# Patient Record
Sex: MALE | Race: WHITE | Employment: FULL TIME | URBAN - NONMETROPOLITAN AREA
[De-identification: names, ages, dates, MRNs, and addresses within clinical notes are randomized per-mention and may not be internally consistent; named-entity substitution may affect disease eponyms.]

---

## 2017-06-11 ENCOUNTER — HOSPITAL ENCOUNTER (EMERGENCY)
Age: 42
Discharge: HOME | End: 2017-06-12
Payer: COMMERCIAL

## 2017-06-11 ENCOUNTER — HOSPITAL ENCOUNTER (EMERGENCY)
Facility: HOSPITAL | Age: 42
Discharge: LEFT BEFORE DISCHARGE | End: 2017-06-11

## 2017-06-11 VITALS
SYSTOLIC BLOOD PRESSURE: 130 MMHG | BODY MASS INDEX: 43.39 KG/M2 | HEIGHT: 66 IN | WEIGHT: 270 LBS | OXYGEN SATURATION: 100 % | TEMPERATURE: 97.7 F | RESPIRATION RATE: 22 BRPM | HEART RATE: 103 BPM | DIASTOLIC BLOOD PRESSURE: 72 MMHG

## 2017-06-11 DIAGNOSIS — S39.012A BACK STRAIN, INITIAL ENCOUNTER: Primary | ICD-10-CM

## 2017-06-11 PROCEDURE — 99283 EMERGENCY DEPT VISIT LOW MDM: CPT

## 2017-06-11 PROCEDURE — 99211 OFF/OP EST MAY X REQ PHY/QHP: CPT

## 2017-06-11 PROCEDURE — 96372 THER/PROPH/DIAG INJ SC/IM: CPT

## 2017-06-11 RX ORDER — SERTRALINE HYDROCHLORIDE 25 MG/1
25 TABLET, FILM COATED ORAL
COMMUNITY

## 2017-06-11 RX ORDER — CYCLOBENZAPRINE HCL 5 MG
5 TABLET ORAL
COMMUNITY
End: 2017-09-15 | Stop reason: ALTCHOICE

## 2017-06-11 RX ORDER — SERTRALINE HYDROCHLORIDE 25 MG/1
25 TABLET, FILM COATED ORAL DAILY
COMMUNITY
End: 2020-11-17

## 2017-06-11 RX ORDER — CYCLOBENZAPRINE HCL 5 MG
5 TABLET ORAL 3 TIMES DAILY PRN
COMMUNITY
End: 2020-11-17

## 2017-06-11 ASSESSMENT — PAIN SCALES - GENERAL: PAINLEVEL_OUTOF10: 10

## 2017-06-11 ASSESSMENT — PAIN DESCRIPTION - PAIN TYPE: TYPE: ACUTE PAIN

## 2017-06-11 ASSESSMENT — PAIN DESCRIPTION - LOCATION: LOCATION: BACK

## 2017-06-12 ENCOUNTER — APPOINTMENT (OUTPATIENT)
Dept: GENERAL RADIOLOGY | Age: 42
End: 2017-06-12
Payer: COMMERCIAL

## 2017-06-12 VITALS
TEMPERATURE: 98 F | RESPIRATION RATE: 17 BRPM | DIASTOLIC BLOOD PRESSURE: 70 MMHG | OXYGEN SATURATION: 98 % | BODY MASS INDEX: 42.38 KG/M2 | HEIGHT: 67 IN | HEART RATE: 72 BPM | SYSTOLIC BLOOD PRESSURE: 128 MMHG | WEIGHT: 270 LBS

## 2017-06-12 PROCEDURE — 99282 EMERGENCY DEPT VISIT SF MDM: CPT | Performed by: NURSE PRACTITIONER

## 2017-06-12 PROCEDURE — 6360000002 HC RX W HCPCS: Performed by: NURSE PRACTITIONER

## 2017-06-12 PROCEDURE — 72100 X-RAY EXAM L-S SPINE 2/3 VWS: CPT

## 2017-06-12 RX ORDER — KETOROLAC TROMETHAMINE 30 MG/ML
60 INJECTION, SOLUTION INTRAMUSCULAR; INTRAVENOUS ONCE
Status: COMPLETED | OUTPATIENT
Start: 2017-06-12 | End: 2017-06-12

## 2017-06-12 RX ORDER — DEXAMETHASONE SODIUM PHOSPHATE 10 MG/ML
10 INJECTION INTRAMUSCULAR; INTRAVENOUS ONCE
Status: COMPLETED | OUTPATIENT
Start: 2017-06-12 | End: 2017-06-12

## 2017-06-12 RX ORDER — HYDROCODONE BITARTRATE AND ACETAMINOPHEN 5; 325 MG/1; MG/1
1 TABLET ORAL EVERY 6 HOURS PRN
Qty: 15 TABLET | Refills: 0 | Status: SHIPPED | OUTPATIENT
Start: 2017-06-12 | End: 2017-06-19

## 2017-06-12 RX ORDER — METHYLPREDNISOLONE 4 MG/1
TABLET ORAL
Qty: 1 KIT | Refills: 0 | Status: SHIPPED | OUTPATIENT
Start: 2017-06-12 | End: 2017-06-18

## 2017-06-12 RX ADMIN — DEXAMETHASONE SODIUM PHOSPHATE 10 MG: 10 INJECTION INTRAMUSCULAR; INTRAVENOUS at 00:41

## 2017-06-12 RX ADMIN — KETOROLAC TROMETHAMINE 60 MG: 30 INJECTION, SOLUTION INTRAMUSCULAR at 00:40

## 2017-06-12 RX ADMIN — HYDROMORPHONE HYDROCHLORIDE 1 MG: 1 INJECTION, SOLUTION INTRAMUSCULAR; INTRAVENOUS; SUBCUTANEOUS at 00:41

## 2017-06-12 ASSESSMENT — PAIN SCALES - GENERAL
PAINLEVEL_OUTOF10: 0
PAINLEVEL_OUTOF10: 7
PAINLEVEL_OUTOF10: 0

## 2017-06-12 ASSESSMENT — ENCOUNTER SYMPTOMS
ABDOMINAL PAIN: 0
BACK PAIN: 1

## 2017-09-15 ENCOUNTER — APPOINTMENT (OUTPATIENT)
Dept: GENERAL RADIOLOGY | Age: 42
End: 2017-09-15
Payer: COMMERCIAL

## 2017-09-15 ENCOUNTER — HOSPITAL ENCOUNTER (EMERGENCY)
Age: 42
Discharge: HOME | End: 2017-09-15
Attending: EMERGENCY MEDICINE
Payer: COMMERCIAL

## 2017-09-15 VITALS
WEIGHT: 245 LBS | RESPIRATION RATE: 18 BRPM | BODY MASS INDEX: 38.45 KG/M2 | DIASTOLIC BLOOD PRESSURE: 79 MMHG | HEIGHT: 67 IN | HEART RATE: 88 BPM | SYSTOLIC BLOOD PRESSURE: 138 MMHG | OXYGEN SATURATION: 95 % | TEMPERATURE: 98.3 F

## 2017-09-15 DIAGNOSIS — R07.89 MUSCULOSKELETAL CHEST PAIN: Primary | ICD-10-CM

## 2017-09-15 LAB
ALBUMIN SERPL-MCNC: 4.2 G/DL (ref 3.5–5.2)
ALP BLD-CCNC: 84 U/L (ref 40–130)
ALT SERPL-CCNC: 29 U/L (ref 5–41)
ANION GAP SERPL CALCULATED.3IONS-SCNC: 14 MMOL/L (ref 7–19)
AST SERPL-CCNC: 21 U/L (ref 5–40)
BASOPHILS ABSOLUTE: 0.1 K/UL (ref 0–0.2)
BASOPHILS RELATIVE PERCENT: 0.5 % (ref 0–1)
BILIRUB SERPL-MCNC: 0.5 MG/DL (ref 0.2–1.2)
BUN BLDV-MCNC: 7 MG/DL (ref 6–20)
CALCIUM SERPL-MCNC: 9.6 MG/DL (ref 8.6–10)
CHLORIDE BLD-SCNC: 101 MMOL/L (ref 98–111)
CO2: 23 MMOL/L (ref 22–29)
CREAT SERPL-MCNC: 0.8 MG/DL (ref 0.5–1.2)
EOSINOPHILS ABSOLUTE: 0 K/UL (ref 0–0.6)
EOSINOPHILS RELATIVE PERCENT: 0 % (ref 0–5)
ETHANOL: 23 MG/DL (ref 0–0.08)
GFR NON-AFRICAN AMERICAN: >60
GLUCOSE BLD-MCNC: 97 MG/DL (ref 74–109)
HCT VFR BLD CALC: 43.8 % (ref 42–52)
HEMOGLOBIN: 15 G/DL (ref 14–18)
LYMPHOCYTES ABSOLUTE: 1.5 K/UL (ref 1.1–4.5)
LYMPHOCYTES RELATIVE PERCENT: 13.5 % (ref 20–40)
MCH RBC QN AUTO: 29 PG (ref 27–31)
MCHC RBC AUTO-ENTMCNC: 34.2 G/DL (ref 33–37)
MCV RBC AUTO: 84.7 FL (ref 80–94)
MONOCYTES ABSOLUTE: 0.7 K/UL (ref 0–0.9)
MONOCYTES RELATIVE PERCENT: 6.5 % (ref 0–10)
NEUTROPHILS ABSOLUTE: 8.8 K/UL (ref 1.5–7.5)
NEUTROPHILS RELATIVE PERCENT: 78.6 % (ref 50–65)
PDW BLD-RTO: 12.7 % (ref 11.5–14.5)
PERFORMED ON: NORMAL
PLATELET # BLD: 392 K/UL (ref 130–400)
PMV BLD AUTO: 8.8 FL (ref 9.4–12.4)
POC TROPONIN I: 0.01 NG/ML (ref 0–0.08)
POTASSIUM SERPL-SCNC: 4.1 MMOL/L (ref 3.5–5)
RBC # BLD: 5.17 M/UL (ref 4.7–6.1)
SODIUM BLD-SCNC: 138 MMOL/L (ref 136–145)
TOTAL PROTEIN: 7.3 G/DL (ref 6.6–8.7)
WBC # BLD: 11.3 K/UL (ref 4.8–10.8)

## 2017-09-15 PROCEDURE — 96375 TX/PRO/DX INJ NEW DRUG ADDON: CPT

## 2017-09-15 PROCEDURE — 6360000002 HC RX W HCPCS: Performed by: EMERGENCY MEDICINE

## 2017-09-15 PROCEDURE — 80053 COMPREHEN METABOLIC PANEL: CPT

## 2017-09-15 PROCEDURE — G0480 DRUG TEST DEF 1-7 CLASSES: HCPCS

## 2017-09-15 PROCEDURE — 84484 ASSAY OF TROPONIN QUANT: CPT

## 2017-09-15 PROCEDURE — 99283 EMERGENCY DEPT VISIT LOW MDM: CPT | Performed by: EMERGENCY MEDICINE

## 2017-09-15 PROCEDURE — 71010 XR CHEST PORTABLE: CPT

## 2017-09-15 PROCEDURE — 36415 COLL VENOUS BLD VENIPUNCTURE: CPT

## 2017-09-15 PROCEDURE — 99285 EMERGENCY DEPT VISIT HI MDM: CPT

## 2017-09-15 PROCEDURE — 96374 THER/PROPH/DIAG INJ IV PUSH: CPT

## 2017-09-15 PROCEDURE — 85025 COMPLETE CBC W/AUTO DIFF WBC: CPT

## 2017-09-15 RX ORDER — HYDROCODONE BITARTRATE AND IBUPROFEN 7.5; 2 MG/1; MG/1
1 TABLET, FILM COATED ORAL EVERY 6 HOURS PRN
Qty: 10 TABLET | Refills: 0 | Status: SHIPPED | OUTPATIENT
Start: 2017-09-15 | End: 2018-05-17

## 2017-09-15 RX ORDER — ORPHENADRINE CITRATE 30 MG/ML
60 INJECTION INTRAMUSCULAR; INTRAVENOUS ONCE
Status: COMPLETED | OUTPATIENT
Start: 2017-09-15 | End: 2017-09-15

## 2017-09-15 RX ORDER — KETOROLAC TROMETHAMINE 30 MG/ML
30 INJECTION, SOLUTION INTRAMUSCULAR; INTRAVENOUS ONCE
Status: COMPLETED | OUTPATIENT
Start: 2017-09-15 | End: 2017-09-15

## 2017-09-15 RX ADMIN — KETOROLAC TROMETHAMINE 30 MG: 30 INJECTION, SOLUTION INTRAMUSCULAR at 18:31

## 2017-09-15 RX ADMIN — ORPHENADRINE CITRATE 60 MG: 30 INJECTION INTRAMUSCULAR; INTRAVENOUS at 18:31

## 2017-09-15 ASSESSMENT — ENCOUNTER SYMPTOMS
SHORTNESS OF BREATH: 0
VOMITING: 0
NAUSEA: 0
COUGH: 0
BACK PAIN: 0

## 2017-09-15 ASSESSMENT — PAIN SCALES - GENERAL
PAINLEVEL_OUTOF10: 6
PAINLEVEL_OUTOF10: 6

## 2018-05-16 ENCOUNTER — APPOINTMENT (OUTPATIENT)
Dept: GENERAL RADIOLOGY | Facility: HOSPITAL | Age: 43
End: 2018-05-16

## 2018-05-16 ENCOUNTER — HOSPITAL ENCOUNTER (EMERGENCY)
Facility: HOSPITAL | Age: 43
Discharge: HOME | End: 2018-05-17
Attending: EMERGENCY MEDICINE | Admitting: EMERGENCY MEDICINE

## 2018-05-16 DIAGNOSIS — R07.9 CHEST PAIN, UNSPECIFIED TYPE: Primary | ICD-10-CM

## 2018-05-16 LAB
ALBUMIN SERPL-MCNC: 3.9 G/DL (ref 3.5–5)
ALBUMIN/GLOB SERPL: 1.6 G/DL (ref 1.1–2.5)
ALP SERPL-CCNC: 71 U/L (ref 24–120)
ALT SERPL W P-5'-P-CCNC: 26 U/L (ref 0–54)
ANION GAP SERPL CALCULATED.3IONS-SCNC: 13 MMOL/L (ref 4–13)
AST SERPL-CCNC: 21 U/L (ref 7–45)
BASOPHILS # BLD AUTO: 0.04 10*3/MM3 (ref 0–0.2)
BASOPHILS NFR BLD AUTO: 0.5 % (ref 0–2)
BILIRUB SERPL-MCNC: 0.4 MG/DL (ref 0.1–1)
BUN BLD-MCNC: 15 MG/DL (ref 5–21)
BUN/CREAT SERPL: 17.4 (ref 7–25)
CALCIUM SPEC-SCNC: 8.5 MG/DL (ref 8.4–10.4)
CHLORIDE SERPL-SCNC: 105 MMOL/L (ref 98–110)
CO2 SERPL-SCNC: 24 MMOL/L (ref 24–31)
CREAT BLD-MCNC: 0.86 MG/DL (ref 0.5–1.4)
DEPRECATED RDW RBC AUTO: 34.9 FL (ref 40–54)
EOSINOPHIL # BLD AUTO: 0.06 10*3/MM3 (ref 0–0.7)
EOSINOPHIL NFR BLD AUTO: 0.7 % (ref 0–4)
ERYTHROCYTE [DISTWIDTH] IN BLOOD BY AUTOMATED COUNT: 12.1 % (ref 12–15)
GFR SERPL CREATININE-BSD FRML MDRD: 98 ML/MIN/1.73
GLOBULIN UR ELPH-MCNC: 2.5 GM/DL
GLUCOSE BLD-MCNC: 93 MG/DL (ref 70–100)
HCT VFR BLD AUTO: 41.4 % (ref 40–52)
HGB BLD-MCNC: 14.7 G/DL (ref 14–18)
HOLD SPECIMEN: NORMAL
HOLD SPECIMEN: NORMAL
IMM GRANULOCYTES # BLD: 0.04 10*3/MM3 (ref 0–0.03)
IMM GRANULOCYTES NFR BLD: 0.5 % (ref 0–5)
INR PPP: 0.96 (ref 0.91–1.09)
LYMPHOCYTES # BLD AUTO: 2 10*3/MM3 (ref 0.72–4.86)
LYMPHOCYTES NFR BLD AUTO: 22.8 % (ref 15–45)
MCH RBC QN AUTO: 28.3 PG (ref 28–32)
MCHC RBC AUTO-ENTMCNC: 35.5 G/DL (ref 33–36)
MCV RBC AUTO: 79.8 FL (ref 82–95)
MONOCYTES # BLD AUTO: 0.68 10*3/MM3 (ref 0.19–1.3)
MONOCYTES NFR BLD AUTO: 7.7 % (ref 4–12)
NEUTROPHILS # BLD AUTO: 5.97 10*3/MM3 (ref 1.87–8.4)
NEUTROPHILS NFR BLD AUTO: 67.8 % (ref 39–78)
NRBC BLD MANUAL-RTO: 0 /100 WBC (ref 0–0)
NT-PROBNP SERPL-MCNC: 35 PG/ML (ref 0–450)
PLATELET # BLD AUTO: 302 10*3/MM3 (ref 130–400)
PMV BLD AUTO: 8.9 FL (ref 6–12)
POTASSIUM BLD-SCNC: 3.6 MMOL/L (ref 3.5–5.3)
PROT SERPL-MCNC: 6.4 G/DL (ref 6.3–8.7)
PROTHROMBIN TIME: 13.1 SECONDS (ref 11.9–14.6)
RBC # BLD AUTO: 5.19 10*6/MM3 (ref 4.8–5.9)
SODIUM BLD-SCNC: 142 MMOL/L (ref 135–145)
TROPONIN I SERPL-MCNC: <0.012 NG/ML (ref 0–0.03)
WBC NRBC COR # BLD: 8.79 10*3/MM3 (ref 4.8–10.8)
WHOLE BLOOD HOLD SPECIMEN: NORMAL
WHOLE BLOOD HOLD SPECIMEN: NORMAL

## 2018-05-16 PROCEDURE — 71045 X-RAY EXAM CHEST 1 VIEW: CPT

## 2018-05-16 PROCEDURE — 85610 PROTHROMBIN TIME: CPT | Performed by: EMERGENCY MEDICINE

## 2018-05-16 PROCEDURE — 93010 ELECTROCARDIOGRAM REPORT: CPT | Performed by: INTERNAL MEDICINE

## 2018-05-16 PROCEDURE — 99284 EMERGENCY DEPT VISIT MOD MDM: CPT

## 2018-05-16 PROCEDURE — 80053 COMPREHEN METABOLIC PANEL: CPT | Performed by: EMERGENCY MEDICINE

## 2018-05-16 PROCEDURE — 84484 ASSAY OF TROPONIN QUANT: CPT | Performed by: EMERGENCY MEDICINE

## 2018-05-16 PROCEDURE — 93005 ELECTROCARDIOGRAM TRACING: CPT | Performed by: EMERGENCY MEDICINE

## 2018-05-16 PROCEDURE — 96374 THER/PROPH/DIAG INJ IV PUSH: CPT

## 2018-05-16 PROCEDURE — 85025 COMPLETE CBC W/AUTO DIFF WBC: CPT | Performed by: EMERGENCY MEDICINE

## 2018-05-16 PROCEDURE — 25010000002 KETOROLAC TROMETHAMINE PER 15 MG: Performed by: EMERGENCY MEDICINE

## 2018-05-16 PROCEDURE — 83880 ASSAY OF NATRIURETIC PEPTIDE: CPT | Performed by: EMERGENCY MEDICINE

## 2018-05-16 RX ORDER — KETOROLAC TROMETHAMINE 15 MG/ML
15 INJECTION, SOLUTION INTRAMUSCULAR; INTRAVENOUS ONCE
Status: COMPLETED | OUTPATIENT
Start: 2018-05-16 | End: 2018-05-16

## 2018-05-16 RX ORDER — SODIUM CHLORIDE 0.9 % (FLUSH) 0.9 %
10 SYRINGE (ML) INJECTION AS NEEDED
Status: DISCONTINUED | OUTPATIENT
Start: 2018-05-16 | End: 2018-05-17 | Stop reason: HOSPADM

## 2018-05-16 RX ORDER — ASPIRIN 81 MG/1
324 TABLET, CHEWABLE ORAL ONCE
Status: COMPLETED | OUTPATIENT
Start: 2018-05-16 | End: 2018-05-16

## 2018-05-16 RX ADMIN — ASPIRIN 81 MG 324 MG: 81 TABLET ORAL at 20:49

## 2018-05-16 RX ADMIN — KETOROLAC TROMETHAMINE 15 MG: 15 INJECTION, SOLUTION INTRAMUSCULAR; INTRAVENOUS at 22:19

## 2018-05-16 RX ADMIN — Medication 10 ML: at 22:20

## 2018-05-17 ENCOUNTER — APPOINTMENT (OUTPATIENT)
Dept: GENERAL RADIOLOGY | Age: 43
End: 2018-05-17
Payer: COMMERCIAL

## 2018-05-17 ENCOUNTER — HOSPITAL ENCOUNTER (EMERGENCY)
Age: 43
Discharge: ANOTHER HOSPITAL | End: 2018-05-18
Attending: EMERGENCY MEDICINE
Payer: COMMERCIAL

## 2018-05-17 VITALS
HEART RATE: 65 BPM | TEMPERATURE: 98 F | RESPIRATION RATE: 16 BRPM | DIASTOLIC BLOOD PRESSURE: 70 MMHG | BODY MASS INDEX: 40.18 KG/M2 | OXYGEN SATURATION: 99 % | HEIGHT: 66 IN | SYSTOLIC BLOOD PRESSURE: 125 MMHG | WEIGHT: 250 LBS

## 2018-05-17 DIAGNOSIS — R07.9 CHEST PAIN, UNSPECIFIED TYPE: Primary | ICD-10-CM

## 2018-05-17 DIAGNOSIS — Z95.810 ICD (IMPLANTABLE CARDIOVERTER-DEFIBRILLATOR) IN PLACE: ICD-10-CM

## 2018-05-17 DIAGNOSIS — Z86.79 HISTORY OF HYPERTROPHIC CARDIOMYOPATHY: ICD-10-CM

## 2018-05-17 LAB
ALBUMIN SERPL-MCNC: 4.5 G/DL (ref 3.5–5.2)
ALP BLD-CCNC: 92 U/L (ref 40–130)
ALT SERPL-CCNC: 18 U/L (ref 5–41)
ANION GAP SERPL CALCULATED.3IONS-SCNC: 13 MMOL/L (ref 7–19)
AST SERPL-CCNC: 16 U/L (ref 5–40)
BASOPHILS ABSOLUTE: 0.1 K/UL (ref 0–0.2)
BASOPHILS RELATIVE PERCENT: 0.5 % (ref 0–1)
BILIRUB SERPL-MCNC: 0.5 MG/DL (ref 0.2–1.2)
BUN BLDV-MCNC: 13 MG/DL (ref 6–20)
CALCIUM SERPL-MCNC: 9.4 MG/DL (ref 8.6–10)
CHLORIDE BLD-SCNC: 102 MMOL/L (ref 98–111)
CO2: 25 MMOL/L (ref 22–29)
CREAT SERPL-MCNC: 0.7 MG/DL (ref 0.5–1.2)
EOSINOPHILS ABSOLUTE: 0 K/UL (ref 0–0.6)
EOSINOPHILS RELATIVE PERCENT: 0.3 % (ref 0–5)
GFR NON-AFRICAN AMERICAN: >60
GLUCOSE BLD-MCNC: 89 MG/DL (ref 74–109)
HCT VFR BLD CALC: 43.2 % (ref 42–52)
HEMOGLOBIN: 15 G/DL (ref 14–18)
LYMPHOCYTES ABSOLUTE: 2.1 K/UL (ref 1.1–4.5)
LYMPHOCYTES RELATIVE PERCENT: 21 % (ref 20–40)
MCH RBC QN AUTO: 28.5 PG (ref 27–31)
MCHC RBC AUTO-ENTMCNC: 34.7 G/DL (ref 33–37)
MCV RBC AUTO: 82 FL (ref 80–94)
MONOCYTES ABSOLUTE: 0.6 K/UL (ref 0–0.9)
MONOCYTES RELATIVE PERCENT: 6.3 % (ref 0–10)
NEUTROPHILS ABSOLUTE: 7.1 K/UL (ref 1.5–7.5)
NEUTROPHILS RELATIVE PERCENT: 71.4 % (ref 50–65)
PDW BLD-RTO: 12.2 % (ref 11.5–14.5)
PERFORMED ON: NORMAL
PERFORMED ON: NORMAL
PLATELET # BLD: 302 K/UL (ref 130–400)
PMV BLD AUTO: 8.9 FL (ref 9.4–12.4)
POC TROPONIN I: 0 NG/ML (ref 0–0.08)
POC TROPONIN I: 0.01 NG/ML (ref 0–0.08)
POTASSIUM SERPL-SCNC: 4.1 MMOL/L (ref 3.5–5)
PRO-BNP: 62 PG/ML (ref 0–450)
RBC # BLD: 5.27 M/UL (ref 4.7–6.1)
SODIUM BLD-SCNC: 140 MMOL/L (ref 136–145)
TOTAL PROTEIN: 7.3 G/DL (ref 6.6–8.7)
TROPONIN I SERPL-MCNC: <0.012 NG/ML (ref 0–0.03)
TROPONIN: <0.01 NG/ML (ref 0–0.03)
WBC # BLD: 10 K/UL (ref 4.8–10.8)

## 2018-05-17 PROCEDURE — 80053 COMPREHEN METABOLIC PANEL: CPT

## 2018-05-17 PROCEDURE — 84484 ASSAY OF TROPONIN QUANT: CPT

## 2018-05-17 PROCEDURE — 83880 ASSAY OF NATRIURETIC PEPTIDE: CPT

## 2018-05-17 PROCEDURE — 71045 X-RAY EXAM CHEST 1 VIEW: CPT

## 2018-05-17 PROCEDURE — 99285 EMERGENCY DEPT VISIT HI MDM: CPT

## 2018-05-17 PROCEDURE — 36415 COLL VENOUS BLD VENIPUNCTURE: CPT

## 2018-05-17 PROCEDURE — 93005 ELECTROCARDIOGRAM TRACING: CPT

## 2018-05-17 PROCEDURE — 85025 COMPLETE CBC W/AUTO DIFF WBC: CPT

## 2018-05-17 ASSESSMENT — ENCOUNTER SYMPTOMS
SHORTNESS OF BREATH: 0
EYE DISCHARGE: 0
ABDOMINAL PAIN: 0
WHEEZING: 0
DIARRHEA: 0
SORE THROAT: 0
BACK PAIN: 0
VOMITING: 0
COUGH: 0
NAUSEA: 1

## 2018-05-17 ASSESSMENT — PAIN SCALES - GENERAL: PAINLEVEL_OUTOF10: 5

## 2018-05-17 NOTE — DISCHARGE INSTRUCTIONS
Read all instructions in this handout.     Call your primary care physician for a follow up appointment in 1-2 days.     Return to the emergency department as soon as possible for worsening of your symptoms or for any other concerns that you may have.

## 2018-05-17 NOTE — ED PROVIDER NOTES
[facility]  emergency department encounter      Pt Name: Feliberto Knowles  MRN: [id number]  Birthdate 1975  Date of evaluation: 5/17/2018      CHIEF COMPLAINT       Chief Complaint   Patient presents with   • Chest Pain       Nurses Notes reviewed and I agree except as noted in the HPI.      HISTORY OF PRESENT ILLNESS    Feliberto Knowles is a 42 y.o. male who presents To the emergency department complaining of left-sided chest pain which he describes as a pressure-type sensation over the past 4 days.  Patient notes that he has been working in the garage quite a bit over that time.  She is not normal for him.  He denies any shortness breath, nausea, vomiting, diaphoresis, light headedness, fever, chills.  Patient does have a history of HOCM as a pacemaker and a third liter.     REVIEW OF SYSTEMS     Review of Systems  CONSTITUTION: No Fever, No chills, No activity change, No diaphoresis, No unexpected wt change.    HENT: No congestion, no dental problems, no ear pain or discharge,   EYES: No drainage, no itching, no photophobia, no visual disturbance  RESPIRATORY: No apnea, no chest tightness, no cough, no shortness of breath, no stridor, no wheezing  CARDIOVASCULAR: Chest pain, no palpitations, no leg swelling  GI: No abdominal distention, no abdominal pain, no rectal bleeding, no melena, no hematochezia, no diarrhea, no nausea, no vomiting  ENDOCRINE: No polydipsia, no polyphagia, no polyuria, no cold or heat intolerance  : No difficulty urinating, no dyspareunia, no dysuria, no flank pain, no frequency, no genital sore, no hematuria, no menstrual problem if female, no decreased urination, no hesitation of urination, no vaginal discharge if female, no vaginal pain if female no penile pain if male  ALLERGY/IMMUNO:  No env or food allergies, not immunocompromised  NEUROLOGICAL: No dizziness, no facial asymmetry, no Headaches, no Light-headedness, no numbness nor paresthesias, no seizures, no speech  "difficulty, No syncope, no tremors, no weakness  HEMATOLOGIC: No adenopathy, no unusual bleeding or bruising  MUSC: No arthralgia, no back pain, no joint swelling, no myalgias, no neck pain, no neck stiffness  SKIN: No rash, no color change, no pallor, no wound  PSYCH: No agitation, no behavior problem, no confusion, no decrease concentration, no hallucinations, no suicidal ideation, no homicidal ideation, no self injury, no sleep disturbance      PAST MEDICAL HISTORY     Past Medical History:   Diagnosis Date   • Anger    • Anxiety    • Coronary artery disease    • Hypertension    • Hypertrophic obstructive cardiomyopathy (HOCM)        SURGICAL HISTORY      has a past surgical history that includes Cardiac pacemaker placement; Carpal tunnel release; Adenoidectomy; Ankle surgery; Coronary artery bypass graft; and Cardiac catheterization.    CURRENT MEDICATIONS        Medication List      CONTINUE taking these medications    FLEXERIL 5 MG tablet  Generic drug:  cyclobenzaprine     FLUoxetine 20 MG capsule  Commonly known as:  PROzac     metoprolol tartrate 25 MG tablet  Commonly known as:  LOPRESSOR     sertraline 25 MG tablet  Commonly known as:  ZOLOFT          ALLERGIES     has No Known Allergies.    FAMILY HISTORY     has no family status information on file.    family history is not on file.    SOCIAL HISTORY      reports that he has never smoked. He does not have any smokeless tobacco history on file. He reports that he does not drink alcohol or use drugs.    PHYSICAL EXAM     INITIAL VITALS:  height is 167.6 cm (66\") and weight is 113 kg (250 lb). His temperature is 98 °F (36.7 °C). His blood pressure is 125/70 and his pulse is 65. His respiration is 16 and oxygen saturation is 99%.    Physical Exam    CONSTITUTIONAL: Well developed, well nourished, not diaphoretic nor distressed  HENT: Normocephalic, atraumatic, oropharynx clear and moist  EYES: PERRL, EOM normal, no discharge, no scleral icterus  NECK: ROM " normal, supple, no tracheal deviation nor JVD, no stridor  CARDIOVASCULAR: Normal rate and rhythm, heart sounds normal, no rub no gallop, intact distal pulses, normal cap refill  PULMONARY: Normal effort and breath sounds, no distress, no wheezes, rhonchi or rales, no chest tenderness  ABDOMINAL: Soft, nontender, no guarding, no mass, no rebound, no hernia  GENITOURINARY/ANORECTAL: deferred  MUSCULOSKELETAL: Left anterior chest wall tenderness, ROM normal, no deformity, no edema  NEUROLOGICAL: Alert, oriented x 3, DTRS normal, CN x 10 normal, normal tone  SKIN: Warm, dry, no erythema, no rash, normal color  PSYCH: Mood and affect normal, behavior normal, thought content and judgement normal.    DIAGNOSTIC RESULTS     EKG: All EKG's are interpreted by the Emergency Department Physician who either signs or Co-signs this chart in the absence of a cardiologist.  EKG #1 performed at 2030 shows normal sinus rhythm with rate of 89 bpm, normal axis, prolonged QT interval of 503 ms, nonspecific ST and T-wave changes, left on a red block.  No change in EKG as compared to EKG performed November 14, 2016    EKG #2 performed at 2328 shows no acute changes from EKG #1    RADIOLOGY: non-plain film images(s) such as CT, Ultrasound and MRI are read by the radiologist.  Plain radiographic images are visualized and preliminarily interpreted by the emergency physician unless otherwise stated below.  Xr Chest 1 View    Result Date: 5/16/2018  1. No acute cardiopulmonary process.  This report was finalized on 05/16/2018 21:04 by Dr. Oliver Escudero MD.             LABS:   Lab Results (last 24 hours)     Procedure Component Value Units Date/Time    CBC & Differential [[id number]] Collected:  05/16/18 2038    Specimen:  Blood Updated:  05/16/18 2045    Narrative:       The following orders were created for panel order CBC & Differential.  Procedure                               Abnormality         Status                     ---------                                -----------         ------                     CBC Auto Differential[[id number]]        Abnormal            Final result                 Please view results for these tests on the individual orders.    Comprehensive Metabolic Panel [[id number]] Collected:  05/16/18 2038    Specimen:  Blood Updated:  05/16/18 2057     Glucose 93 mg/dL      BUN 15 mg/dL      Creatinine 0.86 mg/dL      Sodium 142 mmol/L      Potassium 3.6 mmol/L      Chloride 105 mmol/L      CO2 24.0 mmol/L      Calcium 8.5 mg/dL      Total Protein 6.4 g/dL      Albumin 3.90 g/dL      ALT (SGPT) 26 U/L      AST (SGOT) 21 U/L      Alkaline Phosphatase 71 U/L      Total Bilirubin 0.4 mg/dL      eGFR Non African Amer 98 mL/min/1.73      Globulin 2.5 gm/dL      A/G Ratio 1.6 g/dL      BUN/Creatinine Ratio 17.4     Anion Gap 13.0 mmol/L     Troponin [[id number]]  (Normal) Collected:  05/16/18 2038    Specimen:  Blood Updated:  05/16/18 2108     Troponin I <0.012 ng/mL     proBNP [[id number]]  (Normal) Collected:  05/16/18 2038    Specimen:  Blood Updated:  05/16/18 2108     proBNP 35.0 pg/mL     CBC Auto Differential [[id number]]  (Abnormal) Collected:  05/16/18 2038    Specimen:  Blood Updated:  05/16/18 2045     WBC 8.79 10*3/mm3      RBC 5.19 10*6/mm3      Hemoglobin 14.7 g/dL      Hematocrit 41.4 %      MCV 79.8 (L) fL      MCH 28.3 pg      MCHC 35.5 g/dL      RDW 12.1 %      RDW-SD 34.9 (L) fl      MPV 8.9 fL      Platelets 302 10*3/mm3      Neutrophil % 67.8 %      Lymphocyte % 22.8 %      Monocyte % 7.7 %      Eosinophil % 0.7 %      Basophil % 0.5 %      Immature Grans % 0.5 %      Neutrophils, Absolute 5.97 10*3/mm3      Lymphocytes, Absolute 2.00 10*3/mm3      Monocytes, Absolute 0.68 10*3/mm3      Eosinophils, Absolute 0.06 10*3/mm3      Basophils, Absolute 0.04 10*3/mm3      Immature Grans, Absolute 0.04 (H) 10*3/mm3      nRBC 0.0 /100 WBC     Protime-INR [[id number]]  (Normal) Collected:  05/16/18 2039    Specimen:  Blood Updated:   05/16/18 2053     Protime 13.1 Seconds      INR 0.96    Troponin [[id number]]  (Normal) Collected:  05/16/18 2337    Specimen:  Blood Updated:  05/17/18 0008     Troponin I <0.012 ng/mL           EMERGENCY DEPARTMENT COURSE:   Vitals:    Vitals:    05/16/18 2246 05/16/18 2301 05/16/18 2331 05/17/18 0256   BP: 125/84 121/75 128/83 125/70   Pulse: 77 73 73 65   Resp:    16   Temp:    98 °F (36.7 °C)   TempSrc:       SpO2: 96% 97% 95% 99%   Weight:       Height:           The patient was given the following medications:  Medications   aspirin chewable tablet 324 mg (324 mg Oral Given 5/16/18 2049)   ketorolac (TORADOL) injection 15 mg (15 mg Intravenous Given 5/16/18 2219)        patient presents with left-sided chest pain for 4 days.  He has tenderness to palpation to his left chest.  Exam is otherwise unremarkable.  EKGs show no acute changes.  Labs and chest x-ray are unremarkable.  Patient is a Sensormatic after aspirin and Toradol.  Patient wants to go home.  He will follow-up with his PCP or his cardiologist.  Patient is comfortable at time of discharge and agreeable with plan of care.    CRITICAL CARE:  none    CONSULTS:  none    PROCEDURES:  None    FINAL IMPRESSION      1. Chest pain, unspecified type          DISPOSITION/PLAN   DC      PATIENT REFERRED TO:  Trey Arciniega MD  [address]  [place] [address] [phone]    Schedule an appointment as soon as possible for a visit in 2 days        DISCHARGE MEDICATIONS:     Medication List      CONTINUE taking these medications    FLEXERIL 5 MG tablet  Generic drug:  cyclobenzaprine     FLUoxetine 20 MG capsule  Commonly known as:  PROzac     metoprolol tartrate 25 MG tablet  Commonly known as:  LOPRESSOR     sertraline 25 MG tablet  Commonly known as:  ZOLOFT          (Please note that portions of this note were completed with a voice recognition program.)    Spenser Mcnulty, DO Spenser Mcnulty DO  05/17/18 0742

## 2018-05-18 VITALS
RESPIRATION RATE: 18 BRPM | TEMPERATURE: 98.1 F | DIASTOLIC BLOOD PRESSURE: 83 MMHG | HEART RATE: 74 BPM | BODY MASS INDEX: 39.37 KG/M2 | OXYGEN SATURATION: 98 % | HEIGHT: 66 IN | WEIGHT: 245 LBS | SYSTOLIC BLOOD PRESSURE: 141 MMHG

## 2018-05-18 PROCEDURE — 6370000000 HC RX 637 (ALT 250 FOR IP): Performed by: NURSE PRACTITIONER

## 2018-05-18 PROCEDURE — 84484 ASSAY OF TROPONIN QUANT: CPT

## 2018-05-18 PROCEDURE — 99285 EMERGENCY DEPT VISIT HI MDM: CPT | Performed by: EMERGENCY MEDICINE

## 2018-05-18 RX ORDER — ASPIRIN 325 MG
325 TABLET ORAL ONCE
Status: COMPLETED | OUTPATIENT
Start: 2018-05-18 | End: 2018-05-18

## 2018-05-18 RX ADMIN — ASPIRIN 325 MG ORAL TABLET 325 MG: 325 PILL ORAL at 01:42

## 2018-05-18 NOTE — ED NOTES
Updated family and pt on plan of care and that I am waiting to hear back from the [place].      Lily Mae RN  05/18/18 3515

## 2018-05-18 NOTE — ED PROVIDER NOTES
for abdominal pain, diarrhea and vomiting. Genitourinary: Negative for dysuria, frequency, hematuria and urgency. Musculoskeletal: Negative for back pain and neck pain. Skin: Negative for rash. Neurological: Negative for dizziness and headaches. Except as noted above the remainder of the review of systems was reviewed and negative. PAST MEDICAL HISTORY     Past Medical History:   Diagnosis Date    Cardiomyopathy [facility])     Pacemaker          SURGICAL HISTORY       Past Surgical History:   Procedure Laterality Date    ANKLE SURGERY Right     APPENDECTOMY      CARDIAC SURGERY  05/2012    cardiomypopathy with obstruction         CURRENT MEDICATIONS       Previous Medications    METOPROLOL TARTRATE (LOPRESSOR) 25 MG TABLET    Take 25 mg by mouth daily    SERTRALINE (ZOLOFT) 25 MG TABLET    Take 25 mg by mouth       ALLERGIES     Patient has no known allergies. FAMILY HISTORY     History reviewed. No pertinent family history. SOCIAL HISTORY       Social History     Social History    Marital status:      Spouse name: N/A    Number of children: N/A    Years of education: N/A     Social History Main Topics    Smoking status: Never Smoker    Smokeless tobacco: Current User     Types: Snuff    Alcohol use Yes      Comment: Occaisionally    Drug use: No    Sexual activity: Not Asked     Other Topics Concern    None     Social History Narrative    None       SCREENINGS    [place] Coma Scale  Eye Opening: Spontaneous  Best Verbal Response: Oriented  Best Motor Response: Obeys commands  [place] Coma Scale Score: 15      PHYSICAL EXAM    (up to 7 for level 4, 8 or more for level 5)     ED Triage Vitals [05/17/18 2039]   BP Temp Temp src Pulse Resp SpO2 Height Weight   125/75 98 °F (36.7 °C) -- 61 18 95 % 5' 6\" (1.676 m) 245 lb (111.1 kg)       Physical Exam   Constitutional: He is oriented to person, place, and time. He appears well-developed and well-nourished. No distress.

## 2018-05-19 LAB
EKG P AXIS: 10 DEGREES
EKG P AXIS: 8 DEGREES
EKG P-R INTERVAL: 196 MS
EKG P-R INTERVAL: 200 MS
EKG Q-T INTERVAL: 492 MS
EKG Q-T INTERVAL: 516 MS
EKG QRS DURATION: 138 MS
EKG QRS DURATION: 142 MS
EKG QTC CALCULATION (BAZETT): 491 MS
EKG QTC CALCULATION (BAZETT): 504 MS
EKG T AXIS: 118 DEGREES
EKG T AXIS: 119 DEGREES

## 2018-09-01 ENCOUNTER — APPOINTMENT (OUTPATIENT)
Dept: GENERAL RADIOLOGY | Age: 43
End: 2018-09-01
Payer: OTHER GOVERNMENT

## 2018-09-01 ENCOUNTER — HOSPITAL ENCOUNTER (EMERGENCY)
Age: 43
Discharge: HOME | End: 2018-09-01
Attending: FAMILY MEDICINE
Payer: OTHER GOVERNMENT

## 2018-09-01 VITALS
HEART RATE: 80 BPM | TEMPERATURE: 97.8 F | OXYGEN SATURATION: 96 % | RESPIRATION RATE: 18 BRPM | WEIGHT: 240 LBS | BODY MASS INDEX: 38.74 KG/M2 | SYSTOLIC BLOOD PRESSURE: 130 MMHG | DIASTOLIC BLOOD PRESSURE: 76 MMHG

## 2018-09-01 DIAGNOSIS — S16.1XXA STRAIN OF NECK MUSCLE, INITIAL ENCOUNTER: Primary | ICD-10-CM
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
## 2018-09-01 PROCEDURE — 6360000002 HC RX W HCPCS: Performed by: FAMILY MEDICINE

## 2018-09-01 PROCEDURE — 99284 EMERGENCY DEPT VISIT MOD MDM: CPT | Performed by: FAMILY MEDICINE

## 2018-09-01 PROCEDURE — 72040 X-RAY EXAM NECK SPINE 2-3 VW: CPT

## 2018-09-01 PROCEDURE — 6370000000 HC RX 637 (ALT 250 FOR IP): Performed by: FAMILY MEDICINE

## 2018-09-01 PROCEDURE — 99284 EMERGENCY DEPT VISIT MOD MDM: CPT

## 2018-09-01 RX ORDER — ONDANSETRON 4 MG/1
4 TABLET, ORALLY DISINTEGRATING ORAL ONCE
Status: COMPLETED | OUTPATIENT
Start: 2018-09-01 | End: 2018-09-01

## 2018-09-01 RX ORDER — NAPROXEN SODIUM 550 MG/1
550 TABLET ORAL 2 TIMES DAILY WITH MEALS
Qty: 10 TABLET | Refills: 0 | Status: SHIPPED | OUTPATIENT
Start: 2018-09-01 | End: 2020-06-25

## 2018-09-01 RX ORDER — ONDANSETRON 4 MG/1
4 TABLET, ORALLY DISINTEGRATING ORAL EVERY 8 HOURS PRN
Qty: 15 TABLET | Refills: 0 | Status: SHIPPED | OUTPATIENT
Start: 2018-09-01 | End: 2020-06-25

## 2018-09-01 RX ORDER — CYCLOBENZAPRINE HCL 10 MG
10 TABLET ORAL 3 TIMES DAILY PRN
Qty: 15 TABLET | Refills: 0 | Status: SHIPPED | OUTPATIENT
Start: 2018-09-01 | End: 2018-09-06

## 2018-09-01 RX ORDER — HYDROCODONE BITARTRATE AND ACETAMINOPHEN 7.5; 325 MG/1; MG/1
1 TABLET ORAL ONCE
Status: COMPLETED | OUTPATIENT
Start: 2018-09-01 | End: 2018-09-01

## 2018-09-01 RX ADMIN — ONDANSETRON 4 MG: 4 TABLET, ORALLY DISINTEGRATING ORAL at 19:19

## 2018-09-01 RX ADMIN — HYDROCODONE BITARTRATE AND ACETAMINOPHEN 1 TABLET: 7.5; 325 TABLET ORAL at 19:19

## 2018-09-01 ASSESSMENT — ENCOUNTER SYMPTOMS
CONSTIPATION: 0
NAUSEA: 0
SORE THROAT: 0
ABDOMINAL PAIN: 0
WHEEZING: 0
BACK PAIN: 0
ABDOMINAL DISTENTION: 0
COUGH: 0
SHORTNESS OF BREATH: 0
PHOTOPHOBIA: 0
DIARRHEA: 0

## 2018-09-01 ASSESSMENT — PAIN SCALES - GENERAL
PAINLEVEL_OUTOF10: 4
PAINLEVEL_OUTOF10: 4
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
## 2018-09-01 NOTE — ED NOTES
Bed: 05  Expected date:   Expected time:   Means of arrival: [facility]  Comments:  [address], RN  09/01/18 4882

## 2018-09-05 ENCOUNTER — HOSPITAL ENCOUNTER (EMERGENCY)
Age: 43
Discharge: HOME | End: 2018-09-06
Payer: OTHER MISCELLANEOUS

## 2018-09-05 ENCOUNTER — APPOINTMENT (OUTPATIENT)
Dept: CT IMAGING | Age: 43
End: 2018-09-05
Payer: OTHER MISCELLANEOUS

## 2018-09-05 DIAGNOSIS — G44.319 ACUTE POST-TRAUMATIC HEADACHE, NOT INTRACTABLE: Primary | ICD-10-CM
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
## 2018-09-05 PROCEDURE — 6370000000 HC RX 637 (ALT 250 FOR IP): Performed by: NURSE PRACTITIONER

## 2018-09-05 PROCEDURE — 99284 EMERGENCY DEPT VISIT MOD MDM: CPT

## 2018-09-05 PROCEDURE — 70450 CT HEAD/BRAIN W/O DYE: CPT

## 2018-09-05 PROCEDURE — 99284 EMERGENCY DEPT VISIT MOD MDM: CPT | Performed by: NURSE PRACTITIONER

## 2018-09-05 RX ORDER — HYDROCODONE BITARTRATE AND ACETAMINOPHEN 7.5; 325 MG/1; MG/1
1 TABLET ORAL ONCE
Status: DISCONTINUED | OUTPATIENT
Start: 2018-09-05 | End: 2018-09-05

## 2018-09-05 RX ORDER — HYDROCODONE BITARTRATE AND ACETAMINOPHEN 7.5; 325 MG/1; MG/1
1 TABLET ORAL ONCE
Status: COMPLETED | OUTPATIENT
Start: 2018-09-05 | End: 2018-09-05

## 2018-09-05 RX ADMIN — HYDROCODONE BITARTRATE AND ACETAMINOPHEN 1 TABLET: 7.5; 325 TABLET ORAL at 22:46

## 2018-09-05 ASSESSMENT — PAIN DESCRIPTION - LOCATION: LOCATION: HEAD

## 2018-09-05 ASSESSMENT — ENCOUNTER SYMPTOMS
TROUBLE SWALLOWING: 0
CONSTIPATION: 0
RHINORRHEA: 0
SHORTNESS OF BREATH: 0
VOMITING: 0
NAUSEA: 0
SORE THROAT: 0
ABDOMINAL PAIN: 0
DIARRHEA: 0
COUGH: 0

## 2018-09-05 ASSESSMENT — PAIN SCALES - GENERAL: PAINLEVEL_OUTOF10: 8

## 2018-09-06 VITALS
SYSTOLIC BLOOD PRESSURE: 117 MMHG | BODY MASS INDEX: 38.57 KG/M2 | TEMPERATURE: 98 F | HEIGHT: 66 IN | RESPIRATION RATE: 20 BRPM | WEIGHT: 240 LBS | DIASTOLIC BLOOD PRESSURE: 78 MMHG | HEART RATE: 78 BPM | OXYGEN SATURATION: 98 %
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
## 2019-04-09 ENCOUNTER — HOSPITAL ENCOUNTER (OUTPATIENT)
Age: 44
Setting detail: OBSERVATION
Discharge: HOME | End: 2019-04-11
Attending: EMERGENCY MEDICINE | Admitting: HOSPITALIST
Payer: COMMERCIAL

## 2019-04-09 ENCOUNTER — APPOINTMENT (OUTPATIENT)
Dept: GENERAL RADIOLOGY | Age: 44
End: 2019-04-09
Payer: COMMERCIAL

## 2019-04-09 DIAGNOSIS — R06.00 DYSPNEA, UNSPECIFIED TYPE: ICD-10-CM

## 2019-04-09 DIAGNOSIS — R55 NEAR SYNCOPE: ICD-10-CM

## 2019-04-09 DIAGNOSIS — R07.9 CHEST PAIN, UNSPECIFIED TYPE: Primary | ICD-10-CM

## 2019-04-09 LAB
ALBUMIN SERPL-MCNC: 4.2 G/DL (ref 3.5–5.2)
ALP BLD-CCNC: 97 U/L (ref 40–130)
ALT SERPL-CCNC: 20 U/L (ref 5–41)
ANION GAP SERPL CALCULATED.3IONS-SCNC: 10 MMOL/L (ref 7–19)
AST SERPL-CCNC: 20 U/L (ref 5–40)
BILIRUB SERPL-MCNC: 0.4 MG/DL (ref 0.2–1.2)
BUN BLDV-MCNC: 8 MG/DL (ref 6–20)
CALCIUM SERPL-MCNC: 9.4 MG/DL (ref 8.6–10)
CHLORIDE BLD-SCNC: 102 MMOL/L (ref 98–111)
CO2: 29 MMOL/L (ref 22–29)
CREAT SERPL-MCNC: 0.9 MG/DL (ref 0.5–1.2)
D DIMER: <0.27 UG/ML FEU (ref 0–0.48)
GFR NON-AFRICAN AMERICAN: >60
GLUCOSE BLD-MCNC: 100 MG/DL (ref 74–109)
HCT VFR BLD CALC: 44.5 % (ref 42–52)
HEMOGLOBIN: 15.3 G/DL (ref 14–18)
INR BLD: 1.02 (ref 0.88–1.18)
MCH RBC QN AUTO: 28.9 PG (ref 27–31)
MCHC RBC AUTO-ENTMCNC: 34.4 G/DL (ref 33–37)
MCV RBC AUTO: 84.1 FL (ref 80–94)
PDW BLD-RTO: 12.5 % (ref 11.5–14.5)
PLATELET # BLD: 228 K/UL (ref 130–400)
PMV BLD AUTO: 9.3 FL (ref 9.4–12.4)
POTASSIUM SERPL-SCNC: 4.1 MMOL/L (ref 3.5–5)
PROTHROMBIN TIME: 12.8 SEC (ref 12–14.6)
RBC # BLD: 5.29 M/UL (ref 4.7–6.1)
SODIUM BLD-SCNC: 141 MMOL/L (ref 136–145)
TOTAL PROTEIN: 7.1 G/DL (ref 6.6–8.7)
TROPONIN: <0.01 NG/ML (ref 0–0.03)
WBC # BLD: 6.1 K/UL (ref 4.8–10.8)

## 2019-04-09 PROCEDURE — G0378 HOSPITAL OBSERVATION PER HR: HCPCS

## 2019-04-09 PROCEDURE — 96374 THER/PROPH/DIAG INJ IV PUSH: CPT

## 2019-04-09 PROCEDURE — 2500000003 HC RX 250 WO HCPCS: Performed by: INTERNAL MEDICINE

## 2019-04-09 PROCEDURE — 2580000003 HC RX 258: Performed by: INTERNAL MEDICINE

## 2019-04-09 PROCEDURE — 99285 EMERGENCY DEPT VISIT HI MDM: CPT

## 2019-04-09 PROCEDURE — 84484 ASSAY OF TROPONIN QUANT: CPT

## 2019-04-09 PROCEDURE — 99285 EMERGENCY DEPT VISIT HI MDM: CPT | Performed by: EMERGENCY MEDICINE

## 2019-04-09 PROCEDURE — 85027 COMPLETE CBC AUTOMATED: CPT

## 2019-04-09 PROCEDURE — 71046 X-RAY EXAM CHEST 2 VIEWS: CPT

## 2019-04-09 PROCEDURE — 85610 PROTHROMBIN TIME: CPT

## 2019-04-09 PROCEDURE — 6370000000 HC RX 637 (ALT 250 FOR IP): Performed by: INTERNAL MEDICINE

## 2019-04-09 PROCEDURE — 80053 COMPREHEN METABOLIC PANEL: CPT

## 2019-04-09 PROCEDURE — 36415 COLL VENOUS BLD VENIPUNCTURE: CPT

## 2019-04-09 PROCEDURE — 99219 PR INITIAL OBSERVATION CARE/DAY 50 MINUTES: CPT | Performed by: INTERNAL MEDICINE

## 2019-04-09 PROCEDURE — 93005 ELECTROCARDIOGRAM TRACING: CPT

## 2019-04-09 PROCEDURE — 85379 FIBRIN DEGRADATION QUANT: CPT

## 2019-04-09 RX ORDER — ONDANSETRON 2 MG/ML
4 INJECTION INTRAMUSCULAR; INTRAVENOUS EVERY 6 HOURS PRN
Status: DISCONTINUED | OUTPATIENT
Start: 2019-04-09 | End: 2019-04-11 | Stop reason: SDUPTHER

## 2019-04-09 RX ORDER — NITROGLYCERIN 0.4 MG/1
0.4 TABLET SUBLINGUAL EVERY 5 MIN PRN
Status: DISCONTINUED | OUTPATIENT
Start: 2019-04-09 | End: 2019-04-11 | Stop reason: HOSPADM

## 2019-04-09 RX ORDER — SODIUM CHLORIDE 0.9 % (FLUSH) 0.9 %
10 SYRINGE (ML) INJECTION PRN
Status: DISCONTINUED | OUTPATIENT
Start: 2019-04-09 | End: 2019-04-11 | Stop reason: SDUPTHER

## 2019-04-09 RX ORDER — SODIUM CHLORIDE 0.9 % (FLUSH) 0.9 %
10 SYRINGE (ML) INJECTION EVERY 12 HOURS SCHEDULED
Status: DISCONTINUED | OUTPATIENT
Start: 2019-04-09 | End: 2019-04-11 | Stop reason: SDUPTHER

## 2019-04-09 RX ORDER — SERTRALINE HYDROCHLORIDE 25 MG/1
25 TABLET, FILM COATED ORAL DAILY
Status: DISCONTINUED | OUTPATIENT
Start: 2019-04-10 | End: 2019-04-11 | Stop reason: HOSPADM

## 2019-04-09 RX ORDER — ASPIRIN 81 MG/1
81 TABLET, CHEWABLE ORAL DAILY
Status: DISCONTINUED | OUTPATIENT
Start: 2019-04-10 | End: 2019-04-11 | Stop reason: HOSPADM

## 2019-04-09 RX ORDER — ATORVASTATIN CALCIUM 40 MG/1
40 TABLET, FILM COATED ORAL NIGHTLY
Status: DISCONTINUED | OUTPATIENT
Start: 2019-04-09 | End: 2019-04-11 | Stop reason: HOSPADM

## 2019-04-09 RX ADMIN — ATORVASTATIN CALCIUM 40 MG: 40 TABLET, FILM COATED ORAL at 22:21

## 2019-04-09 RX ADMIN — FAMOTIDINE 20 MG: 10 INJECTION INTRAVENOUS at 22:21

## 2019-04-09 RX ADMIN — Medication 10 ML: at 22:22

## 2019-04-09 ASSESSMENT — PAIN SCALES - GENERAL: PAINLEVEL_OUTOF10: 0

## 2019-04-09 ASSESSMENT — ENCOUNTER SYMPTOMS
DIARRHEA: 0
VOMITING: 0
ABDOMINAL PAIN: 0
EYE PAIN: 0
SHORTNESS OF BREATH: 1

## 2019-04-09 NOTE — ED NOTES
[id number]     Tayler Bryant RN  04/09/19 8631

## 2019-04-09 NOTE — ED PROVIDER NOTES
SURGICAL HISTORY       Past Surgical History:   Procedure Laterality Date    ANKLE SURGERY Right     APPENDECTOMY      CARDIAC SURGERY  05/2012    cardiomypopathy with obstruction         CURRENT MEDICATIONS       Previous Medications    METOPROLOL TARTRATE (LOPRESSOR) 25 MG TABLET    Take 25 mg by mouth daily    NAPROXEN SODIUM (ANAPROX DS) 550 MG TABLET    Take 1 tablet by mouth 2 times daily (with meals)    ONDANSETRON (ZOFRAN ODT) 4 MG DISINTEGRATING TABLET    Take 1 tablet by mouth every 8 hours as needed for Nausea or Vomiting    SERTRALINE (ZOLOFT) 25 MG TABLET    Take 25 mg by mouth       ALLERGIES     Patient has no known allergies. FAMILY HISTORY     History reviewed. No pertinent family history.        SOCIAL HISTORY       Social History     Socioeconomic History    Marital status:      Spouse name: None    Number of children: None    Years of education: None    Highest education level: None   Occupational History    None   Social Needs    Financial resource strain: None    Food insecurity:     Worry: None     Inability: None    Transportation needs:     Medical: None     Non-medical: None   Tobacco Use    Smoking status: Never Smoker    Smokeless tobacco: Current User     Types: Snuff   Substance and Sexual Activity    Alcohol use: Yes     Comment: Occaisionally    Drug use: No    Sexual activity: None   Lifestyle    Physical activity:     Days per week: None     Minutes per session: None    Stress: None   Relationships    Social connections:     Talks on phone: None     Gets together: None     Attends Latter day service: None     Active member of club or organization: None     Attends meetings of clubs or organizations: None     Relationship status: None    Intimate partner violence:     Fear of current or ex partner: None     Emotionally abused: None     Physically abused: None     Forced sexual activity: None   Other Topics Concern    None   Social History Narrative    None       SCREENINGS    [place] Coma Scale  Eye Opening: Spontaneous  Best Verbal Response: Oriented  Best Motor Response: Obeys commands  [place] Coma Scale Score: 15        PHYSICAL EXAM    (up to 7 for level 4, 8 or more for level 5)     ED Triage Vitals   BP Temp Temp Source Pulse Resp SpO2 Height Weight   04/09/19 1449 04/09/19 1449 04/09/19 1449 04/09/19 1449 04/09/19 1449 04/09/19 1449 04/09/19 1451 04/09/19 1451   127/86 98.2 °F (36.8 °C) Oral 85 20 (!) 89 % 5' 7\" (1.702 m) 250 lb (113.4 kg)       Physical Exam   Constitutional: He is oriented to person, place, and time. He appears well-developed. No distress. HENT:   Head: Normocephalic and atraumatic. Eyes: Pupils are equal, round, and reactive to light. No scleral icterus. Neck: Normal range of motion. Neck supple. No JVD present. Cardiovascular: Normal rate, regular rhythm, normal heart sounds and intact distal pulses. Pulmonary/Chest: Effort normal and breath sounds normal. No respiratory distress. Abdominal: Soft. He exhibits no distension. There is no tenderness. Musculoskeletal: He exhibits no edema or tenderness. Neurological: He is alert and oriented to person, place, and time. He has normal strength. No sensory deficit. GCS eye subscore is 4. GCS verbal subscore is 5. GCS motor subscore is 6. Skin: Skin is warm and dry. Psychiatric: He has a normal mood and affect. His behavior is normal.   Vitals reviewed. DIAGNOSTIC RESULTS     EKG: All EKG's are interpreted by the Emergency Department Physician who either signs or Co-signs this chart in the absence of a cardiologist.    Normal sinus rhythm. Normal QT. Left bundle branch block. No evidence of acute ischemia compared to prior EKG. Repeat EKG shows Sinus rhythm. Left bundle branch block.     RADIOLOGY:   Non-plain film images such as CT, Ultrasound and MRI are read by the radiologist. Plainradiographic images are visualized and preliminarily interpreted by Oscar Bennett MD  04/09/19 8573

## 2019-04-10 ENCOUNTER — APPOINTMENT (OUTPATIENT)
Dept: NUCLEAR MEDICINE | Age: 44
End: 2019-04-10
Payer: COMMERCIAL

## 2019-04-10 PROBLEM — E78.1 HYPERTRIGLYCERIDEMIA: Status: ACTIVE | Noted: 2019-04-10

## 2019-04-10 LAB
ANION GAP SERPL CALCULATED.3IONS-SCNC: 11 MMOL/L (ref 7–19)
BUN BLDV-MCNC: 10 MG/DL (ref 6–20)
CALCIUM SERPL-MCNC: 9.2 MG/DL (ref 8.6–10)
CHLORIDE BLD-SCNC: 103 MMOL/L (ref 98–111)
CHOLESTEROL, TOTAL: 175 MG/DL (ref 160–199)
CO2: 25 MMOL/L (ref 22–29)
CREAT SERPL-MCNC: 0.8 MG/DL (ref 0.5–1.2)
GFR NON-AFRICAN AMERICAN: >60
GLUCOSE BLD-MCNC: 103 MG/DL (ref 74–109)
HCT VFR BLD CALC: 42.9 % (ref 42–52)
HCT VFR BLD CALC: 43.9 % (ref 42–52)
HDLC SERPL-MCNC: 30 MG/DL (ref 55–121)
HEMOGLOBIN: 15 G/DL (ref 14–18)
HEMOGLOBIN: 15.3 G/DL (ref 14–18)
LDL CHOLESTEROL CALCULATED: ABNORMAL MG/DL
LDL CHOLESTEROL DIRECT: 98 MG/DL
LV EF: 45 %
LVEF MODALITY: NORMAL
MCH RBC QN AUTO: 29 PG (ref 27–31)
MCH RBC QN AUTO: 29.3 PG (ref 27–31)
MCHC RBC AUTO-ENTMCNC: 34.9 G/DL (ref 33–37)
MCHC RBC AUTO-ENTMCNC: 35 G/DL (ref 33–37)
MCV RBC AUTO: 83.3 FL (ref 80–94)
MCV RBC AUTO: 83.8 FL (ref 80–94)
PDW BLD-RTO: 12.3 % (ref 11.5–14.5)
PDW BLD-RTO: 12.5 % (ref 11.5–14.5)
PLATELET # BLD: 217 K/UL (ref 130–400)
PLATELET # BLD: 224 K/UL (ref 130–400)
PMV BLD AUTO: 9.3 FL (ref 9.4–12.4)
PMV BLD AUTO: 9.3 FL (ref 9.4–12.4)
POTASSIUM REFLEX MAGNESIUM: 3.9 MMOL/L (ref 3.5–5)
RBC # BLD: 5.12 M/UL (ref 4.7–6.1)
RBC # BLD: 5.27 M/UL (ref 4.7–6.1)
SODIUM BLD-SCNC: 139 MMOL/L (ref 136–145)
TRIGL SERPL-MCNC: 582 MG/DL (ref 0–149)
TROPONIN: <0.01 NG/ML (ref 0–0.03)
WBC # BLD: 5.6 K/UL (ref 4.8–10.8)
WBC # BLD: 6.7 K/UL (ref 4.8–10.8)

## 2019-04-10 PROCEDURE — G0378 HOSPITAL OBSERVATION PER HR: HCPCS

## 2019-04-10 PROCEDURE — 3430000000 HC RX DIAGNOSTIC RADIOPHARMACEUTICAL: Performed by: INTERNAL MEDICINE

## 2019-04-10 PROCEDURE — 2580000003 HC RX 258: Performed by: INTERNAL MEDICINE

## 2019-04-10 PROCEDURE — 80061 LIPID PANEL: CPT

## 2019-04-10 PROCEDURE — 96376 TX/PRO/DX INJ SAME DRUG ADON: CPT

## 2019-04-10 PROCEDURE — 93306 TTE W/DOPPLER COMPLETE: CPT

## 2019-04-10 PROCEDURE — 2500000003 HC RX 250 WO HCPCS: Performed by: INTERNAL MEDICINE

## 2019-04-10 PROCEDURE — 36415 COLL VENOUS BLD VENIPUNCTURE: CPT

## 2019-04-10 PROCEDURE — 99225 PR SBSQ OBSERVATION CARE/DAY 25 MINUTES: CPT | Performed by: INTERNAL MEDICINE

## 2019-04-10 PROCEDURE — 93017 CV STRESS TEST TRACING ONLY: CPT

## 2019-04-10 PROCEDURE — 6370000000 HC RX 637 (ALT 250 FOR IP): Performed by: INTERNAL MEDICINE

## 2019-04-10 PROCEDURE — 96372 THER/PROPH/DIAG INJ SC/IM: CPT

## 2019-04-10 PROCEDURE — 99223 1ST HOSP IP/OBS HIGH 75: CPT | Performed by: INTERNAL MEDICINE

## 2019-04-10 PROCEDURE — A9500 TC99M SESTAMIBI: HCPCS | Performed by: INTERNAL MEDICINE

## 2019-04-10 PROCEDURE — 78452 HT MUSCLE IMAGE SPECT MULT: CPT

## 2019-04-10 PROCEDURE — 6360000002 HC RX W HCPCS: Performed by: INTERNAL MEDICINE

## 2019-04-10 PROCEDURE — 83721 ASSAY OF BLOOD LIPOPROTEIN: CPT

## 2019-04-10 PROCEDURE — 85027 COMPLETE CBC AUTOMATED: CPT

## 2019-04-10 PROCEDURE — 93005 ELECTROCARDIOGRAM TRACING: CPT

## 2019-04-10 PROCEDURE — 80048 BASIC METABOLIC PNL TOTAL CA: CPT

## 2019-04-10 PROCEDURE — 84484 ASSAY OF TROPONIN QUANT: CPT

## 2019-04-10 RX ORDER — SODIUM CHLORIDE 0.9 % (FLUSH) 0.9 %
10 SYRINGE (ML) INJECTION PRN
Status: DISCONTINUED | OUTPATIENT
Start: 2019-04-10 | End: 2019-04-11 | Stop reason: SDUPTHER

## 2019-04-10 RX ORDER — ALPRAZOLAM 0.5 MG/1
0.5 TABLET ORAL
Status: ACTIVE | OUTPATIENT
Start: 2019-04-10 | End: 2019-04-10

## 2019-04-10 RX ORDER — LANOLIN ALCOHOL/MO/W.PET/CERES
500 CREAM (GRAM) TOPICAL NIGHTLY
Qty: 30 CAPSULE | Refills: 3 | Status: SHIPPED | OUTPATIENT
Start: 2019-04-10 | End: 2020-06-25

## 2019-04-10 RX ORDER — SODIUM CHLORIDE 9 MG/ML
INJECTION, SOLUTION INTRAVENOUS CONTINUOUS
Status: DISCONTINUED | OUTPATIENT
Start: 2019-04-10 | End: 2019-04-11 | Stop reason: HOSPADM

## 2019-04-10 RX ORDER — LANOLIN ALCOHOL/MO/W.PET/CERES
500 CREAM (GRAM) TOPICAL NIGHTLY
Status: DISCONTINUED | OUTPATIENT
Start: 2019-04-10 | End: 2019-04-11 | Stop reason: HOSPADM

## 2019-04-10 RX ORDER — GEMFIBROZIL 600 MG/1
600 TABLET, FILM COATED ORAL
Qty: 60 TABLET | Refills: 3 | Status: SHIPPED | OUTPATIENT
Start: 2019-04-10 | End: 2020-06-25

## 2019-04-10 RX ORDER — ASPIRIN 81 MG/1
81 TABLET, CHEWABLE ORAL DAILY
Qty: 30 TABLET | Refills: 3 | Status: SHIPPED | OUTPATIENT
Start: 2019-04-11 | End: 2020-06-25

## 2019-04-10 RX ORDER — ONDANSETRON 2 MG/ML
4 INJECTION INTRAMUSCULAR; INTRAVENOUS EVERY 6 HOURS PRN
Status: DISCONTINUED | OUTPATIENT
Start: 2019-04-10 | End: 2019-04-11 | Stop reason: SDUPTHER

## 2019-04-10 RX ORDER — SODIUM CHLORIDE 9 MG/ML
INJECTION, SOLUTION INTRAVENOUS CONTINUOUS
Status: DISCONTINUED | OUTPATIENT
Start: 2019-04-11 | End: 2019-04-11 | Stop reason: HOSPADM

## 2019-04-10 RX ORDER — GEMFIBROZIL 600 MG/1
600 TABLET, FILM COATED ORAL
Status: DISCONTINUED | OUTPATIENT
Start: 2019-04-10 | End: 2019-04-11 | Stop reason: HOSPADM

## 2019-04-10 RX ORDER — ASPIRIN 81 MG/1
81 TABLET ORAL ONCE
Status: COMPLETED | OUTPATIENT
Start: 2019-04-10 | End: 2019-04-10

## 2019-04-10 RX ORDER — ATORVASTATIN CALCIUM 40 MG/1
40 TABLET, FILM COATED ORAL NIGHTLY
Qty: 30 TABLET | Refills: 3 | Status: SHIPPED | OUTPATIENT
Start: 2019-04-10 | End: 2020-06-25

## 2019-04-10 RX ORDER — SODIUM CHLORIDE 0.9 % (FLUSH) 0.9 %
10 SYRINGE (ML) INJECTION EVERY 12 HOURS SCHEDULED
Status: DISCONTINUED | OUTPATIENT
Start: 2019-04-10 | End: 2019-04-11 | Stop reason: SDUPTHER

## 2019-04-10 RX ADMIN — SERTRALINE HYDROCHLORIDE 25 MG: 25 TABLET ORAL at 08:24

## 2019-04-10 RX ADMIN — GEMFIBROZIL 600 MG: 600 TABLET ORAL at 17:18

## 2019-04-10 RX ADMIN — ENOXAPARIN SODIUM 40 MG: 40 INJECTION SUBCUTANEOUS at 08:24

## 2019-04-10 RX ADMIN — REGADENOSON 0.4 MG: 0.08 INJECTION, SOLUTION INTRAVENOUS at 12:10

## 2019-04-10 RX ADMIN — METOPROLOL TARTRATE 25 MG: 25 TABLET ORAL at 08:24

## 2019-04-10 RX ADMIN — TETRAKIS(2-METHOXYISOBUTYLISOCYANIDE)COPPER(I) TETRAFLUOROBORATE 10 MILLICURIE: 1 INJECTION, POWDER, LYOPHILIZED, FOR SOLUTION INTRAVENOUS at 13:31

## 2019-04-10 RX ADMIN — FAMOTIDINE 20 MG: 10 INJECTION INTRAVENOUS at 08:24

## 2019-04-10 RX ADMIN — SODIUM CHLORIDE: 9 INJECTION, SOLUTION INTRAVENOUS at 22:56

## 2019-04-10 RX ADMIN — Medication 10 ML: at 08:24

## 2019-04-10 RX ADMIN — ATORVASTATIN CALCIUM 40 MG: 40 TABLET, FILM COATED ORAL at 20:58

## 2019-04-10 RX ADMIN — TETRAKIS(2-METHOXYISOBUTYLISOCYANIDE)COPPER(I) TETRAFLUOROBORATE 30 MILLICURIE: 1 INJECTION, POWDER, LYOPHILIZED, FOR SOLUTION INTRAVENOUS at 13:31

## 2019-04-10 RX ADMIN — ASPIRIN 81 MG: 81 TABLET, COATED ORAL at 21:05

## 2019-04-10 RX ADMIN — Medication 500 MG: at 20:58

## 2019-04-10 RX ADMIN — ASPIRIN 81 MG 81 MG: 81 TABLET ORAL at 08:24

## 2019-04-10 RX ADMIN — FAMOTIDINE 20 MG: 10 INJECTION INTRAVENOUS at 20:58

## 2019-04-10 RX ADMIN — Medication 10 ML: at 20:59

## 2019-04-10 ASSESSMENT — ENCOUNTER SYMPTOMS
RESPIRATORY NEGATIVE: 1
DIARRHEA: 0
GASTROINTESTINAL NEGATIVE: 1
VOMITING: 0
EYES NEGATIVE: 1
SHORTNESS OF BREATH: 0
NAUSEA: 0

## 2019-04-10 ASSESSMENT — PAIN SCALES - GENERAL
PAINLEVEL_OUTOF10: 0

## 2019-04-10 NOTE — CONSULTS
[facility]  Cardiology Consult      Requesting MD:  Edmundo Taylor MD   Admit Status:  Observation [104]       History obtained from:   [] Patient  [] Other (specify):     Patient:  Belen Bacon  [id number]     Chief Complaint:   Chief Complaint   Patient presents with    Chest Pain     arrived via EMS with co of chest pains, cardiac hx       HPI: Mr. Diana Romano is a 44 y.o. male with a history of hypertrophic cardiomyopathy previous septal myomectomy at [facility] May 2012 now presents with mid substernal chest discomfort described as crazy pressure in the center of his chest while at work yesterday. Some dyspnea and dizziness. D-dimer negative. Heart rate had been increased. ECG shows sinus rhythm with left bundle branch block multiple cardiac enzymes are negative however. He denies exertional chest discomfort or limiting dyspnea. Previous catheterization did not show any evidence of coronary disease. Review of Systems:  Review of Systems   Constitutional: Negative. Negative for chills, fever and unexpected weight change. HENT: Negative. Eyes: Negative. Respiratory: Negative. Negative for shortness of breath. Cardiovascular: Negative. Negative for chest pain. Gastrointestinal: Negative. Negative for diarrhea, nausea and vomiting. Endocrine: Negative. Genitourinary: Negative. Musculoskeletal: Negative. Skin: Negative. Neurological: Negative. All other systems reviewed and are negative. Cardiac Specific Data:  Specialty Problems        Cardiology Problems    Cardiomyopathy [facility])              Past Medical History:  Past Medical History:   Diagnosis Date    Cardiomyopathy [facility])     Pacemaker         Past Surgical History:  Past Surgical History:   Procedure Laterality Date    ANKLE SURGERY Right     APPENDECTOMY      CARDIAC SURGERY  05/2012    cardiomypopathy with obstruction       Past Family History:  History reviewed. No pertinent family history. Past Social History:  Social History     Socioeconomic History    Marital status:      Spouse name: Not on file    Number of children: Not on file    Years of education: Not on file    Highest education level: Not on file   Occupational History    Not on file   Social Needs    Financial resource strain: Not on file    Food insecurity:     Worry: Not on file     Inability: Not on file    Transportation needs:     Medical: Not on file     Non-medical: Not on file   Tobacco Use    Smoking status: Never Smoker    Smokeless tobacco: Current User     Types: Snuff   Substance and Sexual Activity    Alcohol use: Yes     Comment: Occaisionally    Drug use: No    Sexual activity: Not on file   Lifestyle    Physical activity:     Days per week: Not on file     Minutes per session: Not on file    Stress: Not on file   Relationships    Social connections:     Talks on phone: Not on file     Gets together: Not on file     Attends Sikhism service: Not on file     Active member of club or organization: Not on file     Attends meetings of clubs or organizations: Not on file     Relationship status: Not on file    Intimate partner violence:     Fear of current or ex partner: Not on file     Emotionally abused: Not on file     Physically abused: Not on file     Forced sexual activity: Not on file   Other Topics Concern    Not on file   Social History Narrative    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  Authorizing Provider   ondansetron (ZOFRAN ODT) 4 MG 224       Recent Labs     04/09/19  1609 04/10/19  0137    139   K 4.1 3.9    103   CO2 29 25   BUN 8 10   CREATININE 0.9 0.8   LABGLOM >60 >60   CALCIUM 9.4 9.2       CK, CKMB, Troponin: @LABRCNT (CKTOTAL:3, CKMB:3, TROPONINI:3)@    Last 3 BNP:  No results for input(s): BNP in the last 72 hours. IMAGING:  Xr Chest Standard (2 Vw)    Result Date: 4/9/2019  XR CHEST (2 VW) 4/9/2019 3:00 PM HISTORY: Chest pain COMPARISON: May 17, 2018. FINDINGS: The lungs are clear. The cardiac silhouette is normal. Wires are present from previous median sternotomy. Pacing device present soft tissues the left chest.. The osseous structures and surrounding soft tissues demonstrate no acute abnormality. 1. No radiographic evidence of acute cardiopulmonary process. Signed by Dr Cuong Martinez on 4/9/2019 4:46 PM      Assessment:  1. Episode of chest pressure undetermined etiology unstable angina versus cardiac arrhythmias etc.  2. History of hypertrophic cardiomyopathy status post septal myomectomy [facility] May 2012  3. History of ICD placement  4. Apical exertional angina  5. Prior tobacco abuse discontinued 2011  6. Left bundle branch block        Recommendations:  1. Echocardiogram  2.  Lexiscan stress test further comments to follow

## 2019-04-10 NOTE — PROGRESS NOTES
4 Eyes Skin Assessment    Mara Romero is being assessed upon: Admission    I agree that I, Twyla Causey, along with Maldonado Velasquez RN (either 2 RN's or 1 LPN and 1 RN) have performed a thorough Head to Toe Skin Assessment on the patient. ALL assessment sites listed below have been assessed. Areas assessed by both nurses:     [x]   Head, Face, and Ears   [x]   Shoulders, Back, and Chest  [x]   Arms, Elbows, and Hands   [x]   Coccyx, Sacrum, and Ischium  [x]   Legs, Feet, and Heels    Does the Patient have Skin Breakdown?  No    [place] Prevention initiated: NA  Wound Care Orders initiated: NA    [facility] nurse consulted for Pressure Injury (Stage 3,4, Unstageable, DTI, NWPT, and Complex wounds) and New or Established Ostomies: NA        Primary Nurse eSignature: Jaden Randall RN on 4/10/2019 at 3:35 AM      Co-Signer eSignature: Electronically signed by Stanford Atkinson RN on 4/10/19 at 3:37 AM

## 2019-04-10 NOTE — PLAN OF CARE
Problem: Pain:  Goal: Pain level will decrease  Description  Pain level will decrease  Outcome: Ongoing  Goal: Control of acute pain  Description  Control of acute pain  Outcome: Ongoing  Goal: Control of chronic pain  Description  Control of chronic pain  Outcome: Ongoing     Problem: Cardiac:  Goal: Ability to maintain an adequate cardiac output will improve  Description  Ability to maintain an adequate cardiac output will improve  Outcome: Ongoing  Goal: Complications related to the disease process, condition or treatment will be avoided or minimized  Description  Complications related to the disease process, condition or treatment will be avoided or minimized  Outcome: Ongoing  Goal: Hemodynamic stability will improve  Description  Hemodynamic stability will improve  Outcome: Ongoing  Goal: Risk factors for ineffective tissue perfusion will decrease  Description  Risk factors for ineffective tissue perfusion will decrease  Outcome: Ongoing

## 2019-04-10 NOTE — H&P
[facility]      History & Physical    [id number]  PCP: Charity Cobos    Date of Admission:4/9/2019    Patient:  Lan Jackson  MRN: [id number]    Date of Service: Pt seen/examined on4/9/2019 and Placed in Observation. CHIEF COMPLAINT:     Chief Complaint   Patient presents with    Chest Pain     arrived via EMS with co of chest pains, cardiac hx       History Obtained From:  patient, electronic medical record  Primary Care Physician: Charity Cobos    HISTORY OF PRESENT ILLNESS: :    The patient is a 44 y.o. male with a history of hypertrophic cardiomyopathy (s/p repair and defibrillator in place), presenting to the ED on account of acute onset of chest pain, with associated pre-syncope, as well as shortness of breath that started while she was at work. Symptoms reportedly only lasted a few minutes. Patient describes heavy chest pressure    Denies any other associated symptoms, no nausea, no vomiting, no diaphoresis. PAST MEDICAL & SURGICAL HISTORY    Past Medical History:      Diagnosis Date    Cardiomyopathy [facility])     Pacemaker          Past Surgical History:      Procedure Laterality Date    ANKLE SURGERY Right     APPENDECTOMY      CARDIAC SURGERY  05/2012    cardiomypopathy with obstruction          SOCIAL & FAMILY HISTORY:    Social History:   TOBACCO:   reports that he has never smoked. His smokeless tobacco use includes snuff. ETOH:   reports that he drinks alcohol. Family History:   History reviewed. No pertinent family history. MEDICATIONS:    Medications Prior to Admission:    Prior to Admission medications    Medication Sig Start Date End Date Taking?  Authorizing Provider   ondansetron (ZOFRAN ODT) 4 MG disintegrating tablet Take 1 tablet by mouth every 8 hours as needed for Nausea or Vomiting 9/1/18  Yes Argelia Carr MD   naproxen sodium (ANAPROX DS) 550 MG tablet Take 1 tablet by mouth 2 times daily (with meals) 9/1/18  Yes Argelia Carr MD   sertraline (ZOLOFT) 25 MG      Recent Labs     04/09/19  1609      K 4.1      CO2 29   BUN 8   CREATININE 0.9   GLUCOSE 100   AST 20   ALT 20   BILITOT 0.4   ALKPHOS 97     Troponin T:   Recent Labs     04/09/19  1609 04/09/19  1842   TROPONINI <0.01 <0.01     Pro-BNP: No results found for: BNP  Lactate: No results found for: LACTA      ABGs: No results found for: PHART, PO2ART, WDV0GBN  INR:   Recent Labs     04/09/19  1609   INR 1.02     TSH: No results found for: TSH, TSHREFLEX  URINALYSIS:No results for input(s): NITRITE, COLORU, PHUR, LABCAST, WBCUA, RBCUA, MUCUS, TRICHOMONAS, YEAST, BACTERIA, CLARITYU, SPECGRAV, LEUKOCYTESUR, UROBILINOGEN, BILIRUBINUR, BLOODU, GLUCOSEU, AMORPHOUS in the last 72 hours. Invalid input(s): Chares Friday / IMAGING REPORTS:     Xr Chest Standard (2 Vw)    Result Date: 4/9/2019  XR CHEST (2 VW) 4/9/2019 3:00 PM HISTORY: Chest pain COMPARISON: May 17, 2018. FINDINGS: The lungs are clear. The cardiac silhouette is normal. Wires are present from previous median sternotomy. Pacing device present soft tissues the left chest.. The osseous structures and surrounding soft tissues demonstrate no acute abnormality. 1. No radiographic evidence of acute cardiopulmonary process. Signed by Dr Aniceto Gallardo on 4/9/2019 4:46 PM          ECHOCARDIOGRAM     Pending         PATIENT SUMMARY      ASSESSMENT / IMPRESSION & PLAN:      Patient Active Problem List    Diagnosis Date Noted    Chest pain 04/09/2019       Patient Active Problem List   Diagnosis Code    Chest pain R07.9       Active Problems:    Chest pain  Resolved Problems:    * No resolved hospital problems. *      Patient Active Problem List    Diagnosis Date Noted    Chest pain 04/09/2019           # Chest pain / tightness   - Admit to PCU to r/o ACS  - Initial troponin negative   - Will trend troponin  - Serial EKGs for chest pain  - Optimized medical management  -->  Aspirin, Statin and Beta Blocker.    --> Nitro glycerin sublingual when necessary for chest pain  - 2D Echocardiogram  - Continue to monitor on telemetry  - Fasting Lipid panel in a.m  - Famotidine  - Cardiology consult  - NPO after midnight  - Plan for  Stress test for better risk stratification      Continue management of other chronic medical conditions - Please see orders above         CONSULTS:    IP CONSULT TO CARDIOLOGY        INPATIENT CHECKLIST:      Nutrition: No diet orders on file    Prophylaxis Orders:   VTE - enoxaparin     CODE STATUS: Not on file  ISOLATION:       DISCHARGE PLAN: tbd          Electronically signed by   Nan Xie MD, MPH  Internal Medicine Hospitalist   4/9/2019 8:46 PM      EMR Dragon/Transcription disclaimer:   Much of this encounter note is an electronic transcription/translation of spoken language to printed text.  The electronic translation of spoken language may permit erroneous, or at times, nonsensical words or phrases to be inadvertently transcribed; although attempts have made to review the note for such errors, some may still exist.

## 2019-04-10 NOTE — PROGRESS NOTES
[facility] Hospitalists Progress Note    Patient:  Jessica Spear  YOB: 1975  Date of Service: 4/10/2019  MRN: [id number]   Acct: [de-identified]   Primary Care Physician: Que Browne  Advance Directive: Full Code  Admit Date: 4/9/2019       Hospital Day: 0      CHIEF COMPLAINT:     Chief Complaint   Patient presents with    Chest Pain     arrived via EMS with co of chest pains, cardiac hx       4/10/2019 2:26 PM  Subjective / Interval History:   No acute overnight event reported. Patient laying comfortably in bed. Spouse at bedside. Chest pain improved. Denies any active chest pain at this time. Cumulative Hospital History:    As per Initial admission HPI 4/9/2019,   44 y.o. male with a history of hypertrophic cardiomyopathy (s/p repair and defibrillator in place), presenting to the ED on account of acute onset of chest pain, with associated pre-syncope, as well as shortness of breath that started while she was at work.     Symptoms reportedly only lasted a few minutes. Patient describes heavy chest pressure     Denies any other associated symptoms, no nausea, no vomiting, no diaphoresis. Review of Systems:   Review of Systems  ROS: 14 point review of systems is negative except as specifically addressed above.     DIET CARDIAC; No Caffeine    Intake/Output Summary (Last 24 hours) at 4/10/2019 1426  Last data filed at 4/10/2019 0836  Gross per 24 hour   Intake 250 ml   Output 150 ml   Net 100 ml       Medications:  Current Facility-Administered Medications   Medication Dose Route Frequency Provider Last Rate Last Dose    metoprolol tartrate (LOPRESSOR) tablet 25 mg  25 mg Oral Daily Bekah Story MD   25 mg at 04/10/19 8677    sertraline (ZOLOFT) tablet 25 mg  25 mg Oral Daily Bekah Story MD   25 mg at 04/10/19 3337    sodium chloride flush 0.9 % injection 10 mL  10 mL Intravenous 2 times per day Bekah Story MD   10 mL at 04/10/19 0824    sodium chloride flush 0.9 % injection 10 mL  10 mL Intravenous PRN Betsy Moya MD        magnesium hydroxide (MILK OF MAGNESIA) 400 MG/5ML suspension 30 mL  30 mL Oral Daily PRN Betsy Moya MD        ondansetron [facility]) injection 4 mg  4 mg Intravenous Q6H PRN Betsy Moya MD        famotidine (PEPCID) injection 20 mg  20 mg Intravenous BID Betsy Moya MD   20 mg at 04/10/19 6341    atorvastatin (LIPITOR) tablet 40 mg  40 mg Oral Nightly Betsy Moya MD   40 mg at 04/09/19 2221    aspirin chewable tablet 81 mg  81 mg Oral Daily Betsy Moya MD   81 mg at 04/10/19 0824    nitroGLYCERIN (NITROSTAT) SL tablet 0.4 mg  0.4 mg Sublingual Q5 Min PRN Betsy Moya MD        enoxaparin (LOVENOX) injection 40 mg  40 mg Subcutaneous Daily Betsy Moya MD   40 mg at 04/10/19 6110           metoprolol tartrate  25 mg Oral Daily    sertraline  25 mg Oral Daily    sodium chloride flush  10 mL Intravenous 2 times per day    famotidine (PEPCID) injection  20 mg Intravenous BID    atorvastatin  40 mg Oral Nightly    aspirin  81 mg Oral Daily    enoxaparin  40 mg Subcutaneous Daily     sodium chloride flush, magnesium hydroxide, ondansetron, nitroGLYCERIN  DIET CARDIAC; No Caffeine       Labs:     Recent Labs     04/09/19  1609 04/10/19  0137   WBC 6.1 5.6   RBC 5.29 5.12   HGB 15.3 15.0   HCT 44.5 42.9   MCV 84.1 83.8   MCH 28.9 29.3   MCHC 34.4 35.0    224     Recent Labs     04/09/19  1609 04/10/19  0137    139   K 4.1 3.9   ANIONGAP 10 11    103   CO2 29 25   BUN 8 10   CREATININE 0.9 0.8   GLUCOSE 100 103   CALCIUM 9.4 9.2     No results for input(s): MG, PHOS in the last 72 hours.   Recent Labs     04/09/19  1609   AST 20   ALT 20   BILITOT 0.4   ALKPHOS 97     HgBA1c:  No components found for: HGBA1C  FLP:    Lab Results   Component Value Date    TRIG 582 04/10/2019    HDL 30 04/10/2019    LDLCALC see below 04/10/2019    LDLDIRECT 98 04/10/2019     TSH:  No results found for: TSH  Troponin T:   Recent Labs     04/09/19  1842 04/09/19  2123 04/10/19  0137   TROPONINI <0.01 <0.01 <0.01     Pro-BNP: No results for input(s): BNP in the last 72 hours. INR:   Recent Labs     04/09/19  1609   INR 1.02     ABGs: No results found for: PHART, PO2ART, ULS1NNV  UA:No results for input(s): NITRITE, COLORU, PHUR, LABCAST, WBCUA, RBCUA, MUCUS, TRICHOMONAS, YEAST, BACTERIA, CLARITYU, SPECGRAV, LEUKOCYTESUR, UROBILINOGEN, BILIRUBINUR, BLOODU, GLUCOSEU, AMORPHOUS in the last 72 hours. Invalid input(s): Lauren Farris      RAD:   Xr Chest Standard (2 Vw)    Result Date: 4/9/2019  XR CHEST (2 VW) 4/9/2019 3:00 PM HISTORY: Chest pain COMPARISON: May 17, 2018. FINDINGS: The lungs are clear. The cardiac silhouette is normal. Wires are present from previous median sternotomy. Pacing device present soft tissues the left chest.. The osseous structures and surrounding soft tissues demonstrate no acute abnormality. 1. No radiographic evidence of acute cardiopulmonary process.  Signed by Dr Janine Orona on 4/9/2019 4:46 PM    Echo: Official read ending      Objective:   Vitals: BP (!) 139/95   Pulse 69   Temp 97.4 °F (36.3 °C) (Temporal)   Resp 16   Ht 5' 7\" (1.702 m)   Wt 250 lb 11.2 oz (113.7 kg)   SpO2 98%   BMI 39.27 kg/m²   24HR INTAKE/OUTPUT:      Intake/Output Summary (Last 24 hours) at 4/10/2019 1426  Last data filed at 4/10/2019 0836  Gross per 24 hour   Intake 250 ml   Output 150 ml   Net 100 ml       Physical Exam  General appearance: alert and cooperative with exam  HEENT: atraumatic, eyes with clear conjunctiva and normal lids, pupils and irises normal, external ears and nose are normal, lips normal.  Neck without masses, lympadenopathy, bruit, thyroid normal  Lungs: Patient in no acute respiratory distress, no increased work of breathing, \"clear to auscultation bilaterally\" without rales, rhonchi or wheezes  Heart: regular rate and rhythm, S1, S2 normal, no murmur, click, rub or

## 2019-04-10 NOTE — PROGRESS NOTES
Adamaris Baird arrived to room # 416-2. Presented with: CP  Mental Status: Patient is oriented, alert, coherent, logical, thought processes intact and able to concentrate and follow conversation. Vitals:    04/10/19 0247   BP: 124/76   Pulse: 82   Resp: 18   Temp: 98.1 °F (36.7 °C)   SpO2: 97%     Patient safety contract and falls prevention contract reviewed with patient Yes. Oriented Patient and Family to room. Call light within reach. Yes.   Needs, issues or concerns expressed at this time: no.      Electronically signed by Mary Tatum RN on 4/10/2019 at 3:34 AM

## 2019-04-11 ENCOUNTER — APPOINTMENT (OUTPATIENT)
Dept: CARDIAC CATH/INVASIVE PROCEDURES | Age: 44
End: 2019-04-11
Payer: COMMERCIAL

## 2019-04-11 VITALS
RESPIRATION RATE: 18 BRPM | OXYGEN SATURATION: 98 % | WEIGHT: 249.38 LBS | TEMPERATURE: 97.8 F | BODY MASS INDEX: 39.14 KG/M2 | HEART RATE: 62 BPM | DIASTOLIC BLOOD PRESSURE: 77 MMHG | SYSTOLIC BLOOD PRESSURE: 127 MMHG | HEIGHT: 67 IN

## 2019-04-11 LAB
ALBUMIN SERPL-MCNC: 4.2 G/DL (ref 3.5–5.2)
ALP BLD-CCNC: 91 U/L (ref 40–130)
ALT SERPL-CCNC: 25 U/L (ref 5–41)
ANION GAP SERPL CALCULATED.3IONS-SCNC: 11 MMOL/L (ref 7–19)
AST SERPL-CCNC: 23 U/L (ref 5–40)
BILIRUB SERPL-MCNC: 0.6 MG/DL (ref 0.2–1.2)
BUN BLDV-MCNC: 13 MG/DL (ref 6–20)
CALCIUM SERPL-MCNC: 9.1 MG/DL (ref 8.6–10)
CHLORIDE BLD-SCNC: 106 MMOL/L (ref 98–111)
CHOLESTEROL, TOTAL: 156 MG/DL (ref 160–199)
CO2: 23 MMOL/L (ref 22–29)
CREAT SERPL-MCNC: 0.8 MG/DL (ref 0.5–1.2)
EKG P AXIS: 18 DEGREES
EKG P AXIS: 18 DEGREES
EKG P AXIS: 26 DEGREES
EKG P AXIS: 28 DEGREES
EKG P AXIS: 8 DEGREES
EKG P-R INTERVAL: 172 MS
EKG P-R INTERVAL: 178 MS
EKG P-R INTERVAL: 190 MS
EKG P-R INTERVAL: 190 MS
EKG P-R INTERVAL: 194 MS
EKG Q-T INTERVAL: 423 MS
EKG Q-T INTERVAL: 426 MS
EKG Q-T INTERVAL: 456 MS
EKG Q-T INTERVAL: 462 MS
EKG Q-T INTERVAL: 464 MS
EKG QRS DURATION: 132 MS
EKG QRS DURATION: 140 MS
EKG QRS DURATION: 144 MS
EKG QRS DURATION: 148 MS
EKG QRS DURATION: 148 MS
EKG QTC CALCULATION (BAZETT): 462 MS
EKG QTC CALCULATION (BAZETT): 477 MS
EKG QTC CALCULATION (BAZETT): 477 MS
EKG QTC CALCULATION (BAZETT): 481 MS
EKG QTC CALCULATION (BAZETT): 506 MS
EKG T AXIS: 120 DEGREES
EKG T AXIS: 124 DEGREES
EKG T AXIS: 126 DEGREES
EKG T AXIS: 132 DEGREES
EKG T AXIS: 133 DEGREES
GFR NON-AFRICAN AMERICAN: >60
GLUCOSE BLD-MCNC: 104 MG/DL (ref 74–109)
HDLC SERPL-MCNC: 31 MG/DL (ref 55–121)
LDL CHOLESTEROL CALCULATED: 60 MG/DL
LV EF: 46 %
LVEF MODALITY: NORMAL
POTASSIUM REFLEX MAGNESIUM: 3.9 MMOL/L (ref 3.5–5)
SODIUM BLD-SCNC: 140 MMOL/L (ref 136–145)
TOTAL PROTEIN: 6.8 G/DL (ref 6.6–8.7)
TRIGL SERPL-MCNC: 326 MG/DL (ref 0–149)

## 2019-04-11 PROCEDURE — 99232 SBSQ HOSP IP/OBS MODERATE 35: CPT | Performed by: INTERNAL MEDICINE

## 2019-04-11 PROCEDURE — G0378 HOSPITAL OBSERVATION PER HR: HCPCS

## 2019-04-11 PROCEDURE — 6360000004 HC RX CONTRAST MEDICATION: Performed by: INTERNAL MEDICINE

## 2019-04-11 PROCEDURE — 80053 COMPREHEN METABOLIC PANEL: CPT

## 2019-04-11 PROCEDURE — C1894 INTRO/SHEATH, NON-LASER: HCPCS

## 2019-04-11 PROCEDURE — 2500000003 HC RX 250 WO HCPCS

## 2019-04-11 PROCEDURE — 2500000003 HC RX 250 WO HCPCS: Performed by: INTERNAL MEDICINE

## 2019-04-11 PROCEDURE — 96372 THER/PROPH/DIAG INJ SC/IM: CPT

## 2019-04-11 PROCEDURE — 2580000003 HC RX 258: Performed by: INTERNAL MEDICINE

## 2019-04-11 PROCEDURE — 36415 COLL VENOUS BLD VENIPUNCTURE: CPT

## 2019-04-11 PROCEDURE — 6360000002 HC RX W HCPCS: Performed by: INTERNAL MEDICINE

## 2019-04-11 PROCEDURE — 99217 PR OBSERVATION CARE DISCHARGE MANAGEMENT: CPT | Performed by: HOSPITALIST

## 2019-04-11 PROCEDURE — 93458 L HRT ARTERY/VENTRICLE ANGIO: CPT

## 2019-04-11 PROCEDURE — 80061 LIPID PANEL: CPT

## 2019-04-11 PROCEDURE — 6360000002 HC RX W HCPCS

## 2019-04-11 PROCEDURE — 99152 MOD SED SAME PHYS/QHP 5/>YRS: CPT

## 2019-04-11 PROCEDURE — 2709999900 HC NON-CHARGEABLE SUPPLY

## 2019-04-11 PROCEDURE — 93458 L HRT ARTERY/VENTRICLE ANGIO: CPT | Performed by: INTERNAL MEDICINE

## 2019-04-11 PROCEDURE — 93005 ELECTROCARDIOGRAM TRACING: CPT

## 2019-04-11 PROCEDURE — 6370000000 HC RX 637 (ALT 250 FOR IP): Performed by: INTERNAL MEDICINE

## 2019-04-11 PROCEDURE — 99152 MOD SED SAME PHYS/QHP 5/>YRS: CPT | Performed by: INTERNAL MEDICINE

## 2019-04-11 PROCEDURE — 96376 TX/PRO/DX INJ SAME DRUG ADON: CPT

## 2019-04-11 RX ORDER — ACETAMINOPHEN 325 MG/1
650 TABLET ORAL EVERY 4 HOURS PRN
Status: DISCONTINUED | OUTPATIENT
Start: 2019-04-11 | End: 2019-04-11 | Stop reason: HOSPADM

## 2019-04-11 RX ORDER — ONDANSETRON 2 MG/ML
4 INJECTION INTRAMUSCULAR; INTRAVENOUS EVERY 6 HOURS PRN
Status: DISCONTINUED | OUTPATIENT
Start: 2019-04-11 | End: 2019-04-11 | Stop reason: HOSPADM

## 2019-04-11 RX ORDER — SODIUM CHLORIDE 0.9 % (FLUSH) 0.9 %
10 SYRINGE (ML) INJECTION PRN
Status: DISCONTINUED | OUTPATIENT
Start: 2019-04-11 | End: 2019-04-11 | Stop reason: HOSPADM

## 2019-04-11 RX ORDER — SODIUM CHLORIDE 0.9 % (FLUSH) 0.9 %
10 SYRINGE (ML) INJECTION EVERY 12 HOURS SCHEDULED
Status: DISCONTINUED | OUTPATIENT
Start: 2019-04-11 | End: 2019-04-11 | Stop reason: HOSPADM

## 2019-04-11 RX ORDER — SODIUM CHLORIDE 9 MG/ML
1000 INJECTION, SOLUTION INTRAVENOUS CONTINUOUS
Status: DISCONTINUED | OUTPATIENT
Start: 2019-04-11 | End: 2019-04-11 | Stop reason: HOSPADM

## 2019-04-11 RX ORDER — HYDRALAZINE HYDROCHLORIDE 20 MG/ML
10 INJECTION INTRAMUSCULAR; INTRAVENOUS EVERY 10 MIN PRN
Status: DISCONTINUED | OUTPATIENT
Start: 2019-04-11 | End: 2019-04-11 | Stop reason: HOSPADM

## 2019-04-11 RX ADMIN — METOPROLOL TARTRATE 25 MG: 25 TABLET ORAL at 08:49

## 2019-04-11 RX ADMIN — GEMFIBROZIL 600 MG: 600 TABLET ORAL at 08:49

## 2019-04-11 RX ADMIN — FAMOTIDINE 20 MG: 10 INJECTION INTRAVENOUS at 08:48

## 2019-04-11 RX ADMIN — SODIUM CHLORIDE: 9 INJECTION, SOLUTION INTRAVENOUS at 08:49

## 2019-04-11 RX ADMIN — ENOXAPARIN SODIUM 40 MG: 40 INJECTION SUBCUTANEOUS at 08:48

## 2019-04-11 RX ADMIN — Medication 10 ML: at 08:48

## 2019-04-11 RX ADMIN — IOPAMIDOL 136 ML: 612 INJECTION, SOLUTION INTRAVENOUS at 08:09

## 2019-04-11 RX ADMIN — ASPIRIN 81 MG 81 MG: 81 TABLET ORAL at 08:49

## 2019-04-11 RX ADMIN — SERTRALINE HYDROCHLORIDE 25 MG: 25 TABLET ORAL at 08:49

## 2019-04-11 NOTE — PROGRESS NOTES
Yes Historical Provider, MD   metoprolol tartrate (LOPRESSOR) 25 MG tablet Take 25 mg by mouth daily   Yes Historical Provider, MD        gemfibrozil  600 mg Oral BID AC    niacin  500 mg Oral Nightly    sodium chloride flush  10 mL Intravenous 2 times per day    metoprolol tartrate  25 mg Oral Daily    sertraline  25 mg Oral Daily    sodium chloride flush  10 mL Intravenous 2 times per day    famotidine (PEPCID) injection  20 mg Intravenous BID    atorvastatin  40 mg Oral Nightly    aspirin  81 mg Oral Daily    enoxaparin  40 mg Subcutaneous Daily       TELEMETRY: Sinus     Physical Exam:      Physical Exam      General:  Awake, alert, NAD  Skin:  Warm and dry  Neck:  no jvd , no carotid bruits  Chest:  Clear to auscultation, no wheezing or rales  Cardiovascular:  RRR K3I9 no murmurs, clicks, gallups, or rubs  Abdomen:  Soft nontender, nondistended, bowel sounds present  Extremities:  Edema: none       Lab Data:  CBC:   Recent Labs     04/09/19  1609 04/10/19  0137 04/10/19  2241   WBC 6.1 5.6 6.7   HGB 15.3 15.0 15.3   HCT 44.5 42.9 43.9   MCV 84.1 83.8 83.3    224 217     BMP:   Recent Labs     04/09/19  1609 04/10/19  0137 04/11/19  0154    139 140   K 4.1 3.9 3.9    103 106   CO2 29 25 23   BUN 8 10 13   CREATININE 0.9 0.8 0.8     LIVER PROFILE:   Recent Labs     04/09/19  1609 04/11/19  0154   AST 20 23   ALT 20 25   BILITOT 0.4 0.6   ALKPHOS 97 91     PT/INR:   Recent Labs     04/09/19  1609   PROTIME 12.8   INR 1.02     APTT: No results for input(s): APTT in the last 72 hours. BNP:  No results for input(s): BNP in the last 72 hours. CK, CKMB, Troponin: @LABRCNT (CKTOTAL:3, CKMB:3, TROPONINI:3)@    IMAGING:  Xr Chest Standard (2 Vw)    Result Date: 4/9/2019  XR CHEST (2 VW) 4/9/2019 3:00 PM HISTORY: Chest pain COMPARISON: May 17, 2018. FINDINGS: The lungs are clear. The cardiac silhouette is normal. Wires are present from previous median sternotomy.  Pacing device present soft tissues the left chest.. The osseous structures and surrounding soft tissues demonstrate no acute abnormality. 1. No radiographic evidence of acute cardiopulmonary process. Signed by Dr Kae Becerril on 4/9/2019 4:46 PM    Nm Myocardial Spect Rest Exercise Or Rx    Result Date: 4/10/2019  Lexiscan Nuclear Stress Test Report Procedure date: 4/10/2019 Indications: Chest pain Procedure: Stress was performed with injection of 0.4 mg Lexiscan. Vital signs and EKG were monitored. Technetium-99 sestamibi was injected in divided doses, approximately 10 mCi and 30 mCi respectively for rest and stress imaging. The patient was discharged in stable condition. Results: Patient had symptoms of dyspnea and chest pressure during infusion that resolved in recovery. Baseline EKG showed sinus with a left bundle branch block. During stress there were no significant EKG changes or rhythm changes. Baseline and peak blood pressures were 130/81, and 129/78 respectively. Baseline and peak heart rates were 76 and  101 respectively. Radioactive pharmaceuticals used: Rest images 9.09 mCi technetium 99m sestamibi Stress images 30.25 mCi technetium 99m sestamibi Review of rest and stress images obtained in a SPECT acquisition protocol low with review of the polar plot revealed: 1. Ejection fraction mildly decreased at 46% 2. Wall motion suggest inferoseptal akinesis 3. Myocardial perfusion imaging revealed diminished uptake in the anterior wall with some reversibility present suggestive of ischemia. The sum stress score was 3. Summary impression: Mildly abnormal study with mildly decreased ejection fraction 46% and evidence of mild to moderate anterior ischemia correlate clinically Signed by Dr Juan Jose Sanchez on 4/10/2019 5:09 PM        Assessment:  1.  Episode of chest pressure undetermined etiology unstable angina versus cardiac arrhythmias etc.  2. History of hypertrophic cardiomyopathy status post septal myomectomy [facility] May 2012  3. History of ICD placement  4. Apical exertional angina  5. Prior tobacco abuse discontinued 2011  6. Left bundle branch block  7. Abnormal stress test 4/10/2019 with anterior ischemia          Plan:  1. Recommend diagnostic catheterization patient agreeable  I have discussed with the patient regarding indications for the proposed procedure LEFT HEART CATHETERIZATION AND POSSIBLE PERCUTANEOUS INTERVENTION  along with possible alternatives benefits and risks including but not limited to risks of death, myocardial infarction, stroke, contrast induced nephropathy which in some cases may lead to acute kidney failure requiring dialysis, allergic reactions, bleeding requiring blood transfusion,  cardiac arrhthymias, respiratory failure which may require placing the patient on respiratory support such as a ventilator or breathing machine,risk of complications which may require vascular surgery, and if coronary intervention is performed emergency CABG may be required in less than 1% of cases. The patient is awake and alert and understands the issues involved and indicates willingness to proceed as ordered. The patient does not have any contraindications to dual antiplatelet therapy. The patient does not have any known  pending surgical procedures in the next 12 months at this time. The patient is  a reasonable candidate for moderate conscious sedation.     ASA score:  ASA 3 - Patient with moderate systemic disease with functional limitations    Mallampati: II (soft palate, uvula, fauces visible)    Preferred vascular access site will be: right radial        Macrina Jaimes MD 4/11/2019 6:45 AM

## 2019-04-11 NOTE — DISCHARGE SUMMARY
Hospitalist   Discharge Summary    Al Dowd  :  1975  MRN:  [id number]    Admit date:  2019  Discharge date:  2019    Admitting Physician:  Faith Wilson MD    Advance Directive: Full Code    Consults: cardiology    Primary Care Physician:  Brandon Riley    Discharge Diagnoses:  Principal Problem:    Chest pain  Active Problems:    Cardiomyopathy [facility])    Pacemaker    Dyspnea    Hypertriglyceridemia  Resolved Problems:    * No resolved hospital problems. *      Significant Diagnostic Studies:   Xr Chest Standard (2 Vw)    Result Date: 2019  XR CHEST (2 VW) 2019 3:00 PM HISTORY: Chest pain COMPARISON: May 17, 2018. FINDINGS: The lungs are clear. The cardiac silhouette is normal. Wires are present from previous median sternotomy. Pacing device present soft tissues the left chest.. The osseous structures and surrounding soft tissues demonstrate no acute abnormality. 1. No radiographic evidence of acute cardiopulmonary process. Signed by Dr Tala Chapa on 2019 4:46 PM    Nm Myocardial Spect Rest Exercise Or Rx    Result Date: 2019  Mittie Habermann Nuclear Stress Test Report Procedure date: 4/10/2019 Indications: Chest pain Procedure: Stress was performed with injection of 0.4 mg Lexiscan. Vital signs and EKG were monitored. Technetium-99 sestamibi was injected in divided doses, approximately 10 mCi and 30 mCi respectively for rest and stress imaging. The patient was discharged in stable condition. Results: Patient had symptoms of dyspnea and chest pressure during infusion that resolved in recovery. Baseline EKG showed sinus with a left bundle branch block. During stress there were no significant EKG changes or rhythm changes. Baseline and peak blood pressures were 130/81, and 129/78 respectively. Baseline and peak heart rates were 76 and  101 respectively.  Radioactive pharmaceuticals used: Rest images 9.09 mCi technetium 99m sestamibi Stress images 30.25 mCi technetium 99m sestamibi Review of rest and stress images obtained in a SPECT acquisition protocol low with review of the polar plot revealed: 1. Ejection fraction mildly decreased at 46% 2. Wall motion suggest inferoseptal akinesis 3. Myocardial perfusion imaging revealed diminished uptake in the anterior wall with some reversibility present suggestive of ischemia. The sum stress score was 3. Summary impression: Mildly abnormal study with mildly decreased ejection fraction 46% and evidence of mild to moderate anterior ischemia correlate clinically Signed by Dr Bridget Ly on 4/10/2019 5:09 PM      Labs:    CBC:   Recent Labs     04/09/19  1609 04/10/19  0137 04/10/19  2241   WBC 6.1 5.6 6.7   HGB 15.3 15.0 15.3    224 217     BMP:    Recent Labs     04/09/19  1609 04/10/19  0137 04/11/19  0154    139 140   K 4.1 3.9 3.9    103 106   CO2 29 25 23   BUN 8 10 13   CREATININE 0.9 0.8 0.8   GLUCOSE 100 103 104     Hepatic:   Recent Labs     04/09/19  1609 04/11/19  0154   AST 20 23   ALT 20 25   BILITOT 0.4 0.6   ALKPHOS 97 91     Troponin:   Recent Labs     04/09/19  1842 04/09/19  2123 04/10/19  0137   TROPONINI <0.01 <0.01 <0.01     BNP: No results for input(s): BNP in the last 72 hours. Lipids:   Recent Labs     04/11/19  0154   CHOL 156*   HDL 31*     INR:   Recent Labs     04/09/19  1609   INR 1.02     ABG: No results for input(s): PHART, ICA5DRI, PO2ART, HEL7RCV, M5CLDMEH, BEART in the last 72 hours. 30 Day lookback of cultures:    Blood Culture Recent: No results for input(s): BC in the last 720 hours. Gram Stain Recent: No results for input(s): LABGRAM in the last 720 hours. Resp Culture Recent: No results for input(s): CULTRESP in the last 720 hours. Body Fluid Recent : No results for input(s): BFCX in the last 720 hours. MRSA Recent : No results for input(s): [address] in the last 720 hours. Urine Culture Recent : No results for input(s): LABURIN in the last 720 hours.   Organism Recent : No results for input(s): ORG in the last 720 hours. Hospital Course: 44 y.o. male with a history of hypertrophic cardiomyopathy (s/p repair and defibrillator in place), presenting to the ED on account of acute onset of chest pain, with associated pre-syncope, as well as shortness of breath that started while she was at work. Symptoms reportedly only lasted a few minutes. Patient describes heavy chest pressure. Cardiology was consulted and he had stress test done that showed some abnormalities and cath was then planned for. Patient had cardiac cath done that was negative and cardiology cleared patient for discharge. Physical Exam:    Vitals: /77   Pulse 62   Temp 97.8 °F (36.6 °C) (Temporal)   Resp 18   Ht 5' 7\" (1.702 m)   Wt 249 lb 6 oz (113.1 kg)   SpO2 98%   BMI 39.06 kg/m²   General appearance: alert, appears stated age and cooperative  Skin: Skin color, texture, turgor normal. No rashes or lesions   HEENT: Head: Normocephalic, no lesions, without obvious abnormality.   Neck: no adenopathy, no carotid bruit, no JVD, supple, symmetrical, trachea midline and thyroid not enlarged, symmetric, no tenderness/mass/nodules  Lungs: clear to auscultation bilaterally  Heart: regular rate and rhythm, S1, S2 normal, no murmur, click, rub or gallop  Abdomen: soft, non-tender; bowel sounds normal; no masses,  no organomegaly  Extremities: extremities normal, atraumatic, no cyanosis or edema  Neurologic: Mental status: Alert, oriented, thought content appropriate    Discharge Medications:       Debbi Norman   Home Medication Instructions CELIAZS:[id number]    Printed on:04/11/19 4743   Medication Information                      aspirin 81 MG chewable tablet  Take 1 tablet by mouth daily             atorvastatin (LIPITOR) 40 MG tablet  Take 1 tablet by mouth nightly             gemfibrozil (LOPID) 600 MG tablet  Take 1 tablet by mouth 2 times daily (before meals)             metoprolol tartrate (LOPRESSOR) 25 MG tablet  Take 25 mg by mouth daily             naproxen sodium (ANAPROX DS) 550 MG tablet  Take 1 tablet by mouth 2 times daily (with meals)             niacin 500 MG extended release capsule  Take 1 capsule by mouth nightly             ondansetron (ZOFRAN ODT) 4 MG disintegrating tablet  Take 1 tablet by mouth every 8 hours as needed for Nausea or Vomiting             sertraline (ZOLOFT) 25 MG tablet  Take 25 mg by mouth                 Discharge Instructions: Follow up with Ari Chan in 7 days. Take medications as directed. Resume activity as tolerated. Diet: DIET GENERAL; No Caffeine     Disposition: Patient is medically stable and will be discharged home. Time spent on discharge 25 minutes.     Signed:  Wendi Fernandez MD  Hospitalist service  4/11/2019  12:59 PM

## 2019-05-20 ENCOUNTER — TELEPHONE (OUTPATIENT)
Dept: CARDIOLOGY | Age: 44
End: 2019-05-20

## 2019-05-20 NOTE — TELEPHONE ENCOUNTER
left msg on pt phone in regards to prov being out of office.  Pt is a post op and will need to be sent to my ext [phone] when pt calls back

## 2019-05-21 ENCOUNTER — TELEPHONE (OUTPATIENT)
Dept: CARDIOLOGY | Age: 44
End: 2019-05-21

## 2019-05-21 NOTE — TELEPHONE ENCOUNTER
left msg on pt phone in regards to prov being out of office on 5-30. Prov is requesting that pt see him only.  If pt calls back please direct them to my extension @ [phone]

## 2019-06-19 ENCOUNTER — TELEPHONE (OUTPATIENT)
Dept: CARDIOLOGY | Age: 44
End: 2019-06-19

## 2019-06-20 ENCOUNTER — TELEPHONE (OUTPATIENT)
Dept: CARDIOLOGY | Age: 44
End: 2019-06-20

## 2019-06-20 NOTE — TELEPHONE ENCOUNTER
Called to reschedule no show appointment, left message to return call & reschedule appointment. Letter sent.

## 2019-06-24 ENCOUNTER — TELEPHONE (OUTPATIENT)
Dept: CARDIOLOGY | Age: 44
End: 2019-06-24

## 2019-06-25 ENCOUNTER — TELEPHONE (OUTPATIENT)
Dept: CARDIOLOGY | Age: 44
End: 2019-06-25

## 2020-05-20 ENCOUNTER — HOSPITAL ENCOUNTER (OUTPATIENT)
Dept: CT IMAGING | Age: 45
Discharge: HOME | End: 2020-05-20
Payer: COMMERCIAL

## 2020-05-20 PROCEDURE — 73202 CT UPPR EXTREMITY W/O&W/DYE: CPT

## 2020-05-20 PROCEDURE — 6360000004 HC RX CONTRAST MEDICATION: Performed by: ORTHOPAEDIC SURGERY

## 2020-05-20 RX ADMIN — IOPAMIDOL 90 ML: 755 INJECTION, SOLUTION INTRAVENOUS at 08:15

## 2020-06-25 ENCOUNTER — HOSPITAL ENCOUNTER (OUTPATIENT)
Dept: PREADMISSION TESTING | Age: 45
Discharge: HOME | End: 2020-06-29
Payer: COMMERCIAL

## 2020-06-25 ENCOUNTER — OFFICE VISIT (OUTPATIENT)
Age: 45
End: 2020-06-25

## 2020-06-25 VITALS — WEIGHT: 250 LBS | BODY MASS INDEX: 40.18 KG/M2 | HEIGHT: 66 IN

## 2020-06-25 VITALS — OXYGEN SATURATION: 98 % | TEMPERATURE: 98.2 F

## 2020-06-25 LAB
ANION GAP SERPL CALCULATED.3IONS-SCNC: 12 MMOL/L (ref 7–19)
BASOPHILS ABSOLUTE: 0.1 K/UL (ref 0–0.2)
BASOPHILS RELATIVE PERCENT: 0.9 % (ref 0–1)
BUN BLDV-MCNC: 12 MG/DL (ref 6–20)
CALCIUM SERPL-MCNC: 9 MG/DL (ref 8.6–10)
CHLORIDE BLD-SCNC: 103 MMOL/L (ref 98–111)
CO2: 24 MMOL/L (ref 22–29)
CREAT SERPL-MCNC: 0.8 MG/DL (ref 0.5–1.2)
EOSINOPHILS ABSOLUTE: 0 K/UL (ref 0–0.6)
EOSINOPHILS RELATIVE PERCENT: 0.2 % (ref 0–5)
GFR NON-AFRICAN AMERICAN: >60
GLUCOSE BLD-MCNC: 90 MG/DL (ref 74–109)
HCT VFR BLD CALC: 41.6 % (ref 42–52)
HEMOGLOBIN: 14.2 G/DL (ref 14–18)
IMMATURE GRANULOCYTES #: 0.1 K/UL
LYMPHOCYTES ABSOLUTE: 2.1 K/UL (ref 1.1–4.5)
LYMPHOCYTES RELATIVE PERCENT: 25.6 % (ref 20–40)
MCH RBC QN AUTO: 29.1 PG (ref 27–31)
MCHC RBC AUTO-ENTMCNC: 34.1 G/DL (ref 33–37)
MCV RBC AUTO: 85.2 FL (ref 80–94)
MONOCYTES ABSOLUTE: 0.9 K/UL (ref 0–0.9)
MONOCYTES RELATIVE PERCENT: 10.9 % (ref 0–10)
NEUTROPHILS ABSOLUTE: 5 K/UL (ref 1.5–7.5)
NEUTROPHILS RELATIVE PERCENT: 61.3 % (ref 50–65)
PDW BLD-RTO: 12.7 % (ref 11.5–14.5)
PLATELET # BLD: 309 K/UL (ref 130–400)
PMV BLD AUTO: 8.9 FL (ref 9.4–12.4)
POTASSIUM SERPL-SCNC: 4.1 MMOL/L (ref 3.5–5)
RBC # BLD: 4.88 M/UL (ref 4.7–6.1)
SODIUM BLD-SCNC: 139 MMOL/L (ref 136–145)
WBC # BLD: 8.2 K/UL (ref 4.8–10.8)

## 2020-06-25 PROCEDURE — 93005 ELECTROCARDIOGRAM TRACING: CPT | Performed by: ORTHOPAEDIC SURGERY

## 2020-06-25 PROCEDURE — 85025 COMPLETE CBC W/AUTO DIFF WBC: CPT

## 2020-06-25 PROCEDURE — 80048 BASIC METABOLIC PNL TOTAL CA: CPT

## 2020-06-25 RX ORDER — SODIUM CHLORIDE, SODIUM LACTATE, POTASSIUM CHLORIDE, CALCIUM CHLORIDE 600; 310; 30; 20 MG/100ML; MG/100ML; MG/100ML; MG/100ML
INJECTION, SOLUTION INTRAVENOUS CONTINUOUS
Status: CANCELLED | OUTPATIENT
Start: 2020-06-29

## 2020-06-25 NOTE — PATIENT INSTRUCTIONS
departments. [employer] allows you to send messages to your doctor, view your test results, renew your prescriptions, schedule appointments, view visit notes, and more. How Do I Sign Up? 1. In your Internet browser, go to [email or web address]  2. Click on the Sign Up Now link in the Sign In box. You will see the New Member Sign Up page. 3. Enter your [employer] Access Code exactly as it appears below. You will not need to use this code after youve completed the sign-up process. If you do not sign up before the expiration date, you must request a new code. [employer] Access Code: [email or web address]  Expires: 8/9/2020  9:09 AM    4. Enter your Social Security Number ([id number]) and Date of Birth (mm/dd/yyyy) as indicated and click Submit. You will be taken to the next sign-up page. 5. Create a [employer] ID. This will be your [employer] login ID and cannot be changed, so think of one that is secure and easy to remember. 6. Create a [employer] password. You can change your password at any time. 7. Enter your Password Reset Question and Answer. This can be used at a later time if you forget your password. 8. Enter your e-mail address. You will receive e-mail notification when new information is available in [address]. 9. Click Sign Up. You can now view your medical record. Additional Information  If you have questions, please contact the physician practice where you receive care. Remember, [employer] is NOT to be used for urgent needs. For medical emergencies, dial 911. For questions regarding your [employer] account call [phone]. If you have a clinical question, please call your doctor's office.

## 2020-06-26 LAB
EKG P AXIS: 12 DEGREES
EKG P-R INTERVAL: 172 MS
EKG Q-T INTERVAL: 428 MS
EKG QRS DURATION: 138 MS
EKG QTC CALCULATION (BAZETT): 456 MS
EKG T AXIS: 144 DEGREES
REPORT: NORMAL
SARS-COV-2: NOT DETECTED
THIS TEST SENT TO: NORMAL

## 2020-06-26 PROCEDURE — 93010 ELECTROCARDIOGRAM REPORT: CPT | Performed by: INTERNAL MEDICINE

## 2020-06-29 ENCOUNTER — ANESTHESIA (OUTPATIENT)
Dept: OPERATING ROOM | Age: 45
End: 2020-06-29
Payer: COMMERCIAL

## 2020-06-29 ENCOUNTER — ANESTHESIA EVENT (OUTPATIENT)
Dept: OPERATING ROOM | Age: 45
End: 2020-06-29
Payer: COMMERCIAL

## 2020-06-29 ENCOUNTER — HOSPITAL ENCOUNTER (OUTPATIENT)
Age: 45
Setting detail: OUTPATIENT SURGERY
Discharge: HOME | End: 2020-06-29
Attending: ORTHOPAEDIC SURGERY | Admitting: ORTHOPAEDIC SURGERY
Payer: COMMERCIAL

## 2020-06-29 VITALS
RESPIRATION RATE: 2 BRPM | OXYGEN SATURATION: 98 % | DIASTOLIC BLOOD PRESSURE: 52 MMHG | TEMPERATURE: 98 F | SYSTOLIC BLOOD PRESSURE: 106 MMHG

## 2020-06-29 VITALS
RESPIRATION RATE: 16 BRPM | HEART RATE: 89 BPM | BODY MASS INDEX: 40.18 KG/M2 | DIASTOLIC BLOOD PRESSURE: 87 MMHG | OXYGEN SATURATION: 93 % | WEIGHT: 250 LBS | TEMPERATURE: 97.2 F | SYSTOLIC BLOOD PRESSURE: 153 MMHG | HEIGHT: 66 IN

## 2020-06-29 PROCEDURE — 2580000003 HC RX 258: Performed by: ORTHOPAEDIC SURGERY

## 2020-06-29 PROCEDURE — 3700000000 HC ANESTHESIA ATTENDED CARE: Performed by: ORTHOPAEDIC SURGERY

## 2020-06-29 PROCEDURE — 6360000002 HC RX W HCPCS: Performed by: NURSE ANESTHETIST, CERTIFIED REGISTERED

## 2020-06-29 PROCEDURE — 7100000001 HC PACU RECOVERY - ADDTL 15 MIN: Performed by: ORTHOPAEDIC SURGERY

## 2020-06-29 PROCEDURE — 2709999900 HC NON-CHARGEABLE SUPPLY: Performed by: ORTHOPAEDIC SURGERY

## 2020-06-29 PROCEDURE — 6360000002 HC RX W HCPCS: Performed by: ORTHOPAEDIC SURGERY

## 2020-06-29 PROCEDURE — 7100000000 HC PACU RECOVERY - FIRST 15 MIN: Performed by: ORTHOPAEDIC SURGERY

## 2020-06-29 PROCEDURE — 3600000004 HC SURGERY LEVEL 4 BASE: Performed by: ORTHOPAEDIC SURGERY

## 2020-06-29 PROCEDURE — 2500000003 HC RX 250 WO HCPCS: Performed by: NURSE ANESTHETIST, CERTIFIED REGISTERED

## 2020-06-29 PROCEDURE — 2720000010 HC SURG SUPPLY STERILE: Performed by: ORTHOPAEDIC SURGERY

## 2020-06-29 PROCEDURE — 6370000000 HC RX 637 (ALT 250 FOR IP): Performed by: ORTHOPAEDIC SURGERY

## 2020-06-29 PROCEDURE — 6360000002 HC RX W HCPCS: Performed by: ANESTHESIOLOGY

## 2020-06-29 PROCEDURE — 3700000001 HC ADD 15 MINUTES (ANESTHESIA): Performed by: ORTHOPAEDIC SURGERY

## 2020-06-29 PROCEDURE — C1713 ANCHOR/SCREW BN/BN,TIS/BN: HCPCS | Performed by: ORTHOPAEDIC SURGERY

## 2020-06-29 PROCEDURE — 7100000010 HC PHASE II RECOVERY - FIRST 15 MIN: Performed by: ORTHOPAEDIC SURGERY

## 2020-06-29 PROCEDURE — 3600000014 HC SURGERY LEVEL 4 ADDTL 15MIN: Performed by: ORTHOPAEDIC SURGERY

## 2020-06-29 PROCEDURE — 64415 NJX AA&/STRD BRCH PLXS IMG: CPT | Performed by: NURSE ANESTHETIST, CERTIFIED REGISTERED

## 2020-06-29 PROCEDURE — 7100000011 HC PHASE II RECOVERY - ADDTL 15 MIN: Performed by: ORTHOPAEDIC SURGERY

## 2020-06-29 DEVICE — KIT INSTR 1.4MM CRV FLX DRL BIT OBT DISP FOR SFT ANCHR: Type: IMPLANTABLE DEVICE | Status: FUNCTIONAL

## 2020-06-29 DEVICE — ANCHOR SUT DIA14MM 1 SFT SGL LD MB JUGGERKNOT: Type: IMPLANTABLE DEVICE | Site: SHOULDER | Status: FUNCTIONAL

## 2020-06-29 RX ORDER — LABETALOL HYDROCHLORIDE 5 MG/ML
5 INJECTION, SOLUTION INTRAVENOUS EVERY 10 MIN PRN
Status: DISCONTINUED | OUTPATIENT
Start: 2020-06-29 | End: 2020-06-29 | Stop reason: HOSPADM

## 2020-06-29 RX ORDER — FENTANYL CITRATE 50 UG/ML
25 INJECTION, SOLUTION INTRAMUSCULAR; INTRAVENOUS
Status: DISCONTINUED | OUTPATIENT
Start: 2020-06-29 | End: 2020-06-29 | Stop reason: HOSPADM

## 2020-06-29 RX ORDER — HYDROMORPHONE HYDROCHLORIDE 1 MG/ML
0.25 INJECTION, SOLUTION INTRAMUSCULAR; INTRAVENOUS; SUBCUTANEOUS EVERY 5 MIN PRN
Status: DISCONTINUED | OUTPATIENT
Start: 2020-06-29 | End: 2020-06-29 | Stop reason: HOSPADM

## 2020-06-29 RX ORDER — PROPOFOL 10 MG/ML
INJECTION, EMULSION INTRAVENOUS PRN
Status: DISCONTINUED | OUTPATIENT
Start: 2020-06-29 | End: 2020-06-29 | Stop reason: SDUPTHER

## 2020-06-29 RX ORDER — ONDANSETRON 4 MG/1
4 TABLET, FILM COATED ORAL DAILY PRN
Qty: 20 TABLET | Refills: 0 | Status: SHIPPED | OUTPATIENT
Start: 2020-06-29

## 2020-06-29 RX ORDER — LIDOCAINE HYDROCHLORIDE 10 MG/ML
1 INJECTION, SOLUTION EPIDURAL; INFILTRATION; INTRACAUDAL; PERINEURAL
Status: DISCONTINUED | OUTPATIENT
Start: 2020-06-29 | End: 2020-06-29 | Stop reason: HOSPADM

## 2020-06-29 RX ORDER — SODIUM CHLORIDE 9 MG/ML
INJECTION, SOLUTION INTRAVENOUS CONTINUOUS
Status: DISCONTINUED | OUTPATIENT
Start: 2020-06-29 | End: 2020-06-29 | Stop reason: HOSPADM

## 2020-06-29 RX ORDER — DEXAMETHASONE SODIUM PHOSPHATE 10 MG/ML
INJECTION, SOLUTION INTRAMUSCULAR; INTRAVENOUS PRN
Status: DISCONTINUED | OUTPATIENT
Start: 2020-06-29 | End: 2020-06-29 | Stop reason: SDUPTHER

## 2020-06-29 RX ORDER — HYDRALAZINE HYDROCHLORIDE 20 MG/ML
5 INJECTION INTRAMUSCULAR; INTRAVENOUS EVERY 10 MIN PRN
Status: DISCONTINUED | OUTPATIENT
Start: 2020-06-29 | End: 2020-06-29 | Stop reason: HOSPADM

## 2020-06-29 RX ORDER — PROMETHAZINE HYDROCHLORIDE 25 MG/ML
6.25 INJECTION, SOLUTION INTRAMUSCULAR; INTRAVENOUS
Status: DISCONTINUED | OUTPATIENT
Start: 2020-06-29 | End: 2020-06-29 | Stop reason: HOSPADM

## 2020-06-29 RX ORDER — MORPHINE SULFATE 4 MG/ML
2 INJECTION, SOLUTION INTRAMUSCULAR; INTRAVENOUS EVERY 5 MIN PRN
Status: DISCONTINUED | OUTPATIENT
Start: 2020-06-29 | End: 2020-06-29 | Stop reason: HOSPADM

## 2020-06-29 RX ORDER — MEPERIDINE HYDROCHLORIDE 50 MG/ML
12.5 INJECTION INTRAMUSCULAR; INTRAVENOUS; SUBCUTANEOUS EVERY 5 MIN PRN
Status: DISCONTINUED | OUTPATIENT
Start: 2020-06-29 | End: 2020-06-29 | Stop reason: HOSPADM

## 2020-06-29 RX ORDER — ENALAPRILAT 2.5 MG/2ML
1.25 INJECTION INTRAVENOUS
Status: DISCONTINUED | OUTPATIENT
Start: 2020-06-29 | End: 2020-06-29 | Stop reason: HOSPADM

## 2020-06-29 RX ORDER — SODIUM CHLORIDE 0.9 % (FLUSH) 0.9 %
10 SYRINGE (ML) INJECTION EVERY 12 HOURS SCHEDULED
Status: DISCONTINUED | OUTPATIENT
Start: 2020-06-29 | End: 2020-06-29 | Stop reason: HOSPADM

## 2020-06-29 RX ORDER — METOCLOPRAMIDE HYDROCHLORIDE 5 MG/ML
10 INJECTION INTRAMUSCULAR; INTRAVENOUS
Status: COMPLETED | OUTPATIENT
Start: 2020-06-29 | End: 2020-06-29

## 2020-06-29 RX ORDER — SODIUM CHLORIDE, SODIUM LACTATE, POTASSIUM CHLORIDE, CALCIUM CHLORIDE 600; 310; 30; 20 MG/100ML; MG/100ML; MG/100ML; MG/100ML
INJECTION, SOLUTION INTRAVENOUS CONTINUOUS
Status: DISCONTINUED | OUTPATIENT
Start: 2020-06-29 | End: 2020-06-29 | Stop reason: HOSPADM

## 2020-06-29 RX ORDER — HYDROMORPHONE HYDROCHLORIDE 1 MG/ML
0.5 INJECTION, SOLUTION INTRAMUSCULAR; INTRAVENOUS; SUBCUTANEOUS EVERY 5 MIN PRN
Status: DISCONTINUED | OUTPATIENT
Start: 2020-06-29 | End: 2020-06-29 | Stop reason: HOSPADM

## 2020-06-29 RX ORDER — DIPHENHYDRAMINE HYDROCHLORIDE 50 MG/ML
12.5 INJECTION INTRAMUSCULAR; INTRAVENOUS
Status: DISCONTINUED | OUTPATIENT
Start: 2020-06-29 | End: 2020-06-29 | Stop reason: HOSPADM

## 2020-06-29 RX ORDER — SODIUM CHLORIDE 0.9 % (FLUSH) 0.9 %
10 SYRINGE (ML) INJECTION PRN
Status: DISCONTINUED | OUTPATIENT
Start: 2020-06-29 | End: 2020-06-29 | Stop reason: HOSPADM

## 2020-06-29 RX ORDER — FENTANYL CITRATE 50 UG/ML
50 INJECTION, SOLUTION INTRAMUSCULAR; INTRAVENOUS
Status: DISCONTINUED | OUTPATIENT
Start: 2020-06-29 | End: 2020-06-29 | Stop reason: HOSPADM

## 2020-06-29 RX ORDER — MORPHINE SULFATE 4 MG/ML
4 INJECTION, SOLUTION INTRAMUSCULAR; INTRAVENOUS EVERY 5 MIN PRN
Status: DISCONTINUED | OUTPATIENT
Start: 2020-06-29 | End: 2020-06-29 | Stop reason: HOSPADM

## 2020-06-29 RX ORDER — MIDAZOLAM HYDROCHLORIDE 1 MG/ML
2 INJECTION INTRAMUSCULAR; INTRAVENOUS
Status: COMPLETED | OUTPATIENT
Start: 2020-06-29 | End: 2020-06-29

## 2020-06-29 RX ORDER — LIDOCAINE HYDROCHLORIDE 10 MG/ML
INJECTION, SOLUTION INFILTRATION; PERINEURAL PRN
Status: DISCONTINUED | OUTPATIENT
Start: 2020-06-29 | End: 2020-06-29 | Stop reason: SDUPTHER

## 2020-06-29 RX ORDER — ROPIVACAINE HYDROCHLORIDE 5 MG/ML
INJECTION, SOLUTION EPIDURAL; INFILTRATION; PERINEURAL
Status: COMPLETED | OUTPATIENT
Start: 2020-06-29 | End: 2020-06-29

## 2020-06-29 RX ORDER — HYDROCODONE BITARTRATE AND ACETAMINOPHEN 5; 325 MG/1; MG/1
2 TABLET ORAL
Status: COMPLETED | OUTPATIENT
Start: 2020-06-29 | End: 2020-06-29

## 2020-06-29 RX ORDER — HYDROCODONE BITARTRATE AND ACETAMINOPHEN 10; 325 MG/1; MG/1
TABLET ORAL
Qty: 40 TABLET | Refills: 0 | Status: SHIPPED | OUTPATIENT
Start: 2020-06-29 | End: 2020-07-06

## 2020-06-29 RX ORDER — ROCURONIUM BROMIDE 10 MG/ML
INJECTION, SOLUTION INTRAVENOUS PRN
Status: DISCONTINUED | OUTPATIENT
Start: 2020-06-29 | End: 2020-06-29 | Stop reason: SDUPTHER

## 2020-06-29 RX ORDER — ONDANSETRON 2 MG/ML
INJECTION INTRAMUSCULAR; INTRAVENOUS PRN
Status: DISCONTINUED | OUTPATIENT
Start: 2020-06-29 | End: 2020-06-29 | Stop reason: SDUPTHER

## 2020-06-29 RX ADMIN — Medication 2 G: at 08:43

## 2020-06-29 RX ADMIN — ROCURONIUM BROMIDE 50 MG: 10 INJECTION, SOLUTION INTRAVENOUS at 08:36

## 2020-06-29 RX ADMIN — ROPIVACAINE HYDROCHLORIDE 20 ML: 5 INJECTION, SOLUTION EPIDURAL; INFILTRATION; PERINEURAL at 08:13

## 2020-06-29 RX ADMIN — SUGAMMADEX 250 MG: 100 INJECTION, SOLUTION INTRAVENOUS at 10:53

## 2020-06-29 RX ADMIN — LIDOCAINE HYDROCHLORIDE 50 MG: 10 INJECTION, SOLUTION INFILTRATION; PERINEURAL at 08:36

## 2020-06-29 RX ADMIN — SODIUM CHLORIDE, SODIUM LACTATE, POTASSIUM CHLORIDE, AND CALCIUM CHLORIDE: 600; 310; 30; 20 INJECTION, SOLUTION INTRAVENOUS at 08:21

## 2020-06-29 RX ADMIN — DEXAMETHASONE SODIUM PHOSPHATE 10 MG: 10 INJECTION, SOLUTION INTRAMUSCULAR; INTRAVENOUS at 08:49

## 2020-06-29 RX ADMIN — SODIUM CHLORIDE, SODIUM LACTATE, POTASSIUM CHLORIDE, AND CALCIUM CHLORIDE: 600; 310; 30; 20 INJECTION, SOLUTION INTRAVENOUS at 10:21

## 2020-06-29 RX ADMIN — MIDAZOLAM HYDROCHLORIDE 2 MG: 2 INJECTION, SOLUTION INTRAMUSCULAR; INTRAVENOUS at 08:05

## 2020-06-29 RX ADMIN — ONDANSETRON HYDROCHLORIDE 4 MG: 2 INJECTION, SOLUTION INTRAMUSCULAR; INTRAVENOUS at 10:21

## 2020-06-29 RX ADMIN — HYDROMORPHONE HYDROCHLORIDE 1 MG: 1 INJECTION, SOLUTION INTRAMUSCULAR; INTRAVENOUS; SUBCUTANEOUS at 08:36

## 2020-06-29 RX ADMIN — HYDROCODONE BITARTRATE AND ACETAMINOPHEN 2 TABLET: 5; 325 TABLET ORAL at 13:00

## 2020-06-29 RX ADMIN — PROPOFOL 150 MG: 10 INJECTION, EMULSION INTRAVENOUS at 08:36

## 2020-06-29 RX ADMIN — METOCLOPRAMIDE 10 MG: 5 INJECTION, SOLUTION INTRAMUSCULAR; INTRAVENOUS at 11:12

## 2020-06-29 ASSESSMENT — LIFESTYLE VARIABLES: SMOKING_STATUS: 1

## 2020-06-29 ASSESSMENT — PAIN SCALES - GENERAL
PAINLEVEL_OUTOF10: 6
PAINLEVEL_OUTOF10: 0

## 2020-06-29 ASSESSMENT — ENCOUNTER SYMPTOMS: SHORTNESS OF BREATH: 0

## 2020-06-29 NOTE — ANESTHESIA POSTPROCEDURE EVALUATION
Department of Anesthesiology  Postprocedure Note    Patient: Delta Pratt  MRN: [id number]  YOB: 1975  Date of evaluation: 6/29/2020  Time:  11:04 AM     Procedure Summary     Date:  06/29/20 Room / Location:  [address]    Anesthesia Start:  7457 Anesthesia Stop:  1104    Procedure:  RIGHT SHOULDER DIAGNOSTIC ARTHROSCOPY SUBPECTORALIS BICEP TENDONESISI, POSTERIOR LABRAL REPAIR, ANTERIOR LABRAL REPAIR, CONDOPLASTY GLENOID (Right ) Diagnosis:  (M75.41)    Surgeon:  Stephanie Biggs MD Responsible Provider:  ARLEN Moody CRNA    Anesthesia Type:  general, regional ASA Status:  3          Anesthesia Type: general, regional    Ruth Phase I: Ruth Score: 10    Ruth Phase II:      Last vitals: Reviewed and per EMR flowsheets.        Anesthesia Post Evaluation    Patient location during evaluation: PACU  Patient participation: complete - patient participated  Level of consciousness: awake and alert  Pain score: 0  Airway patency: patent  Nausea & Vomiting: no nausea and no vomiting  Complications: no  Cardiovascular status: hemodynamically stable  Respiratory status: acceptable  Hydration status: euvolemic

## 2020-06-29 NOTE — H&P
Pt Name: Reji Munoz  MRN: [id number]  YOB: 1975  Date: 6/29/2020      HPI: 40 yo male presents for a right shoulder diagnostic arthroscopy. Past Medical/Surgical History:   Past Medical History:   Diagnosis Date    Cardiomyopathy ([address]     Left bundle branch block     Pacemaker      Past Surgical History:   Procedure Laterality Date    ANKLE SURGERY Right     APPENDECTOMY      CARDIAC SURGERY  05/2012    cardiomypopathy with obstruction         Social History:    Smoking:  No Smoking History = 0   Alcohol:limit consumption of alcohol to nor more than 14 drinks/week and 4 drinks per occasion for men, or no more than 7 drinks/week and 3 drinks per occasion for women    Medications:   Prior to Admission medications    Medication Sig Start Date End Date Taking?  Authorizing Provider   HYDROcodone-acetaminophen [facility])  MG per tablet 1 tab po q 4 prn pain 6/29/20 7/6/20 Yes Rolando Gutierrez MD   ondansetron [facility]) 4 MG tablet Take 1 tablet by mouth daily as needed for Nausea or Vomiting 6/29/20  Yes Rolando Gutierrez MD   sertraline (ZOLOFT) 25 MG tablet Take 25 mg by mouth   Yes Historical Provider, MD   metoprolol tartrate (LOPRESSOR) 25 MG tablet Take 25 mg by mouth daily   Yes Historical Provider, MD       Allergies: No Known Allergies    Review of systems:     * Constitutional: negative     * HEENT: negative     * Skin: negative     * Cardiac: negative     * Respiratory: negative     * GI: negative     * : negative     * Neuro: negative     * CNS: negative     * Extremities: negative     * Endcrine: negative     Physical Exam:   /67   Pulse 78   Temp 97 °F (36.1 °C) (Tympanic)   Resp 18   Ht 5' 6\" (1.676 m)   Wt 250 lb (113.4 kg)   SpO2 97%   BMI 40.35 kg/m²     - General: normal development and appearance     - HEENT: normocephalic, CASSIE     - Skin: pink, warm, normal tone     - Neck: supple, no masses,     - Chest: no rales or wheezes, normal expansion     - Heart: RRR, no murmurs/gallops     - Abdomen: soft, nondistended, nontender, normal sounds     - Vascular: normal pulses, color, tone     - Pysch/Neuro: normal affect, mood, alert and oriented     - Musculoskeletal: right shoulder anterior TTP and weakness in abduction. Impression: right shoulder pain      Surgical Plan: Right shoulder diagnostic arthroscopy with possible rotator cuff repair.       Electronically signed by Marie Queen MD on 6/29/2020 at 8:30 AM

## 2020-06-29 NOTE — ANESTHESIA PRE PROCEDURE
Department of Anesthesiology  Preprocedure Note       Name:  Marquez Franz   Age:  41 y.o.  :  1975                                          MRN:  [id number]         Date:  2020      Surgeon: Dee Shields):  Xiomara Mendoza MD    Procedure: Procedure(s):  RIGHT SHOULDER DIAGNOSTIC ARTHROSCOPY    Medications prior to admission:   Prior to Admission medications    Medication Sig Start Date End Date Taking?  Authorizing Provider   HYDROcodone-acetaminophen [facility])  MG per tablet 1 tab po q 4 prn pain 20 Yes Xiomara Mendoza MD   ondansetron [facility]) 4 MG tablet Take 1 tablet by mouth daily as needed for Nausea or Vomiting 20  Yes Xiomara Mendoza MD   sertraline (ZOLOFT) 25 MG tablet Take 25 mg by mouth   Yes Historical Provider, MD   metoprolol tartrate (LOPRESSOR) 25 MG tablet Take 25 mg by mouth daily   Yes Historical Provider, MD       Current medications:    Current Facility-Administered Medications   Medication Dose Route Frequency Provider Last Rate Last Dose    ceFAZolin (ANCEF) 2 g in sterile water 20 mL IV syringe  2 g Intravenous Once Xiomara Mendoza MD        lactated ringers infusion   Intravenous Continuous Xiomara Mendoza MD           Allergies:  No Known Allergies    Problem List:    Patient Active Problem List   Diagnosis Code    Chest pain R07.9    Cardiomyopathy ([address] I42.9    Pacemaker Z95.0    Dyspnea R06.00    Hypertriglyceridemia E78.1       Past Medical History:        Diagnosis Date    Cardiomyopathy ([address]     Left bundle branch block     Pacemaker        Past Surgical History:        Procedure Laterality Date    ANKLE SURGERY Right     APPENDECTOMY      CARDIAC SURGERY  2012    cardiomypopathy with obstruction       Social History:    Social History     Tobacco Use    Smoking status: Never Smoker    Smokeless tobacco: Current User     Types: Snuff   Substance Use Topics    Alcohol use: Yes     Comment: Occaisionally                                Ready to quit: Not

## 2020-06-30 NOTE — OP NOTE
[facility]RIAN JEFFRE[facility] [place] JOS[address] MIGUEL [facility] PHYLLIS [address], [address] [facility]                                OPERATIVE REPORT    PATIENT NAME: Mayank Manzanares                     :        1975  MED REC NO:   [id number]                              ROOM:  ACCOUNT NO:   [de-identified]                           ADMIT DATE: 2020  PROVIDER:     Umberto Franklin MD    DATE OF PROCEDURE:  2020    PREOPERATIVE DIAGNOSIS:  Right shoulder pain. POSTOPERATIVE DIAGNOSES:  1. Right shoulder superior labral biceps complex tear. 2.  Right shoulder posterior labral tear. 3.  Right shoulder cartilage tear on the glenoid. PROCEDURE PERFORMED:  1. Right shoulder arthroscopic posterior labral repair. 2.  Right shoulder mini open subpectoralis biceps tenodesis. 3.  Right shoulder SLAP tear debridement with repair of the superior  labrum. 4.  Right shoulder chondroplasty of the glenoid for a glenoid cartilage  tear. SURGEON:  Umberto Franklin MD    ASSISTANT:  Denisse Tomlinson PA-C    ANESTHESIA:  General endotracheal anesthesia. ESTIMATED BLOOD LOSS:  Minimal.    COMPLICATIONS:  None. CONDITION:  Stable. BRIEF HISTORY:  This is a 61-year-old male who presented to the  outpatient clinic with complaints of severe shoulder pain. Unfortunately, the patient could not have an MRI due to a defibrillator  implanted for hypertrophic cardiomyopathy. The patient had signs and  symptoms of proximal biceps tendon pathology versus rotator cuff tear. Based on this, we elected to take him to the operating room for the  above-mentioned procedure. After risks and benefits had been discussed  with the patient, he agreed to proceed. OPERATIVE PROCEDURE:  The patient was interviewed in the preanesthesia  area where his right shoulder was marked with a marking pen.   He was  administered scalene block by the Anesthesia team.  The patient was then  taken to the operative suite where general endotracheal anesthesia was  performed by the Anesthesia team.  Megan Ramsey was then called confirming  the patient, the operative site as well as the planned procedure and  administration of antibiotics. The patient was positioned in the  lateral decubitus position over an axillary roll and Vac-Pac. Care was  taken to pad all bony prominences. He was then prepped and draped in a  standard sterile fashion using ChloraPrep. Once fully prepped and draped, the arm was placed in an Arthrex arm  minor in 10 pounds of traction, slight forward flexion and 70 degrees  of abduction. A standard posterior glenohumeral viewing portal was  established with an 11-blade scalpel and the scope trocar was introduced  into the glenohumeral joint. Using an inside-out technique, an anterior  outflow cannula was established. A diagnostic arthroscopy was then  carried out. The patient had extensive tearing of the labrum. The  patient had a full-thickness tear of the superior labral biceps complex  that extended anteriorly over the large flap tear. This was very  unstable and patulous. This tear then extended all the way around to  about the 5 o'clock position on the glenoid. There was also cracking of  the cartilage just inside from the labral tear with a full-thickness  chondral flap at the inferior aspect of the glenoid. The subscapularis  itself was intact and the articular surface of the humeral head was  intact. The articular side of the supraspinatus, infraspinatus and  teres minor were intact as well. At this point in time, we elected to perform a superior, posterior and  inferior labral repair as well as a mini open subpectoralis biceps  tenodesis in order to address the superior labral biceps complex tear as  well as the superior, posterior and inferior labral tissue. Initially, an anterior superior portal was created. We then started at  the anterior superior aspect of the labrum.   Initially, a liberator  knife was used to free up the labral tissue. A shaver and RF wand were  used to remove a small amount of articular cartilage to create  excoriated bleeding bone. The biceps was tenotomized with a basket  cutter and the superior labral biceps complex was debrided with a shaver  and basket cutter back to a more stable margin as it was very patulous. Initially, two Biomet 1.4 JuggerKnot anchors were placed, one just  anterior to where the biceps anchor would normally be and one slightly  posterior to this. Simple sutures were passed around the labral tissue  and these were tied with [facility] sliding knots back to about three  half-hitches. Scope was then moved to the anterior superior portal and a liberator  knife with shaver were used through the mid glenoid portal to debride  the posterior labrum and create bleeding bony edges. The port of  Corn[place] was located percutaneously and a 1.4 JuggerKnot anchor was  then placed approximately about the 5:30 position. Spectrum suture  passing device was used to pass the PDS suture, which was used to  shuttle the tissue side of the suture. This was tied with an [facility]  sliding knot back to about three half-hitches. I then placed two more  evenly spaced anchors posteroinferiorly and straight posteriorly to  complete the repair of the posterior labrum. Essentially, this was  slightly over a 180-degree repair. At this point in time, a basket cutter and shaver were then used to  perform a chondroplasty on the glenoid resecting the loose articular  flap. Scope was then moved into the subacromial space where a diagnostic  arthroscopy was then carried out in the subacromial space. The  patient's rotator cuff in the subacromial space appeared to be intact. The arm was then taken out of traction. An incision was then made  perpendicular to the lower board of the pec major tendon. Blunt  dissection was carried beneath the pec major.   A 90-degree Hohmann  retractors were placed medially and laterally around the proximal  humerus. A right angle clamp was then used to retrieve the biceps. The  biceps tendon was then whipstitched approximately 3 cm from  musculotendinous junction with #2 nonabsorbable MaxBraid suture. An 8  mm reamer was used to make a socket in the metaphyseal bone above the  bicipital groove. A 2.5 mm drill bit was then used a centimeter distal  to this to drill a distal hole. A PDS suture was then passed through  the 2.5 mm hole up to 8 mm hole. The bicipital sutures were then passed  and the biceps was pulled down in the bony socket and then tied. The  suture was tied over the top of the tendon completing the tenodesis. Once this was complete, final images were captured of the biceps  tenodesis. The subpectoralis incision was then closed with a Vicryl  suture as well as a Prineo wound closure system. The arthroscopic  portals were closed with 3-0 nylon suture. The patient was placed in a  sling. He awoke from anesthesia without difficulty and was transferred  to PACU in stable condition. All sponge, needle and instrument counts  were correct at the end of the procedure. PLAN:  We will plan to keep this patient in a sling for approximately 4  weeks. He can do gentle passive and active assist range of motion  exercises of his elbow. I do not want him to do any active lifting with  his biceps. At 1 month postop, he will be in formal physical therapy.         Poncho Guzman MD    D: 06/29/2020 16:34:39      T: 06/29/2020 16:39:09     [id number]  Job#: [id number]     Doc#: [id number]    CC:

## 2020-11-17 ENCOUNTER — APPOINTMENT (OUTPATIENT)
Dept: GENERAL RADIOLOGY | Facility: HOSPITAL | Age: 45
End: 2020-11-17

## 2020-11-17 ENCOUNTER — APPOINTMENT (OUTPATIENT)
Dept: CT IMAGING | Facility: HOSPITAL | Age: 45
End: 2020-11-17

## 2020-11-17 ENCOUNTER — HOSPITAL ENCOUNTER (OUTPATIENT)
Facility: HOSPITAL | Age: 45
Setting detail: OBSERVATION
Discharge: HOME | End: 2020-11-19
Attending: INTERNAL MEDICINE | Admitting: INTERNAL MEDICINE

## 2020-11-17 DIAGNOSIS — R29.818 SUSPECTED SLEEP APNEA: ICD-10-CM

## 2020-11-17 DIAGNOSIS — Z74.09 IMPAIRED MOBILITY: ICD-10-CM

## 2020-11-17 DIAGNOSIS — R42 DIZZINESS: Primary | ICD-10-CM

## 2020-11-17 PROBLEM — I42.1 HYPERTROPHIC OBSTRUCTIVE CARDIOMYOPATHY (HCC): Status: ACTIVE | Noted: 2020-11-17

## 2020-11-17 PROBLEM — Z86.73 HISTORY OF STROKE: Status: ACTIVE | Noted: 2020-11-17

## 2020-11-17 PROBLEM — G45.9 TRANSIENT ISCHEMIC ATTACK (TIA): Status: ACTIVE | Noted: 2020-11-17

## 2020-11-17 PROBLEM — I10 ESSENTIAL HYPERTENSION: Status: ACTIVE | Noted: 2020-11-17

## 2020-11-17 LAB
ALBUMIN SERPL-MCNC: 4.3 G/DL (ref 3.5–5.2)
ALBUMIN/GLOB SERPL: 1.7 G/DL
ALP SERPL-CCNC: 99 U/L (ref 39–117)
ALT SERPL W P-5'-P-CCNC: 22 U/L (ref 1–41)
ANION GAP SERPL CALCULATED.3IONS-SCNC: 11 MMOL/L (ref 5–15)
AST SERPL-CCNC: 19 U/L (ref 1–40)
BASOPHILS # BLD AUTO: 0.06 10*3/MM3 (ref 0–0.2)
BASOPHILS NFR BLD AUTO: 0.5 % (ref 0–1.5)
BILIRUB SERPL-MCNC: 0.5 MG/DL (ref 0–1.2)
BUN SERPL-MCNC: 7 MG/DL (ref 6–20)
BUN/CREAT SERPL: 8.8 (ref 7–25)
CALCIUM SPEC-SCNC: 9.5 MG/DL (ref 8.6–10.5)
CHLORIDE SERPL-SCNC: 103 MMOL/L (ref 98–107)
CO2 SERPL-SCNC: 24 MMOL/L (ref 22–29)
CREAT SERPL-MCNC: 0.8 MG/DL (ref 0.76–1.27)
DEPRECATED RDW RBC AUTO: 34.7 FL (ref 37–54)
EOSINOPHIL # BLD AUTO: 0.04 10*3/MM3 (ref 0–0.4)
EOSINOPHIL NFR BLD AUTO: 0.4 % (ref 0.3–6.2)
ERYTHROCYTE [DISTWIDTH] IN BLOOD BY AUTOMATED COUNT: 12.3 % (ref 12.3–15.4)
GFR SERPL CREATININE-BSD FRML MDRD: 105 ML/MIN/1.73
GLOBULIN UR ELPH-MCNC: 2.5 GM/DL
GLUCOSE SERPL-MCNC: 95 MG/DL (ref 65–99)
HCT VFR BLD AUTO: 43.3 % (ref 37.5–51)
HGB BLD-MCNC: 15.4 G/DL (ref 13–17.7)
IMM GRANULOCYTES # BLD AUTO: 0.1 10*3/MM3 (ref 0–0.05)
IMM GRANULOCYTES NFR BLD AUTO: 0.9 % (ref 0–0.5)
INR PPP: 1.05 (ref 0.91–1.09)
LYMPHOCYTES # BLD AUTO: 1.89 10*3/MM3 (ref 0.7–3.1)
LYMPHOCYTES NFR BLD AUTO: 17.2 % (ref 19.6–45.3)
MCH RBC QN AUTO: 28.1 PG (ref 26.6–33)
MCHC RBC AUTO-ENTMCNC: 35.6 G/DL (ref 31.5–35.7)
MCV RBC AUTO: 79 FL (ref 79–97)
MONOCYTES # BLD AUTO: 0.82 10*3/MM3 (ref 0.1–0.9)
MONOCYTES NFR BLD AUTO: 7.5 % (ref 5–12)
NEUTROPHILS NFR BLD AUTO: 73.5 % (ref 42.7–76)
NEUTROPHILS NFR BLD AUTO: 8.05 10*3/MM3 (ref 1.7–7)
NRBC BLD AUTO-RTO: 0 /100 WBC (ref 0–0.2)
PLATELET # BLD AUTO: 316 10*3/MM3 (ref 140–450)
PMV BLD AUTO: 9 FL (ref 6–12)
POTASSIUM SERPL-SCNC: 4.2 MMOL/L (ref 3.5–5.2)
PROT SERPL-MCNC: 6.8 G/DL (ref 6–8.5)
PROTHROMBIN TIME: 13.3 SECONDS (ref 11.9–14.6)
RBC # BLD AUTO: 5.48 10*6/MM3 (ref 4.14–5.8)
SARS-COV-2 RDRP RESP QL NAA+PROBE: NOT DETECTED
SODIUM SERPL-SCNC: 138 MMOL/L (ref 136–145)
TROPONIN T SERPL-MCNC: <0.01 NG/ML (ref 0–0.03)
WBC # BLD AUTO: 10.96 10*3/MM3 (ref 3.4–10.8)

## 2020-11-17 PROCEDURE — 87635 SARS-COV-2 COVID-19 AMP PRB: CPT | Performed by: NURSE PRACTITIONER

## 2020-11-17 PROCEDURE — 93010 ELECTROCARDIOGRAM REPORT: CPT | Performed by: INTERNAL MEDICINE

## 2020-11-17 PROCEDURE — G0378 HOSPITAL OBSERVATION PER HR: HCPCS

## 2020-11-17 PROCEDURE — 99285 EMERGENCY DEPT VISIT HI MDM: CPT

## 2020-11-17 PROCEDURE — 85610 PROTHROMBIN TIME: CPT | Performed by: NURSE PRACTITIONER

## 2020-11-17 PROCEDURE — 93005 ELECTROCARDIOGRAM TRACING: CPT | Performed by: INTERNAL MEDICINE

## 2020-11-17 PROCEDURE — 70450 CT HEAD/BRAIN W/O DYE: CPT

## 2020-11-17 PROCEDURE — 93005 ELECTROCARDIOGRAM TRACING: CPT | Performed by: NURSE PRACTITIONER

## 2020-11-17 PROCEDURE — 84484 ASSAY OF TROPONIN QUANT: CPT | Performed by: NURSE PRACTITIONER

## 2020-11-17 PROCEDURE — 80053 COMPREHEN METABOLIC PANEL: CPT | Performed by: NURSE PRACTITIONER

## 2020-11-17 PROCEDURE — 93005 ELECTROCARDIOGRAM TRACING: CPT

## 2020-11-17 PROCEDURE — C9803 HOPD COVID-19 SPEC COLLECT: HCPCS

## 2020-11-17 PROCEDURE — 71045 X-RAY EXAM CHEST 1 VIEW: CPT

## 2020-11-17 PROCEDURE — 85025 COMPLETE CBC W/AUTO DIFF WBC: CPT | Performed by: NURSE PRACTITIONER

## 2020-11-17 RX ORDER — SERTRALINE HYDROCHLORIDE 100 MG/1
100 TABLET, FILM COATED ORAL DAILY
COMMUNITY

## 2020-11-17 RX ORDER — SODIUM CHLORIDE 0.9 % (FLUSH) 0.9 %
10 SYRINGE (ML) INJECTION AS NEEDED
Status: DISCONTINUED | OUTPATIENT
Start: 2020-11-17 | End: 2020-11-19 | Stop reason: HOSPADM

## 2020-11-17 NOTE — ED PROVIDER NOTES
Subjective   Patient is a 45-year-old white male presents to the emergency department with complaints of intermittent dizziness, nausea, headache, elevated blood pressure for the past 3 days.  Patient states he has never had elevated blood pressure previously.  He states his wife is a nurse and checked his blood pressure 3 nights ago and and it was 169/110.  He states he takes metoprolol for tachycardia and he took an extra metoprolol at that time.  He states he was feeling better the next day and then had another episode of dizziness nausea and his blood pressure was elevated again.  He denies any numbness or focal weakness.  He states he has had a mild headache.  He does have a history of hyper trophic cardiomyopathy status post repair and [facility]ibr[facility].  He states he has not had any problems since then.  He states that he was just worried about his blood pressure and the symptoms that he was having.  He states he is not having any nausea at this time.      History provided by:  Patient   used: No        Review of Systems   Constitutional:        Patient is a 45-year-old white male presents to the emergency department with complaints of intermittent dizziness, nausea, headache, elevated blood pressure for the past 3 days.  Patient states he has never had elevated blood pressure previously.  He states his wife is a nurse and checked his blood pressure 3 nights ago and and it was 169/110.  He states he takes metoprolol for tachycardia and he took an extra metoprolol at that time.  He states he was feeling better the next day and then had another episode of dizziness nausea and his blood pressure was elevated again.  He denies any numbness or focal weakness.  He states he has had a mild headache.  He does have a history of hyper trophic cardiomyopathy status post repair and defibrillator placement [facility].  He states he has not had any problems since then.   He states that he was just worried about his blood pressure and the symptoms that he was having.  He states he is not having any nausea at this time.     HENT: Negative.    Eyes: Negative.    Respiratory: Negative.    Cardiovascular: Negative.    Gastrointestinal: Negative.    Endocrine: Negative.    Genitourinary: Negative.    Musculoskeletal: Negative.    Skin: Negative.    Allergic/Immunologic: Negative.    Neurological: Negative.    Hematological: Negative.    Psychiatric/Behavioral: Negative.    All other systems reviewed and are negative.      Past Medical History:   Diagnosis Date   • AICD (automatic cardioverter/defibrillator) present    • Anger    • Anxiety    • Hypertrophic obstructive cardiomyopathy (HOCM) (CMS/HCC)    • Tachycardia        No Known Allergies    Past Surgical History:   Procedure Laterality Date   • ANKLE SURGERY     • APPENDECTOMY     • CARDIAC CATHETERIZATION     • CARDIAC PACEMAKER PLACEMENT     • CARPAL TUNNEL RELEASE     • OTHER SURGICAL HISTORY  05/2012       Family History   Problem Relation Age of Onset   • Heart disease Mother    • No Known Problems Father        Social History     Socioeconomic History   • Marital status:      Spouse name: Not on file   • Number of children: Not on file   • Years of education: Not on file   • Highest education level: Not on file   Tobacco Use   • Smoking status: Never Smoker   Substance and Sexual Activity   • Alcohol use: No   • Drug use: No   • Sexual activity: Yes     Partners: Female           Objective   Physical Exam  Vitals signs and nursing note reviewed.   Constitutional:       Appearance: He is well-developed.   HENT:      Head: Normocephalic and atraumatic.   Eyes:      Conjunctiva/sclera: Conjunctivae normal.      Pupils: Pupils are equal, round, and reactive to light.   Neck:      Musculoskeletal: Normal range of motion and neck supple.   Cardiovascular:      Rate and Rhythm: Normal rate and regular rhythm.      Heart sounds:  Normal heart sounds.   Pulmonary:      Effort: Pulmonary effort is normal.      Breath sounds: Normal breath sounds.   Abdominal:      General: Bowel sounds are normal.      Palpations: Abdomen is soft.   Musculoskeletal: Normal range of motion.   Skin:     General: Skin is warm and dry.   Neurological:      Mental Status: He is alert and oriented to person, place, and time.      Deep Tendon Reflexes: Reflexes are normal and symmetric.   Psychiatric:         Behavior: Behavior normal.         Thought Content: Thought content normal.         Judgment: Judgment normal.         Procedures           ED Course  ED Course as of Nov 17 2310   Tue Nov 17, 2020 1855 Pending ct scan of head at this time. Reviewed pt and pt care plan with SUELLEN MONTES DE OCA. Care of pt transferred at this time     [CW]   1910 Case discussed with Radha Bland. APRN . Discussed results with Dr. Rangel and pt aware. Pt reports continued dizziness and fluttering in chest. Rocky Lawler, case mngr [facility] and [facility] okay to be admitted to this facility.     [LF]   2307 Discussed results with CAIT Rees with neurology. The pt may have an outpt workup for Ct scan and dizziness; states would interrogate pacemaker and would be more concerned of cardiac etiology at this point. Pacemaker interrogation by [employer] shows no abnormality. Discussed with Dr. Benson; pt will be admitted at this time in stable cond. I also discussed pt results with his wife Janet after pt gave permission for this.     [LF]      ED Course User Index  [CW] Radha Bland APRN  [LF] Rebecca Haskins APRN                                   CT Head Without Contrast   Final Result   1.. Atrophy with small vessel disease. Remote right basal ganglial   lacunar infarction is present with focal encephalomalacia.   2. Otherwise unremarkable nonenhanced CT of the brain.   This report was finalized on 11/17/2020 18:51 by Dr. Berto Judge MD.      XR  Chest 1 View   Final Result   . No active disease.   This report was finalized on 11/17/2020 18:09 by Dr. Berto Judge MD.        Labs Reviewed   CBC WITH AUTO DIFFERENTIAL - Abnormal; Notable for the following components:       Result Value    WBC 10.96 (*)     RDW-SD 34.7 (*)     Lymphocyte % 17.2 (*)     Immature Grans % 0.9 (*)     Neutrophils, Absolute 8.05 (*)     Immature Grans, Absolute 0.10 (*)     All other components within normal limits   COVID-19,ABBOTT IN-HOUSE,NP SWAB (NO TRANSPORT MEDIA) 2 HR TAT - Normal    Narrative:     Fact sheet for providers: https://www.fda.gov/media/256346/download     Fact sheet for patients: https://www.fda.gov/media/981304/download   PROTIME-INR - Normal   TROPONIN (IN-HOUSE) - Normal    Narrative:     Troponin T Reference Range:  <= 0.03 ng/mL-   Negative for AMI  >0.03 ng/mL-     Abnormal for myocardial necrosis.  Clinicians would have to utilize clinical acumen, EKG, Troponin and serial changes to determine if it is an Acute Myocardial Infarction or myocardial injury due to an underlying chronic condition.       Results may be falsely decreased if patient taking Biotin.     COVID PRE-OP / PRE-PROCEDURE SCREENING ORDER (NO ISOLATION)    Narrative:     The following orders were created for panel order COVID PRE-OP / PRE-PROCEDURE SCREENING ORDER (NO ISOLATION) - Swab, Nasal Cavity.  Procedure                               Abnormality         Status                     ---------                               -----------         ------                     COVID-19, ABBOTT IN-HOUS...[[id number]]  Normal              Final result                 Please view results for these tests on the individual orders.   COMPREHENSIVE METABOLIC PANEL    Narrative:     GFR Normal >60  Chronic Kidney Disease <60  Kidney Failure <15     CBC AND DIFFERENTIAL    Narrative:     The following orders were created for panel order CBC & Differential.  Procedure                                Abnormality         Status                     ---------                               -----------         ------                     CBC Auto Differential[[id number]]        Abnormal            Final result                 Please view results for these tests on the individual orders.             MDM  Number of Diagnoses or Management Options  Dizziness: new and requires workup     Amount and/or Complexity of Data Reviewed  Clinical lab tests: reviewed and ordered  Tests in the radiology section of CPT®: reviewed and ordered  Tests in the medicine section of CPT®: reviewed and ordered  Discuss the patient with other providers: yes    Patient Progress  Patient progress: stable      Final diagnoses:   Dizziness            Rebecca Haskins, APRN  11/17/20 [id number]

## 2020-11-18 ENCOUNTER — APPOINTMENT (OUTPATIENT)
Dept: CARDIOLOGY | Facility: HOSPITAL | Age: 45
End: 2020-11-18

## 2020-11-18 ENCOUNTER — APPOINTMENT (OUTPATIENT)
Dept: ULTRASOUND IMAGING | Facility: HOSPITAL | Age: 45
End: 2020-11-18

## 2020-11-18 LAB
BH CV ECHO MEAS - AO MAX PG (FULL): 2.3 MMHG
BH CV ECHO MEAS - AO MAX PG: 6.2 MMHG
BH CV ECHO MEAS - AO MEAN PG (FULL): 2 MMHG
BH CV ECHO MEAS - AO MEAN PG: 4 MMHG
BH CV ECHO MEAS - AO ROOT AREA (BSA CORRECTED): 1.5
BH CV ECHO MEAS - AO ROOT AREA: 9.6 CM^2
BH CV ECHO MEAS - AO ROOT DIAM: 3.5 CM
BH CV ECHO MEAS - AO V2 MAX: 124 CM/SEC
BH CV ECHO MEAS - AO V2 MEAN: 91.7 CM/SEC
BH CV ECHO MEAS - AO V2 VTI: 21.9 CM
BH CV ECHO MEAS - AVA(I,A): 3.8 CM^2
BH CV ECHO MEAS - AVA(I,D): 3.8 CM^2
BH CV ECHO MEAS - AVA(V,A): 3.9 CM^2
BH CV ECHO MEAS - AVA(V,D): 3.9 CM^2
BH CV ECHO MEAS - BSA(HAYCOCK): 2.5 M^2
BH CV ECHO MEAS - BSA: 2.3 M^2
BH CV ECHO MEAS - BZI_BMI: 43.9 KILOGRAMS/M^2
BH CV ECHO MEAS - BZI_METRIC_HEIGHT: 170.2 CM
BH CV ECHO MEAS - BZI_METRIC_WEIGHT: 127 KG
BH CV ECHO MEAS - EDV(CUBED): 117.6 ML
BH CV ECHO MEAS - EDV(MOD-SP4): 230 ML
BH CV ECHO MEAS - EDV(TEICH): 112.8 ML
BH CV ECHO MEAS - EF(CUBED): 76.1 %
BH CV ECHO MEAS - EF(MOD-SP4): 66.6 %
BH CV ECHO MEAS - EF(TEICH): 68 %
BH CV ECHO MEAS - ESV(CUBED): 28.1 ML
BH CV ECHO MEAS - ESV(MOD-SP4): 76.8 ML
BH CV ECHO MEAS - ESV(TEICH): 36.2 ML
BH CV ECHO MEAS - FS: 38 %
BH CV ECHO MEAS - IVS/LVPW: 1.7
BH CV ECHO MEAS - IVSD: 1.4 CM
BH CV ECHO MEAS - LA 3D VOL INDEX: 34.6
BH CV ECHO MEAS - LA DIMENSION: 4.4 CM
BH CV ECHO MEAS - LA/AO: 1.3
BH CV ECHO MEAS - LAT PEAK E' VEL: 9.4 CM/SEC
BH CV ECHO MEAS - LV DIASTOLIC VOL/BSA (35-75): 98.6 ML/M^2
BH CV ECHO MEAS - LV MASS(C)D: 205.2 GRAMS
BH CV ECHO MEAS - LV MASS(C)DI: 87.9 GRAMS/M^2
BH CV ECHO MEAS - LV MAX PG: 3.8 MMHG
BH CV ECHO MEAS - LV MEAN PG: 2 MMHG
BH CV ECHO MEAS - LV SYSTOLIC VOL/BSA (12-30): 32.9 ML/M^2
BH CV ECHO MEAS - LV V1 MAX: 97.5 CM/SEC
BH CV ECHO MEAS - LV V1 MEAN: 65.5 CM/SEC
BH CV ECHO MEAS - LV V1 VTI: 17.1 CM
BH CV ECHO MEAS - LVIDD: 4.9 CM
BH CV ECHO MEAS - LVIDS: 3 CM
BH CV ECHO MEAS - LVLD AP4: 10.7 CM
BH CV ECHO MEAS - LVLS AP4: 7.9 CM
BH CV ECHO MEAS - LVOT AREA (M): 4.9 CM^2
BH CV ECHO MEAS - LVOT AREA: 4.9 CM^2
BH CV ECHO MEAS - LVOT DIAM: 2.5 CM
BH CV ECHO MEAS - LVPWD: 0.84 CM
BH CV ECHO MEAS - MED PEAK E' VEL: 5.22 CM/SEC
BH CV ECHO MEAS - MV A MAX VEL: 99.4 CM/SEC
BH CV ECHO MEAS - MV DEC SLOPE: 426 CM/SEC^2
BH CV ECHO MEAS - MV DEC TIME: 0.19 SEC
BH CV ECHO MEAS - MV E MAX VEL: 81 CM/SEC
BH CV ECHO MEAS - MV E/A: 0.81
BH CV ECHO MEAS - RAP SYSTOLE: 5 MMHG
BH CV ECHO MEAS - RVSP: 22.6 MMHG
BH CV ECHO MEAS - SI(AO): 90.3 ML/M^2
BH CV ECHO MEAS - SI(CUBED): 38.4 ML/M^2
BH CV ECHO MEAS - SI(LVOT): 36 ML/M^2
BH CV ECHO MEAS - SI(MOD-SP4): 65.7 ML/M^2
BH CV ECHO MEAS - SI(TEICH): 32.9 ML/M^2
BH CV ECHO MEAS - SV(AO): 210.7 ML
BH CV ECHO MEAS - SV(CUBED): 89.6 ML
BH CV ECHO MEAS - SV(LVOT): 83.9 ML
BH CV ECHO MEAS - SV(MOD-SP4): 153.2 ML
BH CV ECHO MEAS - SV(TEICH): 76.7 ML
BH CV ECHO MEAS - TR MAX VEL: 210 CM/SEC
BH CV ECHO MEASUREMENTS AVERAGE E/E' RATIO: 11.08
CHOLEST SERPL-MCNC: 163 MG/DL (ref 0–200)
HBA1C MFR BLD: 5.4 % (ref 4.8–5.6)
HDLC SERPL-MCNC: 34 MG/DL (ref 40–60)
LDLC SERPL CALC-MCNC: 88 MG/DL (ref 0–100)
LDLC/HDLC SERPL: 2.36 {RATIO}
LEFT ATRIUM VOLUME: 80.7 CM3
LV EF 2D ECHO EST: 65 %
MAXIMAL PREDICTED HEART RATE: 176 BPM
QT INTERVAL: 434 MS
QTC INTERVAL: 494 MS
STRESS TARGET HR: 150 BPM
T4 FREE SERPL-MCNC: 1.07 NG/DL (ref 0.93–1.7)
TRIGL SERPL-MCNC: 243 MG/DL (ref 0–150)
TSH SERPL DL<=0.05 MIU/L-ACNC: 5.44 UIU/ML (ref 0.27–4.2)
VLDLC SERPL-MCNC: 41 MG/DL (ref 5–40)

## 2020-11-18 PROCEDURE — 25010000002 PERFLUTREN 6.52 MG/ML SUSPENSION: Performed by: INTERNAL MEDICINE

## 2020-11-18 PROCEDURE — 93306 TTE W/DOPPLER COMPLETE: CPT | Performed by: INTERNAL MEDICINE

## 2020-11-18 PROCEDURE — G0378 HOSPITAL OBSERVATION PER HR: HCPCS

## 2020-11-18 PROCEDURE — 80061 LIPID PANEL: CPT | Performed by: NURSE PRACTITIONER

## 2020-11-18 PROCEDURE — 93880 EXTRACRANIAL BILAT STUDY: CPT

## 2020-11-18 PROCEDURE — 93306 TTE W/DOPPLER COMPLETE: CPT

## 2020-11-18 PROCEDURE — 93880 EXTRACRANIAL BILAT STUDY: CPT | Performed by: SURGERY

## 2020-11-18 PROCEDURE — 82607 VITAMIN B-12: CPT | Performed by: PSYCHIATRY & NEUROLOGY

## 2020-11-18 PROCEDURE — 97161 PT EVAL LOW COMPLEX 20 MIN: CPT

## 2020-11-18 PROCEDURE — 83036 HEMOGLOBIN GLYCOSYLATED A1C: CPT | Performed by: NURSE PRACTITIONER

## 2020-11-18 PROCEDURE — 84443 ASSAY THYROID STIM HORMONE: CPT | Performed by: CLINICAL NURSE SPECIALIST

## 2020-11-18 PROCEDURE — 84439 ASSAY OF FREE THYROXINE: CPT | Performed by: NURSE PRACTITIONER

## 2020-11-18 PROCEDURE — 99204 OFFICE O/P NEW MOD 45 MIN: CPT | Performed by: PSYCHIATRY & NEUROLOGY

## 2020-11-18 PROCEDURE — 97165 OT EVAL LOW COMPLEX 30 MIN: CPT | Performed by: OCCUPATIONAL THERAPIST

## 2020-11-18 RX ORDER — ATORVASTATIN CALCIUM 40 MG/1
80 TABLET, FILM COATED ORAL NIGHTLY
Status: DISCONTINUED | OUTPATIENT
Start: 2020-11-18 | End: 2020-11-19 | Stop reason: HOSPADM

## 2020-11-18 RX ORDER — ASPIRIN 81 MG/1
81 TABLET, CHEWABLE ORAL DAILY
Status: DISCONTINUED | OUTPATIENT
Start: 2020-11-18 | End: 2020-11-19 | Stop reason: HOSPADM

## 2020-11-18 RX ORDER — ASPIRIN 300 MG/1
300 SUPPOSITORY RECTAL DAILY
Status: DISCONTINUED | OUTPATIENT
Start: 2020-11-18 | End: 2020-11-19 | Stop reason: HOSPADM

## 2020-11-18 RX ORDER — ONDANSETRON 2 MG/ML
4 INJECTION INTRAMUSCULAR; INTRAVENOUS EVERY 6 HOURS PRN
Status: DISCONTINUED | OUTPATIENT
Start: 2020-11-18 | End: 2020-11-19 | Stop reason: HOSPADM

## 2020-11-18 RX ORDER — ACETAMINOPHEN 325 MG/1
650 TABLET ORAL EVERY 4 HOURS PRN
Status: DISCONTINUED | OUTPATIENT
Start: 2020-11-18 | End: 2020-11-19 | Stop reason: HOSPADM

## 2020-11-18 RX ORDER — ACETAMINOPHEN 160 MG/5ML
650 SOLUTION ORAL EVERY 4 HOURS PRN
Status: DISCONTINUED | OUTPATIENT
Start: 2020-11-18 | End: 2020-11-19 | Stop reason: HOSPADM

## 2020-11-18 RX ORDER — ONDANSETRON 4 MG/1
4 TABLET, FILM COATED ORAL EVERY 6 HOURS PRN
Status: DISCONTINUED | OUTPATIENT
Start: 2020-11-18 | End: 2020-11-19 | Stop reason: HOSPADM

## 2020-11-18 RX ORDER — ACETAMINOPHEN 650 MG/1
650 SUPPOSITORY RECTAL EVERY 4 HOURS PRN
Status: DISCONTINUED | OUTPATIENT
Start: 2020-11-18 | End: 2020-11-19 | Stop reason: HOSPADM

## 2020-11-18 RX ORDER — SODIUM CHLORIDE 0.9 % (FLUSH) 0.9 %
10 SYRINGE (ML) INJECTION EVERY 12 HOURS SCHEDULED
Status: DISCONTINUED | OUTPATIENT
Start: 2020-11-18 | End: 2020-11-19 | Stop reason: HOSPADM

## 2020-11-18 RX ORDER — SODIUM CHLORIDE 0.9 % (FLUSH) 0.9 %
10 SYRINGE (ML) INJECTION AS NEEDED
Status: DISCONTINUED | OUTPATIENT
Start: 2020-11-18 | End: 2020-11-19 | Stop reason: HOSPADM

## 2020-11-18 RX ADMIN — SODIUM CHLORIDE, PRESERVATIVE FREE 10 ML: 5 INJECTION INTRAVENOUS at 09:58

## 2020-11-18 RX ADMIN — ATORVASTATIN CALCIUM 80 MG: 40 TABLET, FILM COATED ORAL at 20:47

## 2020-11-18 RX ADMIN — SODIUM CHLORIDE, PRESERVATIVE FREE 10 ML: 5 INJECTION INTRAVENOUS at 20:47

## 2020-11-18 RX ADMIN — METOPROLOL TARTRATE 25 MG: 25 TABLET, FILM COATED ORAL at 09:58

## 2020-11-18 RX ADMIN — METOPROLOL TARTRATE 25 MG: 25 TABLET, FILM COATED ORAL at 20:48

## 2020-11-18 RX ADMIN — ASPIRIN 81 MG: 81 TABLET, CHEWABLE ORAL at 09:57

## 2020-11-18 RX ADMIN — PERFLUTREN 8.48 MG: 6.52 INJECTION, SUSPENSION INTRAVENOUS at 15:03

## 2020-11-18 NOTE — PLAN OF CARE
Goal Outcome Evaluation:  Plan of Care Reviewed With: patient  Progress: improving  Outcome Summary: Pt hasnt had any complaints throughout the day today.  Pt just got back from his carotid us/echo with results pending. Questionable whether or not his aicd is MRI complatible as the company said it was not but he/his wife states that it is.  Vital signs have been stable.  Pt has been [id number] on tele.  Will continue to monitor.

## 2020-11-18 NOTE — PROGRESS NOTES
Notified by provider that pt needs to be admitted due to stroke. Spoke with Makedaerika Lundberg to update her. She said they do not have neuro so to admit here. She will callback with their on call MD's name. Faxed clinical to her.    Dr Beltran is the [facility] MD on call who approved this admission.

## 2020-11-18 NOTE — PLAN OF CARE
Problem: Adult Inpatient Plan of Care  Goal: Plan of Care Review  Recent Flowsheet Documentation  Taken 11/18/2020 0813 by Lili Reeves, PT Student  Progress: no change  Plan of Care Reviewed With:   patient   spouse   PT eval completed. Pt A&Ox4. Pt demonstrates independence with bed mobility, t/f, and ambulation. Pt was in bathroom at start of eval. demonstrates 5/5 strength, reports no dizziness, vision or speech problems, or any other symptoms. No Skilled PT recommended. PT to sign off. D/c home
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
## 2020-11-18 NOTE — PLAN OF CARE
Problem: Adult Inpatient Plan of Care  Goal: Plan of Care Review  Outcome: Ongoing, Progressing  Flowsheets (Taken 11/18/2020 1042)  Progress: improving  Plan of Care Reviewed With: patient  Outcome Summary: Patient passed swallow screening.  No concerns with speech, language, or cognition from patient or family.  SLP evaluation not warranted.  Reconsult if any concerns.

## 2020-11-18 NOTE — PLAN OF CARE
Goal Outcome Evaluation:  Plan of Care Reviewed With: patient  Progress: no change  Outcome Summary: patient arrived to 4b this shift. VSS. S 88-95. patient has had no complaints of pain or dizziness this shift. alert and oriented. q4 neuro checks completed. room air. safety maintained, will continue to monitor.

## 2020-11-18 NOTE — PLAN OF CARE
Problem: Adult Inpatient Plan of Care  Goal: Plan of Care Review  Recent Flowsheet Documentation  Taken 11/18/2020 0814 by Mio Tobar, OTR/L  Progress: no change  Plan of Care Reviewed With: patient  Outcome Summary: OT tony salmeron. Pt reports no pain and that all symptoms have resolved. Pt was independent for all bed mobility, transfers, and functional mobility. Pt was independent for toileting and donning socks. Pt has no coordination, strength, ROM or sensation deficits. Pt appears to be at baseline and expresses no concerns. No skilled OT warranted. OT to sign off. Recommended d/c home.

## 2020-11-18 NOTE — THERAPY DISCHARGE NOTE
Acute Care - Occupational Therapy Discharge  [facility]    Patient Name: Feliberto Knowles  : 1975    MRN: [id number]                              Today's Date: 2020       Admit Date: 2020    Visit Dx:     ICD-10-CM ICD-9-CM   1. Dizziness  R42 780.4     Patient Active Problem List   Diagnosis   • Dizziness   • Transient ischemic attack (TIA)   • Hypertrophic obstructive cardiomyopathy (CMS/HCC)   • Essential hypertension, new diagnosis   • History of stroke     Past Medical History:   Diagnosis Date   • AICD (automatic cardioverter/defibrillator) present    • Anger    • Anxiety    • Hypertrophic obstructive cardiomyopathy (HOCM) (CMS/HCC)    • Tachycardia      Past Surgical History:   Procedure Laterality Date   • ANKLE SURGERY     • APPENDECTOMY     • CARDIAC CATHETERIZATION     • CARDIAC PACEMAKER PLACEMENT     • CARPAL TUNNEL RELEASE     • OTHER SURGICAL HISTORY  2012     General Information     Row Name 2014          OT Time and Intention    Document Type  evaluation;other (see comments) see MAR  -MM     Mode of Treatment  occupational therapy  -MM     Row Name 2014          General Information    Patient Profile Reviewed  yes  -MM     Prior Level of Function  independent:;all household mobility;community mobility;ADL's  -MM     Existing Precautions/Restrictions  no known precautions/restrictions  -MM     Barriers to Rehab  none identified  -MM     Row Name 20          Occupational Profile    Occupational History/Life Experiences (Occupational Profile)  CC: dizziness; Dx: TIA; Hx: hypertrophic obstructive cardiomyopathy, pacemaker, labral repair with bicep tendonesis 2020  -MM     Environmental Supports and Barriers (Occupational Profile)  tub/shower combo  -MM     Row Name 2014          Living Environment    Lives With  spouse;child(camron), dependent  -MM     Row Name 20          Home Main Entrance    Number of Stairs, Main Entrance  five   -MM     Stair Railings, Main Entrance  none  -MM     Row Name 11/18/20 0814          Stairs Within Home, Primary    Number of Stairs, Within Home, Primary  none  -MM     Stair Railings, Within Home, Primary  none  -MM     Row Name 11/18/20 0814          Cognition    Orientation Status (Cognition)  oriented x 4  -MM       User Key  (r) = Recorded By, (t) = Taken By, (c) = Cosigned By    Initials Name Provider Type    Mio Porter, OTR/L Occupational Therapist        Mobility/ADL's     Row Name 11/18/20 0814          Bed Mobility    Bed Mobility  bed mobility (all) activities  -MM     All Activities, [place] (Bed Mobility)  independent  -MM     Assistive Device (Bed Mobility)  head of bed elevated  -MM     Row Name 11/18/20 0814          Functional Mobility    Functional Mobility- Ind. Level  independent  -MM     Functional Mobility- Comment  BR to bed to [place] to bed  -MM     Row Name 11/18/20 0814          Activities of Daily Living    BADL Assessment/Intervention  lower body dressing;toileting  -MM     Row Name 11/18/20 0814          Lower Body Dressing Assessment/Training    [place] Level (Lower Body Dressing)  don;socks;independent  -MM     Position (Lower Body Dressing)  edge of bed sitting  -MM     Row Name 11/18/20 0814          Toileting Assessment/Training    [place] Level (Toileting)  toileting skills;independent  -MM     Position (Toileting)  unsupported standing  -MM       User Key  (r) = Recorded By, (t) = Taken By, (c) = Cosigned By    Initials Name Provider Type    Mio Porter, OTR/L Occupational Therapist        Obj/Interventions     Row Name 11/18/20 0814          Sensory Assessment (Somatosensory)    Sensory Assessment (Somatosensory)  UE sensation intact  -MM     Row Name 11/18/20 0814          Vision Assessment/Intervention    Visual Impairment/Limitations  WFL  -MM     Row Name 11/18/20 0814          Range of Motion Comprehensive    General Range of Motion  no range  of motion deficits identified  -MM     Row Name 11/18/20 0814          Strength Comprehensive (MMT)    General Manual Muscle Testing (MMT) Assessment  no strength deficits identified  -MM     Comment, General Manual Muscle Testing (MMT) Assessment  BUE 5/5  -MM       User Key  (r) = Recorded By, (t) = Taken By, (c) = Cosigned By    Initials Name Provider Type    MM Mio Tobar, OTR/L Occupational Therapist        Goals/Plan    No documentation.       Clinical Impression     [facility] Name 11/18/20 0814          Pain Assessment    Additional Documentation  Pain Scale: Numbers Pre/Post-Treatment (Group)  -MM     Row Name 11/18/20 0814          Pain Scale: Numbers Pre/Post-Treatment    Pretreatment Pain Rating  0/10 - no pain  -MM     Posttreatment Pain Rating  0/10 - no pain  -MM     Row Name 11/18/20 0814          Plan of Care Review    Plan of Care Reviewed With  patient  -MM     Progress  no change  -MM     Outcome Summary  OT eval comopleted. Pt reports no pain and that all symptoms have resolved. Pt was independent for all bed mobility, transfers, and functional mobility. Pt was independent for toileting and donning socks. Pt has no coordination, strength, ROM or sensation deficits. Pt appears to be at baseline and expresses no concerns. No skilled OT warranted. OT to sign off. Recommended d/c home.  -MM     [facility] Name 11/18/20 0814          Therapy Assessment/Plan (OT)    Patient/Family Therapy Goal Statement (OT)  return home  -MM     Criteria for Skilled Therapeutic Interventions Met (OT)  no;no problems identified which require skilled intervention  -MM     Therapy Frequency (OT)  evaluation only  -MM     Row Name 11/18/20 0814          Therapy Plan Review/Discharge Plan (OT)    Anticipated Discharge Disposition (OT)  home  -MM     Row Name 11/18/20 0814          Positioning and Restraints    Pre-Treatment Position  bathroom  -MM     Post Treatment Position  bed  -MM     In Bed  fowlers;call light within  reach;encouraged to call for assist;with family/caregiver;side rails up x2  -MM       User Key  (r) = Recorded By, (t) = Taken By, (c) = Cosigned By    Initials Name Provider Type    Mio Porter, OTR/L Occupational Therapist        Outcome Measures     Row Name 11/18/20 0851          How much help from another is currently needed...    Putting on and taking off regular lower body clothing?  4  -MM     Bathing (including washing, rinsing, and drying)  4  -MM     Toileting (which includes using toilet bed pan or urinal)  4  -MM     Putting on and taking off regular upper body clothing  4  -MM     Taking care of personal grooming (such as brushing teeth)  4  -MM     Eating meals  4  -MM     AM-PAC 6 Clicks Score (OT)  24  -MM     Row Name 11/18/20 0851          Modified [place] Scale    Pre-Stroke Modified [place] Scale  0 - No Symptoms at all.  -MM     Row Name 11/18/20 0851          Functional Assessment    Outcome Measure Options  AM-PAC 6 Clicks Daily Activity (OT);Modified Venu  -MM       User Key  (r) = Recorded By, (t) = Taken By, (c) = Cosigned By    Initials Name Provider Type    Mio Porter, OTR/L Occupational Therapist        Occupational Therapy Education                 Title: PT OT SLP Therapies (In Progress)     Topic: Occupational Therapy (Resolved)     Point: ADL training (Resolved)     Description:   Instruct learner(s) on proper safety adaptation and remediation techniques during self care or transfers.   Instruct in proper use of assistive devices.              Learning Progress Summary           Patient Acceptance, E, VU by  at 11/18/2020 0852    Comment: OT role, benefits, POC   Family Acceptance, E, VU by  at 11/18/2020 0852    Comment: OT role, benefits, POC                               User Key     Initials Effective Dates Name Provider Type [facility] 07/05/20 -  Mio Tobar OTR/L Occupational Therapist OT              OT Recommendation and Plan  Retired  Outcome Summary/Treatment Plan (OT)  Anticipated Discharge Disposition (OT): home  Therapy Frequency (OT): evaluation only  Plan of Care Review  Plan of Care Reviewed With: patient  Progress: no change  Outcome Summary: OT eval comopleted. Pt reports no pain and that all symptoms have resolved. Pt was independent for all bed mobility, transfers, and functional mobility. Pt was independent for toileting and donning socks. Pt has no coordination, strength, ROM or sensation deficits. Pt appears to be at baseline and expresses no concerns. No skilled OT warranted. OT to sign off. Recommended d/c home.  Plan of Care Reviewed With: patient  Outcome Summary: OT eval comopleted. Pt reports no pain and that all symptoms have resolved. Pt was independent for all bed mobility, transfers, and functional mobility. Pt was independent for toileting and donning socks. Pt has no coordination, strength, ROM or sensation deficits. Pt appears to be at baseline and expresses no concerns. No skilled OT warranted. OT to sign off. Recommended d/c home.       11/18/20 0853   OT Discharge Summary   Anticipated Discharge Disposition (OT) home   Reason for Discharge Independent;At baseline function   Outcomes Achieved Refer to plan of care for updates on goals achieved;Other  (one time evaluation)   Discharge Destination Home      Time Calculation:   Time Calculation- OT     Row Name 11/18/20 0810             Time Calculation- OT    OT Start Time  0810  -MM      OT Stop Time  0838  -MM      OT Time Calculation (min)  28 min  -MM      OT Received On  11/18/20  -MM        User Key  (r) = Recorded By, (t) = Taken By, (c) = Cosigned By    Initials Name Provider Type     Mio Tobar, OTR/L Occupational Therapist        Therapy Charges for Today     Code Description Service Date Service Provider Modifiers Qty    [id number]  OT EVAL LOW COMPLEXITY 2 11/18/2020 Mio Tobar, OTR/L GO 1               Mio Tobar  OTR/L  11/18/2020

## 2020-11-18 NOTE — PROGRESS NOTES
Discharge Planning Assessment  [facility]     Patient Name: Feliberto Knowles  MRN: [id number]  Today's Date: 11/18/2020    Admit Date: 11/17/2020    Discharge Needs Assessment     Row Name 11/18/20 1018       Living Environment    Lives With  spouse    Current Living Arrangements  home/apartment/condo    Primary Care Provided by  self    Provides Primary Care For  no one    Family Caregiver if Needed  spouse    Quality of Family Relationships  supportive    Able to Return to Prior Arrangements  yes       Resource/Environmental Concerns    Resource/Environmental Concerns  none    Transportation Concerns  car, none       Transition Planning    Patient/Family Anticipates Transition to  home    Patient/Family Anticipated Services at Transition  none    Transportation Anticipated  family or friend will provide       Discharge Needs Assessment    Readmission Within the Last 30 Days  no previous admission in last 30 days    Equipment Currently Used at Home  none    Concerns to be Addressed  no discharge needs identified    Anticipated Changes Related to Illness  none    Equipment Needed After Discharge  none    Discharge Coordination/Progress  PT resides at home with spouse and was independent and working outside the home prior to admission. PT plans to dc to the same and denies any dc needs at this time. PT has PCP and Rx coverage.        Discharge Plan    No documentation.       Continued Care and Services - Admitted Since 11/17/2020    Coordination has not been started for this encounter.         Demographic Summary    No documentation.       Functional Status    No documentation.       Psychosocial    No documentation.       Abuse/Neglect    No documentation.       Legal    No documentation.       Substance Abuse    No documentation.       Patient Forms    No documentation.           ERIKA Crouch
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
## 2020-11-18 NOTE — PROGRESS NOTES
[facility] Medicine Services  INPATIENT PROGRESS NOTE    Patient Name: Feliberto Knowles  Date of Admission: 11/17/2020  Today's Date: 11/18/20  Length of Stay: 0  Primary Care Physician: Trey Arciniega MD    Subjective   Chief Complaint: Follow-up dizziness  HPI   Patient resting comfortably sitting on the side of bed.  He tells me he feels much better as compared to yesterday.  He denies nausea and vomiting.  He denies headache, blurred vision or any other neurological deficit.  He denies dizziness, syncope, or presyncope.  His blood pressure is stable -  e tells me he typically does not check his blood pressure and he does not have difficulty with it.  His nurse Vashti tells me that his AICD is not MRI compatible.  He has just returned from transthoracic echocardiogram and carotid duplex scan.    Review of Systems   All pertinent negatives and positives are as above. All other systems have been reviewed and are negative unless otherwise stated.     Objective    Temp:  [97.6 °F (36.4 °C)-98.4 °F (36.9 °C)] 98.4 °F (36.9 °C)  Heart Rate:  [76-92] 78  Resp:  [16-18] 18  BP: ()/(53-98) 139/87  Physical Exam  Vitals signs reviewed.   Constitutional:       General: He is not in acute distress.     Appearance: He is obese. He is not toxic-appearing.      Comments: Sitting on the side of the bed.  No acute distress.  Tolerating room air.   HENT:      Head: Normocephalic and atraumatic.      Mouth/Throat:      Mouth: Mucous membranes are moist.      Pharynx: Oropharynx is clear.   Eyes:      Extraocular Movements: Extraocular movements intact.      Conjunctiva/sclera: Conjunctivae normal.      Pupils: Pupils are equal, round, and reactive to light.   Neck:      Musculoskeletal: Normal range of motion and neck supple. No muscular tenderness.   Cardiovascular:      Rate and Rhythm: Normal rate and regular rhythm.      Pulses: Normal pulses.      Heart sounds: No murmur.      Comments:  Sinus 79-95  Pulmonary:      Effort: Pulmonary effort is normal. No respiratory distress.      Breath sounds: Normal breath sounds. No wheezing or rhonchi.   Abdominal:      General: Bowel sounds are normal. There is no distension.      Palpations: Abdomen is soft.      Tenderness: There is no abdominal tenderness.   Musculoskeletal: Normal range of motion.         General: No swelling or tenderness.   Skin:     General: Skin is warm and dry.      Findings: No erythema or rash.   Neurological:      General: No focal deficit present.      Mental Status: He is alert and oriented to person, place, and time.      Cranial Nerves: No cranial nerve deficit.      Motor: No weakness.   Psychiatric:         Mood and Affect: Mood normal.         Behavior: Behavior normal.       Results Review:  I have reviewed the labs, radiology results, and diagnostic studies.    Laboratory Data:   Results from last 7 days   Lab Units 11/17/20  1721   WBC 10*3/mm3 10.96*   HEMOGLOBIN g/dL 15.4   HEMATOCRIT % 43.3   PLATELETS 10*3/mm3 316        Results from last 7 days   Lab Units 11/17/20  1721   SODIUM mmol/L 138   POTASSIUM mmol/L 4.2   CHLORIDE mmol/L 103   CO2 mmol/L 24.0   BUN mg/dL 7   CREATININE mg/dL 0.80   CALCIUM mg/dL 9.5   BILIRUBIN mg/dL 0.5   ALK PHOS U/L 99   ALT (SGPT) U/L 22   AST (SGOT) U/L 19   GLUCOSE mg/dL 95     Radiology Data:   Imaging Results (Last 24 Hours)     Procedure Component Value Units Date/Time    US Carotid Bilateral [[id number]] Collected: 11/18/20 1516     Updated: 11/18/20 1519    Narrative:      History: TIA       Impression:      Impression:  1. There is less than 50% stenosis of the right internal carotid artery.  2. There is less than 50% stenosis of the left internal carotid artery.  3. Antegrade flow is demonstrated in bilateral vertebral arteries.     Comments: Bilateral carotid vertebral arterial duplex scan was  performed.     Grayscale imaging shows intimal thickening and calcified elements at  the  carotid bifurcation. The right internal carotid artery peak systolic  velocity is 83.4 cm/sec. The end-diastolic velocity is 36.2 cm/sec. The  right ICA/CCA ratio is approximately 1.2 . These findings correlate with  less than 50% stenosis of the right internal carotid artery.     Grayscale imaging shows intimal thickening and calcified elements at the  carotid bifurcation. The left internal carotid artery peak systolic  velocity is 81.5 cm/sec. The end-diastolic velocity is 35.3 cm/sec. The  left ICA/CCA ratio is approximately 0.9 . These findings correlate with  less than 50% stenosis of the left internal carotid artery.     Antegrade flow is demonstrated in bilateral vertebral arteries.     This report was finalized on 11/18/2020 15:16 by Dr. Clark Guillen MD.    CT Head Without Contrast [[id number]] Collected: 11/17/20 1848     Updated: 11/17/20 1854    Narrative:      EXAMINATION: CT head without contrast 11/17/2020     DOSE: 782 mGycm. Automated exposure control was utilized to diminish  patient radiation dose..     HISTORY: Elevated blood pressure. Dizziness and nausea     FINDINGS: Multiple contiguous axials are obtained from the skull base to  the vertex per protocol without intravenous contrast administration.  Reformatted images are obtained in the sagittal and coronal projections  from the original data set.     There is atrophy with small vessel disease involving the corona radiata  and centrum semiovale. There is evidence of previous right basal  ganglial lacunar infarction with focal encephalomalacia. There is no  evidence of mass, mass effect or shift of the midline. No evidence of  acute infarct or hemorrhage.     No acute calvarial abnormalities are present.     The orbits are unremarkable. The visualized paranasal sinuses and  mastoid air cells demonstrate normal aeration. There is no fluid within  the middle ear cavity.       Impression:      1.. Atrophy with small vessel disease. Remote  right basal ganglial  lacunar infarction is present with focal encephalomalacia.  2. Otherwise unremarkable nonenhanced CT of the brain.  This report was finalized on 11/17/2020 18:51 by Dr. Berto Judge MD.    XR Chest 1 View [[id number]] Collected: 11/17/20 1809     Updated: 11/17/20 1812    Narrative:      EXAMINATION: Chest 1 view 11/17/2020     HISTORY: Elevated blood pressure and dizziness     FINDINGS: Today's exam is compared to previous study of 5/16/2018. A  transvenous pacer remains in place. Lungs are fully expanded and clear.  There is no effusion or free air present.       Impression:      . No active disease.  This report was finalized on 11/17/2020 18:09 by Dr. Berto Judge MD.          I have reviewed the patient's current medications.     Assessment/Plan     Active Hospital Problems    Diagnosis   • Dizziness   • Transient ischemic attack (TIA)   • Hypertrophic obstructive cardiomyopathy (CMS/HCC)   • Essential hypertension, new diagnosis   • History of stroke     Plan:  1.  Patient presented on 11/17 with complaints of a 3-day history of dizziness.  He had associated symptoms of nausea and headache with occasional blurred vision.  Blurred vision typically occurs 1 time weekly and has done so for the past several months.  The blurred vision would last about 3 minutes and then resolved.  He noticed that his blood pressure was elevated a few nights ago and was noted to be 169/110.  He took an extra metoprolol.  CT of the head did show prior right basal ganglia stroke.  Patient is unaware of when this would have occurred.  2.  Neurology evaluating patient.  Neurology feels associated dizziness may be related to hypertension but cannot rule out TIA.  Planning for MRI of the brain if AICD is MRI compatible.  However, [employer] was called - AICD is not MRI compatible.  Aspirin 81 mg daily and Lipitor 80 mg daily.  Carotid duplex scan.  3.  Transthoracic echocardiogram.    4.  Counseled on  limiting caffeine intake to include energy drinks and soda.  5.  TSH 5.4, will check free T4.  6.  Sequential compression devices for DVT prophylaxis.  Encourage ambulation.  7.  Suspected underlying obstructive sleep apnea.  Will need ambulatory sleep referral.  8.  Work-up ongoing.    Discharge Planning: I expect the patient to be discharged to home likely tomorrow.    Electronically signed by FALGUNI Arora, 11/18/20, 16:24 CST.

## 2020-11-18 NOTE — THERAPY DISCHARGE NOTE
Patient Name: Feliberto Knowles  : 1975    MRN: [id number]                              Today's Date: 2020       Admit Date: 2020    Visit Dx:     ICD-10-CM ICD-9-CM   1. Dizziness  R42 780.4   2. Impaired mobility  Z74.09 799.89     Patient Active Problem List   Diagnosis   • Dizziness   • Transient ischemic attack (TIA)   • Hypertrophic obstructive cardiomyopathy (CMS/HCC)   • Essential hypertension, new diagnosis   • History of stroke     Past Medical History:   Diagnosis Date   • AICD (automatic cardioverter/defibrillator) present    • Anger    • Anxiety    • Hypertrophic obstructive cardiomyopathy (HOCM) (CMS/HCC)    • Tachycardia      Past Surgical History:   Procedure Laterality Date   • ANKLE SURGERY     • APPENDECTOMY     • CARDIAC CATHETERIZATION     • CARDIAC PACEMAKER PLACEMENT     • CARPAL TUNNEL RELEASE     • OTHER SURGICAL HISTORY  2012     General Information     Row Name 20 [facility]          Physical Therapy Time and Intention    Document Type  evaluation CC: dizziness; Dx: TIA; Hx: hypertrophic obstructive cardiomyopathy, pacemaker, labral repair with bicep tendonesis 2020  -TONEY (r) AN (t) TONEY (c)     Mode of Treatment  physical therapy  -TONEY (r) AN (t) TONEY (c)     Row Name 20 0813          General Information    Patient Profile Reviewed  yes  -TONEY (r) AN (t) TONEY (c)     Prior Level of Function  independent:;all household mobility;community mobility;ADL's  -TONEY (r) AN (t) TONEY (c)     Existing Precautions/Restrictions  no known precautions/restrictions  -TONEY (r) AN (t) TONEY (c)     Barriers to Rehab  none identified  -TONEY (r) AN (t) TONEY (c)     Row Name 20 0813          Living Environment    Lives With  child(camron), dependent;spouse children 3 and 5  -TONEY (r) AN (t) TONEY (c)     Row Name 20 0813          Home Main Entrance    Number of Stairs, Main Entrance  four  -TONEY (r) AN (t) TONEY (c)     Stair Railings, Main Entrance  none  -TONEY (r) AN (t) TONEY (c)     Row Name 20  0813          Stairs Within Home, Primary    Number of Stairs, Within Home, Primary  none  -TONEY (r) AN (t) TONEY (c)     Row Name 11/18/20 0813          Cognition    Orientation Status (Cognition)  oriented x 4  -TONEY (r) AN (t) TONEY (c)       User Key  (r) = Recorded By, (t) = Taken By, (c) = Cosigned By    Initials Name Provider Type    Suraj Mosqueda, PT DPT Physical Therapist    Lili Cornelius, PT Student PT Student        Mobility     Row Name 11/18/20 0813          Bed Mobility    Bed Mobility  supine-sit;sit-supine  -TONEY (r) AN (t) TONEY (c)     Supine-Sit [place] (Bed Mobility)  independent  -TONEY (r) AN (t) TONEY (c)     Sit-Supine [place] (Bed Mobility)  independent  -TONEY (r) AN (t) TONEY (c)     Row Name 11/18/20 0813          Sit-Stand Transfer    Sit-Stand [place] (Transfers)  independent  -TONEY (r) AN (t) TONEY (c)     Row Name 11/18/20 0813          Gait/Stairs (Locomotion)    [place] Level (Gait)  independent  -TONEY (r) AN (t) TONEY (c)     Distance in Feet (Gait)  250  (Pended)   -AN       User Key  (r) = Recorded By, (t) = Taken By, (c) = Cosigned By    Initials Name Provider Type    Suraj Mosqueda, PT DPT Physical Therapist    Lili Cornelius, PT Student PT Student        Obj/Interventions     Row Name 11/18/20 0813          Range of Motion Comprehensive    General Range of Motion  no range of motion deficits identified  -TONEY (r) AN (t) TONEY (c)     Row Name 11/18/20 0813          Strength Comprehensive (MMT)    General Manual Muscle Testing (MMT) Assessment  no strength deficits identified  -TONEY (r) AN (t) TONEY (c)     Row Name 11/18/20 0813          Motor Skills    Motor Skills  coordination  -TONEY (r) AN (t) TONEY (c)     Coordination  WFL;heel to shin  -TONEY (r) AN (t) TONEY (c)     Row Name 11/18/20 0813          Balance    Balance Assessment  sitting static balance;standing static balance;standing dynamic balance  -TONEY (r) AN (t) TONEY (c)     Static Sitting Balance  WFL  -TONEY (r) AN (t) TONEY (c)     Static  Standing Balance  WFL  -TONEY (r) AN (t) TONEY (c)     Dynamic Standing Balance  WFL  -TONEY (r) AN (t) TONEY (c)     Row Name 11/18/20 0813          Sensory Assessment (Somatosensory)    Sensory Assessment (Somatosensory)  sensation intact  -TONEY (r) AN (t) TONEY (c)       User Key  (r) = Recorded By, (t) = Taken By, (c) = Cosigned By    Initials Name Provider Type    Suraj Mosqueda, PT DPT Physical Therapist    Lili Cornelius, PT Student PT Student        Goals/Plan    No documentation.       Clinical Impression     [facility] Name 11/18/20 0813          Pain    Additional Documentation  Pain Scale: Numbers Pre/Post-Treatment (Group)  -TONEY (r) AN (t) TONEY (c)     Row Name 11/18/20 0813          Pain Scale: Numbers Pre/Post-Treatment    Pretreatment Pain Rating  0/10 - no pain  -TONEY (r) AN (t) TONEY (c)     Row Name 11/18/20 0813          Plan of Care Review    Plan of Care Reviewed With  patient;spouse  -TONEY (r) AN (t) TONEY (c)     Progress  no change  -TONEY (r) AN (t) TONEY (c)     Outcome Summary  PT eval completed. Pt A&Ox4. Pt demonstrates independence with bed mobility, t/f, and ambulation. Pt was in bathroom at start of eval. demonstrates 5/5 strength, reports no dizziness, vision or speech problems, or any other symptoms. No Skilled PT recommended. PT to sign off. D/c home  -TONEY     Row Name 11/18/20 0813          Therapy Assessment/Plan (PT)    Patient/Family Therapy Goals Statement (PT)  go home  -TONEY (r) AN (t) TONEY (c)     Criteria for Skilled Interventions Met (PT)  no;no problems identified which require skilled intervention  -TONEY (r) AN (t) TONEY (c)     Predicted Duration of Therapy Intervention (PT)  d/c  -TONEY (r) AN (t) TONEY (c)     Row Name 11/18/20 0813          Vital Signs    O2 Delivery Pre Treatment  room air  -TONEY (r) AN (t) TONEY (c)     O2 Delivery Intra Treatment  room air  -TONEY (r) AN (t) TONEY (c)     O2 Delivery Post Treatment  room air  -TONEY (r) AN (t) TONEY (c)     Pre Patient Position  Standing  -TONEY (r) AN (t) TONEY (c)     Intra Patient  Position  Standing  -TONEY (r) AN (t) TONEY (c)     Post Patient Position  Supine  -TONEY (r) AN (t) TONEY (c)     Row Name 11/18/20 0813          Positioning and Restraints    Pre-Treatment Position  bathroom  -TONEY (r) AN (t) TONEY (c)     Post Treatment Position  bed  -TONEY (r) AN (t) TONEY (c)     In Bed  fowlers;call light within reach;encouraged to call for assist;with family/caregiver  -TONEY (r) AN (t) TONEY (c)       User Key  (r) = Recorded By, (t) = Taken By, (c) = Cosigned By    Initials Name Provider Type    Suraj Mosqueda, PT DPT Physical Therapist    Lili Cornelius, PT Student PT Student        Outcome Measures     Row Name 11/18/20 0813          How much help from another person do you currently need...    Turning from your back to your side while in flat bed without using bedrails?  4  -TONEY (r) AN (t) TONEY (c)     Moving from lying on back to sitting on the side of a flat bed without bedrails?  4  -TONEY (r) AN (t) TONEY (c)     Moving to and from a bed to a chair (including a wheelchair)?  4  -TONEY (r) AN (t) TONEY (c)     Standing up from a chair using your arms (e.g., wheelchair, bedside chair)?  4  -TONEY (r) AN (t) TONEY (c)     Climbing 3-5 steps with a railing?  4  -TONEY (r) AN (t) TONEY (c)     To walk in hospital room?  4  -TONEY (r) AN (t) TONEY (c)     AM-PAC 6 Clicks Score (PT)  24  -TONEY (r) AN (t)     Row Name 11/18/20 0813          Functional Assessment    Outcome Measure Options  AM-PAC 6 Clicks Basic Mobility (PT)  -TONEY (r) AN (t) TONEY (c)       User Key  (r) = Recorded By, (t) = Taken By, (c) = Cosigned By    Initials Name Provider Type    Suraj Mosqueda, PT DPT Physical Therapist    Lili Cornelius, PT Student PT Student        Physical Therapy Education                 Title: PT OT SLP Therapies (Resolved)     Topic: Physical Therapy (Resolved)     Point: Mobility training (Resolved)     Learner Progress:  Not documented in this visit.          Point: Home exercise program (Resolved)     Learner Progress:  Not documented in  this visit.          Point: Body mechanics (Resolved)     Learner Progress:  Not documented in this visit.          Point: Precautions (Resolved)     Learning Progress Summary           Patient Acceptance, E, VU by AN at 11/18/2020 0813    Comment: report any changes in movement, speech, or vision                               User Key     Initials Effective Dates Name Provider Type Discipline    AN 10/08/20 -  Lili Reeves, PT Student PT Student PT              PT Recommendation and Plan     Plan of Care Reviewed With: patient, spouse  Progress: no change     Time Calculation:   PT Charges     Row Name 11/18/20 0813             Time Calculation    Start Time  0813  -TONEY (r) AN (t) TONEY (c)      Stop Time  0839  -TONEY (r) AN (t) TONEY (c)      Time Calculation (min)  26 min  -TONEY (r) AN (t)      PT Received On  11/18/20  -TONEY        User Key  (r) = Recorded By, (t) = Taken By, (c) = Cosigned By    Initials Name Provider Type    Suraj Mosqueda, PT DPT Physical Therapist    Lili Cornelius, PT Student PT Student            PT G-Codes  Outcome Measure Options: AM-PAC 6 Clicks Daily Activity (OT), Modified Venu  AM-PAC 6 Clicks Score (PT): 24  AM-PAC 6 Clicks Score (OT): 24    PT Discharge Summary  Anticipated Discharge Disposition (PT): home    Lili Varela PT Student  11/18/2020

## 2020-11-18 NOTE — H&P
[facility] Medicine Services  HISTORY AND PHYSICAL    Date of Admission: 11/17/2020  Primary Care Physician: Trey Arciniega MD    Subjective     Chief Complaint: 3-day history of dizziness    History of Present Illness  Feliberto Knowles is a 45-year-old male with past medical history of hypertrophic obstructive cardiomyopathy with AICD (replaced 5 months ago), tachycardia for which he takes metoprolol twice daily, anxiety-patient unsure of what medication he takes for this.  Patient presented to [facility] emergency department with complaints of a 3-day history of intermittent dizziness, nausea and a headache today.  He did ask his wife, who is a nurse on , to take his blood pressure 3 nights ago it was 169/110.  He took an extra metoprolol.  ER work-up revealed remote right basal ganglia lacunar infarction which has been unknown to the patient.  He does have a left bundle branch block with a rate of 78, CBC within normal limits, CBC without acute findings.  Patient states at rest he has no dizziness currently.  It did occur today intermittently throughout his workday.  He has no headache at this time.  He has no neurological focal deficits.  He he is admitted as observation for further evaluation treatment.    Review of Systems   A 10 point review of systems was completed, all negative except for those discussed in HPI    Past Medical History:   Past Medical History:   Diagnosis Date   • AICD (automatic cardioverter/defibrillator) present    • Anger    • Anxiety    • Hypertrophic obstructive cardiomyopathy (HOCM) (CMS/HCC)    • Tachycardia        Past Surgical History:   Past Surgical History:   Procedure Laterality Date   • ANKLE SURGERY     • APPENDECTOMY     • CARDIAC CATHETERIZATION     • CARDIAC PACEMAKER PLACEMENT     • CARPAL TUNNEL RELEASE     • OTHER SURGICAL HISTORY  05/2012       Family History: family history includes Heart disease in his mother; No Known  "Problems in his father.    Social History:  reports that he has never smoked. He does not have any smokeless tobacco history on file. He reports that he does not drink alcohol or use drugs.    Code Status: Full, if unable speak for himself his wife Janet will speak for him      Allergies:  No Known Allergies    Medications:  Prior to Admission medications    Medication Sig Start Date End Date Taking? Authorizing Provider   metoprolol tartrate (LOPRESSOR) 25 MG tablet Take 25 mg by mouth 2 (Two) Times a Day.    Provider, MD Izabel       Objective     /87   Pulse 79   Temp 98.1 °F (36.7 °C) (Oral)   Resp 16   Ht 170.2 cm (67\")   Wt 122 kg (270 lb)   SpO2 98%   BMI 42.29 kg/m²   Physical Exam  Vitals signs reviewed.   Constitutional:       Appearance: Normal appearance.   HENT:      Head: Normocephalic and atraumatic.      Mouth/Throat:      Mouth: Mucous membranes are moist.      Pharynx: Oropharynx is clear.   Eyes:      Extraocular Movements: Extraocular movements intact.      Pupils: Pupils are equal, round, and reactive to light.   Neck:      Musculoskeletal: Normal range of motion and neck supple.   Cardiovascular:      Rate and Rhythm: Normal rate and regular rhythm.   Pulmonary:      Effort: Pulmonary effort is normal.      Breath sounds: Normal breath sounds.   Abdominal:      General: Bowel sounds are normal.      Palpations: Abdomen is soft.   Musculoskeletal: Normal range of motion.         General: No swelling.   Skin:     General: Skin is warm and dry.   Neurological:      General: No focal deficit present.      Mental Status: He is alert and oriented to person, place, and time.   Psychiatric:         Mood and Affect: Mood normal.         Behavior: Behavior normal.       Pertinent Data:   Lab Results (last 72 hours)     Procedure Component Value Units Date/Time    COVID-19, ABBOTT IN-HOUSE,NP Swab (NO TRANSPORT MEDIA) 2 HR TAT - Swab, Nasal Cavity [[id number]]  (Normal) Collected: " 11/17/20 1721    Specimen: Swab from Nasal Cavity Updated: 11/17/20 1816     COVID19 Not Detected    Narrative:      Fact sheet for providers: https://www.fda.gov/media/467122/download     Fact sheet for patients: https://www.fda.gov/media/769040/download    Comprehensive Metabolic Panel [[id number]] Collected: 11/17/20 1721    Specimen: Blood Updated: 11/17/20 1746     Glucose 95 mg/dL      BUN 7 mg/dL      Creatinine 0.80 mg/dL      Sodium 138 mmol/L      Potassium 4.2 mmol/L      Chloride 103 mmol/L      CO2 24.0 mmol/L      Calcium 9.5 mg/dL      Total Protein 6.8 g/dL      Albumin 4.30 g/dL      ALT (SGPT) 22 U/L      AST (SGOT) 19 U/L      Alkaline Phosphatase 99 U/L      Total Bilirubin 0.5 mg/dL      eGFR Non African Amer 105 mL/min/1.73      Globulin 2.5 gm/dL      A/G Ratio 1.7 g/dL      BUN/Creatinine Ratio 8.8     Anion Gap 11.0 mmol/L     Troponin [[id number]]  (Normal) Collected: 11/17/20 1721    Specimen: Blood Updated: 11/17/20 1744     Troponin T <0.010 ng/mL     Protime-INR [[id number]]  (Normal) Collected: 11/17/20 1721    Specimen: Blood Updated: 11/17/20 1735     Protime 13.3 Seconds      INR 1.05    CBC Auto Differential [[id number]]  (Abnormal) Collected: 11/17/20 1721    Specimen: Blood Updated: 11/17/20 1728     WBC 10.96 10*3/mm3      RBC 5.48 10*6/mm3      Hemoglobin 15.4 g/dL      Hematocrit 43.3 %      MCV 79.0 fL      MCH 28.1 pg      MCHC 35.6 g/dL      RDW 12.3 %      RDW-SD 34.7 fl      MPV 9.0 fL      Platelets 316 10*3/mm3      Neutrophil % 73.5 %      Lymphocyte % 17.2 %      Monocyte % 7.5 %      Eosinophil % 0.4 %      Basophil % 0.5 %      Immature Grans % 0.9 %      Neutrophils, Absolute 8.05 10*3/mm3      Lymphocytes, Absolute 1.89 10*3/mm3      Monocytes, Absolute 0.82 10*3/mm3      Eosinophils, Absolute 0.04 10*3/mm3      Basophils, Absolute 0.06 10*3/mm3      Immature Grans, Absolute 0.10 10*3/mm3      nRBC 0.0 /100 WBC         Imaging Results (Last 24 Hours)     Procedure  Component Value Units Date/Time    CT Head Without Contrast [[id number]] Collected: 11/17/20 1848     Updated: 11/17/20 1854    Narrative:      EXAMINATION: CT head without contrast 11/17/2020     DOSE: 782 mGycm. Automated exposure control was utilized to diminish  patient radiation dose..     HISTORY: Elevated blood pressure. Dizziness and nausea     FINDINGS: Multiple contiguous axials are obtained from the skull base to  the vertex per protocol without intravenous contrast administration.  Reformatted images are obtained in the sagittal and coronal projections  from the original data set.     There is atrophy with small vessel disease involving the corona radiata  and centrum semiovale. There is evidence of previous right basal  ganglial lacunar infarction with focal encephalomalacia. There is no  evidence of mass, mass effect or shift of the midline. No evidence of  acute infarct or hemorrhage.     No acute calvarial abnormalities are present.     The orbits are unremarkable. The visualized paranasal sinuses and  mastoid air cells demonstrate normal aeration. There is no fluid within  the middle ear cavity.       Impression:      1.. Atrophy with small vessel disease. Remote right basal ganglial  lacunar infarction is present with focal encephalomalacia.  2. Otherwise unremarkable nonenhanced CT of the brain.  This report was finalized on 11/17/2020 18:51 by Dr. Berto Judge MD.    XR Chest 1 View [[id number]] Collected: 11/17/20 1809     Updated: 11/17/20 1812    Narrative:      EXAMINATION: Chest 1 view 11/17/2020     HISTORY: Elevated blood pressure and dizziness     FINDINGS: Today's exam is compared to previous study of 5/16/2018. A  transvenous pacer remains in place. Lungs are fully expanded and clear.  There is no effusion or free air present.       Impression:      . No active disease.  This report was finalized on 11/17/2020 18:09 by Dr. Berto Judge MD.          I have personally reviewed and  interpreted the radiology studies and ECG obtained at time of admission.     Assessment / Plan     Assessment:   Active Hospital Problems    Diagnosis   • Dizziness   • Transient ischemic attack (TIA)   • Hypertrophic obstructive cardiomyopathy (CMS/HCC)   • Essential hypertension, new diagnosis   • History of stroke       Plan:   1.  Admit as observation  2.  Follow TIA/stroke protocol  3.  Home medications reviewed and restarted as appropriate  4.  DVT prophylaxis with SCD's  5.  Labs in a.m.    I discussed the patient's findings and my recommendations with: Mehran Benson DO  Time spent: 35 minutes    I was told by ED to admit the patient. Attempted to see the patient in the ED and he had to take a phone call. ED NP called back after discussing the case with neurology when asked, and was told could be seen as OP. Plan as above.     Patient seen and examined by me on 11/17/2020 at 8:02 PM.    Electronically signed by FALGUNI No, 11/17/20, 20:57 CST.

## 2020-11-18 NOTE — ED NOTES
Pt wanting to see wife and stated that would walk out to see his wife. Pt was informed of the hospital policy. Pt also stated that if he cannot see his wife, he would leave and go to [facility].     Feliberto Woods RN  11/17/20 2041

## 2020-11-19 ENCOUNTER — APPOINTMENT (OUTPATIENT)
Dept: CT IMAGING | Facility: HOSPITAL | Age: 45
End: 2020-11-19

## 2020-11-19 ENCOUNTER — TELEPHONE (OUTPATIENT)
Dept: NEUROLOGY | Facility: CLINIC | Age: 45
End: 2020-11-19

## 2020-11-19 VITALS
RESPIRATION RATE: 18 BRPM | HEIGHT: 67 IN | TEMPERATURE: 97.8 F | SYSTOLIC BLOOD PRESSURE: 100 MMHG | BODY MASS INDEX: 43.85 KG/M2 | WEIGHT: 279.4 LBS | DIASTOLIC BLOOD PRESSURE: 57 MMHG | HEART RATE: 74 BPM | OXYGEN SATURATION: 97 %

## 2020-11-19 LAB
GLUCOSE BLDC GLUCOMTR-MCNC: 103 MG/DL (ref 70–130)
VIT B12 BLD-MCNC: 938 PG/ML (ref 211–946)

## 2020-11-19 PROCEDURE — 70450 CT HEAD/BRAIN W/O DYE: CPT

## 2020-11-19 PROCEDURE — 99214 OFFICE O/P EST MOD 30 MIN: CPT | Performed by: PSYCHIATRY & NEUROLOGY

## 2020-11-19 PROCEDURE — G0378 HOSPITAL OBSERVATION PER HR: HCPCS

## 2020-11-19 PROCEDURE — 82962 GLUCOSE BLOOD TEST: CPT

## 2020-11-19 RX ORDER — ATORVASTATIN CALCIUM 80 MG/1
80 TABLET, FILM COATED ORAL NIGHTLY
Qty: 30 TABLET | Refills: 0 | Status: SHIPPED | OUTPATIENT
Start: 2020-11-19 | End: 2020-12-19

## 2020-11-19 RX ORDER — ASPIRIN 81 MG/1
81 TABLET, CHEWABLE ORAL DAILY
Qty: 30 TABLET | Refills: 0 | Status: SHIPPED | OUTPATIENT
Start: 2020-11-20 | End: 2020-12-20

## 2020-11-19 RX ADMIN — METOPROLOL TARTRATE 25 MG: 25 TABLET, FILM COATED ORAL at 09:07

## 2020-11-19 RX ADMIN — ASPIRIN 81 MG: 81 TABLET, CHEWABLE ORAL at 09:07

## 2020-11-19 NOTE — DISCHARGE SUMMARY
[facility]  DISCHARGE SUMMARY       Date of Admission: 11/17/2020  Date of Discharge:  11/19/2020  Primary Care Physician: Trey Arciniega MD    Presenting Problem/History of Present Illness:  Dizziness [R42]  Dizziness [R42]     Final Discharge Diagnoses:  Active Hospital Problems    Diagnosis   • **Transient ischemic attack (TIA)   • Dizziness   • Hypertrophic obstructive cardiomyopathy (CMS/HCC)   • Essential hypertension, new diagnosis   • History of stroke       Consults: Dr. Xavier Aguilera with neurology.    Procedures Performed: None.    Pertinent Test Results:   Lab Results (last 72 hours)     Procedure Component Value Units Date/Time    POC Glucose Once [[id number]]  (Normal) Collected: 11/19/20 0338    Specimen: Blood Updated: 11/19/20 0350     Glucose 103 mg/dL      Comment: : [id number] Epi McnairMeter ID: [id number]       Vitamin B12 [[id number]]  (Normal) Collected: 11/18/20 1754    Specimen: Blood Updated: 11/19/20 0151     Vitamin B-12 938 pg/mL     Narrative:      Results may be falsely increased if patient taking Biotin.      T4, Free [[id number]]  (Normal) Collected: 11/18/20 0455    Specimen: Blood Updated: 11/18/20 1548     Free T4 1.07 ng/dL     Narrative:      Results may be falsely increased if patient taking Biotin.      TSH [[id number]]  (Abnormal) Collected: 11/18/20 0455    Specimen: Blood Updated: 11/18/20 1001     TSH 5.440 uIU/mL     Lipid Panel [[id number]]  (Abnormal) Collected: 11/18/20 0455    Specimen: Blood Updated: 11/18/20 0545     Total Cholesterol 163 mg/dL      Triglycerides 243 mg/dL      HDL Cholesterol 34 mg/dL      LDL Cholesterol  88 mg/dL      VLDL Cholesterol 41 mg/dL      LDL/HDL Ratio 2.36    Narrative:      Cholesterol Reference Ranges  (U.S. Department of Health and Human Services ATP III Classifications)    Desirable          <200 mg/dL  Borderline High    200-239 mg/dL  High Risk          >240  mg/dL      Triglyceride Reference Ranges  (U.S. Department of Health and Human Services ATP III Classifications)    Normal           <150 mg/dL  Borderline High  150-199 mg/dL  High             200-499 mg/dL  Very High        >500 mg/dL    HDL Reference Ranges  (U.S. Department of Health and Human Services ATP III Classifcations)    Low     <40 mg/dl (major risk factor for CHD)  High    >60 mg/dl ('negative' risk factor for CHD)        LDL Reference Ranges  (U.S. Department of Health and Human Services ATP III Classifcations)    Optimal          <100 mg/dL  Near Optimal     100-129 mg/dL  Borderline High  130-159 mg/dL  High             160-189 mg/dL  Very High        >189 mg/dL    Hemoglobin A1c [[id number]]  (Normal) Collected: 11/18/20 0455    Specimen: Blood Updated: 11/18/20 0537     Hemoglobin A1C 5.40 %     Narrative:      Hemoglobin A1C Ranges:    Increased Risk for Diabetes  5.7% to 6.4%  Diabetes                     >= 6.5%  Diabetic Goal                < 7.0%    COVID PRE-OP / PRE-PROCEDURE SCREENING ORDER (NO ISOLATION) - Swab, Nasal Cavity [[id number]]  (Normal) Collected: 11/17/20 1721    Specimen: Swab from Nasal Cavity Updated: 11/17/20 1816    Narrative:      The following orders were created for panel order COVID PRE-OP / PRE-PROCEDURE SCREENING ORDER (NO ISOLATION) - Swab, Nasal Cavity.  Procedure                               Abnormality         Status                     ---------                               -----------         ------                     COVID-19, ABBOTT IN-HOUS...[[id number]]  Normal              Final result                 Please view results for these tests on the individual orders.    COVID-19, ABBOTT IN-HOUSE,NP Swab (NO TRANSPORT MEDIA) 2 HR TAT - Swab, Nasal Cavity [[id number]]  (Normal) Collected: 11/17/20 1721    Specimen: Swab from Nasal Cavity Updated: 11/17/20 1816     COVID19 Not Detected    Narrative:      Fact sheet for providers:  https://www.fda.gov/media/427562/download     Fact sheet for patients: https://www.fda.gov/media/952850/download    Comprehensive Metabolic Panel [[id number]] Collected: 11/17/20 1721    Specimen: Blood Updated: 11/17/20 1746     Glucose 95 mg/dL      BUN 7 mg/dL      Creatinine 0.80 mg/dL      Sodium 138 mmol/L      Potassium 4.2 mmol/L      Chloride 103 mmol/L      CO2 24.0 mmol/L      Calcium 9.5 mg/dL      Total Protein 6.8 g/dL      Albumin 4.30 g/dL      ALT (SGPT) 22 U/L      AST (SGOT) 19 U/L      Alkaline Phosphatase 99 U/L      Total Bilirubin 0.5 mg/dL      eGFR Non African Amer 105 mL/min/1.73      Globulin 2.5 gm/dL      A/G Ratio 1.7 g/dL      BUN/Creatinine Ratio 8.8     Anion Gap 11.0 mmol/L     Narrative:      GFR Normal >60  Chronic Kidney Disease <60  Kidney Failure <15      Troponin [[id number]]  (Normal) Collected: 11/17/20 1721    Specimen: Blood Updated: 11/17/20 1744     Troponin T <0.010 ng/mL     Narrative:      Troponin T Reference Range:  <= 0.03 ng/mL-   Negative for AMI  >0.03 ng/mL-     Abnormal for myocardial necrosis.  Clinicians would have to utilize clinical acumen, EKG, Troponin and serial changes to determine if it is an Acute Myocardial Infarction or myocardial injury due to an underlying chronic condition.       Results may be falsely decreased if patient taking Biotin.      Protime-INR [[id number]]  (Normal) Collected: 11/17/20 1721    Specimen: Blood Updated: 11/17/20 1735     Protime 13.3 Seconds      INR 1.05    CBC & Differential [[id number]]  (Abnormal) Collected: 11/17/20 1721    Specimen: Blood Updated: 11/17/20 1728    Narrative:      The following orders were created for panel order CBC & Differential.  Procedure                               Abnormality         Status                     ---------                               -----------         ------                     CBC Auto Differential[[id number]]        Abnormal            Final result                 Please  view results for these tests on the individual orders.    CBC Auto Differential [[id number]]  (Abnormal) Collected: 11/17/20 1721    Specimen: Blood Updated: 11/17/20 1728     WBC 10.96 10*3/mm3      RBC 5.48 10*6/mm3      Hemoglobin 15.4 g/dL      Hematocrit 43.3 %      MCV 79.0 fL      MCH 28.1 pg      MCHC 35.6 g/dL      RDW 12.3 %      RDW-SD 34.7 fl      MPV 9.0 fL      Platelets 316 10*3/mm3      Neutrophil % 73.5 %      Lymphocyte % 17.2 %      Monocyte % 7.5 %      Eosinophil % 0.4 %      Basophil % 0.5 %      Immature Grans % 0.9 %      Neutrophils, Absolute 8.05 10*3/mm3      Lymphocytes, Absolute 1.89 10*3/mm3      Monocytes, Absolute 0.82 10*3/mm3      Eosinophils, Absolute 0.04 10*3/mm3      Basophils, Absolute 0.06 10*3/mm3      Immature Grans, Absolute 0.10 10*3/mm3      nRBC 0.0 /100 WBC         Imaging Results (Last 72 Hours)     Procedure Component Value Units Date/Time    CT Head Without Contrast [[id number]] Collected: 11/19/20 0858     Updated: 11/19/20 0905    Narrative:      EXAM: CT HEAD WO CONTRAST- - 11/19/2020 7:51 AM CST     HISTORY: Neuro deficit, acute, stroke suspected; R42-Dizziness and  giddiness; Z74.09-Other reduced mobility, hypertension, dizziness     COMPARISON: 11/17/2020.      DOSE LENGTH PRODUCT: 676 mGy cm. Automated exposure control was also  utilized to decrease patient radiation dose.     TECHNIQUE: Unenhanced CT images obtained from vertex to skull base with  multiplanar reformats.     FINDINGS:  No acute intracranial hemorrhage, midline shift, mass effect or large  territory cortical infarct. Ventricles, sulci, basilar cisterns are  normal in size and configuration for the patient's age. Mild  periventricular and subcortical white matter hypodensities, most likely  due to chronic microvascular disease. Similar right basal ganglia  lacunar infarct compared to 11/17/2020.     Globes, retrobulbar soft tissues, paranasal sinuses, mastoid air cells  and external auditory  canals are within normal limits. Calvarium appears  intact. Subcutaneous tissues within normal limits.          Impression:      1. No acute intracranial hemorrhage or midline shift.  2. Similar mild chronic intravascular changes and right basal ganglia  lacunar infarct compared to 11/17/2020. If clinical concern for acute  infarct, MRI would be a more sensitive exam.  This report was finalized on 11/19/2020 09:02 by Dr Lili Saab MD.    US Carotid Bilateral [[id number]] Collected: 11/18/20 1516     Updated: 11/18/20 1519    Narrative:      History: TIA       Impression:      Impression:  1. There is less than 50% stenosis of the right internal carotid artery.  2. There is less than 50% stenosis of the left internal carotid artery.  3. Antegrade flow is demonstrated in bilateral vertebral arteries.     Comments: Bilateral carotid vertebral arterial duplex scan was  performed.     Grayscale imaging shows intimal thickening and calcified elements at the  carotid bifurcation. The right internal carotid artery peak systolic  velocity is 83.4 cm/sec. The end-diastolic velocity is 36.2 cm/sec. The  right ICA/CCA ratio is approximately 1.2 . These findings correlate with  less than 50% stenosis of the right internal carotid artery.     Grayscale imaging shows intimal thickening and calcified elements at the  carotid bifurcation. The left internal carotid artery peak systolic  velocity is 81.5 cm/sec. The end-diastolic velocity is 35.3 cm/sec. The  left ICA/CCA ratio is approximately 0.9 . These findings correlate with  less than 50% stenosis of the left internal carotid artery.     Antegrade flow is demonstrated in bilateral vertebral arteries.     This report was finalized on 11/18/2020 15:16 by Dr. Clark Guillen MD.    CT Head Without Contrast [[id number]] Collected: 11/17/20 1848     Updated: 11/17/20 1854    Narrative:      EXAMINATION: CT head without contrast 11/17/2020     DOSE: 782 mGycm. Automated exposure  control was utilized to diminish  patient radiation dose..     HISTORY: Elevated blood pressure. Dizziness and nausea     FINDINGS: Multiple contiguous axials are obtained from the skull base to  the vertex per protocol without intravenous contrast administration.  Reformatted images are obtained in the sagittal and coronal projections  from the original data set.     There is atrophy with small vessel disease involving the corona radiata  and centrum semiovale. There is evidence of previous right basal  ganglial lacunar infarction with focal encephalomalacia. There is no  evidence of mass, mass effect or shift of the midline. No evidence of  acute infarct or hemorrhage.     No acute calvarial abnormalities are present.     The orbits are unremarkable. The visualized paranasal sinuses and  mastoid air cells demonstrate normal aeration. There is no fluid within  the middle ear cavity.       Impression:      1.. Atrophy with small vessel disease. Remote right basal ganglial  lacunar infarction is present with focal encephalomalacia.  2. Otherwise unremarkable nonenhanced CT of the brain.  This report was finalized on 11/17/2020 18:51 by Dr. Berto Judge MD.    XR Chest 1 View [[id number]] Collected: 11/17/20 1809     Updated: 11/17/20 1812    Narrative:      EXAMINATION: Chest 1 view 11/17/2020     HISTORY: Elevated blood pressure and dizziness     FINDINGS: Today's exam is compared to previous study of 5/16/2018. A  transvenous pacer remains in place. Lungs are fully expanded and clear.  There is no effusion or free air present.       Impression:      . No active disease.  This report was finalized on 11/17/2020 18:09 by Dr. Berto Judge MD.        Chief Complaint on Day of Discharge: Overall, patient reports feeling much better.  He denies shortness of breath, chest pain or pressure.  Denies dizziness, syncope or presyncope.  Denies any neurological deficit.  Patient tells me he feels back to baseline.  He  is eager for discharge home.    Hospital Course:  The patient is a 45 y.o. male who presented to [facility] with dizziness.  He has a past medical history significant for hypertrophic cardiomyopathy, AICD implant whose leads are not compatible with MRI although the implant is compatible, previous smoker, and anxiety.  Patient presented for a 3-day history of dizziness.  He had associated symptoms of nausea and headache with occasional blurred vision.  Blurred vision typically occurs 1 time weekly and has done so for the past several months.  The blurred vision would last about 3 minutes and then resolved.  He noticed that his blood pressure was elevated a few nights ago and was noted to be 169/110.  He took an extra metoprolol.  Upon evaluation in the emergency department, CT of the head did show prior right basal ganglia stroke.  Patient is unaware of when this would have occurred.  He was admitted to the hospitalist service for further evaluation management.    He was seen in consultation by neurology.  Neurology feels associated dizziness may be related to hypertension but cannot rule out TIA.  Patient reports that the AICD implant itself is MRI compatible but the leads are not compatible.  Therefore unable to obtain MRI of the brain.  Repeat head CT today showed no acute intracranial hemorrhage or midline shift.  Carotid duplex scan less than 50% bilateral ICA stenosis with bilateral antegrade vertebral flow.  Transthoracic echocardiogram 11/17 showed left ventricular diastolic dysfunction with preserved ejection fraction.  He was started on 81 mg aspirin daily.  His blood pressure has been stable during hospitalization.  He has been instructed to check his blood pressure at home and bring a log of readings to his follow-up appointment with his primary care provider.  He tells me he does have a blood pressure cuff at home and is agreeable to do so.  Lipitor 80 mg daily for LDL goal less than 70.   "Patient has returned back to baseline and does not need any therapy needs.  He feels neurologically back to baseline.  He is okay for discharge from neurology standpoint with follow-up in 3 to 4 weeks.  TSH 5.4 with normal free T4 at 1.07 -will need thyroid function test repeated in 4 to 6 weeks on outpatient basis by his primary care provider.  He was counseled on the importance of limiting caffeine to include energy drinks and soda intake.  Tobacco cessation counseling as he uses smokeless tobacco.  Reports he plans to make \"positive changes moving forward.\"  He plans to focus on his diet, exercise, and to limit caffeine and stop smokeless tobacco.  He tells me he \"wants to be around for his kids.\"  Sequential compression devices for VTE prophylaxis.  Will need evaluation for suspected obstructive sleep apnea.  Ambulatory referral to sleep medicine.  Overall, patient reports feeling much better.  He is neurologically back to baseline.  Denies shortness of breath, chest pain or pressure.  He is hemodynamically stable and on room air.  He is appropriate for discharge home today.  He is to follow-up with neurology within 3 to 4 weeks.  He is to follow-up with his primary provider within 1 week.    Condition on Discharge:  Medically stable.    Physical Exam on Discharge:  /57 (BP Location: Right arm, Patient Position: Sitting)   Pulse 74   Temp 97.8 °F (36.6 °C) (Oral)   Resp 18   Ht 170.2 cm (67.01\")   Wt 127 kg (279 lb 6.4 oz)   SpO2 97%   BMI 43.75 kg/m²   Physical Exam  Vitals signs reviewed.   Constitutional:       General: He is not in acute distress.     Appearance: He is obese. He is not toxic-appearing.      Comments: Sitting on the side of the bed.  No acute distress.  Tolerating room air.   HENT:      Head: Normocephalic and atraumatic.      Mouth/Throat:      Mouth: Mucous membranes are moist.      Pharynx: Oropharynx is clear.   Eyes:      Extraocular Movements: Extraocular movements intact. "      Conjunctiva/sclera: Conjunctivae normal.      Pupils: Pupils are equal, round, and reactive to light.   Neck:      Musculoskeletal: Normal range of motion and neck supple. No muscular tenderness.   Cardiovascular:      Rate and Rhythm: Normal rate and regular rhythm.      Pulses: Normal pulses.      Heart sounds: No murmur.      Comments: Sinus 74-96  Pulmonary:      Effort: Pulmonary effort is normal. No respiratory distress.      Breath sounds: Normal breath sounds. No wheezing or rhonchi.   Abdominal:      General: Bowel sounds are normal. There is no distension.      Palpations: Abdomen is soft.      Tenderness: There is no abdominal tenderness.   Musculoskeletal: Normal range of motion.         General: No swelling or tenderness.   Skin:     General: Skin is warm and dry.      Findings: No erythema or rash.   Neurological:      General: No focal deficit present.      Mental Status: He is alert and oriented to person, place, and time.      Cranial Nerves: No cranial nerve deficit.      Motor: No weakness.   Psychiatric:         Mood and Affect: Mood normal.         Behavior: Behavior normal.     Discharge Disposition:  Home or Self Care    Discharge Medications:     Discharge Medications      New Medications      Instructions Start Date   aspirin 81 MG chewable tablet   81 mg, Oral, Daily   Start Date: November 20, 2020     atorvastatin 80 MG tablet  Commonly known as: LIPITOR   80 mg, Oral, Nightly         Continue These Medications      Instructions Start Date   metoprolol tartrate 25 MG tablet  Commonly known as: LOPRESSOR   25 mg, Oral, 2 Times Daily      sertraline 100 MG tablet  Commonly known as: ZOLOFT   100 mg, Oral, Daily           Discharge Diet:   Diet Instructions     Advance Diet As Tolerated          Activity at Discharge:   Activity Instructions     Activity as Tolerated          Discharge Care Plan/Instructions:   1.  Follow-up with primary care provider within 1 week.  2.  Follow-up with  neurology within 3 to 4 weeks.  3.  Aspirin 81 mg daily.  Lipitor 80 mg daily.  4.  Limit caffeine intake to include energy drinks and soda intake.  Tobacco cessation counseling as he uses smokeless tobacco.  5.  Encouraged heart healthy diet and exercise.  6.  Check blood pressure at home and keep a log of readings to bring to follow-up appointment with primary care provider.    Follow-up Appointments:   Future Appointments   Date Time Provider Department Center   2/25/2021  9:00 AM Murali Ledbetter PA-C [facility]       Test Results Pending at Discharge: none.    Electronically signed by FALGUNI Arora, 11/19/20, 14:04 CST.    Time: 35 minutes.    I personally evaluated and examined the patient in conjunction with FALGUNI Lim and agree with the assessment, treatment plan, and disposition of the patient as recorded by her. My history, exam, and further recommendations are:     Gen: alert, interactive, NAD  Neuro: AAOX3, no focal deficits, fluent speech    Agree w/ note as above, pt doing well, no complaints, no further events, ok to d/c home and f/u as an outpatient.    Electronically signed by Hong Conory DO, 11/19/2020, 18:17 CST.

## 2020-11-19 NOTE — PLAN OF CARE
Goal Outcome Evaluation:  Plan of Care Reviewed With: patient  Progress: improving   VSS. No C/O pain. No C/O dizziness. AICD compatability with MRI needs to be addressed. Continues on room air with saturation in the 90s. Sinus 75-96 with BBB and pvc, coup on tele. Safety maintained. Will continue to monitor.

## 2020-11-19 NOTE — TELEPHONE ENCOUNTER
THAD CALLING FROM [facility] TO SCHEDULE PT HOSP F/U. ATTEMPTED TO WARM TRANSFER BUT NO ANSWER.     THAD CAN BE REACHED AT [phone].    PLEASE ADVISE.

## 2020-11-19 NOTE — PROGRESS NOTES
Neurology Progress Note      Chief Complaint:  F/u  history of stroke, dizziness and concern for new stroke       Subjective     Subjective:  Patient sitting up in chair complaining of back stiffness. Wife at bedside. No further episodes of dizziness. CT head this AM and pending. [facility] trying to [facility] to determine if AICD and leads are MRI compatible.     Patient does have smokeless tobacco currently.    B12 938  TSH- 5.44  T4- 1.07  Carotid duplex scan less than 50% bilateral ICA stenosis with bilateral antegrade vertebral flow.    Results for orders placed during the hospital encounter of 11/17/20   Adult Transthoracic Echo Complete W/ Cont if Necessary Per Protocol (With Agitated Saline)    Addendum · Saline test results are negative. · Left ventricular wall thickness is consistent with septal asymmetric  hypertrophy. · Estimated left ventricular EF = 65% Left ventricular systolic function  is normal. · Left ventricular diastolic function is consistent with (grade I)  impaired relaxation.        Gorge Ford MD 11/18/2020  4:14 PM          Narrative · Saline test results are negative.  · Left ventricular wall thickness is consistent with concentric   hypertrophy.  · Estimated left ventricular EF = 65% Left ventricular systolic function   is normal.  · Left ventricular diastolic function is consistent with (grade I)   impaired relaxation.        Medications:  Current Facility-Administered Medications   Medication Dose Route Frequency Provider Last Rate Last Admin   • acetaminophen (TYLENOL) tablet 650 mg  650 mg Oral Q4H PRN Jenna Delacruz APRN        Or   • acetaminophen (TYLENOL) 160 MG/5ML solution 650 mg  650 mg Oral Q4H PRN Jenna Delacruz APRN        Or   • acetaminophen (TYLENOL) suppository 650 mg  650 mg Rectal Q4H PRN Jenna Delacruz APRN       • aspirin chewable tablet 81 mg  81 mg Oral Daily Jenna Delacruz APRN   81 mg at 11/18/20 0957    Or   •  aspirin suppository 300 mg  300 mg Rectal Daily Jenna Delacruz, FALGUNI       • atorvastatin (LIPITOR) tablet 80 mg  80 mg Oral Nightly Jenna Delacruz APRN   80 mg at 11/18/20 2047   • metoprolol tartrate (LOPRESSOR) tablet 25 mg  25 mg Oral Q12H Jenna Delacruz APRN   25 mg at 11/18/20 2048   • ondansetron (ZOFRAN) tablet 4 mg  4 mg Oral Q6H PRN Jenna Delacruz APRN        Or   • ondansetron (ZOFRAN) injection 4 mg  4 mg Intravenous Q6H PRN Jenna Delacruz APRN       • sodium chloride 0.9 % flush 10 mL  10 mL Intravenous PRN Jenna Delacruz APRN       • sodium chloride 0.9 % flush 10 mL  10 mL Intravenous Q12H Jenna Delacruz APRN   10 mL at 11/18/20 2047   • sodium chloride 0.9 % flush 10 mL  10 mL Intravenous PRN Jenna Delacruz APRN           Review of Systems:   -A 14 point review of systems is completed and is negative except for back stiffness.       Objective      Vital Signs  Temp:  [97.7 °F (36.5 °C)-98.5 °F (36.9 °C)] 98.5 °F (36.9 °C)  Heart Rate:  [52-91] 52  Resp:  [16-20] 20  BP: ()/(54-94) 119/66    Telemetry: S 74-96     Physical Exam:  General Exam:  Head:  Normal cephalic, atraumatic  HEENT:  Neck supple  Fundoscopic Exam:  No signs of disc edema  CVS:  Regular rate and rhythm.  No murmurs  Carotid Examination:  No bruits  Lungs:  Clear to auscultation  Abdomen:  Non-tender, Non-distended  Extremities:  No signs of peripheral edema  Skin:  No rashes     Neurologic Exam:     Mental Status:    -Awake, Alert, Oriented X 3  -No word finding difficulties  -No aphasia  -No dysarthria  -Follows simple and complex commands     CN II:  Visual fields full.  Pupils equally reactive to light  CN III, IV, VI:  Extraocular Muscles full with no signs of nystagmus  CN V:  Facial sensory is symmetric with no asymmetries.  CN VII:  Facial motor symmetric  CN VIII:  Gross hearing intact bilaterally  CN IX:  Palate elevates symmetrically  CN X:  Palate elevates symmetrically  CN XI:   Shoulder shrug symmetric  CN XII:  Tongue is midline on protrusion     Motor: (strength out of 5:  1= minimal movement, 2 = movement in plane of gravity, 3 = movement against gravity, 4 = movement against some resistance, 5 = full strength)     -Right Upper Ext: Proximal: 5 Distal: 5  -Left Upper Ext: Proximal: 5   Distal: 5     -Right Lower Ext: Proximal: 5 Distal: 5  -Left Lower Ext: Proximal: 5   Distal: 5     DTR:  -2+ throughout     Sensory:  -Intact to light touch, pinprick, temperature, pain, and proprioception     Coordination:  -Finger to nose intact  -Heel to shin intact  -No ataxia     Gait  -No signs of ataxia  -ambulates unassisted     Results Review:    I reviewed the patient's new clinical results.    Results from last 7 days   Lab Units 11/17/20  1721   WBC 10*3/mm3 10.96*   HEMOGLOBIN g/dL 15.4   HEMATOCRIT % 43.3   PLATELETS 10*3/mm3 316        Results from last 7 days   Lab Units 11/17/20  1721   SODIUM mmol/L 138   POTASSIUM mmol/L 4.2   CHLORIDE mmol/L 103   CO2 mmol/L 24.0   BUN mg/dL 7   CREATININE mg/dL 0.80   CALCIUM mg/dL 9.5   BILIRUBIN mg/dL 0.5   ALK PHOS U/L 99   ALT (SGPT) U/L 22   AST (SGOT) U/L 19   GLUCOSE mg/dL 95        Lab Results   Component Value Date    MG 1.7 02/22/2016    PROTIME 13.3 11/17/2020    INR 1.05 11/17/2020     No components found for: POCGLUC  No components found for: A1C  Lab Results   Component Value Date    HDL 34 (L) 11/18/2020    LDL 88 11/18/2020     No components found for: B12  Lab Results   Component Value Date    TSH 5.440 (H) 11/18/2020     Imaging Results (Last 48 Hours)     Procedure Component Value Units Date/Time    CT Head Without Contrast [[id number]] Resulted: 11/19/20 0752     Updated: 11/19/20 0757    US Carotid Bilateral [[id number]] Collected: 11/18/20 1516     Updated: 11/18/20 1519    Narrative:      History: TIA       Impression:      Impression:  1. There is less than 50% stenosis of the right internal carotid artery.  2. There is less  than 50% stenosis of the left internal carotid artery.  3. Antegrade flow is demonstrated in bilateral vertebral arteries.     Comments: Bilateral carotid vertebral arterial duplex scan was  performed.     Grayscale imaging shows intimal thickening and calcified elements at the  carotid bifurcation. The right internal carotid artery peak systolic  velocity is 83.4 cm/sec. The end-diastolic velocity is 36.2 cm/sec. The  right ICA/CCA ratio is approximately 1.2 . These findings correlate with  less than 50% stenosis of the right internal carotid artery.     Grayscale imaging shows intimal thickening and calcified elements at the  carotid bifurcation. The left internal carotid artery peak systolic  velocity is 81.5 cm/sec. The end-diastolic velocity is 35.3 cm/sec. The  left ICA/CCA ratio is approximately 0.9 . These findings correlate with  less than 50% stenosis of the left internal carotid artery.     Antegrade flow is demonstrated in bilateral vertebral arteries.     This report was finalized on 11/18/2020 15:16 by Dr. Clark Guillen MD.    CT Head Without Contrast [[id number]] Collected: 11/17/20 1848     Updated: 11/17/20 1854    Narrative:      EXAMINATION: CT head without contrast 11/17/2020     DOSE: 782 mGycm. Automated exposure control was utilized to diminish  patient radiation dose..     HISTORY: Elevated blood pressure. Dizziness and nausea     FINDINGS: Multiple contiguous axials are obtained from the skull base to  the vertex per protocol without intravenous contrast administration.  Reformatted images are obtained in the sagittal and coronal projections  from the original data set.     There is atrophy with small vessel disease involving the corona radiata  and centrum semiovale. There is evidence of previous right basal  ganglial lacunar infarction with focal encephalomalacia. There is no  evidence of mass, mass effect or shift of the midline. No evidence of  acute infarct or hemorrhage.     No  acute calvarial abnormalities are present.     The orbits are unremarkable. The visualized paranasal sinuses and  mastoid air cells demonstrate normal aeration. There is no fluid within  the middle ear cavity.       Impression:      1.. Atrophy with small vessel disease. Remote right basal ganglial  lacunar infarction is present with focal encephalomalacia.  2. Otherwise unremarkable nonenhanced CT of the brain.  This report was finalized on 11/17/2020 18:51 by Dr. Berto Judge MD.    XR Chest 1 View [[id number]] Collected: 11/17/20 1809     Updated: 11/17/20 1812    Narrative:      EXAMINATION: Chest 1 view 11/17/2020     HISTORY: Elevated blood pressure and dizziness     FINDINGS: Today's exam is compared to previous study of 5/16/2018. A  transvenous pacer remains in place. Lungs are fully expanded and clear.  There is no effusion or free air present.       Impression:      . No active disease.  This report was finalized on 11/17/2020 18:09 by Dr. Berto Judge MD.        Assessment/Plan     Hospital Problem List      Dizziness    Transient ischemic attack (TIA)    Hypertrophic obstructive cardiomyopathy (CMS/HCC)    Essential hypertension, new diagnosis    History of stroke    Impression:  1. Dizziness, nausea, and headache associated with elevated BP and CT head showing old right basal ganglia stroke. Symptoms likely related to hypertension but cannot rule out TIA. Would like to rule out new stroke. IV TPA not considered as symptoms resolved. Pacemaker was interrogated upon admission and no arhythmia detected.   2. Hypertension.  3. Hypertrophic obstructive cardiomyopathy s/p AICD implant.  4. Suspected underlying ANISA.  5. Hypertriglyceridemia at 243 and elevated LDL at 88.       Plan:  1. CT head pending.  2. ASA 81 mg daily.  3. Lipitor 80 mg daily for LDL goal less than 70.  4. It would be ideal to be able to get MRI brain if ever determined AICD and leads are MRI compatible.   5. Counseled on  cessation of energy drinks and decrease amount of caffeine as this may worsen tachycardia and elevated BP.   6. Patient does use smokeless tobacco and counseled on cessation  7. SCD for DVT prophylaxis.  8. Patient return to baseline and not anticipating therapy needs.  9. Will need outpatient evaluation for suspected ANISA.  10. Ok to discharge home later today. Will need follow up with neurology in 3-4 weeks.         Cleo Portillo, APRN  11/19/20  08:53 CST

## 2020-11-20 ENCOUNTER — READMISSION MANAGEMENT (OUTPATIENT)
Dept: CALL CENTER | Facility: HOSPITAL | Age: 45
End: 2020-11-20

## 2020-11-20 NOTE — OUTREACH NOTE
Prep Survey      Responses   [facility] facility patient discharged from?  [facility]   Is LACE score < 7 ?  Yes   Eligibility  Readm Mgmt   Discharge diagnosis  TIA   Does the patient have one of the following disease processes/diagnoses(primary or secondary)?  Stroke (TIA)   Does the patient have Home health ordered?  No   Is there a DME ordered?  No   Comments regarding appointments  listed on AVS   Medication alerts for this patient  ASA, lipitor   Prep survey completed?  Yes          Estefany Munoz RN

## 2020-11-20 NOTE — PAYOR COMM NOTE
"DC HOME 11-19-20  Feliberto Stoner (45 y.o. Male)     Date of Birth Social Security Number Address Home Phone MRN    [id number]  [id number] [phone] [id number]    Yarsani Marital Status          Presybeterian        Admission Date Admission Type Admitting Provider Attending Provider Department, Room/Bed    11/17/20 Emergency Hong Conroy DO  [facility] 4B, 404/1    Discharge Date Discharge Disposition Discharge Destination        11/19/2020 Home or Self Care Home             Attending Provider: (none)   Allergies: No Known Allergies    Isolation: None   Infection: None   Code Status: Prior    Ht: 170.2 cm (67.01\")   Wt: 127 kg (279 lb 6.4 oz)    Admission Cmt: None   Principal Problem: Transient ischemic attack (TIA) [G45.9]                 Active Insurance as of 11/17/2020     Primary Coverage     Payor Plan Insurance Group Employer/Plan Group    [facility] [employer]      Payor Plan Address Payor Plan Phone Number Payor Plan Fax Number Effective Dates    [address] [phone]  11/17/2020 - None Entered    [facility] [id number]       Subscriber Name Subscriber Birth Date Member ID       FELIBERTO STONER 1975 [id number]                 Emergency Contacts      (Rel.) Home Phone Work Phone Mobile Phone    Janet Stoner (Spouse) -- -- [phone]               Discharge Summary      Hong Conroy DO at 11/19/20 1404              [facility] Medicine Services  DISCHARGE SUMMARY       Date of Admission: 11/17/2020  Date of Discharge:  11/19/2020  Primary Care Physician: Trey Arciniega MD    Presenting Problem/History of Present Illness:  Dizziness [R42]  Dizziness [R42]     Final Discharge Diagnoses:  Active Hospital Problems    Diagnosis   • **Transient ischemic attack (TIA)   • Dizziness   • Hypertrophic obstructive cardiomyopathy (CMS/HCC)   • Essential hypertension, new diagnosis "   • History of stroke       Consults: Dr. Xavier Aguilera with neurology.    Procedures Performed: None.    Pertinent Test Results:   Lab Results (last 72 hours)     Procedure Component Value Units Date/Time    POC Glucose Once [[id number]]  (Normal) Collected: 11/19/20 0338    Specimen: Blood Updated: 11/19/20 0350     Glucose 103 mg/dL      Comment: : [id number] Epi McnairMeter ID: [id number]       Vitamin B12 [[id number]]  (Normal) Collected: 11/18/20 1754    Specimen: Blood Updated: 11/19/20 0151     Vitamin B-12 938 pg/mL     Narrative:      Results may be falsely increased if patient taking Biotin.      T4, Free [[id number]]  (Normal) Collected: 11/18/20 0455    Specimen: Blood Updated: 11/18/20 1548     Free T4 1.07 ng/dL     Narrative:      Results may be falsely increased if patient taking Biotin.      TSH [[id number]]  (Abnormal) Collected: 11/18/20 0455    Specimen: Blood Updated: 11/18/20 1001     TSH 5.440 uIU/mL     Lipid Panel [[id number]]  (Abnormal) Collected: 11/18/20 0455    Specimen: Blood Updated: 11/18/20 0545     Total Cholesterol 163 mg/dL      Triglycerides 243 mg/dL      HDL Cholesterol 34 mg/dL      LDL Cholesterol  88 mg/dL      VLDL Cholesterol 41 mg/dL      LDL/HDL Ratio 2.36    Narrative:      Cholesterol Reference Ranges  (U.S. Department of Health and Human Services ATP III Classifications)    Desirable          <200 mg/dL  Borderline High    200-239 mg/dL  High Risk          >240 mg/dL      Triglyceride Reference Ranges  (U.S. Department of Health and Human Services ATP III Classifications)    Normal           <150 mg/dL  Borderline High  150-199 mg/dL  High             200-499 mg/dL  Very High        >500 mg/dL    HDL Reference Ranges  (U.S. Department of Health and Human Services ATP III Classifcations)    Low     <40 mg/dl (major risk factor for CHD)  High    >60 mg/dl ('negative' risk factor for CHD)        LDL Reference Ranges  (U.S. Department of Health and Human Services ATP  III Classifcations)    Optimal          <100 mg/dL  Near Optimal     100-129 mg/dL  Borderline High  130-159 mg/dL  High             160-189 mg/dL  Very High        >189 mg/dL    Hemoglobin A1c [[id number]]  (Normal) Collected: 11/18/20 0455    Specimen: Blood Updated: 11/18/20 0537     Hemoglobin A1C 5.40 %     Narrative:      Hemoglobin A1C Ranges:    Increased Risk for Diabetes  5.7% to 6.4%  Diabetes                     >= 6.5%  Diabetic Goal                < 7.0%    COVID PRE-OP / PRE-PROCEDURE SCREENING ORDER (NO ISOLATION) - Swab, Nasal Cavity [[id number]]  (Normal) Collected: 11/17/20 1721    Specimen: Swab from Nasal Cavity Updated: 11/17/20 1816    Narrative:      The following orders were created for panel order COVID PRE-OP / PRE-PROCEDURE SCREENING ORDER (NO ISOLATION) - Swab, Nasal Cavity.  Procedure                               Abnormality         Status                     ---------                               -----------         ------                     COVID-19, ABBOTT IN-HOUS...[[id number]]  Normal              Final result                 Please view results for these tests on the individual orders.    COVID-19, ABBOTT IN-HOUSE,NP Swab (NO TRANSPORT MEDIA) 2 HR TAT - Swab, Nasal Cavity [[id number]]  (Normal) Collected: 11/17/20 1721    Specimen: Swab from Nasal Cavity Updated: 11/17/20 1816     COVID19 Not Detected    Narrative:      Fact sheet for providers: https://www.fda.gov/media/287982/download     Fact sheet for patients: https://www.fda.gov/media/024769/download    Comprehensive Metabolic Panel [[id number]] Collected: 11/17/20 1721    Specimen: Blood Updated: 11/17/20 1746     Glucose 95 mg/dL      BUN 7 mg/dL      Creatinine 0.80 mg/dL      Sodium 138 mmol/L      Potassium 4.2 mmol/L      Chloride 103 mmol/L      CO2 24.0 mmol/L      Calcium 9.5 mg/dL      Total Protein 6.8 g/dL      Albumin 4.30 g/dL      ALT (SGPT) 22 U/L      AST (SGOT) 19 U/L      Alkaline Phosphatase 99 U/L       Total Bilirubin 0.5 mg/dL      eGFR Non African Amer 105 mL/min/1.73      Globulin 2.5 gm/dL      A/G Ratio 1.7 g/dL      BUN/Creatinine Ratio 8.8     Anion Gap 11.0 mmol/L     Narrative:      GFR Normal >60  Chronic Kidney Disease <60  Kidney Failure <15      Troponin [[id number]]  (Normal) Collected: 11/17/20 1721    Specimen: Blood Updated: 11/17/20 1744     Troponin T <0.010 ng/mL     Narrative:      Troponin T Reference Range:  <= 0.03 ng/mL-   Negative for AMI  >0.03 ng/mL-     Abnormal for myocardial necrosis.  Clinicians would have to utilize clinical acumen, EKG, Troponin and serial changes to determine if it is an Acute Myocardial Infarction or myocardial injury due to an underlying chronic condition.       Results may be falsely decreased if patient taking Biotin.      Protime-INR [[id number]]  (Normal) Collected: 11/17/20 1721    Specimen: Blood Updated: 11/17/20 1735     Protime 13.3 Seconds      INR 1.05    CBC & Differential [[id number]]  (Abnormal) Collected: 11/17/20 1721    Specimen: Blood Updated: 11/17/20 1728    Narrative:      The following orders were created for panel order CBC & Differential.  Procedure                               Abnormality         Status                     ---------                               -----------         ------                     CBC Auto Differential[[id number]]        Abnormal            Final result                 Please view results for these tests on the individual orders.    CBC Auto Differential [[id number]]  (Abnormal) Collected: 11/17/20 1721    Specimen: Blood Updated: 11/17/20 1728     WBC 10.96 10*3/mm3      RBC 5.48 10*6/mm3      Hemoglobin 15.4 g/dL      Hematocrit 43.3 %      MCV 79.0 fL      MCH 28.1 pg      MCHC 35.6 g/dL      RDW 12.3 %      RDW-SD 34.7 fl      MPV 9.0 fL      Platelets 316 10*3/mm3      Neutrophil % 73.5 %      Lymphocyte % 17.2 %      Monocyte % 7.5 %      Eosinophil % 0.4 %      Basophil % 0.5 %      Immature Grans % 0.9 %       Neutrophils, Absolute 8.05 10*3/mm3      Lymphocytes, Absolute 1.89 10*3/mm3      Monocytes, Absolute 0.82 10*3/mm3      Eosinophils, Absolute 0.04 10*3/mm3      Basophils, Absolute 0.06 10*3/mm3      Immature Grans, Absolute 0.10 10*3/mm3      nRBC 0.0 /100 WBC         Imaging Results (Last 72 Hours)     Procedure Component Value Units Date/Time    CT Head Without Contrast [[id number]] Collected: 11/19/20 0858     Updated: 11/19/20 0905    Narrative:      EXAM: CT HEAD WO CONTRAST- - 11/19/2020 7:51 AM CST     HISTORY: Neuro deficit, acute, stroke suspected; R42-Dizziness and  giddiness; Z74.09-Other reduced mobility, hypertension, dizziness     COMPARISON: 11/17/2020.      DOSE LENGTH PRODUCT: 676 mGy cm. Automated exposure control was also  utilized to decrease patient radiation dose.     TECHNIQUE: Unenhanced CT images obtained from vertex to skull base with  multiplanar reformats.     FINDINGS:  No acute intracranial hemorrhage, midline shift, mass effect or large  territory cortical infarct. Ventricles, sulci, basilar cisterns are  normal in size and configuration for the patient's age. Mild  periventricular and subcortical white matter hypodensities, most likely  due to chronic microvascular disease. Similar right basal ganglia  lacunar infarct compared to 11/17/2020.     Globes, retrobulbar soft tissues, paranasal sinuses, mastoid air cells  and external auditory canals are within normal limits. Calvarium appears  intact. Subcutaneous tissues within normal limits.          Impression:      1. No acute intracranial hemorrhage or midline shift.  2. Similar mild chronic intravascular changes and right basal ganglia  lacunar infarct compared to 11/17/2020. If clinical concern for acute  infarct, MRI would be a more sensitive exam.  This report was finalized on 11/19/2020 09:02 by Dr Lili Saab MD.    US Carotid Bilateral [[id number]] Collected: 11/18/20 1516     Updated: 11/18/20 1519    Narrative:       History: TIA       Impression:      Impression:  1. There is less than 50% stenosis of the right internal carotid artery.  2. There is less than 50% stenosis of the left internal carotid artery.  3. Antegrade flow is demonstrated in bilateral vertebral arteries.     Comments: Bilateral carotid vertebral arterial duplex scan was  performed.     Grayscale imaging shows intimal thickening and calcified elements at the  carotid bifurcation. The right internal carotid artery peak systolic  velocity is 83.4 cm/sec. The end-diastolic velocity is 36.2 cm/sec. The  right ICA/CCA ratio is approximately 1.2 . These findings correlate with  less than 50% stenosis of the right internal carotid artery.     Grayscale imaging shows intimal thickening and calcified elements at the  carotid bifurcation. The left internal carotid artery peak systolic  velocity is 81.5 cm/sec. The end-diastolic velocity is 35.3 cm/sec. The  left ICA/CCA ratio is approximately 0.9 . These findings correlate with  less than 50% stenosis of the left internal carotid artery.     Antegrade flow is demonstrated in bilateral vertebral arteries.     This report was finalized on 11/18/2020 15:16 by Dr. Clark Guillen MD.    CT Head Without Contrast [[id number]] Collected: 11/17/20 1848     Updated: 11/17/20 1854    Narrative:      EXAMINATION: CT head without contrast 11/17/2020     DOSE: 782 mGycm. Automated exposure control was utilized to diminish  patient radiation dose..     HISTORY: Elevated blood pressure. Dizziness and nausea     FINDINGS: Multiple contiguous axials are obtained from the skull base to  the vertex per protocol without intravenous contrast administration.  Reformatted images are obtained in the sagittal and coronal projections  from the original data set.     There is atrophy with small vessel disease involving the corona radiata  and centrum semiovale. There is evidence of previous right basal  ganglial lacunar infarction with focal  encephalomalacia. There is no  evidence of mass, mass effect or shift of the midline. No evidence of  acute infarct or hemorrhage.     No acute calvarial abnormalities are present.     The orbits are unremarkable. The visualized paranasal sinuses and  mastoid air cells demonstrate normal aeration. There is no fluid within  the middle ear cavity.       Impression:      1.. Atrophy with small vessel disease. Remote right basal ganglial  lacunar infarction is present with focal encephalomalacia.  2. Otherwise unremarkable nonenhanced CT of the brain.  This report was finalized on 11/17/2020 18:51 by Dr. Berto Judge MD.    XR Chest 1 View [[id number]] Collected: 11/17/20 1809     Updated: 11/17/20 1812    Narrative:      EXAMINATION: Chest 1 view 11/17/2020     HISTORY: Elevated blood pressure and dizziness     FINDINGS: Today's exam is compared to previous study of 5/16/2018. A  transvenous pacer remains in place. Lungs are fully expanded and clear.  There is no effusion or free air present.       Impression:      . No active disease.  This report was finalized on 11/17/2020 18:09 by Dr. Berto Judge MD.        Chief Complaint on Day of Discharge: Overall, patient reports feeling much better.  He denies shortness of breath, chest pain or pressure.  Denies dizziness, syncope or presyncope.  Denies any neurological deficit.  Patient tells me he feels back to baseline.  He is eager for discharge home.    Hospital Course:  The patient is a 45 y.o. male who presented to [facility] with dizziness.  He has a past medical history significant for hypertrophic cardiomyopathy, AICD implant whose leads are not compatible with MRI although the implant is compatible, previous smoker, and anxiety.  Patient presented for a 3-day history of dizziness.  He had associated symptoms of nausea and headache with occasional blurred vision.  Blurred vision typically occurs 1 time weekly and has done so for the past  "several months.  The blurred vision would last about 3 minutes and then resolved.  He noticed that his blood pressure was elevated a few nights ago and was noted to be 169/110.  He took an extra metoprolol.  Upon evaluation in the emergency department, CT of the head did show prior right basal ganglia stroke.  Patient is unaware of when this would have occurred.  He was admitted to the hospitalist service for further evaluation management.    He was seen in consultation by neurology.  Neurology feels associated dizziness may be related to hypertension but cannot rule out TIA.  Patient reports that the AICD implant itself is MRI compatible but the leads are not compatible.  Therefore unable to obtain MRI of the brain.  Repeat head CT today showed no acute intracranial hemorrhage or midline shift.  Carotid duplex scan less than 50% bilateral ICA stenosis with bilateral antegrade vertebral flow.  Transthoracic echocardiogram 11/17 showed left ventricular diastolic dysfunction with preserved ejection fraction.  He was started on 81 mg aspirin daily.  His blood pressure has been stable during hospitalization.  He has been instructed to check his blood pressure at home and bring a log of readings to his follow-up appointment with his primary care provider.  He tells me he does have a blood pressure cuff at home and is agreeable to do so.  Lipitor 80 mg daily for LDL goal less than 70.  Patient has returned back to baseline and does not need any therapy needs.  He feels neurologically back to baseline.  He is okay for discharge from neurology standpoint with follow-up in 3 to 4 weeks.  TSH 5.4 with normal free T4 at 1.07 -will need thyroid function test repeated in 4 to 6 weeks on outpatient basis by his primary care provider.  He was counseled on the importance of limiting caffeine to include energy drinks and soda intake.  Tobacco cessation counseling as he uses smokeless tobacco.  Reports he plans to make \"positive " "changes moving forward.\"  He plans to focus on his diet, exercise, and to limit caffeine and stop smokeless tobacco.  He tells me he \"wants to be around for his kids.\"  Sequential compression devices for VTE prophylaxis.  Will need evaluation for suspected obstructive sleep apnea.  Ambulatory referral to sleep medicine.  Overall, patient reports feeling much better.  He is neurologically back to baseline.  Denies shortness of breath, chest pain or pressure.  He is hemodynamically stable and on room air.  He is appropriate for discharge home today.  He is to follow-up with neurology within 3 to 4 weeks.  He is to follow-up with his primary provider within 1 week.    Condition on Discharge:  Medically stable.    Physical Exam on Discharge:  /57 (BP Location: Right arm, Patient Position: Sitting)   Pulse 74   Temp 97.8 °F (36.6 °C) (Oral)   Resp 18   Ht 170.2 cm (67.01\")   Wt 127 kg (279 lb 6.4 oz)   SpO2 97%   BMI 43.75 kg/m²   Physical Exam  Vitals signs reviewed.   Constitutional:       General: He is not in acute distress.     Appearance: He is obese. He is not toxic-appearing.      Comments: Sitting on the side of the bed.  No acute distress.  Tolerating room air.   HENT:      Head: Normocephalic and atraumatic.      Mouth/Throat:      Mouth: Mucous membranes are moist.      Pharynx: Oropharynx is clear.   Eyes:      Extraocular Movements: Extraocular movements intact.      Conjunctiva/sclera: Conjunctivae normal.      Pupils: Pupils are equal, round, and reactive to light.   Neck:      Musculoskeletal: Normal range of motion and neck supple. No muscular tenderness.   Cardiovascular:      Rate and Rhythm: Normal rate and regular rhythm.      Pulses: Normal pulses.      Heart sounds: No murmur.      Comments: Sinus 74-96  Pulmonary:      Effort: Pulmonary effort is normal. No respiratory distress.      Breath sounds: Normal breath sounds. No wheezing or rhonchi.   Abdominal:      General: Bowel sounds " are normal. There is no distension.      Palpations: Abdomen is soft.      Tenderness: There is no abdominal tenderness.   Musculoskeletal: Normal range of motion.         General: No swelling or tenderness.   Skin:     General: Skin is warm and dry.      Findings: No erythema or rash.   Neurological:      General: No focal deficit present.      Mental Status: He is alert and oriented to person, place, and time.      Cranial Nerves: No cranial nerve deficit.      Motor: No weakness.   Psychiatric:         Mood and Affect: Mood normal.         Behavior: Behavior normal.     Discharge Disposition:  Home or Self Care    Discharge Medications:     Discharge Medications      New Medications      Instructions Start Date   aspirin 81 MG chewable tablet   81 mg, Oral, Daily   Start Date: November 20, 2020     atorvastatin 80 MG tablet  Commonly known as: LIPITOR   80 mg, Oral, Nightly         Continue These Medications      Instructions Start Date   metoprolol tartrate 25 MG tablet  Commonly known as: LOPRESSOR   25 mg, Oral, 2 Times Daily      sertraline 100 MG tablet  Commonly known as: ZOLOFT   100 mg, Oral, Daily           Discharge Diet:   Diet Instructions     Advance Diet As Tolerated          Activity at Discharge:   Activity Instructions     Activity as Tolerated          Discharge Care Plan/Instructions:   1.  Follow-up with primary care provider within 1 week.  2.  Follow-up with neurology within 3 to 4 weeks.  3.  Aspirin 81 mg daily.  Lipitor 80 mg daily.  4.  Limit caffeine intake to include energy drinks and soda intake.  Tobacco cessation counseling as he uses smokeless tobacco.  5.  Encouraged heart healthy diet and exercise.  6.  Check blood pressure at home and keep a log of readings to bring to follow-up appointment with primary care provider.    Follow-up Appointments:   Future Appointments   Date Time Provider Department Center   2/25/2021  9:00 AM Murali Ledbetter PA-C [facility]       Test Results  Pending at Discharge: none.    Electronically signed by FALGUNI Arora, 11/19/20, 14:04 CST.    Time: 35 minutes.    I personally evaluated and examined the patient in conjunction with FALGUNI Lim and agree with the assessment, treatment plan, and disposition of the patient as recorded by her. My history, exam, and further recommendations are:     Gen: alert, interactive, NAD  Neuro: AAOX3, no focal deficits, fluent speech    Agree w/ note as above, pt doing well, no complaints, no further events, ok to d/c home and f/u as an outpatient.    Electronically signed by Hong Conroy DO, 11/19/2020, 18:17 CST.        Electronically signed by Hong Conroy DO at 11/19/20 1582

## 2020-11-23 ENCOUNTER — READMISSION MANAGEMENT (OUTPATIENT)
Dept: CALL CENTER | Facility: HOSPITAL | Age: 45
End: 2020-11-23

## 2020-11-23 NOTE — OUTREACH NOTE
Stroke Week 1 Survey      Responses   [facility] patient discharged from?  [place]   Does the patient have one of the following disease processes/diagnoses(primary or secondary)?  Stroke (TIA)   Week 1 attempt successful?  Yes   Call start time  1338   Call end time  1345   Discharge diagnosis  TIA   Is patient permission given to speak with other caregiver?  Yes   Person spoke with today (if not patient) and relationship  Janet, wife   Meds reviewed with patient/caregiver?  Yes   Is the patient having any side effects they believe may be caused by any medication additions or changes?  No   Does the patient have all medications ordered at discharge?  Yes   Is the patient taking all medications as directed (includes completed medication regime)?  Yes   Does the patient have a primary care provider?   Yes   Does the patient have an appointment with their PCP within 7 days of discharge?  Yes   Comments  Telehealth visit 11/23/2020 PCP     Neurologist this week   Has home health visited the patient within 72 hours of discharge?  N/A   Psychosocial issues?  No   Does the patient require any assistance with activities of daily living such as eating, bathing, dressing, walking, etc.?  No   Does the patient have any residual symptoms from stroke/TIA?  No   Does the patient understand the diet ordered at discharge?  Yes   Did the patient receive a copy of their discharge instructions?  Yes   Nursing interventions  Reviewed instructions with patient   What is the patient's perception of their health status since discharge?  Improving   Nursing interventions  Nurse provided patient education   Is the patient able to teach back FAST for Stroke?  Yes   Is the patient/caregiver able to teach back the risk factors for a stroke?  High blood pressure-goal below 120/80, Smoking, Diabetes, High Cholesterol, Physical inactivity and obesity, History of TIAs, Carotid or other artery disease   Is the patient/caregiver able to  teach back signs and symptoms related to disease process for when to call PCP?  Yes   Is the patient/caregiver able to teach back signs and symptoms related to disease process for when to call 911?  Yes   Is the patient/caregiver able to teach back the hierarchy of who to call/visit for symptoms/problems? PCP, Specialist, Home health nurse, Urgent Care, ED, 911  Yes   Week 1 call completed?  Yes   Revoked  No further contact(revokes)-requires comment   Graduated/Revoked comments  Doing well. Went hunting this am. Wife is a nurse at [employer]. Wife is Stroke certified          Radha Taylor, DINORAH

## 2021-01-05 PROCEDURE — 87635 SARS-COV-2 COVID-19 AMP PRB: CPT | Performed by: NURSE PRACTITIONER

## 2021-03-19 ENCOUNTER — APPOINTMENT (OUTPATIENT)
Dept: PREADMISSION TESTING | Facility: HOSPITAL | Age: 46
End: 2021-03-19

## 2021-03-19 ENCOUNTER — TRANSCRIBE ORDERS (OUTPATIENT)
Dept: ADMINISTRATIVE | Facility: HOSPITAL | Age: 46
End: 2021-03-19

## 2021-03-19 DIAGNOSIS — Z11.59 SCREENING FOR VIRAL DISEASE: Primary | ICD-10-CM

## 2021-03-24 ENCOUNTER — LAB (OUTPATIENT)
Dept: LAB | Facility: HOSPITAL | Age: 46
End: 2021-03-24

## 2021-03-24 ENCOUNTER — APPOINTMENT (OUTPATIENT)
Dept: PREADMISSION TESTING | Facility: HOSPITAL | Age: 46
End: 2021-03-24

## 2021-03-24 VITALS
BODY MASS INDEX: 42.77 KG/M2 | HEIGHT: 68 IN | RESPIRATION RATE: 16 BRPM | SYSTOLIC BLOOD PRESSURE: 149 MMHG | OXYGEN SATURATION: 96 % | WEIGHT: 282.19 LBS | HEART RATE: 84 BPM | DIASTOLIC BLOOD PRESSURE: 83 MMHG

## 2021-03-24 LAB
ANION GAP SERPL CALCULATED.3IONS-SCNC: 11 MMOL/L (ref 5–15)
BUN SERPL-MCNC: 12 MG/DL (ref 6–20)
BUN/CREAT SERPL: 13.5 (ref 7–25)
CALCIUM SPEC-SCNC: 9.3 MG/DL (ref 8.6–10.5)
CHLORIDE SERPL-SCNC: 103 MMOL/L (ref 98–107)
CO2 SERPL-SCNC: 27 MMOL/L (ref 22–29)
CREAT SERPL-MCNC: 0.89 MG/DL (ref 0.76–1.27)
DEPRECATED RDW RBC AUTO: 37.5 FL (ref 37–54)
ERYTHROCYTE [DISTWIDTH] IN BLOOD BY AUTOMATED COUNT: 12.5 % (ref 12.3–15.4)
GFR SERPL CREATININE-BSD FRML MDRD: 93 ML/MIN/1.73
GLUCOSE SERPL-MCNC: 104 MG/DL (ref 65–99)
HCT VFR BLD AUTO: 39.6 % (ref 37.5–51)
HGB BLD-MCNC: 13.8 G/DL (ref 13–17.7)
MCH RBC QN AUTO: 28.8 PG (ref 26.6–33)
MCHC RBC AUTO-ENTMCNC: 34.8 G/DL (ref 31.5–35.7)
MCV RBC AUTO: 82.5 FL (ref 79–97)
PLATELET # BLD AUTO: 307 10*3/MM3 (ref 140–450)
PMV BLD AUTO: 9 FL (ref 6–12)
POTASSIUM SERPL-SCNC: 3.8 MMOL/L (ref 3.5–5.2)
RBC # BLD AUTO: 4.8 10*6/MM3 (ref 4.14–5.8)
SARS-COV-2 RNA PNL SPEC NAA+PROBE: NOT DETECTED
SODIUM SERPL-SCNC: 141 MMOL/L (ref 136–145)
WBC # BLD AUTO: 9.83 10*3/MM3 (ref 3.4–10.8)

## 2021-03-24 PROCEDURE — 85027 COMPLETE CBC AUTOMATED: CPT

## 2021-03-24 PROCEDURE — C9803 HOPD COVID-19 SPEC COLLECT: HCPCS | Performed by: ORTHOPAEDIC SURGERY

## 2021-03-24 PROCEDURE — 36415 COLL VENOUS BLD VENIPUNCTURE: CPT

## 2021-03-24 PROCEDURE — 87635 SARS-COV-2 COVID-19 AMP PRB: CPT | Performed by: ORTHOPAEDIC SURGERY

## 2021-03-24 PROCEDURE — 80048 BASIC METABOLIC PNL TOTAL CA: CPT

## 2021-03-24 RX ORDER — ASPIRIN 325 MG
325 TABLET ORAL DAILY
COMMUNITY
End: 2021-09-01

## 2021-03-24 NOTE — DISCHARGE INSTRUCTIONS
DAY OF SURGERY INSTRUCTIONS        YOUR SURGEON: ***maureen garcia    PROCEDURE: ***right shoulder arthroscopy    DATE OF SURGERY: ***03/25/2021    ARRIVAL TIME: AS DIRECTED BY OFFICE    YOU MAY TAKE THE FOLLOWING MEDICATION(S) THE MORNING OF SURGERY WITH A SIP OF WATER: ***      ALL OTHER HOME MEDICATION CHECK WITH YOUR PHYSICIAN      DO NOT TAKE ANY ERECTILE DYSFUNCTION MEDICATIONS (EX: CIALIS, VIAGRA) 24 HOURS PRIOR TO SURGERY                      MANAGING PAIN AFTER SURGERY    We know you are probably wondering what your pain will be like after surgery.  Following surgery it is unrealistic to expect you will not have pain.   Pain is how our bodies let us know that something is wrong or cautions us to be careful.  That said, our goal is to make your pain tolerable.    Methods we may use to treat your pain include (oral or IV medications, PCAs, epidurals, nerve blocks, etc.)   While some procedures require IV pain medications for a short time after surgery, transitioning to pain medications by mouth allows for better management of pain.   Your nurse will encourage you to take oral pain medications whenever possible.  IV medications work almost immediately, but only last a short while.  Taking medications by mouth allows for a more constant level of medication in your blood stream for a longer period of time.      Once your pain is out of control it is harder to get back under control.  It is important you are aware when your next dose of pain medication is due.  If you are admitted, your nurse may write the time of your next dose on the white board in your room to help you remember.      We are interested in your pain and encourage you to inform us about aggravating factors during your visit.   Many times a simple repositioning every few hours can make a big difference.    If your physician says it is okay, do not let your pain prevent you from getting out of bed. Be sure to call your nurse for assistance prior to  getting up so you do not fall.      Before surgery, please decide your tolerable pain goal.  These faces help describe the pain ratings we use on a 0-10 scale.   Be prepared to tell us your goal and whether or not you take pain or anxiety medications at home.          BEFORE YOU COME TO THE HOSPITAL  (Pre-op instructions)  • Do not eat, drink, smoke or chew gum after midnight the night before surgery.  This also includes no mints.  • Morning of surgery take only the medicines you have been instructed with a sip of water unless otherwise instructed  by your physician.  • Do not shave, wear makeup or dark nail polish.  • Remove all jewelry including rings.  • Leave anything you consider valuable at home.  • Leave your suitcase in the car until after your surgery.  • Bring the following with you if applicable:  o Picture ID and insurance, Medicare or Medicaid cards  o Co-pay/deductible required by insurance (cash, check, credit card)  o Copy of advance directive, living will or power-of- documents if not brought to PAT  o CPAP or BIPAP mask and tubing  o Relaxation aids ( book, magazine), etc.  o Hearing aids                        ON THE DAY OF SURGERY  · On the day of surgery check in at registration located at the main entrance of the hospital.   ? You will be registered and given a beeper with instructions where to wait in the main lobby.  ? When your beeper lights up and vibrates a member of the Outpatient Surgery staff will meet you at the double doors under the stair steps and escort you to your preoperative room.   · You may have cloth compression devices placed on your legs. These help to prevent blood clots and reduce swelling in your legs.  · An IV may be inserted into one of your veins.  · In the operating room, you may be given one or more of the following:  ? A medicine to help you relax (sedative).  ? A medicine to numb the area (local anesthetic).  ? A medicine to make you fall asleep (general  "anesthetic).  ? A medicine that is injected into an area of your body to numb everything below the injection site (regional anesthetic).  · Your surgical site will be marked or identified.  · You may be given an antibiotic through your IV to help prevent infection.  Contact a health care provider if you:  · Develop a fever of more than 100.4°F (38°C) or other feelings of illness during the 48 hours before your surgery.  · Have symptoms that get worse.  Have questions or concerns about your surgery    General Anesthesia/Surgery, Adult  General anesthesia is the use of medicines to make a person \"go to sleep\" (unconscious) for a medical procedure. General anesthesia must be used for certain procedures, and is often recommended for procedures that:  · Last a long time.  · Require you to be still or in an unusual position.  · Are major and can cause blood loss.  The medicines used for general anesthesia are called general anesthetics. As well as making you unconscious for a certain amount of time, these medicines:  · Prevent pain.  · Control your blood pressure.  · Relax your muscles.  Tell a health care provider about:  · Any allergies you have.  · All medicines you are taking, including vitamins, herbs, eye drops, creams, and over-the-counter medicines.  · Any problems you or family members have had with anesthetic medicines.  · Types of anesthetics you have had in the past.  · Any blood disorders you have.  · Any surgeries you have had.  · Any medical conditions you have.  · Any recent upper respiratory, chest, or ear infections.  · Any history of:  ? Heart or lung conditions, such as heart failure, sleep apnea, asthma, or chronic obstructive pulmonary disease (COPD).  ?  service.  ? Depression or anxiety.  · Any tobacco or drug use, including marijuana or alcohol use.  · Whether you are pregnant or may be pregnant.  What are the risks?  Generally, this is a safe procedure. However, problems may occur, " including:  · Allergic reaction.  · Lung and heart problems.  · Inhaling food or liquid from the stomach into the lungs (aspiration).  · Nerve injury.  · Air in the bloodstream, which can lead to stroke.  · Extreme agitation or confusion (delirium) when you wake up from the anesthetic.  · Waking up during your procedure and being unable to move. This is rare.  These problems are more likely to develop if you are having a major surgery or if you have an advanced or serious medical condition. You can prevent some of these complications by answering all of your health care provider's questions thoroughly and by following all instructions before your procedure.  General anesthesia can cause side effects, including:  · Nausea or vomiting.  · A sore throat from the breathing tube.  · Hoarseness.  · Wheezing or coughing.  · Shaking chills.  · Tiredness.  · Body aches.  · Anxiety.  · Sleepiness or drowsiness.  · Confusion or agitation.  RISKS AND COMPLICATIONS OF SURGERY  Your health care provider will discuss possible risks and complications with you before surgery. Common risks and complications include:    · Problems due to the use of anesthetics.  · Blood loss and replacement (does not apply to minor surgical procedures).  · Temporary increase in pain due to surgery.  · Uncorrected pain or problems that the surgery was meant to correct.  · Infection.  · New damage.    What happens before the procedure?    Medicines  Ask your health care provider about:  · Changing or stopping your regular medicines. This is especially important if you are taking diabetes medicines or blood thinners.  · Taking medicines such as aspirin and ibuprofen. These medicines can thin your blood. Do not take these medicines unless your health care provider tells you to take them.  · Taking over-the-counter medicines, vitamins, herbs, and supplements. Do not take these during the week before your procedure unless your health care provider approves  them.  General instructions  · Starting 3-6 weeks before the procedure, do not use any products that contain nicotine or tobacco, such as cigarettes and e-cigarettes. If you need help quitting, ask your health care provider.  · If you brush your teeth on the morning of the procedure, make sure to spit out all of the toothpaste.  · Tell your health care provider if you become ill or develop a cold, cough, or fever.  · If instructed by your health care provider, bring your sleep apnea device with you on the day of your surgery (if applicable).  · Ask your health care provider if you will be going home the same day, the following day, or after a longer hospital stay.  ? Plan to have someone take you home from the hospital or clinic.  ? Plan to have a responsible adult care for you for at least 24 hours after you leave the hospital or clinic. This is important.  What happens during the procedure?  · You will be given anesthetics through both of the following:  ? A mask placed over your nose and mouth.  ? An IV in one of your veins.  · You may receive a medicine to help you relax (sedative).  · After you are unconscious, a breathing tube may be inserted down your throat to help you breathe. This will be removed before you wake up.  · An anesthesia specialist will stay with you throughout your procedure. He or she will:  ? Keep you comfortable and safe by continuing to give you medicines and adjusting the amount of medicine that you get.  ? Monitor your blood pressure, pulse, and oxygen levels to make sure that the anesthetics do not cause any problems.  The procedure may vary among health care providers and hospitals.  What happens after the procedure?  · Your blood pressure, temperature, heart rate, breathing rate, and blood oxygen level will be monitored until the medicines you were given have worn off.  · You will wake up in a recovery area. You may wake up slowly.  · If you feel anxious or agitated, you may be given  medicine to help you calm down.  · If you will be going home the same day, your health care provider may check to make sure you can walk, drink, and urinate.  · Your health care provider will treat any pain or side effects you have before you go home.  · Do not drive for 24 hours if you were given a sedative.  Summary  · General anesthesia is used to keep you still and prevent pain during a procedure.  · It is important to tell your healthcare provider about your medical history and any surgeries you have had, and previous experience with anesthesia.  · Follow your healthcare provider’s instructions about when to stop eating, drinking, or taking certain medicines before your procedure.  · Plan to have someone take you home from the hospital or clinic.  This information is not intended to replace advice given to you by your health care provider. Make sure you discuss any questions you have with your health care provider.  Document Released: 03/26/2009 Document Revised: 08/03/2018 Document Reviewed: 08/03/2018  [employer] Interactive Patient Education © 2019 [employer]       Fall Prevention in Hospitals, Adult  As a hospital patient, your condition and the treatments you receive can increase your risk for falls. Some additional risk factors for falls in a hospital include:  · Being in an unfamiliar environment.  · Being on bed rest.  · Your surgery.  · Taking certain medicines.  · Your tubing requirements, such as intravenous (IV) therapy or catheters.  It is important that you learn how to decrease fall risks while at the hospital. Below are important tips that can help prevent falls.  SAFETY TIPS FOR PREVENTING FALLS  Talk about your risk of falling.  · Ask your health care provider why you are at risk for falling. Is it your medicine, illness, tubing placement, or something else?  · Make a plan with your health care provider to keep you safe from falls.  · Ask your health care provider or pharmacist about side  effects of your medicines. Some medicines can make you dizzy or affect your coordination.  Ask for help.  · Ask for help before getting out of bed. You may need to press your call button.  · Ask for assistance in getting safely to the toilet.  · Ask for a walker or cane to be put at your bedside. Ask that most of the side rails on your bed be placed up before your health care provider leaves the room.  · Ask family or friends to sit with you.  · Ask for things that are out of your reach, such as your glasses, hearing aids, telephone, bedside table, or call button.  Follow these tips to avoid falling:  · Stay lying or seated, rather than standing, while waiting for help.  · Wear rubber-soled slippers or shoes whenever you walk in the hospital.  · Avoid quick, sudden movements.  ¨ Change positions slowly.  ¨ Sit on the side of your bed before standing.  ¨ Stand up slowly and wait before you start to walk.  · Let your health care provider know if there is a spill on the floor.  · Pay careful attention to the medical equipment, electrical cords, and tubes around you.  · When you need help, use your call button by your bed or in the bathroom. Wait for one of your health care providers to help you.  · If you feel dizzy or unsure of your footing, return to bed and wait for assistance.  · Avoid being distracted by the TV, telephone, or another person in your room.  · Do not lean or support yourself on rolling objects, such as IV poles or bedside tables.     This information is not intended to replace advice given to you by your health care provider. Make sure you discuss any questions you have with your health care provider.     Document Released: 12/15/2001 Document Revised: 01/08/2016 Document Reviewed: 08/25/2013  [employer] Interactive Patient Education ©2016 [employer]       [facility]  CHG 4% Patient Instruction Sheet    Chlorhexidine Before Surgery  Chlorhexidine gluconate (CHG) is a germ-killing  (antiseptic) solution that is used to clean the skin. It gets rid of the bacteria that normally live on the skin. Cleaning your skin with CHG before surgery helps lower the risk for infection after surgery.    How to use CHG solution  · You will take 2 showers, one shower the night before surgery, the second shower the morning of surgery before coming to the hospital.  · Use CHG only as told by your health care provider, and follow the instructions on the label.  · Use CHG solution while taking a shower. Follow these steps when using CHG solution (unless your health care provider gives you different instructions):  1. Start the shower.  2. Use your normal soap and shampoo to wash your face and hair.  3. Turn off the shower or move out of the shower stream.  4. Pour the CHG onto a clean washcloth. Do not use any type of brush or rough-edged sponge.  5. Starting at your neck, lather your body down to your toes. Make sure you:  6. Pay special attention to the part of your body where you will be having surgery. Scrub this area for at least 1 minute.  7. Use the full amount of CHG as directed. Usually, this is one half bottle for each shower.  8. Do not use CHG on your head or face. If the solution gets into your ears or eyes, rinse them well with water.  9. Avoid your genital area.  10. Avoid any areas of skin that have broken skin, cuts, or scrapes.  11. Scrub your back and under your arms. Make sure to wash skin folds.  12. Let the lather sit on your skin for 1-2 minutes or as long as told by your health care  provider.  13. Thoroughly rinse your entire body in the shower. Make sure that all body creases and crevices are rinsed well.  14. Dry off with a clean towel. Do not put any substances on your body afterward, such as powder, lotion, or perfume.  15. Put on clean clothes or pajamas.  16. If it is the night before your surgery, sleep in clean sheets.    What are the risks?  Risks of using CHG include:  · A skin  reaction.  · Hearing loss, if CHG gets in your ears.  · Eye injury, if CHG gets in your eyes and is not rinsed out.  · The CHG product catching fire.  Make sure that you avoid smoking and flames after applying CHG to your skin.  Do not use CHG:  · If you have a chlorhexidine allergy or have previously reacted to chlorhexidine.  · On babies younger than 2 months of age.      On the day of surgery, when you are taken to your room in Outpatient Surgery you will be given a CHG prepackaged cloth to wipe the site for your surgery.  How to use CHG prepackaged cloths  · Follow the instructions on the label.  · Use the CHG cloth on clean, dry skin. Follow these steps when using a CHG cloth (unless your health care provider gives you different instructions):  1. Using the CHG cloth, vigorously scrub the part of your body where you will be having surgery. Scrub using a back-and-forth motion for 3 minutes. The area on your body should be completely wet with CHG when you are finished scrubbing.  2. Do not rinse. Discard the cloth and let the area air-dry for 1 minute. Do not put any substances on your body afterward, such as powder, lotion, or perfume.  Contact a health care provider if:  · Your skin gets irritated after scrubbing.  · You have questions about using your solution or cloth.  Get help right away if:  · Your eyes become very red or swollen.  · Your eyes itch badly.  · Your skin itches badly and is red or swollen.  · Your hearing changes.  · You have trouble seeing.  · You have swelling or tingling in your mouth or throat.  · You have trouble breathing.  · You swallow any chlorhexidine.  Summary  · Chlorhexidine gluconate (CHG) is a germ-killing (antiseptic) solution that is used to clean the skin. Cleaning your skin with CHG before surgery helps lower the risk for infection after surgery.  · You may be given CHG to use at home. It may be in a bottle or in a prepackaged cloth to use on your skin. Carefully follow  your health care provider's instructions and the instructions on the product label.  · Do not use CHG if you have a chlorhexidine allergy.  · Contact your health care provider if your skin gets irritated after scrubbing.  This information is not intended to replace advice given to you by your health care provider. Make sure you discuss any questions you have with your health care provider.  Document Released: 09/11/2013 Document Revised: 11/15/2018 Document Reviewed: 11/15/2018  [employer] Interactive Patient Education © 2019 [employer]          PATIENT/FAMILY/RESPONSIBLE PARTY VERBALIZES UNDERSTANDING OF ABOVE EDUCATION.  COPY OF PAIN SCALE GIVEN AND REVIEWED WITH VERBALIZED UNDERSTANDING.

## 2021-03-25 ENCOUNTER — HOSPITAL ENCOUNTER (OUTPATIENT)
Facility: HOSPITAL | Age: 46
Setting detail: HOSPITAL OUTPATIENT SURGERY
Discharge: HOME | End: 2021-03-25
Attending: ORTHOPAEDIC SURGERY | Admitting: ORTHOPAEDIC SURGERY

## 2021-03-25 ENCOUNTER — ANESTHESIA (OUTPATIENT)
Dept: PERIOP | Facility: HOSPITAL | Age: 46
End: 2021-03-25

## 2021-03-25 ENCOUNTER — ANESTHESIA EVENT (OUTPATIENT)
Dept: PERIOP | Facility: HOSPITAL | Age: 46
End: 2021-03-25

## 2021-03-25 VITALS
RESPIRATION RATE: 14 BRPM | TEMPERATURE: 97.6 F | DIASTOLIC BLOOD PRESSURE: 85 MMHG | OXYGEN SATURATION: 89 % | HEART RATE: 82 BPM | SYSTOLIC BLOOD PRESSURE: 117 MMHG

## 2021-03-25 DIAGNOSIS — Z98.890 S/P ARTHROSCOPY OF RIGHT SHOULDER: Primary | ICD-10-CM

## 2021-03-25 PROCEDURE — 25010000002 METHYLPREDNISOLONE PER 80 MG: Performed by: ORTHOPAEDIC SURGERY

## 2021-03-25 PROCEDURE — 96372 THER/PROPH/DIAG INJ SC/IM: CPT | Performed by: ORTHOPAEDIC SURGERY

## 2021-03-25 PROCEDURE — 25010000002 EPINEPHRINE PER 0.1 MG: Performed by: ORTHOPAEDIC SURGERY

## 2021-03-25 PROCEDURE — 25010000002 ROPIVACAINE PER 1 MG: Performed by: ORTHOPAEDIC SURGERY

## 2021-03-25 PROCEDURE — 25010000002 MIDAZOLAM PER 1 MG: Performed by: ANESTHESIOLOGY

## 2021-03-25 PROCEDURE — 76942 ECHO GUIDE FOR BIOPSY: CPT | Performed by: ORTHOPAEDIC SURGERY

## 2021-03-25 PROCEDURE — 25010000002 FENTANYL CITRATE (PF) 100 MCG/2ML SOLUTION: Performed by: NURSE ANESTHETIST, CERTIFIED REGISTERED

## 2021-03-25 PROCEDURE — 25010000002 PROPOFOL 10 MG/ML EMULSION: Performed by: NURSE ANESTHETIST, CERTIFIED REGISTERED

## 2021-03-25 PROCEDURE — 25010000002 ROPIVACAINE PER 1 MG: Performed by: ANESTHESIOLOGY

## 2021-03-25 PROCEDURE — 25010000002 CEFAZOLIN PER 500 MG: Performed by: ORTHOPAEDIC SURGERY

## 2021-03-25 PROCEDURE — 25010000002 DEXAMETHASONE PER 1 MG: Performed by: NURSE ANESTHETIST, CERTIFIED REGISTERED

## 2021-03-25 PROCEDURE — 25010000002 ONDANSETRON PER 1 MG: Performed by: NURSE ANESTHETIST, CERTIFIED REGISTERED

## 2021-03-25 RX ORDER — ONDANSETRON 4 MG/1
4 TABLET, FILM COATED ORAL EVERY 8 HOURS PRN
Qty: 20 TABLET | Refills: 0 | Status: SHIPPED | OUTPATIENT
Start: 2021-03-25 | End: 2021-06-09

## 2021-03-25 RX ORDER — IBUPROFEN 600 MG/1
600 TABLET ORAL ONCE AS NEEDED
Status: DISCONTINUED | OUTPATIENT
Start: 2021-03-25 | End: 2021-03-25 | Stop reason: HOSPADM

## 2021-03-25 RX ORDER — LABETALOL HYDROCHLORIDE 5 MG/ML
5 INJECTION, SOLUTION INTRAVENOUS
Status: DISCONTINUED | OUTPATIENT
Start: 2021-03-25 | End: 2021-03-25 | Stop reason: HOSPADM

## 2021-03-25 RX ORDER — LIDOCAINE HYDROCHLORIDE 20 MG/ML
INJECTION, SOLUTION EPIDURAL; INFILTRATION; INTRACAUDAL; PERINEURAL AS NEEDED
Status: DISCONTINUED | OUTPATIENT
Start: 2021-03-25 | End: 2021-03-25 | Stop reason: SURG

## 2021-03-25 RX ORDER — ROPIVACAINE HYDROCHLORIDE 5 MG/ML
INJECTION, SOLUTION EPIDURAL; INFILTRATION; PERINEURAL
Status: COMPLETED | OUTPATIENT
Start: 2021-03-25 | End: 2021-03-25

## 2021-03-25 RX ORDER — NEOSTIGMINE METHYLSULFATE 5 MG/5 ML
SYRINGE (ML) INTRAVENOUS AS NEEDED
Status: DISCONTINUED | OUTPATIENT
Start: 2021-03-25 | End: 2021-03-25 | Stop reason: SURG

## 2021-03-25 RX ORDER — OXYCODONE AND ACETAMINOPHEN 10; 325 MG/1; MG/1
1 TABLET ORAL EVERY 4 HOURS PRN
Qty: 40 TABLET | Refills: 0 | Status: SHIPPED | OUTPATIENT
Start: 2021-03-25 | End: 2021-06-09

## 2021-03-25 RX ORDER — ONDANSETRON 2 MG/ML
4 INJECTION INTRAMUSCULAR; INTRAVENOUS ONCE AS NEEDED
Status: DISCONTINUED | OUTPATIENT
Start: 2021-03-25 | End: 2021-03-25 | Stop reason: HOSPADM

## 2021-03-25 RX ORDER — NALOXONE HCL 0.4 MG/ML
0.4 VIAL (ML) INJECTION AS NEEDED
Status: DISCONTINUED | OUTPATIENT
Start: 2021-03-25 | End: 2021-03-25 | Stop reason: HOSPADM

## 2021-03-25 RX ORDER — LIDOCAINE HYDROCHLORIDE 10 MG/ML
0.5 INJECTION, SOLUTION EPIDURAL; INFILTRATION; INTRACAUDAL; PERINEURAL ONCE AS NEEDED
Status: DISCONTINUED | OUTPATIENT
Start: 2021-03-25 | End: 2021-03-25 | Stop reason: HOSPADM

## 2021-03-25 RX ORDER — LIDOCAINE HYDROCHLORIDE 10 MG/ML
0.5 INJECTION, SOLUTION EPIDURAL; INFILTRATION; INTRACAUDAL; PERINEURAL ONCE AS NEEDED
Status: DISCONTINUED | OUTPATIENT
Start: 2021-03-25 | End: 2021-03-25 | Stop reason: SDUPTHER

## 2021-03-25 RX ORDER — OXYCODONE AND ACETAMINOPHEN 10; 325 MG/1; MG/1
1 TABLET ORAL ONCE AS NEEDED
Status: DISCONTINUED | OUTPATIENT
Start: 2021-03-25 | End: 2021-03-25 | Stop reason: HOSPADM

## 2021-03-25 RX ORDER — MIDAZOLAM HYDROCHLORIDE 1 MG/ML
2 INJECTION INTRAMUSCULAR; INTRAVENOUS ONCE
Status: COMPLETED | OUTPATIENT
Start: 2021-03-25 | End: 2021-03-25

## 2021-03-25 RX ORDER — ROPIVACAINE HYDROCHLORIDE 5 MG/ML
INJECTION, SOLUTION EPIDURAL; INFILTRATION; PERINEURAL AS NEEDED
Status: DISCONTINUED | OUTPATIENT
Start: 2021-03-25 | End: 2021-03-25 | Stop reason: HOSPADM

## 2021-03-25 RX ORDER — SODIUM CHLORIDE 0.9 % (FLUSH) 0.9 %
3 SYRINGE (ML) INJECTION AS NEEDED
Status: DISCONTINUED | OUTPATIENT
Start: 2021-03-25 | End: 2021-03-25 | Stop reason: HOSPADM

## 2021-03-25 RX ORDER — BUPIVACAINE HCL/0.9 % NACL/PF 0.1 %
2 PLASTIC BAG, INJECTION (ML) EPIDURAL ONCE
Status: COMPLETED | OUTPATIENT
Start: 2021-03-25 | End: 2021-03-25

## 2021-03-25 RX ORDER — OXYCODONE AND ACETAMINOPHEN 7.5; 325 MG/1; MG/1
1 TABLET ORAL ONCE AS NEEDED
Status: DISCONTINUED | OUTPATIENT
Start: 2021-03-25 | End: 2021-03-25 | Stop reason: HOSPADM

## 2021-03-25 RX ORDER — MAGNESIUM HYDROXIDE 1200 MG/15ML
LIQUID ORAL AS NEEDED
Status: DISCONTINUED | OUTPATIENT
Start: 2021-03-25 | End: 2021-03-25 | Stop reason: HOSPADM

## 2021-03-25 RX ORDER — FENTANYL CITRATE 50 UG/ML
INJECTION, SOLUTION INTRAMUSCULAR; INTRAVENOUS AS NEEDED
Status: DISCONTINUED | OUTPATIENT
Start: 2021-03-25 | End: 2021-03-25 | Stop reason: SURG

## 2021-03-25 RX ORDER — ONDANSETRON 4 MG/1
4 TABLET, FILM COATED ORAL ONCE AS NEEDED
Status: DISCONTINUED | OUTPATIENT
Start: 2021-03-25 | End: 2021-03-25 | Stop reason: HOSPADM

## 2021-03-25 RX ORDER — ROCURONIUM BROMIDE 10 MG/ML
INJECTION, SOLUTION INTRAVENOUS AS NEEDED
Status: DISCONTINUED | OUTPATIENT
Start: 2021-03-25 | End: 2021-03-25 | Stop reason: SURG

## 2021-03-25 RX ORDER — SODIUM CHLORIDE, SODIUM LACTATE, POTASSIUM CHLORIDE, CALCIUM CHLORIDE 600; 310; 30; 20 MG/100ML; MG/100ML; MG/100ML; MG/100ML
100 INJECTION, SOLUTION INTRAVENOUS CONTINUOUS
Status: DISCONTINUED | OUTPATIENT
Start: 2021-03-25 | End: 2021-03-25 | Stop reason: HOSPADM

## 2021-03-25 RX ORDER — FENTANYL CITRATE 50 UG/ML
50 INJECTION, SOLUTION INTRAMUSCULAR; INTRAVENOUS ONCE
Status: DISCONTINUED | OUTPATIENT
Start: 2021-03-25 | End: 2021-03-25 | Stop reason: HOSPADM

## 2021-03-25 RX ORDER — SODIUM CHLORIDE 0.9 % (FLUSH) 0.9 %
3-10 SYRINGE (ML) INJECTION AS NEEDED
Status: DISCONTINUED | OUTPATIENT
Start: 2021-03-25 | End: 2021-03-25 | Stop reason: HOSPADM

## 2021-03-25 RX ORDER — ONDANSETRON 2 MG/ML
INJECTION INTRAMUSCULAR; INTRAVENOUS AS NEEDED
Status: DISCONTINUED | OUTPATIENT
Start: 2021-03-25 | End: 2021-03-25 | Stop reason: SURG

## 2021-03-25 RX ORDER — ACETAMINOPHEN 500 MG
1000 TABLET ORAL ONCE
Status: COMPLETED | OUTPATIENT
Start: 2021-03-25 | End: 2021-03-25

## 2021-03-25 RX ORDER — FLUMAZENIL 0.1 MG/ML
0.2 INJECTION INTRAVENOUS AS NEEDED
Status: DISCONTINUED | OUTPATIENT
Start: 2021-03-25 | End: 2021-03-25 | Stop reason: HOSPADM

## 2021-03-25 RX ORDER — DEXAMETHASONE SODIUM PHOSPHATE 4 MG/ML
INJECTION, SOLUTION INTRA-ARTICULAR; INTRALESIONAL; INTRAMUSCULAR; INTRAVENOUS; SOFT TISSUE AS NEEDED
Status: DISCONTINUED | OUTPATIENT
Start: 2021-03-25 | End: 2021-03-25 | Stop reason: SURG

## 2021-03-25 RX ORDER — SODIUM CHLORIDE 0.9 % (FLUSH) 0.9 %
3 SYRINGE (ML) INJECTION EVERY 12 HOURS SCHEDULED
Status: DISCONTINUED | OUTPATIENT
Start: 2021-03-25 | End: 2021-03-25 | Stop reason: HOSPADM

## 2021-03-25 RX ORDER — FENTANYL CITRATE 50 UG/ML
25 INJECTION, SOLUTION INTRAMUSCULAR; INTRAVENOUS
Status: DISCONTINUED | OUTPATIENT
Start: 2021-03-25 | End: 2021-03-25 | Stop reason: HOSPADM

## 2021-03-25 RX ORDER — OXYCODONE AND ACETAMINOPHEN 7.5; 325 MG/1; MG/1
2 TABLET ORAL EVERY 4 HOURS PRN
Status: DISCONTINUED | OUTPATIENT
Start: 2021-03-25 | End: 2021-03-25 | Stop reason: HOSPADM

## 2021-03-25 RX ORDER — PROPOFOL 10 MG/ML
VIAL (ML) INTRAVENOUS AS NEEDED
Status: DISCONTINUED | OUTPATIENT
Start: 2021-03-25 | End: 2021-03-25 | Stop reason: SURG

## 2021-03-25 RX ORDER — EPINEPHRINE 1 MG/ML
INJECTION, SOLUTION, CONCENTRATE INTRAVENOUS AS NEEDED
Status: DISCONTINUED | OUTPATIENT
Start: 2021-03-25 | End: 2021-03-25 | Stop reason: HOSPADM

## 2021-03-25 RX ORDER — METHYLPREDNISOLONE ACETATE 80 MG/ML
INJECTION, SUSPENSION INTRA-ARTICULAR; INTRALESIONAL; INTRAMUSCULAR; SOFT TISSUE AS NEEDED
Status: DISCONTINUED | OUTPATIENT
Start: 2021-03-25 | End: 2021-03-25 | Stop reason: HOSPADM

## 2021-03-25 RX ADMIN — DEXAMETHASONE SODIUM PHOSPHATE 8 MG: 4 INJECTION, SOLUTION INTRAMUSCULAR; INTRAVENOUS at 08:04

## 2021-03-25 RX ADMIN — PROPOFOL 200 MG: 10 INJECTION, EMULSION INTRAVENOUS at 07:38

## 2021-03-25 RX ADMIN — SODIUM CHLORIDE, POTASSIUM CHLORIDE, SODIUM LACTATE AND CALCIUM CHLORIDE 100 ML/HR: 600; 310; 30; 20 INJECTION, SOLUTION INTRAVENOUS at 07:28

## 2021-03-25 RX ADMIN — SODIUM CHLORIDE, POTASSIUM CHLORIDE, SODIUM LACTATE AND CALCIUM CHLORIDE: 600; 310; 30; 20 INJECTION, SOLUTION INTRAVENOUS at 08:40

## 2021-03-25 RX ADMIN — ACETAMINOPHEN 1000 MG: 500 TABLET, FILM COATED ORAL at 07:25

## 2021-03-25 RX ADMIN — LIDOCAINE HYDROCHLORIDE 100 MG: 20 INJECTION, SOLUTION EPIDURAL; INFILTRATION; INTRACAUDAL; PERINEURAL at 07:38

## 2021-03-25 RX ADMIN — ROCURONIUM BROMIDE 50 MG: 10 INJECTION INTRAVENOUS at 07:38

## 2021-03-25 RX ADMIN — ONDANSETRON HYDROCHLORIDE 4 MG: 2 SOLUTION INTRAMUSCULAR; INTRAVENOUS at 08:04

## 2021-03-25 RX ADMIN — FENTANYL CITRATE 100 MCG: 50 INJECTION, SOLUTION INTRAMUSCULAR; INTRAVENOUS at 07:38

## 2021-03-25 RX ADMIN — GLYCOPYRROLATE 0.5 MG: 0.2 INJECTION, SOLUTION INTRAMUSCULAR; INTRAVENOUS at 08:30

## 2021-03-25 RX ADMIN — Medication 5 MG: at 08:30

## 2021-03-25 RX ADMIN — ROPIVACAINE HYDROCHLORIDE 20 ML: 5 INJECTION, SOLUTION EPIDURAL; INFILTRATION; PERINEURAL at 07:23

## 2021-03-25 RX ADMIN — Medication 2 G: at 07:00

## 2021-03-25 RX ADMIN — MIDAZOLAM 2 MG: 1 INJECTION INTRAMUSCULAR; INTRAVENOUS at 07:19

## 2021-03-25 RX ADMIN — VASOPRESSIN 2 ML: 20 INJECTION INTRAVENOUS at 07:46

## 2021-03-25 NOTE — ANESTHESIA PREPROCEDURE EVALUATION
Anesthesia Evaluation     NPO Solid Status: > 8 hours  NPO Liquid Status: > 8 hours           Airway   Mallampati: II  TM distance: >3 FB  Neck ROM: full  No difficulty expected  Dental          Pulmonary    (-) sleep apnea, not a smoker  Cardiovascular   Exercise tolerance: good (4-7 METS)    ECG reviewed  Patient on routine beta blocker and Beta blocker given within 24 hours of surgery    (+) pacemaker pacemaker, ICD, hypertension, valvular problems/murmurs murmur,     ROS comment: Echo 11/2020  · Saline test results are negative.  · Left ventricular wall thickness is consistent with septal asymmetric hypertrophy.  · Estimated left ventricular EF = 65% Left ventricular systolic function is normal.  · Left ventricular diastolic function is consistent with (grade I) impaired relaxation.    Surgery for hypertrophic cardiomyopathy- septal resection  5/2012    ICD/Pacemaker medtronic, interrogation report reviewed from 11/2020- 99.5% as-vs    Neuro/Psych  (+) TIA, CVA, psychiatric history Anxiety,     GI/Hepatic/Renal/Endo    (+) morbid obesity,    (-) liver disease, no renal disease, diabetes    Musculoskeletal     Abdominal    Substance History      OB/GYN          Other                      Anesthesia Plan    ASA 3     general with block   (Place magnet on device. Interrogate in recovery. )  intravenous induction     Anesthetic plan, all risks, benefits, and alternatives have been provided, discussed and informed consent has been obtained with: patient.

## 2021-03-25 NOTE — ANESTHESIA POSTPROCEDURE EVALUATION
Patient: Feliberto Knowles    Procedure Summary     Date: 03/25/21 Room / Location:  [facility]  /  [facility]    Anesthesia Start: 0734 Anesthesia Stop: 0843    Procedure: RIGHT SHOULDER ARTHROSCOPIC REVISION POSTERIOR  LABRAL REPAIR (Right Shoulder) Diagnosis: (RIGHT SHOULDER PAIN)    Surgeons: Sandro Monsivais MD Provider: MECHE Hood CRNA    Anesthesia Type: general with block ASA Status: 3          Anesthesia Type: general with block    Vitals  Vitals Value Taken Time   /76 03/25/21 0955   Temp 97.6 °F (36.4 °C) 03/25/21 0955   Pulse 70 03/25/21 0958   Resp 14 03/25/21 0955   SpO2 93 % 03/25/21 0958   Vitals shown include unvalidated device data.        Post Anesthesia Care and Evaluation    Patient location during evaluation: PACU  Patient participation: complete - patient participated  Level of consciousness: awake and alert  Pain management: adequate  Airway patency: patent  Anesthetic complications: No anesthetic complications    Cardiovascular status: acceptable  Respiratory status: acceptable  Hydration status: acceptable    Comments: Blood pressure 115/76, pulse 77, temperature 97.6 °F (36.4 °C), temperature source Temporal, resp. rate 14, SpO2 94 %.    Pt discharged from PACU based on melanie score >8

## 2021-03-25 NOTE — ANESTHESIA PROCEDURE NOTES
Peripheral Block    Pre-sedation assessment completed: 3/25/2021 7:17 AM    Patient reassessed immediately prior to procedure    Patient location during procedure: pre-op  Start time: 3/25/2021 7:20 AM  Stop time: 3/25/2021 7:21 AM  Reason for block: procedure for pain, at surgeon's request and post-op pain management  Performed by  Anesthesiologist: Marilu Emerson MD  Preanesthetic Checklist  Completed: patient identified, IV checked, site marked, risks and benefits discussed, surgical consent, monitors and equipment checked, pre-op evaluation and timeout performed  Prep:  Pt Position: supine  Sterile barriers:mask, gloves, alcohol skin prep and washed/disinfected hands  Prep: ChloraPrep  Patient monitoring: blood pressure monitoring, continuous pulse oximetry and EKG  Procedure  Sedation:yes  Performed under: local infiltration  Guidance:ultrasound guided and Brachial plexus identified and local anesthetic seen surrounding nerves  ULTRASOUND INTERPRETATION. Using ultrasound guidance a 20 G gauge needle was placed in close proximity to the nerve, at which point, under ultrasound guidance anesthetic was injected in the area of the nerve and spread of the anesthesia was seen on ultrasound in close proximity thereto.  There were no abnormalities seen on ultrasound; a digital image was taken; and the patient tolerated the procedure with no complications. Images:still images obtained (picture printed and placed in patients chart)    Laterality:right  Block Type:interscalene  Injection Technique:single-shot  Needle Type:echogenic  Needle Gauge:20 G      Medications Used: ropivacaine (NAROPIN) injection 0.5 %, 20 mL      Post Assessment  Injection Assessment: negative aspiration for heme, no paresthesia on injection and incremental injection  Patient Tolerance:comfortable throughout block  Complications:no

## 2021-03-25 NOTE — ANESTHESIA PROCEDURE NOTES
Airway  Urgency: elective    Date/Time: 3/25/2021 7:39 AM  Airway not difficult    General Information and Staff    Patient location during procedure: OR  CRNA: MECHE Hood CRNA    Indications and Patient Condition  Indications for airway management: airway protection    Preoxygenated: yes  MILS maintained throughout  Mask difficulty assessment: 1 - vent by mask    Final Airway Details  Final airway type: endotracheal airway      Successful airway: ETT  Cuffed: yes   Successful intubation technique: direct laryngoscopy  Facilitating devices/methods: intubating stylet  Endotracheal tube insertion site: oral  Blade: Pinzon  Blade size: 2  ETT size (mm): 7.5  Cormack-Lehane Classification: grade I - full view of glottis  Placement verified by: chest auscultation and capnometry   Cuff volume (mL): 5  Measured from: lips  ETT/EBT  to lips (cm): 21  Number of attempts at approach: 1  Assessment: lips, teeth, and gum same as pre-op and atraumatic intubation

## 2021-06-08 ENCOUNTER — LAB (OUTPATIENT)
Dept: LAB | Facility: HOSPITAL | Age: 46
End: 2021-06-08

## 2021-06-08 ENCOUNTER — TRANSCRIBE ORDERS (OUTPATIENT)
Dept: ADMINISTRATIVE | Facility: HOSPITAL | Age: 46
End: 2021-06-08

## 2021-06-08 DIAGNOSIS — M25.511 RIGHT SHOULDER PAIN, UNSPECIFIED CHRONICITY: Primary | ICD-10-CM

## 2021-06-08 DIAGNOSIS — M25.511 RIGHT SHOULDER PAIN, UNSPECIFIED CHRONICITY: ICD-10-CM

## 2021-06-08 DIAGNOSIS — Z48.89 ENCOUNTER FOR OTHER SPECIFIED SURGICAL AFTERCARE: ICD-10-CM

## 2021-06-08 LAB
AUTO MIXED CELLS #: 1 10*3/MM3 (ref 0.1–2.6)
AUTO MIXED CELLS %: 7.4 % (ref 0.1–24)
ERYTHROCYTE [DISTWIDTH] IN BLOOD BY AUTOMATED COUNT: 12.5 % (ref 12.3–15.4)
ERYTHROCYTE [SEDIMENTATION RATE] IN BLOOD: 3 MM/HR (ref 0–15)
HCT VFR BLD AUTO: 44.6 % (ref 37.5–51)
HGB BLD-MCNC: 15.3 G/DL (ref 13–17.7)
LYMPHOCYTES # BLD AUTO: 1.7 10*3/MM3 (ref 0.7–3.1)
LYMPHOCYTES NFR BLD AUTO: 12.8 % (ref 19.6–45.3)
MCH RBC QN AUTO: 28.4 PG (ref 26.6–33)
MCHC RBC AUTO-ENTMCNC: 34.3 G/DL (ref 31.5–35.7)
MCV RBC AUTO: 82.9 FL (ref 79–97)
NEUTROPHILS NFR BLD AUTO: 10.6 10*3/MM3 (ref 1.7–7)
NEUTROPHILS NFR BLD AUTO: 79.8 % (ref 42.7–76)
PLATELET # BLD AUTO: 287 10*3/MM3 (ref 140–450)
PMV BLD AUTO: 8 FL (ref 6–12)
RBC # BLD AUTO: 5.38 10*6/MM3 (ref 4.14–5.8)
WBC # BLD AUTO: 13.3 10*3/MM3 (ref 3.4–10.8)

## 2021-06-08 PROCEDURE — 85025 COMPLETE CBC W/AUTO DIFF WBC: CPT

## 2021-06-08 PROCEDURE — 85652 RBC SED RATE AUTOMATED: CPT | Performed by: ORTHOPAEDIC SURGERY

## 2021-06-08 PROCEDURE — 36415 COLL VENOUS BLD VENIPUNCTURE: CPT

## 2021-06-08 PROCEDURE — 86140 C-REACTIVE PROTEIN: CPT | Performed by: ORTHOPAEDIC SURGERY

## 2021-06-09 LAB — CRP SERPL-MCNC: 0.55 MG/DL (ref 0–0.5)

## 2021-06-09 PROCEDURE — U0004 COV-19 TEST NON-CDC HGH THRU: HCPCS | Performed by: NURSE PRACTITIONER

## 2021-06-18 ENCOUNTER — TRANSCRIBE ORDERS (OUTPATIENT)
Dept: ADMINISTRATIVE | Facility: HOSPITAL | Age: 46
End: 2021-06-18

## 2021-06-18 ENCOUNTER — LAB (OUTPATIENT)
Dept: LAB | Facility: HOSPITAL | Age: 46
End: 2021-06-18

## 2021-06-18 DIAGNOSIS — M25.511 RIGHT SHOULDER PAIN, UNSPECIFIED CHRONICITY: Primary | ICD-10-CM

## 2021-06-18 DIAGNOSIS — M25.511 RIGHT SHOULDER PAIN, UNSPECIFIED CHRONICITY: ICD-10-CM

## 2021-06-18 LAB
AUTO MIXED CELLS #: 0.7 10*3/MM3 (ref 0.1–2.6)
AUTO MIXED CELLS %: 12.3 % (ref 0.1–24)
ERYTHROCYTE [DISTWIDTH] IN BLOOD BY AUTOMATED COUNT: 12.3 % (ref 12.3–15.4)
ERYTHROCYTE [SEDIMENTATION RATE] IN BLOOD: 11 MM/HR (ref 0–15)
HCT VFR BLD AUTO: 46.5 % (ref 37.5–51)
HGB BLD-MCNC: 15.8 G/DL (ref 13–17.7)
LYMPHOCYTES # BLD AUTO: 1.4 10*3/MM3 (ref 0.7–3.1)
LYMPHOCYTES NFR BLD AUTO: 23.1 % (ref 19.6–45.3)
MCH RBC QN AUTO: 28.2 PG (ref 26.6–33)
MCHC RBC AUTO-ENTMCNC: 34 G/DL (ref 31.5–35.7)
MCV RBC AUTO: 82.9 FL (ref 79–97)
NEUTROPHILS NFR BLD AUTO: 3.9 10*3/MM3 (ref 1.7–7)
NEUTROPHILS NFR BLD AUTO: 64.6 % (ref 42.7–76)
PLATELET # BLD AUTO: 244 10*3/MM3 (ref 140–450)
PMV BLD AUTO: 8.4 FL (ref 6–12)
RBC # BLD AUTO: 5.61 10*6/MM3 (ref 4.14–5.8)
WBC # BLD AUTO: 6 10*3/MM3 (ref 3.4–10.8)

## 2021-06-18 PROCEDURE — 85025 COMPLETE CBC W/AUTO DIFF WBC: CPT

## 2021-06-18 PROCEDURE — 85652 RBC SED RATE AUTOMATED: CPT | Performed by: ORTHOPAEDIC SURGERY

## 2021-06-18 PROCEDURE — 86140 C-REACTIVE PROTEIN: CPT | Performed by: ORTHOPAEDIC SURGERY

## 2021-06-18 PROCEDURE — 36415 COLL VENOUS BLD VENIPUNCTURE: CPT

## 2021-06-19 LAB — CRP SERPL-MCNC: 0.63 MG/DL (ref 0–0.5)

## 2021-07-19 ENCOUNTER — TRANSCRIBE ORDERS (OUTPATIENT)
Dept: ADMINISTRATIVE | Facility: HOSPITAL | Age: 46
End: 2021-07-19

## 2021-07-19 ENCOUNTER — LAB (OUTPATIENT)
Dept: LAB | Facility: HOSPITAL | Age: 46
End: 2021-07-19

## 2021-07-19 DIAGNOSIS — M67.921 UNSPECIFIED DISORDER OF SYNOVIUM AND TENDON, RIGHT UPPER ARM: ICD-10-CM

## 2021-07-19 DIAGNOSIS — M25.511 RIGHT SHOULDER PAIN, UNSPECIFIED CHRONICITY: ICD-10-CM

## 2021-07-19 DIAGNOSIS — M19.019 PRIMARY OSTEOARTHRITIS OF SHOULDER, UNSPECIFIED LATERALITY: Primary | ICD-10-CM

## 2021-07-19 LAB
AUTO MIXED CELLS #: 1 10*3/MM3 (ref 0.1–2.6)
AUTO MIXED CELLS %: 10.3 % (ref 0.1–24)
ERYTHROCYTE [DISTWIDTH] IN BLOOD BY AUTOMATED COUNT: 13.2 % (ref 12.3–15.4)
ERYTHROCYTE [SEDIMENTATION RATE] IN BLOOD: 6 MM/HR (ref 0–15)
HCT VFR BLD AUTO: 44.3 % (ref 37.5–51)
HGB BLD-MCNC: 15.2 G/DL (ref 13–17.7)
LYMPHOCYTES # BLD AUTO: 2 10*3/MM3 (ref 0.7–3.1)
LYMPHOCYTES NFR BLD AUTO: 20.4 % (ref 19.6–45.3)
MCH RBC QN AUTO: 28.5 PG (ref 26.6–33)
MCHC RBC AUTO-ENTMCNC: 34.3 G/DL (ref 31.5–35.7)
MCV RBC AUTO: 83.1 FL (ref 79–97)
NEUTROPHILS NFR BLD AUTO: 6.6 10*3/MM3 (ref 1.7–7)
NEUTROPHILS NFR BLD AUTO: 69.3 % (ref 42.7–76)
PLATELET # BLD AUTO: 281 10*3/MM3 (ref 140–450)
PMV BLD AUTO: 8.4 FL (ref 6–12)
RBC # BLD AUTO: 5.33 10*6/MM3 (ref 4.14–5.8)
WBC # BLD AUTO: 9.6 10*3/MM3 (ref 3.4–10.8)

## 2021-07-19 PROCEDURE — 85025 COMPLETE CBC W/AUTO DIFF WBC: CPT | Performed by: ORTHOPAEDIC SURGERY

## 2021-07-19 PROCEDURE — 36415 COLL VENOUS BLD VENIPUNCTURE: CPT | Performed by: ORTHOPAEDIC SURGERY

## 2021-07-19 PROCEDURE — 85652 RBC SED RATE AUTOMATED: CPT | Performed by: ORTHOPAEDIC SURGERY

## 2021-07-19 PROCEDURE — 86140 C-REACTIVE PROTEIN: CPT | Performed by: ORTHOPAEDIC SURGERY

## 2021-07-20 LAB — CRP SERPL-MCNC: 0.44 MG/DL (ref 0–0.5)

## 2021-08-26 ENCOUNTER — APPOINTMENT (OUTPATIENT)
Dept: PREADMISSION TESTING | Facility: HOSPITAL | Age: 46
End: 2021-08-26

## 2021-09-01 ENCOUNTER — PRE-ADMISSION TESTING (OUTPATIENT)
Dept: PREADMISSION TESTING | Facility: HOSPITAL | Age: 46
End: 2021-09-01

## 2021-09-01 VITALS
RESPIRATION RATE: 16 BRPM | BODY MASS INDEX: 41.33 KG/M2 | OXYGEN SATURATION: 98 % | SYSTOLIC BLOOD PRESSURE: 146 MMHG | HEART RATE: 88 BPM | HEIGHT: 68 IN | DIASTOLIC BLOOD PRESSURE: 80 MMHG | WEIGHT: 272.71 LBS

## 2021-09-01 LAB
ANION GAP SERPL CALCULATED.3IONS-SCNC: 10 MMOL/L (ref 5–15)
BUN SERPL-MCNC: 6 MG/DL (ref 6–20)
BUN/CREAT SERPL: 7 (ref 7–25)
CALCIUM SPEC-SCNC: 9.1 MG/DL (ref 8.6–10.5)
CHLORIDE SERPL-SCNC: 104 MMOL/L (ref 98–107)
CO2 SERPL-SCNC: 25 MMOL/L (ref 22–29)
CREAT SERPL-MCNC: 0.86 MG/DL (ref 0.76–1.27)
DEPRECATED RDW RBC AUTO: 40.2 FL (ref 37–54)
ERYTHROCYTE [DISTWIDTH] IN BLOOD BY AUTOMATED COUNT: 12.9 % (ref 12.3–15.4)
GFR SERPL CREATININE-BSD FRML MDRD: 96 ML/MIN/1.73
GLUCOSE SERPL-MCNC: 100 MG/DL (ref 65–99)
HCT VFR BLD AUTO: 44.5 % (ref 37.5–51)
HGB BLD-MCNC: 15.1 G/DL (ref 13–17.7)
MCH RBC QN AUTO: 29.3 PG (ref 26.6–33)
MCHC RBC AUTO-ENTMCNC: 33.9 G/DL (ref 31.5–35.7)
MCV RBC AUTO: 86.4 FL (ref 79–97)
PLATELET # BLD AUTO: 295 10*3/MM3 (ref 140–450)
PMV BLD AUTO: 9.3 FL (ref 6–12)
POTASSIUM SERPL-SCNC: 4 MMOL/L (ref 3.5–5.2)
RBC # BLD AUTO: 5.15 10*6/MM3 (ref 4.14–5.8)
SODIUM SERPL-SCNC: 139 MMOL/L (ref 136–145)
WBC # BLD AUTO: 9.34 10*3/MM3 (ref 3.4–10.8)

## 2021-09-01 PROCEDURE — 80048 BASIC METABOLIC PNL TOTAL CA: CPT

## 2021-09-01 PROCEDURE — 93010 ELECTROCARDIOGRAM REPORT: CPT | Performed by: INTERNAL MEDICINE

## 2021-09-01 PROCEDURE — 36415 COLL VENOUS BLD VENIPUNCTURE: CPT

## 2021-09-01 PROCEDURE — 85027 COMPLETE CBC AUTOMATED: CPT

## 2021-09-01 PROCEDURE — 93005 ELECTROCARDIOGRAM TRACING: CPT

## 2021-09-01 NOTE — DISCHARGE INSTRUCTIONS
DAY OF SURGERY INSTRUCTIONS        YOUR SURGEON: Dr. Monsivais    PROCEDURE: Right Reverse Total Shoulder Arthroplasty    DATE OF SURGERY: September 9, 2021    ARRIVAL TIME: AS DIRECTED BY OFFICE    YOU MAY TAKE THE FOLLOWING MEDICATION(S) THE MORNING OF SURGERY WITH A SIP OF WATER: Metoprolol      ALL OTHER HOME MEDICATION CHECK WITH YOUR PHYSICIAN      DO NOT TAKE ANY ERECTILE DYSFUNCTION MEDICATIONS (EX: CIALIS, VIAGRA) 24 HOURS PRIOR TO SURGERY                      MANAGING PAIN AFTER SURGERY    We know you are probably wondering what your pain will be like after surgery.  Following surgery it is unrealistic to expect you will not have pain.   Pain is how our bodies let us know that something is wrong or cautions us to be careful.  That said, our goal is to make your pain tolerable.    Methods we may use to treat your pain include (oral or IV medications, PCAs, epidurals, nerve blocks, etc.)   While some procedures require IV pain medications for a short time after surgery, transitioning to pain medications by mouth allows for better management of pain.   Your nurse will encourage you to take oral pain medications whenever possible.  IV medications work almost immediately, but only last a short while.  Taking medications by mouth allows for a more constant level of medication in your blood stream for a longer period of time.      Once your pain is out of control it is harder to get back under control.  It is important you are aware when your next dose of pain medication is due.  If you are admitted, your nurse may write the time of your next dose on the white board in your room to help you remember.      We are interested in your pain and encourage you to inform us about aggravating factors during your visit.   Many times a simple repositioning every few hours can make a big difference.    If your physician says it is okay, do not let your pain prevent you from getting out of bed. Be sure to call your nurse for  assistance prior to getting up so you do not fall.      Before surgery, please decide your tolerable pain goal.  These faces help describe the pain ratings we use on a 0-10 scale.   Be prepared to tell us your goal and whether or not you take pain or anxiety medications at home.          BEFORE YOU COME TO THE HOSPITAL  (Pre-op instructions)  • Do not eat, drink, smoke or chew gum after midnight the night before surgery.  This also includes no mints.  • Morning of surgery take only the medicines you have been instructed with a sip of water unless otherwise instructed  by your physician.  • Do not shave, wear makeup or dark nail polish.  • Remove all jewelry including rings.  • Leave anything you consider valuable at home.  • Leave your suitcase in the car until after your surgery.  • Bring the following with you if applicable:  o Picture ID and insurance, Medicare or Medicaid cards  o Co-pay/deductible required by insurance (cash, check, credit card)  o Copy of advance directive, living will or power-of- documents if not brought to PAT  o CPAP or BIPAP mask and tubing  o Relaxation aids ( book, magazine), etc.  o Hearing aids                        ON THE DAY OF SURGERY  · On the day of surgery check in at registration located at the main entrance of the hospital.   ? You will be registered and given a beeper with instructions where to wait in the main lobby.  ? When your beeper lights up and vibrates a member of the Outpatient Surgery staff will meet you at the double doors under the stair steps and escort you to your preoperative room.   · You may have cloth compression devices placed on your legs. These help to prevent blood clots and reduce swelling in your legs.  · An IV may be inserted into one of your veins.  · In the operating room, you may be given one or more of the following:  ? A medicine to help you relax (sedative).  ? A medicine to numb the area (local anesthetic).  ? A medicine to make you  "fall asleep (general anesthetic).  ? A medicine that is injected into an area of your body to numb everything below the injection site (regional anesthetic).  · Your surgical site will be marked or identified.  · You may be given an antibiotic through your IV to help prevent infection.  Contact a health care provider if you:  · Develop a fever of more than 100.4°F (38°C) or other feelings of illness during the 48 hours before your surgery.  · Have symptoms that get worse.  Have questions or concerns about your surgery    General Anesthesia/Surgery, Adult  General anesthesia is the use of medicines to make a person \"go to sleep\" (unconscious) for a medical procedure. General anesthesia must be used for certain procedures, and is often recommended for procedures that:  · Last a long time.  · Require you to be still or in an unusual position.  · Are major and can cause blood loss.  The medicines used for general anesthesia are called general anesthetics. As well as making you unconscious for a certain amount of time, these medicines:  · Prevent pain.  · Control your blood pressure.  · Relax your muscles.  Tell a health care provider about:  · Any allergies you have.  · All medicines you are taking, including vitamins, herbs, eye drops, creams, and over-the-counter medicines.  · Any problems you or family members have had with anesthetic medicines.  · Types of anesthetics you have had in the past.  · Any blood disorders you have.  · Any surgeries you have had.  · Any medical conditions you have.  · Any recent upper respiratory, chest, or ear infections.  · Any history of:  ? Heart or lung conditions, such as heart failure, sleep apnea, asthma, or chronic obstructive pulmonary disease (COPD).  ?  service.  ? Depression or anxiety.  · Any tobacco or drug use, including marijuana or alcohol use.  · Whether you are pregnant or may be pregnant.  What are the risks?  Generally, this is a safe procedure. However, " problems may occur, including:  · Allergic reaction.  · Lung and heart problems.  · Inhaling food or liquid from the stomach into the lungs (aspiration).  · Nerve injury.  · Air in the bloodstream, which can lead to stroke.  · Extreme agitation or confusion (delirium) when you wake up from the anesthetic.  · Waking up during your procedure and being unable to move. This is rare.  These problems are more likely to develop if you are having a major surgery or if you have an advanced or serious medical condition. You can prevent some of these complications by answering all of your health care provider's questions thoroughly and by following all instructions before your procedure.  General anesthesia can cause side effects, including:  · Nausea or vomiting.  · A sore throat from the breathing tube.  · Hoarseness.  · Wheezing or coughing.  · Shaking chills.  · Tiredness.  · Body aches.  · Anxiety.  · Sleepiness or drowsiness.  · Confusion or agitation.  RISKS AND COMPLICATIONS OF SURGERY  Your health care provider will discuss possible risks and complications with you before surgery. Common risks and complications include:    · Problems due to the use of anesthetics.  · Blood loss and replacement (does not apply to minor surgical procedures).  · Temporary increase in pain due to surgery.  · Uncorrected pain or problems that the surgery was meant to correct.  · Infection.  · New damage.    What happens before the procedure?    Medicines  Ask your health care provider about:  · Changing or stopping your regular medicines. This is especially important if you are taking diabetes medicines or blood thinners.  · Taking medicines such as aspirin and ibuprofen. These medicines can thin your blood. Do not take these medicines unless your health care provider tells you to take them.  · Taking over-the-counter medicines, vitamins, herbs, and supplements. Do not take these during the week before your procedure unless your health  care provider approves them.  General instructions  · Starting 3-6 weeks before the procedure, do not use any products that contain nicotine or tobacco, such as cigarettes and e-cigarettes. If you need help quitting, ask your health care provider.  · If you brush your teeth on the morning of the procedure, make sure to spit out all of the toothpaste.  · Tell your health care provider if you become ill or develop a cold, cough, or fever.  · If instructed by your health care provider, bring your sleep apnea device with you on the day of your surgery (if applicable).  · Ask your health care provider if you will be going home the same day, the following day, or after a longer hospital stay.  ? Plan to have someone take you home from the hospital or clinic.  ? Plan to have a responsible adult care for you for at least 24 hours after you leave the hospital or clinic. This is important.  What happens during the procedure?  · You will be given anesthetics through both of the following:  ? A mask placed over your nose and mouth.  ? An IV in one of your veins.  · You may receive a medicine to help you relax (sedative).  · After you are unconscious, a breathing tube may be inserted down your throat to help you breathe. This will be removed before you wake up.  · An anesthesia specialist will stay with you throughout your procedure. He or she will:  ? Keep you comfortable and safe by continuing to give you medicines and adjusting the amount of medicine that you get.  ? Monitor your blood pressure, pulse, and oxygen levels to make sure that the anesthetics do not cause any problems.  The procedure may vary among health care providers and hospitals.  What happens after the procedure?  · Your blood pressure, temperature, heart rate, breathing rate, and blood oxygen level will be monitored until the medicines you were given have worn off.  · You will wake up in a recovery area. You may wake up slowly.  · If you feel anxious or  agitated, you may be given medicine to help you calm down.  · If you will be going home the same day, your health care provider may check to make sure you can walk, drink, and urinate.  · Your health care provider will treat any pain or side effects you have before you go home.  · Do not drive for 24 hours if you were given a sedative.  Summary  · General anesthesia is used to keep you still and prevent pain during a procedure.  · It is important to tell your healthcare provider about your medical history and any surgeries you have had, and previous experience with anesthesia.  · Follow your healthcare provider’s instructions about when to stop eating, drinking, or taking certain medicines before your procedure.  · Plan to have someone take you home from the hospital or clinic.  This information is not intended to replace advice given to you by your health care provider. Make sure you discuss any questions you have with your health care provider.  Document Released: 03/26/2009 Document Revised: 08/03/2018 Document Reviewed: 08/03/2018  [employer] Interactive Patient Education © 2019 [employer]       Fall Prevention in Hospitals, Adult  As a hospital patient, your condition and the treatments you receive can increase your risk for falls. Some additional risk factors for falls in a hospital include:  · Being in an unfamiliar environment.  · Being on bed rest.  · Your surgery.  · Taking certain medicines.  · Your tubing requirements, such as intravenous (IV) therapy or catheters.  It is important that you learn how to decrease fall risks while at the hospital. Below are important tips that can help prevent falls.  SAFETY TIPS FOR PREVENTING FALLS  Talk about your risk of falling.  · Ask your health care provider why you are at risk for falling. Is it your medicine, illness, tubing placement, or something else?  · Make a plan with your health care provider to keep you safe from falls.  · Ask your health care provider  or pharmacist about side effects of your medicines. Some medicines can make you dizzy or affect your coordination.  Ask for help.  · Ask for help before getting out of bed. You may need to press your call button.  · Ask for assistance in getting safely to the toilet.  · Ask for a walker or cane to be put at your bedside. Ask that most of the side rails on your bed be placed up before your health care provider leaves the room.  · Ask family or friends to sit with you.  · Ask for things that are out of your reach, such as your glasses, hearing aids, telephone, bedside table, or call button.  Follow these tips to avoid falling:  · Stay lying or seated, rather than standing, while waiting for help.  · Wear rubber-soled slippers or shoes whenever you walk in the hospital.  · Avoid quick, sudden movements.  ¨ Change positions slowly.  ¨ Sit on the side of your bed before standing.  ¨ Stand up slowly and wait before you start to walk.  · Let your health care provider know if there is a spill on the floor.  · Pay careful attention to the medical equipment, electrical cords, and tubes around you.  · When you need help, use your call button by your bed or in the bathroom. Wait for one of your health care providers to help you.  · If you feel dizzy or unsure of your footing, return to bed and wait for assistance.  · Avoid being distracted by the TV, telephone, or another person in your room.  · Do not lean or support yourself on rolling objects, such as IV poles or bedside tables.     This information is not intended to replace advice given to you by your health care provider. Make sure you discuss any questions you have with your health care provider.     Document Released: 12/15/2001 Document Revised: 01/08/2016 Document Reviewed: 08/25/2013  [employer] Interactive Patient Education ©2016 [employer]       [facility]  CHG 4% Patient Instruction Sheet    Chlorhexidine Before Surgery  Chlorhexidine gluconate (CHG)  is a germ-killing (antiseptic) solution that is used to clean the skin. It gets rid of the bacteria that normally live on the skin. Cleaning your skin with CHG before surgery helps lower the risk for infection after surgery.    How to use CHG solution  · You will take 2 showers, one shower the night before surgery, the second shower the morning of surgery before coming to the hospital.  · Use CHG only as told by your health care provider, and follow the instructions on the label.  · Use CHG solution while taking a shower. Follow these steps when using CHG solution (unless your health care provider gives you different instructions):  1. Start the shower.  2. Use your normal soap and shampoo to wash your face and hair.  3. Turn off the shower or move out of the shower stream.  4. Pour the CHG onto a clean washcloth. Do not use any type of brush or rough-edged sponge.  5. Starting at your neck, lather your body down to your toes. Make sure you:  6. Pay special attention to the part of your body where you will be having surgery. Scrub this area for at least 1 minute.  7. Use the full amount of CHG as directed. Usually, this is one half bottle for each shower.  8. Do not use CHG on your head or face. If the solution gets into your ears or eyes, rinse them well with water.  9. Avoid your genital area.  10. Avoid any areas of skin that have broken skin, cuts, or scrapes.  11. Scrub your back and under your arms. Make sure to wash skin folds.  12. Let the lather sit on your skin for 1-2 minutes or as long as told by your health care  provider.  13. Thoroughly rinse your entire body in the shower. Make sure that all body creases and crevices are rinsed well.  14. Dry off with a clean towel. Do not put any substances on your body afterward, such as powder, lotion, or perfume.  15. Put on clean clothes or pajamas.  16. If it is the night before your surgery, sleep in clean sheets.    What are the risks?  Risks of using CHG  include:  · A skin reaction.  · Hearing loss, if CHG gets in your ears.  · Eye injury, if CHG gets in your eyes and is not rinsed out.  · The CHG product catching fire.  Make sure that you avoid smoking and flames after applying CHG to your skin.  Do not use CHG:  · If you have a chlorhexidine allergy or have previously reacted to chlorhexidine.  · On babies younger than 2 months of age.      On the day of surgery, when you are taken to your room in Outpatient Surgery you will be given a CHG prepackaged cloth to wipe the site for your surgery.  How to use CHG prepackaged cloths  · Follow the instructions on the label.  · Use the CHG cloth on clean, dry skin. Follow these steps when using a CHG cloth (unless your health care provider gives you different instructions):  1. Using the CHG cloth, vigorously scrub the part of your body where you will be having surgery. Scrub using a back-and-forth motion for 3 minutes. The area on your body should be completely wet with CHG when you are finished scrubbing.  2. Do not rinse. Discard the cloth and let the area air-dry for 1 minute. Do not put any substances on your body afterward, such as powder, lotion, or perfume.  Contact a health care provider if:  · Your skin gets irritated after scrubbing.  · You have questions about using your solution or cloth.  Get help right away if:  · Your eyes become very red or swollen.  · Your eyes itch badly.  · Your skin itches badly and is red or swollen.  · Your hearing changes.  · You have trouble seeing.  · You have swelling or tingling in your mouth or throat.  · You have trouble breathing.  · You swallow any chlorhexidine.  Summary  · Chlorhexidine gluconate (CHG) is a germ-killing (antiseptic) solution that is used to clean the skin. Cleaning your skin with CHG before surgery helps lower the risk for infection after surgery.  · You may be given CHG to use at home. It may be in a bottle or in a prepackaged cloth to use on your skin.  Carefully follow your health care provider's instructions and the instructions on the product label.  · Do not use CHG if you have a chlorhexidine allergy.  · Contact your health care provider if your skin gets irritated after scrubbing.  This information is not intended to replace advice given to you by your health care provider. Make sure you discuss any questions you have with your health care provider.  Document Released: 09/11/2013 Document Revised: 11/15/2018 Document Reviewed: 11/15/2018  [employer] Interactive Patient Education © 2019 [employer]          PATIENT/FAMILY/RESPONSIBLE PARTY VERBALIZES UNDERSTANDING OF ABOVE EDUCATION.  COPY OF PAIN SCALE GIVEN AND REVIEWED WITH VERBALIZED UNDERSTANDING.

## 2021-09-02 ENCOUNTER — TRANSCRIBE ORDERS (OUTPATIENT)
Dept: LAB | Facility: HOSPITAL | Age: 46
End: 2021-09-02

## 2021-09-02 DIAGNOSIS — Z11.59 SCREENING FOR VIRAL DISEASE: Primary | ICD-10-CM

## 2021-09-02 LAB
QT INTERVAL: 422 MS
QTC INTERVAL: 498 MS

## 2021-09-08 PROCEDURE — U0004 COV-19 TEST NON-CDC HGH THRU: HCPCS | Performed by: NURSE PRACTITIONER

## 2021-09-16 ENCOUNTER — APPOINTMENT (OUTPATIENT)
Dept: PREADMISSION TESTING | Facility: HOSPITAL | Age: 46
End: 2021-09-16

## 2021-09-24 ENCOUNTER — TRANSCRIBE ORDERS (OUTPATIENT)
Dept: LAB | Facility: HOSPITAL | Age: 46
End: 2021-09-24

## 2021-09-24 DIAGNOSIS — Z01.818 PRE-OP TESTING: Primary | ICD-10-CM

## 2021-09-27 ENCOUNTER — LAB (OUTPATIENT)
Dept: LAB | Facility: HOSPITAL | Age: 46
End: 2021-09-27

## 2021-09-27 LAB — SARS-COV-2 ORF1AB RESP QL NAA+PROBE: NOT DETECTED

## 2021-09-27 PROCEDURE — U0004 COV-19 TEST NON-CDC HGH THRU: HCPCS | Performed by: ORTHOPAEDIC SURGERY

## 2021-09-27 PROCEDURE — C9803 HOPD COVID-19 SPEC COLLECT: HCPCS | Performed by: ORTHOPAEDIC SURGERY

## 2021-09-30 ENCOUNTER — ANESTHESIA (OUTPATIENT)
Dept: PERIOP | Facility: HOSPITAL | Age: 46
End: 2021-09-30

## 2021-09-30 ENCOUNTER — ANESTHESIA EVENT (OUTPATIENT)
Dept: PERIOP | Facility: HOSPITAL | Age: 46
End: 2021-09-30

## 2021-09-30 ENCOUNTER — APPOINTMENT (OUTPATIENT)
Dept: GENERAL RADIOLOGY | Facility: HOSPITAL | Age: 46
End: 2021-09-30

## 2021-09-30 ENCOUNTER — HOSPITAL ENCOUNTER (OUTPATIENT)
Facility: HOSPITAL | Age: 46
Setting detail: HOSPITAL OUTPATIENT SURGERY
Discharge: HOME | End: 2021-09-30
Attending: ORTHOPAEDIC SURGERY | Admitting: ORTHOPAEDIC SURGERY

## 2021-09-30 VITALS
DIASTOLIC BLOOD PRESSURE: 65 MMHG | TEMPERATURE: 97.4 F | SYSTOLIC BLOOD PRESSURE: 127 MMHG | HEART RATE: 85 BPM | OXYGEN SATURATION: 96 % | RESPIRATION RATE: 16 BRPM

## 2021-09-30 DIAGNOSIS — Z96.611 S/P REVERSE TOTAL SHOULDER ARTHROPLASTY, RIGHT: Primary | ICD-10-CM

## 2021-09-30 PROCEDURE — C1776 JOINT DEVICE (IMPLANTABLE): HCPCS | Performed by: ORTHOPAEDIC SURGERY

## 2021-09-30 PROCEDURE — 25010000002 FENTANYL CITRATE (PF) 50 MCG/ML SOLUTION: Performed by: ANESTHESIOLOGY

## 2021-09-30 PROCEDURE — 25010000002 ONDANSETRON PER 1 MG: Performed by: NURSE ANESTHETIST, CERTIFIED REGISTERED

## 2021-09-30 PROCEDURE — 76000 FLUOROSCOPY <1 HR PHYS/QHP: CPT

## 2021-09-30 PROCEDURE — 25010000003 CEFAZOLIN PER 500 MG: Performed by: ORTHOPAEDIC SURGERY

## 2021-09-30 PROCEDURE — 25010000002 ONDANSETRON PER 1 MG: Performed by: ORTHOPAEDIC SURGERY

## 2021-09-30 PROCEDURE — 73030 X-RAY EXAM OF SHOULDER: CPT

## 2021-09-30 PROCEDURE — 25010000002 MIDAZOLAM PER 1 MG: Performed by: ANESTHESIOLOGY

## 2021-09-30 PROCEDURE — 25010000002 ONDANSETRON PER 1 MG: Performed by: ANESTHESIOLOGY

## 2021-09-30 PROCEDURE — 25010000003 BUPIVACAINE LIPOSOME 1.3 % SUSPENSION: Performed by: ANESTHESIOLOGY

## 2021-09-30 PROCEDURE — 25010000002 HYDROMORPHONE PER 4 MG: Performed by: ANESTHESIOLOGY

## 2021-09-30 PROCEDURE — 76942 ECHO GUIDE FOR BIOPSY: CPT | Performed by: ORTHOPAEDIC SURGERY

## 2021-09-30 PROCEDURE — 25010000002 PROPOFOL 10 MG/ML EMULSION: Performed by: NURSE ANESTHETIST, CERTIFIED REGISTERED

## 2021-09-30 PROCEDURE — C9290 INJ, BUPIVACAINE LIPOSOME: HCPCS | Performed by: ANESTHESIOLOGY

## 2021-09-30 DEVICE — LINER HUM/SHLDR TRABECULARMETAL REV POLY 60DEG 40MM PLS0: Type: IMPLANTABLE DEVICE | Site: SHOULDER | Status: FUNCTIONAL

## 2021-09-30 DEVICE — GLENSPHR TRABECULARMETAL REV 40MM: Type: IMPLANTABLE DEVICE | Site: SHOULDER | Status: FUNCTIONAL

## 2021-09-30 DEVICE — SPACR HUM REV TM PLS 9MM: Type: IMPLANTABLE DEVICE | Site: SHOULDER | Status: FUNCTIONAL

## 2021-09-30 DEVICE — BASEPLT GLEN TRABECULARMETAL REV 15MM: Type: IMPLANTABLE DEVICE | Site: SHOULDER | Status: FUNCTIONAL

## 2021-09-30 DEVICE — TOTL SHLDER REV: Type: IMPLANTABLE DEVICE | Site: SHOULDER | Status: FUNCTIONAL

## 2021-09-30 DEVICE — INVERSE/REVERSE SCREW SYSTEM, 4.5-36
Type: IMPLANTABLE DEVICE | Site: SHOULDER | Status: FUNCTIONAL
Brand: INVERSE/REVERSE

## 2021-09-30 DEVICE — STEM HUM/SHLDR TRABECULARMETAL REV 10X130MM: Type: IMPLANTABLE DEVICE | Site: SHOULDER | Status: FUNCTIONAL

## 2021-09-30 RX ORDER — FLUMAZENIL 0.1 MG/ML
0.2 INJECTION INTRAVENOUS AS NEEDED
Status: DISCONTINUED | OUTPATIENT
Start: 2021-09-30 | End: 2021-09-30 | Stop reason: HOSPADM

## 2021-09-30 RX ORDER — ACETAMINOPHEN 500 MG
1000 TABLET ORAL ONCE
Status: COMPLETED | OUTPATIENT
Start: 2021-09-30 | End: 2021-09-30

## 2021-09-30 RX ORDER — MIDAZOLAM HYDROCHLORIDE 1 MG/ML
1 INJECTION INTRAMUSCULAR; INTRAVENOUS
Status: DISCONTINUED | OUTPATIENT
Start: 2021-09-30 | End: 2021-09-30 | Stop reason: HOSPADM

## 2021-09-30 RX ORDER — ONDANSETRON 4 MG/1
4 TABLET, FILM COATED ORAL ONCE AS NEEDED
Status: DISCONTINUED | OUTPATIENT
Start: 2021-09-30 | End: 2021-09-30 | Stop reason: HOSPADM

## 2021-09-30 RX ORDER — PROPOFOL 10 MG/ML
VIAL (ML) INTRAVENOUS AS NEEDED
Status: DISCONTINUED | OUTPATIENT
Start: 2021-09-30 | End: 2021-09-30 | Stop reason: SURG

## 2021-09-30 RX ORDER — SCOLOPAMINE TRANSDERMAL SYSTEM 1 MG/1
1 PATCH, EXTENDED RELEASE TRANSDERMAL CONTINUOUS
Status: DISCONTINUED | OUTPATIENT
Start: 2021-09-30 | End: 2021-09-30 | Stop reason: HOSPADM

## 2021-09-30 RX ORDER — SODIUM CHLORIDE, SODIUM LACTATE, POTASSIUM CHLORIDE, CALCIUM CHLORIDE 600; 310; 30; 20 MG/100ML; MG/100ML; MG/100ML; MG/100ML
100 INJECTION, SOLUTION INTRAVENOUS CONTINUOUS
Status: DISCONTINUED | OUTPATIENT
Start: 2021-09-30 | End: 2021-09-30 | Stop reason: HOSPADM

## 2021-09-30 RX ORDER — MAGNESIUM HYDROXIDE 1200 MG/15ML
LIQUID ORAL AS NEEDED
Status: DISCONTINUED | OUTPATIENT
Start: 2021-09-30 | End: 2021-09-30 | Stop reason: HOSPADM

## 2021-09-30 RX ORDER — SODIUM CHLORIDE 0.9 % (FLUSH) 0.9 %
3 SYRINGE (ML) INJECTION AS NEEDED
Status: DISCONTINUED | OUTPATIENT
Start: 2021-09-30 | End: 2021-09-30 | Stop reason: HOSPADM

## 2021-09-30 RX ORDER — NEOSTIGMINE METHYLSULFATE 5 MG/5 ML
SYRINGE (ML) INTRAVENOUS AS NEEDED
Status: DISCONTINUED | OUTPATIENT
Start: 2021-09-30 | End: 2021-09-30 | Stop reason: SURG

## 2021-09-30 RX ORDER — ONDANSETRON 2 MG/ML
4 INJECTION INTRAMUSCULAR; INTRAVENOUS ONCE AS NEEDED
Status: COMPLETED | OUTPATIENT
Start: 2021-09-30 | End: 2021-09-30

## 2021-09-30 RX ORDER — CEFAZOLIN SODIUM IN 0.9 % NACL 3 G/100 ML
3 INTRAVENOUS SOLUTION, PIGGYBACK (ML) INTRAVENOUS ONCE
Status: COMPLETED | OUTPATIENT
Start: 2021-09-30 | End: 2021-09-30

## 2021-09-30 RX ORDER — LIDOCAINE HYDROCHLORIDE 10 MG/ML
0.5 INJECTION, SOLUTION EPIDURAL; INFILTRATION; INTRACAUDAL; PERINEURAL ONCE AS NEEDED
Status: DISCONTINUED | OUTPATIENT
Start: 2021-09-30 | End: 2021-09-30 | Stop reason: HOSPADM

## 2021-09-30 RX ORDER — PROMETHAZINE HYDROCHLORIDE 25 MG/1
12.5 TABLET ORAL ONCE AS NEEDED
Status: DISCONTINUED | OUTPATIENT
Start: 2021-09-30 | End: 2021-09-30 | Stop reason: HOSPADM

## 2021-09-30 RX ORDER — SODIUM CHLORIDE, SODIUM LACTATE, POTASSIUM CHLORIDE, CALCIUM CHLORIDE 600; 310; 30; 20 MG/100ML; MG/100ML; MG/100ML; MG/100ML
1000 INJECTION, SOLUTION INTRAVENOUS CONTINUOUS
Status: DISCONTINUED | OUTPATIENT
Start: 2021-09-30 | End: 2021-09-30 | Stop reason: HOSPADM

## 2021-09-30 RX ORDER — OXYCODONE AND ACETAMINOPHEN 10; 325 MG/1; MG/1
1 TABLET ORAL ONCE AS NEEDED
Status: COMPLETED | OUTPATIENT
Start: 2021-09-30 | End: 2021-09-30

## 2021-09-30 RX ORDER — ONDANSETRON 2 MG/ML
INJECTION INTRAMUSCULAR; INTRAVENOUS AS NEEDED
Status: DISCONTINUED | OUTPATIENT
Start: 2021-09-30 | End: 2021-09-30 | Stop reason: SURG

## 2021-09-30 RX ORDER — FENTANYL CITRATE 50 UG/ML
50 INJECTION, SOLUTION INTRAMUSCULAR; INTRAVENOUS ONCE
Status: COMPLETED | OUTPATIENT
Start: 2021-09-30 | End: 2021-09-30

## 2021-09-30 RX ORDER — OXYCODONE AND ACETAMINOPHEN 7.5; 325 MG/1; MG/1
1 TABLET ORAL ONCE AS NEEDED
Status: DISCONTINUED | OUTPATIENT
Start: 2021-09-30 | End: 2021-09-30 | Stop reason: HOSPADM

## 2021-09-30 RX ORDER — ROCURONIUM BROMIDE 10 MG/ML
INJECTION, SOLUTION INTRAVENOUS AS NEEDED
Status: DISCONTINUED | OUTPATIENT
Start: 2021-09-30 | End: 2021-09-30 | Stop reason: SURG

## 2021-09-30 RX ORDER — LABETALOL HYDROCHLORIDE 5 MG/ML
5 INJECTION, SOLUTION INTRAVENOUS
Status: DISCONTINUED | OUTPATIENT
Start: 2021-09-30 | End: 2021-09-30 | Stop reason: HOSPADM

## 2021-09-30 RX ORDER — HYDROMORPHONE HYDROCHLORIDE 2 MG/1
2 TABLET ORAL ONCE AS NEEDED
Status: DISCONTINUED | OUTPATIENT
Start: 2021-09-30 | End: 2021-09-30 | Stop reason: HOSPADM

## 2021-09-30 RX ORDER — MIDAZOLAM HYDROCHLORIDE 1 MG/ML
2 INJECTION INTRAMUSCULAR; INTRAVENOUS ONCE
Status: COMPLETED | OUTPATIENT
Start: 2021-09-30 | End: 2021-09-30

## 2021-09-30 RX ORDER — HYDROMORPHONE HYDROCHLORIDE 1 MG/ML
0.5 INJECTION, SOLUTION INTRAMUSCULAR; INTRAVENOUS; SUBCUTANEOUS
Status: DISCONTINUED | OUTPATIENT
Start: 2021-09-30 | End: 2021-09-30 | Stop reason: HOSPADM

## 2021-09-30 RX ORDER — ONDANSETRON 2 MG/ML
4 INJECTION INTRAMUSCULAR; INTRAVENOUS AS NEEDED
Status: DISCONTINUED | OUTPATIENT
Start: 2021-09-30 | End: 2021-09-30 | Stop reason: HOSPADM

## 2021-09-30 RX ORDER — NALOXONE HCL 0.4 MG/ML
0.04 VIAL (ML) INJECTION AS NEEDED
Status: DISCONTINUED | OUTPATIENT
Start: 2021-09-30 | End: 2021-09-30 | Stop reason: HOSPADM

## 2021-09-30 RX ORDER — FENTANYL CITRATE 50 UG/ML
25 INJECTION, SOLUTION INTRAMUSCULAR; INTRAVENOUS
Status: DISCONTINUED | OUTPATIENT
Start: 2021-09-30 | End: 2021-09-30 | Stop reason: HOSPADM

## 2021-09-30 RX ORDER — SODIUM CHLORIDE 0.9 % (FLUSH) 0.9 %
3-10 SYRINGE (ML) INJECTION AS NEEDED
Status: DISCONTINUED | OUTPATIENT
Start: 2021-09-30 | End: 2021-09-30 | Stop reason: HOSPADM

## 2021-09-30 RX ORDER — OXYCODONE AND ACETAMINOPHEN 10; 325 MG/1; MG/1
1 TABLET ORAL EVERY 4 HOURS PRN
Qty: 40 TABLET | Refills: 0 | Status: SHIPPED | OUTPATIENT
Start: 2021-09-30 | End: 2021-10-20 | Stop reason: HOSPADM

## 2021-09-30 RX ORDER — LIDOCAINE HYDROCHLORIDE 20 MG/ML
INJECTION, SOLUTION EPIDURAL; INFILTRATION; INTRACAUDAL; PERINEURAL AS NEEDED
Status: DISCONTINUED | OUTPATIENT
Start: 2021-09-30 | End: 2021-09-30 | Stop reason: SURG

## 2021-09-30 RX ORDER — ONDANSETRON 4 MG/1
4 TABLET, FILM COATED ORAL EVERY 8 HOURS PRN
Qty: 20 TABLET | Refills: 0 | Status: SHIPPED | OUTPATIENT
Start: 2021-09-30 | End: 2023-01-09

## 2021-09-30 RX ORDER — BUPIVACAINE HYDROCHLORIDE 5 MG/ML
INJECTION, SOLUTION EPIDURAL; INTRACAUDAL
Status: COMPLETED | OUTPATIENT
Start: 2021-09-30 | End: 2021-09-30

## 2021-09-30 RX ORDER — SODIUM CHLORIDE 0.9 % (FLUSH) 0.9 %
3 SYRINGE (ML) INJECTION EVERY 12 HOURS SCHEDULED
Status: DISCONTINUED | OUTPATIENT
Start: 2021-09-30 | End: 2021-09-30 | Stop reason: HOSPADM

## 2021-09-30 RX ADMIN — FENTANYL CITRATE 25 MCG: 50 INJECTION, SOLUTION INTRAMUSCULAR; INTRAVENOUS at 12:35

## 2021-09-30 RX ADMIN — BUPIVACAINE 10 ML: 13.3 INJECTION, SUSPENSION, LIPOSOMAL INFILTRATION at 09:13

## 2021-09-30 RX ADMIN — BUPIVACAINE HYDROCHLORIDE 10 ML: 5 INJECTION, SOLUTION EPIDURAL; INTRACAUDAL; PERINEURAL at 09:13

## 2021-09-30 RX ADMIN — Medication 3 MG: at 11:44

## 2021-09-30 RX ADMIN — OXYCODONE HYDROCHLORIDE AND ACETAMINOPHEN 1 TABLET: 10; 325 TABLET ORAL at 13:01

## 2021-09-30 RX ADMIN — LIDOCAINE HYDROCHLORIDE 50 MG: 20 INJECTION, SOLUTION EPIDURAL; INFILTRATION; INTRACAUDAL; PERINEURAL at 10:02

## 2021-09-30 RX ADMIN — SODIUM CHLORIDE, POTASSIUM CHLORIDE, SODIUM LACTATE AND CALCIUM CHLORIDE: 600; 310; 30; 20 INJECTION, SOLUTION INTRAVENOUS at 12:00

## 2021-09-30 RX ADMIN — HYDROMORPHONE HYDROCHLORIDE 0.5 MG: 1 INJECTION, SOLUTION INTRAMUSCULAR; INTRAVENOUS; SUBCUTANEOUS at 12:29

## 2021-09-30 RX ADMIN — ACETAMINOPHEN 1000 MG: 500 TABLET, FILM COATED ORAL at 09:05

## 2021-09-30 RX ADMIN — ONDANSETRON 4 MG: 2 INJECTION INTRAMUSCULAR; INTRAVENOUS at 12:21

## 2021-09-30 RX ADMIN — SCOPALAMINE 1 PATCH: 1 PATCH, EXTENDED RELEASE TRANSDERMAL at 09:05

## 2021-09-30 RX ADMIN — ROCURONIUM BROMIDE 50 MG: 50 INJECTION INTRAVENOUS at 10:02

## 2021-09-30 RX ADMIN — GLYCOPYRROLATE 0.4 MG: 0.2 INJECTION, SOLUTION INTRAMUSCULAR; INTRAVENOUS at 11:44

## 2021-09-30 RX ADMIN — ONDANSETRON 4 MG: 2 INJECTION INTRAMUSCULAR; INTRAVENOUS at 13:57

## 2021-09-30 RX ADMIN — PROPOFOL 200 MG: 10 INJECTION, EMULSION INTRAVENOUS at 10:02

## 2021-09-30 RX ADMIN — SODIUM CHLORIDE, POTASSIUM CHLORIDE, SODIUM LACTATE AND CALCIUM CHLORIDE 1000 ML: 600; 310; 30; 20 INJECTION, SOLUTION INTRAVENOUS at 07:45

## 2021-09-30 RX ADMIN — MIDAZOLAM 2 MG: 1 INJECTION INTRAMUSCULAR; INTRAVENOUS at 09:06

## 2021-09-30 RX ADMIN — ONDANSETRON 4 MG: 2 INJECTION INTRAMUSCULAR; INTRAVENOUS at 11:44

## 2021-09-30 RX ADMIN — FENTANYL CITRATE 100 MCG: 50 INJECTION INTRAMUSCULAR; INTRAVENOUS at 09:05

## 2021-09-30 RX ADMIN — FENTANYL CITRATE 25 MCG: 50 INJECTION, SOLUTION INTRAMUSCULAR; INTRAVENOUS at 12:40

## 2021-09-30 RX ADMIN — Medication 3 G: at 10:02

## 2021-09-30 RX ADMIN — FENTANYL CITRATE 25 MCG: 50 INJECTION, SOLUTION INTRAMUSCULAR; INTRAVENOUS at 12:30

## 2021-10-14 ENCOUNTER — ANESTHESIA (OUTPATIENT)
Dept: PERIOP | Facility: HOSPITAL | Age: 46
End: 2021-10-14

## 2021-10-14 ENCOUNTER — APPOINTMENT (OUTPATIENT)
Dept: GENERAL RADIOLOGY | Facility: HOSPITAL | Age: 46
End: 2021-10-14

## 2021-10-14 ENCOUNTER — ANESTHESIA EVENT (OUTPATIENT)
Dept: PERIOP | Facility: HOSPITAL | Age: 46
End: 2021-10-14

## 2021-10-14 ENCOUNTER — HOSPITAL ENCOUNTER (INPATIENT)
Facility: HOSPITAL | Age: 46
LOS: 6 days | Discharge: HOME | End: 2021-10-20
Attending: ORTHOPAEDIC SURGERY | Admitting: ORTHOPAEDIC SURGERY

## 2021-10-14 DIAGNOSIS — T81.40XA POSTOPERATIVE INFECTION, UNSPECIFIED TYPE, INITIAL ENCOUNTER: ICD-10-CM

## 2021-10-14 DIAGNOSIS — T81.40XA POSTOPERATIVE INFECTION: ICD-10-CM

## 2021-10-14 DIAGNOSIS — T84.59XA INFECTION OF PROSTHETIC TOTAL SHOULDER JOINT, INITIAL ENCOUNTER (HCC): Primary | ICD-10-CM

## 2021-10-14 DIAGNOSIS — Z96.619 INFECTION OF PROSTHETIC TOTAL SHOULDER JOINT, INITIAL ENCOUNTER (HCC): Primary | ICD-10-CM

## 2021-10-14 DIAGNOSIS — Z78.9 DECREASED ACTIVITIES OF DAILY LIVING (ADL): ICD-10-CM

## 2021-10-14 LAB
ANION GAP SERPL CALCULATED.3IONS-SCNC: 14 MMOL/L (ref 5–15)
BUN SERPL-MCNC: 11 MG/DL (ref 6–20)
BUN/CREAT SERPL: 20.4 (ref 7–25)
CALCIUM SPEC-SCNC: 8 MG/DL (ref 8.6–10.5)
CHLORIDE SERPL-SCNC: 100 MMOL/L (ref 98–107)
CO2 SERPL-SCNC: 18 MMOL/L (ref 22–29)
CREAT SERPL-MCNC: 0.54 MG/DL (ref 0.76–1.27)
DEPRECATED RDW RBC AUTO: 37 FL (ref 37–54)
ERYTHROCYTE [DISTWIDTH] IN BLOOD BY AUTOMATED COUNT: 12.4 % (ref 12.3–15.4)
GFR SERPL CREATININE-BSD FRML MDRD: >150 ML/MIN/1.73
GLUCOSE SERPL-MCNC: 78 MG/DL (ref 65–99)
HCT VFR BLD AUTO: 31.7 % (ref 37.5–51)
HGB BLD-MCNC: 10.5 G/DL (ref 13–17.7)
MCH RBC QN AUTO: 27.5 PG (ref 26.6–33)
MCHC RBC AUTO-ENTMCNC: 33.1 G/DL (ref 31.5–35.7)
MCV RBC AUTO: 83 FL (ref 79–97)
PLATELET # BLD AUTO: 262 10*3/MM3 (ref 140–450)
PMV BLD AUTO: 8.6 FL (ref 6–12)
POTASSIUM SERPL-SCNC: 4.4 MMOL/L (ref 3.5–5.2)
RBC # BLD AUTO: 3.82 10*6/MM3 (ref 4.14–5.8)
SARS-COV-2 RNA PNL SPEC NAA+PROBE: NOT DETECTED
SODIUM SERPL-SCNC: 132 MMOL/L (ref 136–145)
WBC # BLD AUTO: 13.47 10*3/MM3 (ref 3.4–10.8)

## 2021-10-14 PROCEDURE — 73030 X-RAY EXAM OF SHOULDER: CPT

## 2021-10-14 PROCEDURE — 25010000002 FENTANYL CITRATE (PF) 100 MCG/2ML SOLUTION

## 2021-10-14 PROCEDURE — 76000 FLUOROSCOPY <1 HR PHYS/QHP: CPT

## 2021-10-14 PROCEDURE — 25010000002 ONDANSETRON PER 1 MG

## 2021-10-14 PROCEDURE — 87176 TISSUE HOMOGENIZATION CULTR: CPT | Performed by: ORTHOPAEDIC SURGERY

## 2021-10-14 PROCEDURE — 0KB60ZZ EXCISION OF LEFT SHOULDER MUSCLE, OPEN APPROACH: ICD-10-PCS | Performed by: ORTHOPAEDIC SURGERY

## 2021-10-14 PROCEDURE — 80048 BASIC METABOLIC PNL TOTAL CA: CPT | Performed by: ORTHOPAEDIC SURGERY

## 2021-10-14 PROCEDURE — 85027 COMPLETE CBC AUTOMATED: CPT | Performed by: ORTHOPAEDIC SURGERY

## 2021-10-14 PROCEDURE — C1776 JOINT DEVICE (IMPLANTABLE): HCPCS | Performed by: ORTHOPAEDIC SURGERY

## 2021-10-14 PROCEDURE — 25010000002 HYDROMORPHONE PER 4 MG: Performed by: NURSE ANESTHETIST, CERTIFIED REGISTERED

## 2021-10-14 PROCEDURE — 25010000002 DEXAMETHASONE PER 1 MG

## 2021-10-14 PROCEDURE — 25010000002 VANCOMYCIN 1 G RECONSTITUTED SOLUTION

## 2021-10-14 PROCEDURE — 25010000002 FENTANYL CITRATE (PF) 50 MCG/ML SOLUTION: Performed by: NURSE ANESTHETIST, CERTIFIED REGISTERED

## 2021-10-14 PROCEDURE — 25010000002 HYDROMORPHONE PER 4 MG: Performed by: ORTHOPAEDIC SURGERY

## 2021-10-14 PROCEDURE — 87075 CULTR BACTERIA EXCEPT BLOOD: CPT | Performed by: ORTHOPAEDIC SURGERY

## 2021-10-14 PROCEDURE — 87070 CULTURE OTHR SPECIMN AEROBIC: CPT | Performed by: ORTHOPAEDIC SURGERY

## 2021-10-14 PROCEDURE — 87205 SMEAR GRAM STAIN: CPT | Performed by: ORTHOPAEDIC SURGERY

## 2021-10-14 PROCEDURE — 87186 SC STD MICRODIL/AGAR DIL: CPT | Performed by: ORTHOPAEDIC SURGERY

## 2021-10-14 PROCEDURE — 87635 SARS-COV-2 COVID-19 AMP PRB: CPT | Performed by: ORTHOPAEDIC SURGERY

## 2021-10-14 PROCEDURE — 0RPJ0J7 REMOVAL OF SYNTHETIC SUBSTITUTE FROM RIGHT SHOULDER JOINT, GLENOID SURFACE, OPEN APPROACH: ICD-10-PCS | Performed by: ORTHOPAEDIC SURGERY

## 2021-10-14 PROCEDURE — 25010000002 PROPOFOL 10 MG/ML EMULSION

## 2021-10-14 PROCEDURE — 87147 CULTURE TYPE IMMUNOLOGIC: CPT | Performed by: ORTHOPAEDIC SURGERY

## 2021-10-14 PROCEDURE — 0RRJ0J7 REPLACEMENT OF RIGHT SHOULDER JOINT WITH SYNTHETIC SUBSTITUTE, GLENOID SURFACE, OPEN APPROACH: ICD-10-PCS | Performed by: ORTHOPAEDIC SURGERY

## 2021-10-14 DEVICE — SPACR HUM TRABECULAR REV PLS12MM: Type: IMPLANTABLE DEVICE | Site: SHOULDER | Status: FUNCTIONAL

## 2021-10-14 DEVICE — LINER HUM/SHLDR TRABECULARMETAL REV POLY 60DEG 40MM PLS0: Type: IMPLANTABLE DEVICE | Site: SHOULDER | Status: FUNCTIONAL

## 2021-10-14 DEVICE — GLENSPHR TRABECULARMETAL REV 40MM: Type: IMPLANTABLE DEVICE | Site: SHOULDER | Status: FUNCTIONAL

## 2021-10-14 RX ORDER — VANCOMYCIN HYDROCHLORIDE 1 G/20ML
INJECTION, POWDER, LYOPHILIZED, FOR SOLUTION INTRAVENOUS AS NEEDED
Status: DISCONTINUED | OUTPATIENT
Start: 2021-10-14 | End: 2021-10-14 | Stop reason: SURG

## 2021-10-14 RX ORDER — DIPHENHYDRAMINE HCL 25 MG
25 CAPSULE ORAL EVERY 6 HOURS PRN
Status: DISCONTINUED | OUTPATIENT
Start: 2021-10-14 | End: 2021-10-20 | Stop reason: HOSPADM

## 2021-10-14 RX ORDER — SODIUM CHLORIDE 9 MG/ML
100 INJECTION, SOLUTION INTRAVENOUS CONTINUOUS
Status: DISCONTINUED | OUTPATIENT
Start: 2021-10-14 | End: 2021-10-20 | Stop reason: HOSPADM

## 2021-10-14 RX ORDER — FLUMAZENIL 0.1 MG/ML
0.2 INJECTION INTRAVENOUS AS NEEDED
Status: DISCONTINUED | OUTPATIENT
Start: 2021-10-14 | End: 2021-10-14 | Stop reason: HOSPADM

## 2021-10-14 RX ORDER — OXYCODONE AND ACETAMINOPHEN 10; 325 MG/1; MG/1
1 TABLET ORAL EVERY 4 HOURS PRN
Status: DISCONTINUED | OUTPATIENT
Start: 2021-10-14 | End: 2021-10-20 | Stop reason: HOSPADM

## 2021-10-14 RX ORDER — IBUPROFEN 600 MG/1
600 TABLET ORAL ONCE AS NEEDED
Status: DISCONTINUED | OUTPATIENT
Start: 2021-10-14 | End: 2021-10-14 | Stop reason: HOSPADM

## 2021-10-14 RX ORDER — ONDANSETRON 2 MG/ML
INJECTION INTRAMUSCULAR; INTRAVENOUS AS NEEDED
Status: DISCONTINUED | OUTPATIENT
Start: 2021-10-14 | End: 2021-10-14 | Stop reason: SURG

## 2021-10-14 RX ORDER — DIAZEPAM 5 MG/1
5 TABLET ORAL EVERY 6 HOURS PRN
Status: DISCONTINUED | OUTPATIENT
Start: 2021-10-14 | End: 2021-10-20 | Stop reason: HOSPADM

## 2021-10-14 RX ORDER — HYDROMORPHONE HCL 110MG/55ML
0.5 PATIENT CONTROLLED ANALGESIA SYRINGE INTRAVENOUS
Status: DISCONTINUED | OUTPATIENT
Start: 2021-10-14 | End: 2021-10-15

## 2021-10-14 RX ORDER — FENTANYL CITRATE 50 UG/ML
INJECTION, SOLUTION INTRAMUSCULAR; INTRAVENOUS AS NEEDED
Status: DISCONTINUED | OUTPATIENT
Start: 2021-10-14 | End: 2021-10-14 | Stop reason: SURG

## 2021-10-14 RX ORDER — LIDOCAINE HYDROCHLORIDE 20 MG/ML
INJECTION, SOLUTION EPIDURAL; INFILTRATION; INTRACAUDAL; PERINEURAL AS NEEDED
Status: DISCONTINUED | OUTPATIENT
Start: 2021-10-14 | End: 2021-10-14 | Stop reason: SURG

## 2021-10-14 RX ORDER — SODIUM CHLORIDE, SODIUM LACTATE, POTASSIUM CHLORIDE, CALCIUM CHLORIDE 600; 310; 30; 20 MG/100ML; MG/100ML; MG/100ML; MG/100ML
1000 INJECTION, SOLUTION INTRAVENOUS CONTINUOUS
Status: DISPENSED | OUTPATIENT
Start: 2021-10-14 | End: 2021-10-16

## 2021-10-14 RX ORDER — NALOXONE HCL 0.4 MG/ML
0.4 VIAL (ML) INJECTION
Status: DISCONTINUED | OUTPATIENT
Start: 2021-10-14 | End: 2021-10-20 | Stop reason: HOSPADM

## 2021-10-14 RX ORDER — LABETALOL HYDROCHLORIDE 5 MG/ML
5 INJECTION, SOLUTION INTRAVENOUS
Status: DISCONTINUED | OUTPATIENT
Start: 2021-10-14 | End: 2021-10-14 | Stop reason: HOSPADM

## 2021-10-14 RX ORDER — OXYCODONE AND ACETAMINOPHEN 10; 325 MG/1; MG/1
1 TABLET ORAL ONCE AS NEEDED
Status: DISCONTINUED | OUTPATIENT
Start: 2021-10-14 | End: 2021-10-14 | Stop reason: HOSPADM

## 2021-10-14 RX ORDER — PHENYLEPHRINE HCL IN 0.9% NACL 1 MG/10 ML
SYRINGE (ML) INTRAVENOUS AS NEEDED
Status: DISCONTINUED | OUTPATIENT
Start: 2021-10-14 | End: 2021-10-14 | Stop reason: SURG

## 2021-10-14 RX ORDER — ONDANSETRON 4 MG/1
4 TABLET, FILM COATED ORAL EVERY 6 HOURS PRN
Status: DISCONTINUED | OUTPATIENT
Start: 2021-10-14 | End: 2021-10-20 | Stop reason: HOSPADM

## 2021-10-14 RX ORDER — SODIUM CHLORIDE 0.9 % (FLUSH) 0.9 %
3 SYRINGE (ML) INJECTION AS NEEDED
Status: DISCONTINUED | OUTPATIENT
Start: 2021-10-14 | End: 2021-10-14 | Stop reason: HOSPADM

## 2021-10-14 RX ORDER — PROPOFOL 10 MG/ML
VIAL (ML) INTRAVENOUS AS NEEDED
Status: DISCONTINUED | OUTPATIENT
Start: 2021-10-14 | End: 2021-10-14 | Stop reason: SURG

## 2021-10-14 RX ORDER — MAGNESIUM HYDROXIDE 1200 MG/15ML
LIQUID ORAL AS NEEDED
Status: DISCONTINUED | OUTPATIENT
Start: 2021-10-14 | End: 2021-10-14 | Stop reason: HOSPADM

## 2021-10-14 RX ORDER — ATORVASTATIN CALCIUM 10 MG/1
10 TABLET, FILM COATED ORAL DAILY
Status: ON HOLD | COMMUNITY
End: 2021-10-16 | Stop reason: DRUGHIGH

## 2021-10-14 RX ORDER — PROMETHAZINE HYDROCHLORIDE 12.5 MG/1
12.5 SUPPOSITORY RECTAL EVERY 4 HOURS PRN
Status: DISCONTINUED | OUTPATIENT
Start: 2021-10-14 | End: 2021-10-20 | Stop reason: HOSPADM

## 2021-10-14 RX ORDER — HYDROMORPHONE HYDROCHLORIDE 1 MG/ML
0.5 INJECTION, SOLUTION INTRAMUSCULAR; INTRAVENOUS; SUBCUTANEOUS
Status: COMPLETED | OUTPATIENT
Start: 2021-10-14 | End: 2021-10-14

## 2021-10-14 RX ORDER — ASPIRIN 81 MG/1
81 TABLET ORAL DAILY
COMMUNITY
End: 2023-01-09

## 2021-10-14 RX ORDER — BUPIVACAINE HCL/0.9 % NACL/PF 0.1 %
2 PLASTIC BAG, INJECTION (ML) EPIDURAL ONCE
Status: DISCONTINUED | OUTPATIENT
Start: 2021-10-14 | End: 2021-10-14 | Stop reason: HOSPADM

## 2021-10-14 RX ORDER — SERTRALINE HYDROCHLORIDE 100 MG/1
100 TABLET, FILM COATED ORAL DAILY
Status: DISCONTINUED | OUTPATIENT
Start: 2021-10-14 | End: 2021-10-20 | Stop reason: HOSPADM

## 2021-10-14 RX ORDER — PROMETHAZINE HYDROCHLORIDE 25 MG/1
12.5 TABLET ORAL EVERY 4 HOURS PRN
Status: DISCONTINUED | OUTPATIENT
Start: 2021-10-14 | End: 2021-10-20 | Stop reason: HOSPADM

## 2021-10-14 RX ORDER — PROMETHAZINE HYDROCHLORIDE 25 MG/1
12.5 TABLET ORAL EVERY 4 HOURS PRN
Status: DISCONTINUED | OUTPATIENT
Start: 2021-10-14 | End: 2021-10-14 | Stop reason: SDUPTHER

## 2021-10-14 RX ORDER — SODIUM CHLORIDE 9 MG/ML
INJECTION, SOLUTION INTRAVENOUS CONTINUOUS PRN
Status: DISCONTINUED | OUTPATIENT
Start: 2021-10-14 | End: 2021-10-14 | Stop reason: SURG

## 2021-10-14 RX ORDER — NEOSTIGMINE METHYLSULFATE 5 MG/5 ML
SYRINGE (ML) INTRAVENOUS AS NEEDED
Status: DISCONTINUED | OUTPATIENT
Start: 2021-10-14 | End: 2021-10-14 | Stop reason: SURG

## 2021-10-14 RX ORDER — OXYCODONE AND ACETAMINOPHEN 7.5; 325 MG/1; MG/1
1 TABLET ORAL EVERY 4 HOURS PRN
Status: DISCONTINUED | OUTPATIENT
Start: 2021-10-14 | End: 2021-10-20 | Stop reason: HOSPADM

## 2021-10-14 RX ORDER — ROCURONIUM BROMIDE 10 MG/ML
INJECTION, SOLUTION INTRAVENOUS AS NEEDED
Status: DISCONTINUED | OUTPATIENT
Start: 2021-10-14 | End: 2021-10-14 | Stop reason: SURG

## 2021-10-14 RX ORDER — PROPOFOL 10 MG/ML
INJECTION, EMULSION INTRAVENOUS AS NEEDED
Status: DISCONTINUED | OUTPATIENT
Start: 2021-10-14 | End: 2021-10-14

## 2021-10-14 RX ORDER — ONDANSETRON 2 MG/ML
4 INJECTION INTRAMUSCULAR; INTRAVENOUS EVERY 6 HOURS PRN
Status: DISCONTINUED | OUTPATIENT
Start: 2021-10-14 | End: 2021-10-20 | Stop reason: HOSPADM

## 2021-10-14 RX ORDER — DEXAMETHASONE SODIUM PHOSPHATE 4 MG/ML
INJECTION, SOLUTION INTRA-ARTICULAR; INTRALESIONAL; INTRAMUSCULAR; INTRAVENOUS; SOFT TISSUE AS NEEDED
Status: DISCONTINUED | OUTPATIENT
Start: 2021-10-14 | End: 2021-10-14 | Stop reason: SURG

## 2021-10-14 RX ORDER — FENTANYL CITRATE 50 UG/ML
25 INJECTION, SOLUTION INTRAMUSCULAR; INTRAVENOUS
Status: COMPLETED | OUTPATIENT
Start: 2021-10-14 | End: 2021-10-14

## 2021-10-14 RX ORDER — SUCCINYLCHOLINE/SOD CL,ISO/PF 200MG/10ML
SYRINGE (ML) INTRAVENOUS AS NEEDED
Status: DISCONTINUED | OUTPATIENT
Start: 2021-10-14 | End: 2021-10-14 | Stop reason: SURG

## 2021-10-14 RX ADMIN — LIDOCAINE HYDROCHLORIDE 100 MG: 20 INJECTION, SOLUTION EPIDURAL; INFILTRATION; INTRACAUDAL; PERINEURAL at 15:04

## 2021-10-14 RX ADMIN — SUGAMMADEX 200 MG: 100 INJECTION, SOLUTION INTRAVENOUS at 17:10

## 2021-10-14 RX ADMIN — Medication 200 MCG: at 16:39

## 2021-10-14 RX ADMIN — Medication 4 MG: at 16:53

## 2021-10-14 RX ADMIN — SODIUM CHLORIDE 100 ML/HR: 9 INJECTION, SOLUTION INTRAVENOUS at 20:06

## 2021-10-14 RX ADMIN — Medication 50 MCG: at 15:54

## 2021-10-14 RX ADMIN — HYDROMORPHONE HYDROCHLORIDE 0.5 MG: 2 INJECTION, SOLUTION INTRAMUSCULAR; INTRAVENOUS; SUBCUTANEOUS at 23:31

## 2021-10-14 RX ADMIN — DEXAMETHASONE SODIUM PHOSPHATE 4 MG: 4 INJECTION, SOLUTION INTRA-ARTICULAR; INTRALESIONAL; INTRAMUSCULAR; INTRAVENOUS; SOFT TISSUE at 15:24

## 2021-10-14 RX ADMIN — FENTANYL CITRATE 25 MCG: 50 INJECTION, SOLUTION INTRAMUSCULAR; INTRAVENOUS at 18:13

## 2021-10-14 RX ADMIN — Medication 50 MCG: at 15:52

## 2021-10-14 RX ADMIN — HYDROMORPHONE HYDROCHLORIDE 0.5 MG: 1 INJECTION, SOLUTION INTRAMUSCULAR; INTRAVENOUS; SUBCUTANEOUS at 18:09

## 2021-10-14 RX ADMIN — ROCURONIUM BROMIDE 40 MG: 50 INJECTION INTRAVENOUS at 15:07

## 2021-10-14 RX ADMIN — FENTANYL CITRATE 25 MCG: 50 INJECTION, SOLUTION INTRAMUSCULAR; INTRAVENOUS at 18:25

## 2021-10-14 RX ADMIN — PROPOFOL 150 MCG/KG/MIN: 10 INJECTION, EMULSION INTRAVENOUS at 15:04

## 2021-10-14 RX ADMIN — SODIUM CHLORIDE: 9 INJECTION, SOLUTION INTRAVENOUS at 16:40

## 2021-10-14 RX ADMIN — FENTANYL CITRATE 100 MCG: 50 INJECTION, SOLUTION INTRAMUSCULAR; INTRAVENOUS at 15:04

## 2021-10-14 RX ADMIN — Medication 200 MG: at 15:04

## 2021-10-14 RX ADMIN — OXYCODONE AND ACETAMINOPHEN 1 TABLET: 325; 10 TABLET ORAL at 20:05

## 2021-10-14 RX ADMIN — HYDROMORPHONE HYDROCHLORIDE 0.5 MG: 1 INJECTION, SOLUTION INTRAMUSCULAR; INTRAVENOUS; SUBCUTANEOUS at 18:40

## 2021-10-14 RX ADMIN — GLYCOPYRROLATE 0.4 MG: 0.2 INJECTION, SOLUTION INTRAMUSCULAR; INTRAVENOUS at 16:53

## 2021-10-14 RX ADMIN — SERTRALINE 100 MG: 100 TABLET, FILM COATED ORAL at 21:54

## 2021-10-14 RX ADMIN — FENTANYL CITRATE 100 MCG: 50 INJECTION, SOLUTION INTRAMUSCULAR; INTRAVENOUS at 15:36

## 2021-10-14 RX ADMIN — ROCURONIUM BROMIDE 10 MG: 50 INJECTION INTRAVENOUS at 15:04

## 2021-10-14 RX ADMIN — HYDROMORPHONE HYDROCHLORIDE 0.5 MG: 1 INJECTION, SOLUTION INTRAMUSCULAR; INTRAVENOUS; SUBCUTANEOUS at 18:30

## 2021-10-14 RX ADMIN — PROPOFOL 170 MCG/KG/MIN: 10 INJECTION, EMULSION INTRAVENOUS at 15:49

## 2021-10-14 RX ADMIN — FENTANYL CITRATE 25 MCG: 50 INJECTION, SOLUTION INTRAMUSCULAR; INTRAVENOUS at 18:19

## 2021-10-14 RX ADMIN — ROCURONIUM BROMIDE 20 MG: 50 INJECTION INTRAVENOUS at 16:09

## 2021-10-14 RX ADMIN — ROCURONIUM BROMIDE 20 MG: 50 INJECTION INTRAVENOUS at 15:38

## 2021-10-14 RX ADMIN — PROPOFOL 200 MG: 10 INJECTION, EMULSION INTRAVENOUS at 15:04

## 2021-10-14 RX ADMIN — FENTANYL CITRATE 25 MCG: 50 INJECTION, SOLUTION INTRAMUSCULAR; INTRAVENOUS at 18:31

## 2021-10-14 RX ADMIN — Medication 100 MCG: at 16:13

## 2021-10-14 RX ADMIN — VASOPRESSIN 0.5 ML: 20 INJECTION INTRAVENOUS at 16:42

## 2021-10-14 RX ADMIN — Medication 200 MCG: at 16:25

## 2021-10-14 RX ADMIN — Medication 200 MCG: at 16:34

## 2021-10-14 RX ADMIN — HYDROMORPHONE HYDROCHLORIDE 0.5 MG: 1 INJECTION, SOLUTION INTRAMUSCULAR; INTRAVENOUS; SUBCUTANEOUS at 18:20

## 2021-10-14 RX ADMIN — METOPROLOL TARTRATE 25 MG: 25 TABLET, FILM COATED ORAL at 21:54

## 2021-10-14 RX ADMIN — SODIUM CHLORIDE, POTASSIUM CHLORIDE, SODIUM LACTATE AND CALCIUM CHLORIDE 1000 ML: 600; 310; 30; 20 INJECTION, SOLUTION INTRAVENOUS at 14:51

## 2021-10-14 RX ADMIN — ONDANSETRON 4 MG: 2 INJECTION INTRAMUSCULAR; INTRAVENOUS at 15:24

## 2021-10-14 RX ADMIN — Medication 100 MCG: at 16:15

## 2021-10-14 RX ADMIN — Medication 100 MCG: at 16:10

## 2021-10-14 RX ADMIN — VANCOMYCIN HYDROCHLORIDE 1 G: 1 INJECTION, POWDER, LYOPHILIZED, FOR SOLUTION INTRAVENOUS at 15:38

## 2021-10-14 RX ADMIN — Medication 200 MCG: at 16:32

## 2021-10-14 RX ADMIN — Medication 100 MCG: at 16:04

## 2021-10-14 NOTE — ANESTHESIA PROCEDURE NOTES
Airway  Urgency: elective    Date/Time: 10/14/2021 3:05 PM  Airway not difficult    General Information and Staff    Patient location during procedure: OR  CRNA: Estefani Dudley CRNA    Indications and Patient Condition  Indications for airway management: airway protection    Preoxygenated: yes  Mask difficulty assessment: 0 - not attempted    Final Airway Details  Final airway type: endotracheal airway      Successful airway: ETT  Cuffed: yes   Successful intubation technique: direct laryngoscopy and RSI  Facilitating devices/methods: intubating stylet and cricoid pressure  Endotracheal tube insertion site: oral  Blade: Pinzon  Blade size: 4  ETT size (mm): 7.5  Cormack-Lehane Classification: grade I - full view of glottis  Placement verified by: capnometry   Cuff volume (mL): 5  Measured from: teeth  ETT/EBT  to teeth (cm): 23  Number of attempts at approach: 1  Assessment: lips, teeth, and gum same as pre-op and atraumatic intubation

## 2021-10-14 NOTE — ANESTHESIA PREPROCEDURE EVALUATION
Anesthesia Evaluation     Patient summary reviewed   history of anesthetic complications: PONV  NPO Solid Status: > 8 hours  NPO Liquid Status: > 8 hours           Airway   Mallampati: II  TM distance: >3 FB  Neck ROM: full  No difficulty expected  Dental          Pulmonary    (-) sleep apnea, not a smoker  Cardiovascular   Exercise tolerance: good (4-7 METS)    ECG reviewed  Patient on routine beta blocker and Beta blocker given within 24 hours of surgery    (+) pacemaker ([employer]tr[employer]) pacemaker, ICD interrogated <1 month ago, hypertension, valvular problems/murmurs murmur, hyperlipidemia,   (-) past MI, cardiac stents    ROS comment: Echo 11/2020  · Saline test results are negative.  · Left ventricular wall thickness is consistent with septal asymmetric hypertrophy.  · Estimated left ventricular EF = 65% Left ventricular systolic function is normal.  · Left ventricular diastolic function is consistent with (grade I) impaired relaxation.    Surgery for hypertrophic cardiomyopathy- septal resection  5/2012        Neuro/Psych  (+) CVA, psychiatric history Anxiety,     GI/Hepatic/Renal/Endo    (+) morbid obesity,    (-) liver disease, no renal disease, diabetes    Musculoskeletal     Abdominal   (+) obese,    Substance History      OB/GYN          Other                        Anesthesia Plan    ASA 3     general with block   (Place magnet on device. Interrogate in recovery. )  intravenous induction     Anesthetic plan, all risks, benefits, and alternatives have been provided, discussed and informed consent has been obtained with: patient.

## 2021-10-14 NOTE — H&P
Orthopedic History and Physical    Pt Name: Feliberto Knowles  MRN: [id number]  YOB: 1975  Date: 10/14/2021      HPI: 46-year-old male status post a right reverse total shoulder arthroplasty on 9/30/2021 presented to clinic today with complaints of increased pain erythema and drainage from the incision.  Patient had been seen several days prior and placed on Bactrim for a small amount of drainage from the incision site.  Over the last 24 hours he is gotten much worse as he is not feeling well and he has increased pain increased erythema.      Past Medical/Surgical History:   Past Medical History:   Diagnosis Date   • AICD (automatic cardioverter/defibrillator) present    • Anger    • Anxiety    • Heart murmur    • Hypertrophic obstructive cardiomyopathy (HOCM) (HCC)    • Stroke (HCC)     tia november 2017   • Tachycardia      Past Surgical History:   Procedure Laterality Date   • ANKLE SURGERY     • APPENDECTOMY     • CARDIAC CATHETERIZATION     • CARDIAC DEFIBRILLATOR PLACEMENT     • CARDIAC PACEMAKER PLACEMENT      septalmyectomy   • CARPAL TUNNEL RELEASE     • OTHER SURGICAL HISTORY  05/2012    septalmyectomy   • SHOULDER ARTHROSCOPY W/ LABRAL REPAIR Right 3/25/2021    Procedure: RIGHT SHOULDER ARTHROSCOPIC REVISION POSTERIOR  LABRAL REPAIR;  Surgeon: Sandro Monsivais MD;  Location: [facility] OR;  Service: Orthopedics;  Laterality: Right;   • TOTAL SHOULDER ARTHROPLASTY W/ DISTAL CLAVICLE EXCISION Right 9/30/2021    Procedure: RIGHT REVERSE TOTAL SHOULDER ARTHROPLASTY;  Surgeon: Sandro Monsivais MD;  Location: [facility] OR;  Service: Orthopedics;  Laterality: Right;         Social History:   Social History     Socioeconomic History   • Marital status:    Tobacco Use   • Smoking status: Former Smoker     Types: Cigarettes     Quit date: 2011     Years since quitting: 10.7   • Smokeless tobacco: Current User     Types: Chew   Vaping Use   • Vaping Use: Never used   Substance and Sexual Activity   • Alcohol use: No    • Drug use: No   • Sexual activity: Yes     Partners: Female            Medications:   Current Facility-Administered Medications:   •  lactated ringers infusion 1,000 mL, 1,000 mL, Intravenous, Continuous, Sandro Monsivais MD  •  sodium chloride 0.9 % flush 3 mL, 3 mL, Intravenous, PRN, Sandro Monsivais MD    Allergies: No Known Allergies       Review of Systems   Constitutional: Negative.    HENT: Negative.    Eyes: Negative.    Respiratory: Negative.    Cardiovascular: Negative.    Gastrointestinal: Negative.    Endocrine: Negative.    Genitourinary: Negative.    Musculoskeletal: Negative.    Skin: Negative.    Allergic/Immunologic: Negative.    Neurological: Negative.    Hematological: Negative.    Psychiatric/Behavioral: Negative.           Physical Exam  Constitutional:       Appearance: He is well-developed.   HENT:      Head: Normocephalic and atraumatic.   Eyes:      Conjunctiva/sclera: Conjunctivae normal.   Pulmonary:      Effort: Pulmonary effort is normal.      Breath sounds: Normal breath sounds.   Abdominal:      Palpations: Abdomen is soft.   Musculoskeletal:         General: Tenderness present.      Cervical back: Normal range of motion and neck supple.   Skin:     General: Skin is warm and dry.   Neurological:      Mental Status: He is alert and oriented to person, place, and time.   Psychiatric:         Behavior: Behavior normal.         Thought Content: Thought content normal.         Judgment: Judgment normal.         Ortho Exam:  Right shoulder wound dehiscence with drainage and erythema    Imaging:  Imaging Results (Last 72 Hours)     ** No results found for the last 72 hours. **          Labs:   Lab Results (last 24 hours)     Procedure Component Value Units Date/Time    COVID PRE-OP / PRE-PROCEDURE SCREENING ORDER (NO ISOLATION) - Swab, Nasal Cavity [[id number]] Collected: 10/14/21 1420    Specimen: Swab from Nasal Cavity Updated: 10/14/21 1425    Narrative:      The following orders were created  for panel order COVID PRE-OP / PRE-PROCEDURE SCREENING ORDER (NO ISOLATION) - Swab, Nasal Cavity.  Procedure                               Abnormality         Status                     ---------                               -----------         ------                     COVID-19,[facility]...[[id number]]                      In process                   Please view results for these tests on the individual orders.    COVID-19,[facility] IN-HOUSE,Nasal Swab No Transport Media 3-4 HR TAT - Swab, Nasal Cavity [[id number]] Collected: 10/14/21 1420    Specimen: Swab from Nasal Cavity Updated: 10/14/21 1425          Impression: Right reverse total shoulder acute postoperative infection      Surgical Plan: I&D of right reverse total shoulder acute postoperative infection with possible polyethylene exchange.      Electronically signed by Sandro Monsivais MD on 10/14/2021 at 14:34 CDT

## 2021-10-14 NOTE — OP NOTE
Operative Report    Pt Name: Feliberto Knowles  MRN: [id number]  YOB: 1975  Date: 10/14/2021    PREOPERATIVE DIAGNOSIS:  1.   Acute right shoulder periprosthetic infection, status post reverse total shoulder arthroplasty 9/30/2021    POSTOPERATIVE DIAGNOSIS:    1.   Acute right shoulder periprosthetic infection, status post reverse total shoulder arthroplasty 9/30/2021    PROCEDURE:    1.   Excisional debridement of a right shoulder periprosthetic infection  2    Right shoulder exchange of the glenosphere, humeral tray and polyethylene spacer     SURGEON:  Sandro Monsivais M.D.    ASSISTANT: Kailyn Conte    ANESTHESIA:  General    ESTIMATED BLOOD LOSS: 100 cc    COMPLICATIONS:  None.    CONDITION:  Stable.    INDICATION FOR PROCEDURE:  46-year-old male status post a right reverse total shoulder arthroplasty on 9/30/2021 presented to clinic today with complaints of increased pain erythema and drainage from the incision.  Patient had been seen several days prior and placed on Bactrim for a small amount of drainage from the incision site.  Over the last 24 hours he is gotten much worse as he is not feeling well and he has increased pain increased erythema.    DESCRIPTION OF PROCEDURE:  The patient was interviewed in the preanesthesia area where the operative extremity was marked with a marking pen.  The patient was then taken to the operative suite where general endotracheal anesthesia was performed per the anesthesia team.  A timeout was called to confirm the patient, the operative site, the planned procedure, and the administration of antibiotics.  The patient was then prepped and draped in a standard sterile fashion using ChloraPrep.    The prior incision was opened sharply with a 15 blade scalpel.  Purulent fluid was expressed from the wound this was collected and sent off for Gram stain culture and sensitivity.  The superficial shoulder had a in appearance of mixed hematoma with a slimy film and a dark  serosanguineous fluid.  Multiple samples of this tissue were obtained with a curette and sent off for Gram stain culture and sensitivity in vision to the fluid sample.  Careful dissection was carried down through the deltopectoral interval.  The joint itself was then inspected.  Similar fluid and tissue were encountered.  The shoulder was located.    Shoulder was then dislocated and the polyethylene spacer and the metallic tray were removed.  Tissue from around the implant was sent off for Gram stain culture and sensitivity.  I elected to remove any metal that was exposed to the shoulder.  Therefore I removed the humeral tray the polyethylene spacer and the glenosphere.  The device to remove the glenosphere was hook behind the glenosphere and I was able to get it loose.  There was no tissue behind the glenosphere on the baseplate.  The area was irrigated with 9 total liters of pulsatile lavage and 1 L of a [employer] antibiotic solution.  A Betadine brush was used to brush the top of the humeral stem as well as the baseplate.    Between every 3 L of fluid I sharply debrided the muscular tissue small amount of bone as well as fascial tissue using a rongeur 15 blade scalpel a radiofrequency wand and curettes.  I felt I was able to get the wound completely clean particular by taking off the humeral tray and the glenosphere thus removing any metal exposed to the infectious fluid.    A new 40 mm glenosphere was then placed.   I then placed a 12 metallic spacer with a 0 polyethylene spacer.  The shoulder was then reduced and found to be quite stable.  The cephalic vein was tied off with multiple #1 Vicryl sutures.  Care was taken to make sure I would maintain meticulous hemostasis.  A 19 Pashto drain was then exited out distally and laterally and placed into the joint.    The incision was then closed with a 2-0 nylon suture.  Patient was placed in a shoulder immobilizer.  He awoke from anesthesia without  difficulty and was transferred the PACU in stable condition.    The patient will be admitted for IV antibiotics.  An infectious disease consult will be ordered and we will await the outcome of the patient's cultures.  The patient will most likely require 6 weeks of IV antibiotics, followed by 6 weeks of oral antibiotics.      Sandro Monsivais M.D.

## 2021-10-15 PROBLEM — T81.40XA POSTOPERATIVE INFECTION: Status: ACTIVE | Noted: 2021-10-15

## 2021-10-15 LAB — CRP SERPL-MCNC: 6.92 MG/DL (ref 0–0.5)

## 2021-10-15 PROCEDURE — 25010000002 ONDANSETRON PER 1 MG: Performed by: ORTHOPAEDIC SURGERY

## 2021-10-15 PROCEDURE — 25010000002 VANCOMYCIN 10 G RECONSTITUTED SOLUTION: Performed by: ORTHOPAEDIC SURGERY

## 2021-10-15 PROCEDURE — 97165 OT EVAL LOW COMPLEX 30 MIN: CPT

## 2021-10-15 PROCEDURE — 94799 UNLISTED PULMONARY SVC/PX: CPT

## 2021-10-15 PROCEDURE — 86140 C-REACTIVE PROTEIN: CPT | Performed by: PHYSICIAN ASSISTANT

## 2021-10-15 PROCEDURE — 25010000002 HYDROMORPHONE PER 4 MG: Performed by: ORTHOPAEDIC SURGERY

## 2021-10-15 RX ORDER — HYDROMORPHONE HYDROCHLORIDE 1 MG/ML
0.5 INJECTION, SOLUTION INTRAMUSCULAR; INTRAVENOUS; SUBCUTANEOUS
Status: DISCONTINUED | OUTPATIENT
Start: 2021-10-15 | End: 2021-10-16

## 2021-10-15 RX ADMIN — OXYCODONE AND ACETAMINOPHEN 1 TABLET: 325; 10 TABLET ORAL at 00:42

## 2021-10-15 RX ADMIN — METOPROLOL TARTRATE 25 MG: 25 TABLET, FILM COATED ORAL at 08:11

## 2021-10-15 RX ADMIN — HYDROMORPHONE HYDROCHLORIDE 0.5 MG: 1 INJECTION, SOLUTION INTRAMUSCULAR; INTRAVENOUS; SUBCUTANEOUS at 17:21

## 2021-10-15 RX ADMIN — HYDROMORPHONE HYDROCHLORIDE 0.5 MG: 1 INJECTION, SOLUTION INTRAMUSCULAR; INTRAVENOUS; SUBCUTANEOUS at 21:02

## 2021-10-15 RX ADMIN — ONDANSETRON 4 MG: 2 INJECTION INTRAMUSCULAR; INTRAVENOUS at 12:29

## 2021-10-15 RX ADMIN — OXYCODONE AND ACETAMINOPHEN 1 TABLET: 325; 10 TABLET ORAL at 13:57

## 2021-10-15 RX ADMIN — HYDROMORPHONE HYDROCHLORIDE 0.5 MG: 2 INJECTION, SOLUTION INTRAMUSCULAR; INTRAVENOUS; SUBCUTANEOUS at 11:48

## 2021-10-15 RX ADMIN — SODIUM CHLORIDE 100 ML/HR: 9 INJECTION, SOLUTION INTRAVENOUS at 19:26

## 2021-10-15 RX ADMIN — HYDROMORPHONE HYDROCHLORIDE 0.5 MG: 1 INJECTION, SOLUTION INTRAMUSCULAR; INTRAVENOUS; SUBCUTANEOUS at 23:03

## 2021-10-15 RX ADMIN — HYDROMORPHONE HYDROCHLORIDE 0.5 MG: 2 INJECTION, SOLUTION INTRAMUSCULAR; INTRAVENOUS; SUBCUTANEOUS at 09:42

## 2021-10-15 RX ADMIN — OXYCODONE AND ACETAMINOPHEN 1 TABLET: 325; 10 TABLET ORAL at 08:44

## 2021-10-15 RX ADMIN — VANCOMYCIN HYDROCHLORIDE 1500 MG: 10 INJECTION, POWDER, LYOPHILIZED, FOR SOLUTION INTRAVENOUS at 03:35

## 2021-10-15 RX ADMIN — MAGNESIUM HYDROXIDE 10 ML: 2400 SUSPENSION ORAL at 15:11

## 2021-10-15 RX ADMIN — OXYCODONE AND ACETAMINOPHEN 1 TABLET: 325; 10 TABLET ORAL at 19:25

## 2021-10-15 RX ADMIN — OXYCODONE AND ACETAMINOPHEN 1 TABLET: 325; 10 TABLET ORAL at 04:41

## 2021-10-15 RX ADMIN — HYDROMORPHONE HYDROCHLORIDE 0.5 MG: 2 INJECTION, SOLUTION INTRAMUSCULAR; INTRAVENOUS; SUBCUTANEOUS at 06:44

## 2021-10-15 RX ADMIN — HYDROMORPHONE HYDROCHLORIDE 0.5 MG: 2 INJECTION, SOLUTION INTRAMUSCULAR; INTRAVENOUS; SUBCUTANEOUS at 03:34

## 2021-10-15 RX ADMIN — SODIUM CHLORIDE 100 ML/HR: 9 INJECTION, SOLUTION INTRAVENOUS at 08:44

## 2021-10-15 RX ADMIN — METOPROLOL TARTRATE 25 MG: 25 TABLET, FILM COATED ORAL at 21:02

## 2021-10-15 RX ADMIN — HYDROMORPHONE HYDROCHLORIDE 0.5 MG: 2 INJECTION, SOLUTION INTRAMUSCULAR; INTRAVENOUS; SUBCUTANEOUS at 14:36

## 2021-10-15 NOTE — PLAN OF CARE
Goal Outcome Evaluation:  Plan of Care Reviewed With: patient        Progress: no change  Outcome Summary: OT tony completed. pt A&Ox4, c/o pain 7/10 in R shoulder. Pt performs bed mob and sit to stand I'ly. He is impulsive with stand but shows no balance impairments with sitting or standing. Pt educated on sling for RUE as well as precuations and body mechanics needed for maintaining safety and integrity of RUE shoulder. He verbalizes knowledge and previous use of shoulder imobilizer from previous shoulder sx. He demos LBD and toileting I'ly but req mod A for UBD of sling and hospital gown. His greatest limiting factor is the ROM/Strength limitations he has in his RUE d/t sx. He would benefit from cont OT tx to educate on compensatory techniques, safe use of RUE and exercises to prevent contractures. Reccomend pt dc home with assist

## 2021-10-15 NOTE — PLAN OF CARE
Goal Outcome Evaluation:  Plan of Care Reviewed With: patient        Progress: no change  Outcome Summary: PT screen completed per chart review and speaking with OT. Pt at baseline function and has no strength, balance or gait deficits. Defer brace training to OT. PT to sign off. Recommend d/c home with assist.

## 2021-10-15 NOTE — CASE MANAGEMENT/SOCIAL WORK
Continued Stay Note   Roly     Patient Name: Feliberto Knowles  MRN: [id number]  Today's Date: 10/15/2021    Admit Date: 10/14/2021     Discharge Plan     Row Name 10/15/21 1537       Plan    Plan Comments Received call from Rashida Raymundo (workman comp ) stating to call her with any dc needs. Number for Rashida is [phone]. Fax number for any orders is [phone]               Discharge Codes    No documentation.                     CHARLES Mckeon

## 2021-10-15 NOTE — ANESTHESIA POSTPROCEDURE EVALUATION
"Patient: Feliberto Knowles    Procedure Summary     Date: 10/14/21 Room / Location:  [facility] OR 06 /  [facility] OR    Anesthesia Start: 1457 Anesthesia Stop: 1712    Procedure: INCISION AND DRAINAGE RIGHT SHOULDER WITH POLY EXCHANGE (Right Shoulder) Diagnosis:     Surgeons: Sandro Monsivais MD Provider: Estefani Dudley CRNA    Anesthesia Type: general with block ASA Status: 3          Anesthesia Type: general with block    Vitals  Vitals Value Taken Time   /74 10/14/21 1930   Temp 98.6 °F (37 °C) 10/14/21 1930   Pulse 84 10/14/21 1941   Resp 14 10/14/21 1930   SpO2 94 % 10/14/21 1941   Vitals shown include unvalidated device data.        Post Anesthesia Care and Evaluation    Patient location during evaluation: PACU  Patient participation: complete - patient participated  Level of consciousness: awake and alert  Pain management: adequate  Airway patency: patent  Anesthetic complications: No anesthetic complications    Cardiovascular status: acceptable  Respiratory status: acceptable  Hydration status: acceptable    Comments: Blood pressure 127/72, pulse 88, temperature 98.1 °F (36.7 °C), temperature source Oral, resp. rate 18, height 170.5 cm (67.13\"), weight 116 kg (256 lb 13.4 oz), SpO2 98 %.    Pt discharged from PACU based on melanie score >8      "

## 2021-10-15 NOTE — PROGRESS NOTES
MD Mohinder Kahn PA-C, [facility]       Orthopaedic Surgery Progress Note      10/15/2021   11:51 CDT    Name:  Feliberto Knowles  MRN:    [id number]     Acct:     [id number]   Room:  [address] [place] [address] Day: 1  POD:    1 Day Post-Op     Admit Date: 10/14/2021  PCP: Trey Arciniega MD        Subjective:     Interval:   S/P  1.   Excisional debridement of a right shoulder periprosthetic infection  2    Right shoulder exchange of the glenosphere, humeral tray and polyethylene spacer    STABLE THIS AM. NO C/O.    Pain: 3       Medications:     Allergies: No Known Allergies    Current Meds:   Current Facility-Administered Medications   Medication Dose Route Frequency Provider Last Rate Last Admin   • diazePAM (VALIUM) tablet 5 mg  5 mg Oral Q6H PRN Sandro Monsivais MD       • diphenhydrAMINE (BENADRYL) capsule 25 mg  25 mg Oral Q6H PRN Sandro Monsivais MD       • HYDROmorphone (DILAUDID) injection 0.5 mg  0.5 mg Intravenous Q2H PRN Sandro Monsivais MD   0.5 mg at 10/15/21 1148    And   • naloxone (NARCAN) injection 0.4 mg  0.4 mg Intravenous Q5 Min PRN Sandro Monsivais MD       • lactated ringers infusion 1,000 mL  1,000 mL Intravenous Continuous Sandro Monsivais MD   Stopped at 10/14/21 1640   • magnesium hydroxide (MILK OF MAGNESIA) suspension 10 mL  10 mL Oral Daily PRN Sandro Monsivais MD       • metoprolol tartrate (LOPRESSOR) tablet 25 mg  25 mg Oral Q12H Sandro Monsivais MD   25 mg at 10/15/21 0811   • ondansetron (ZOFRAN) injection 4 mg  4 mg Intravenous Q6H PRN Sandro Monsivais MD       • ondansetron (ZOFRAN) tablet 4 mg  4 mg Oral Q6H PRN Sandro Monsivais MD       • oxyCODONE-acetaminophen (PERCOCET)  MG per tablet 1 tablet  1 tablet Oral Q4H PRN Sandro Monsivais MD   1 tablet at 10/15/21 0844   • oxyCODONE-acetaminophen (PERCOCET) 7.5-325 MG per tablet 1 tablet  1 tablet Oral Q4H PRN Sandro Monsivais MD       • promethazine (PHENERGAN) tablet 12.5 mg  12.5 mg Oral Q4H PRN Sandro Monsivais MD        Or   • promethazine (PHENERGAN) suppository 12.5 mg  " 12.5 mg Rectal Q4H PRN Sandro Monsivais MD       • sertraline (ZOLOFT) tablet 100 mg  100 mg Oral Daily Sandro Monsivais MD   100 mg at 10/14/21 2154   • sodium chloride 0.9 % infusion  100 mL/hr Intravenous Continuous Sandro Monsivais  mL/hr at 10/15/21 0844 100 mL/hr at 10/15/21 08         Data:     Vitals:  /72 (BP Location: Left arm, Patient Position: Lying)   Pulse 82   Temp 98 °F (36.7 °C) (Oral)   Resp 18   Ht 170.5 cm (67.13\")   Wt 116 kg (256 lb 13.4 oz)   SpO2 99%   BMI 40.08 kg/m²   Temp (24hrs), Av.7 °F (36.5 °C), Min:96.2 °F (35.7 °C), Max:98.6 °F (37 °C)      I/O (24Hr):    Intake/Output Summary (Last 24 hours) at 10/15/2021 1151  Last data filed at 10/15/2021 0400  Gross per 24 hour   Intake 800 ml   Output 900 ml   Net -100 ml       Labs:  Lab Results (last 72 hours)     Procedure Component Value Units Date/Time    Tissue / Bone Culture - Tissue, Shoulder, Right [[id number]] Collected: 10/14/21 1557    Specimen: Tissue from Shoulder, Right Updated: 10/15/21 0621     Gram Stain Many (4+) WBCs per low power field      No organisms seen    Tissue / Bone Culture - Tissue, Shoulder, Right [[id number]] Collected: 10/14/21 1556    Specimen: Tissue from Shoulder, Right Updated: 10/15/21 0613     Gram Stain Many (4+) WBCs per low power field      Rare (1+) Gram positive cocci    Tissue / Bone Culture - Tissue, Shoulder, Right [[id number]] Collected: 10/14/21 1556    Specimen: Tissue from Shoulder, Right Updated: 10/15/21 06     Gram Stain Many (4+) WBCs per low power field      No organisms seen    Body Fluid Culture - Synovial Fluid, Shoulder, Right [[id number]] Collected: 10/14/21 1546    Specimen: Synovial Fluid from Shoulder, Right Updated: 10/15/21 0407     Body Fluid Culture Abnormal Stain     Gram Stain Many (4+) WBCs seen      Few (2+) Gram positive cocci, intracellular and extracellular    Anaerobic Culture - Tissue, Shoulder, Right [[id number]] Collected: 10/14/21 1557    Specimen: Tissue " from Shoulder, Right Updated: 10/14/21 1640    Anaerobic Culture - Tissue, Shoulder, Right [[id number]] Collected: 10/14/21 1556    Specimen: Tissue from Shoulder, Right Updated: 10/14/21 1640    Anaerobic Culture - Tissue, Shoulder, Right [[id number]] Collected: 10/14/21 1556    Specimen: Tissue from Shoulder, Right Updated: 10/14/21 1640    Basic Metabolic Panel [[id number]]  (Abnormal) Collected: 10/14/21 1429    Specimen: Blood Updated: 10/14/21 1520     Glucose 78 mg/dL      BUN 11 mg/dL      Creatinine 0.54 mg/dL      Sodium 132 mmol/L      Potassium 4.4 mmol/L      Chloride 100 mmol/L      CO2 18.0 mmol/L      Calcium 8.0 mg/dL      eGFR Non African Amer >150 mL/min/1.73      BUN/Creatinine Ratio 20.4     Anion Gap 14.0 mmol/L     Narrative:      GFR Normal >60  Chronic Kidney Disease <60  Kidney Failure <15      COVID PRE-OP / PRE-PROCEDURE SCREENING ORDER (NO ISOLATION) - Swab, Nasal Cavity [[id number]]  (Normal) Collected: 10/14/21 1420    Specimen: Swab from Nasal Cavity Updated: 10/14/21 1500    Narrative:      The following orders were created for panel order COVID PRE-OP / PRE-PROCEDURE SCREENING ORDER (NO ISOLATION) - Swab, Nasal Cavity.  Procedure                               Abnormality         Status                     ---------                               -----------         ------                     COVID-19,[facility]...[[id number]]  Normal              Final result                 Please view results for these tests on the individual orders.    COVID-19,[facility] IN-HOUSE,Nasal Swab No Transport Media 3-4 HR TAT - Swab, Nasal Cavity [[id number]]  (Normal) Collected: 10/14/21 1420    Specimen: Swab from Nasal Cavity Updated: 10/14/21 1500     COVID19 Not Detected    Narrative:      Fact sheet for providers: https://www.fda.gov/media/003775/download     Fact sheet for patients: https://www.fda.gov/media/853911/download    Test performed by PCR.    Consider negative results in combination with  clinical observations, patient history, and epidemiological information.    CBC (No Diff) [[id number]]  (Abnormal) Collected: 10/14/21 1429    Specimen: Blood Updated: 10/14/21 1456     WBC 13.47 10*3/mm3      RBC 3.82 10*6/mm3      Hemoglobin 10.5 g/dL      Hematocrit 31.7 %      MCV 83.0 fL      MCH 27.5 pg      MCHC 33.1 g/dL      RDW 12.4 %      RDW-SD 37.0 fl      MPV 8.6 fL      Platelets 262 10*3/mm3         Collected Updated Procedure Result Status    10/14/2021 1557 10/14/2021 1640 Anaerobic Culture - Tissue, Shoulder, Right [[id number]]   Tissue from Shoulder, Right    In process Component Value   No component results              10/14/2021 1557 10/15/2021 0621 Tissue / Bone Culture - Tissue, Shoulder, Right [[id number]]   Tissue from Shoulder, Right    Preliminary result Component Value   Gram Stain Many (4+) WBCs per low power field P    No organisms seen P              10/14/2021 1556 10/14/2021 1640 Anaerobic Culture - Tissue, Shoulder, Right [[id number]]   Tissue from Shoulder, Right    In process Component Value   No component results              10/14/2021 1556 10/14/2021 1640 Anaerobic Culture - Tissue, Shoulder, Right [[id number]]   Tissue from Shoulder, Right    In process Component Value   No component results              10/14/2021 1556 10/15/2021 0613 Tissue / Bone Culture - Tissue, Shoulder, Right [[id number]]   Tissue from Shoulder, Right    Preliminary result Component Value   Gram Stain Many (4+) WBCs per low power field P    Rare (1+) Gram positive cocci P              10/14/2021 1556 10/15/2021 0608 Tissue / Bone Culture - Tissue, Shoulder, Right [[id number]]   Tissue from Shoulder, Right    Preliminary result Component Value   Gram Stain Many (4+) WBCs per low power field P    No organisms seen P              10/14/2021 1546 10/15/2021 0407 Body Fluid Culture - Synovial Fluid, Shoulder, Right [[id number]]   Synovial Fluid from Shoulder, Right    Preliminary result Component Value   Body  Fluid Culture Abnormal Stain P   Gram Stain Many (4+) WBCs seen P    Few (2+) Gram positive cocci, intracellular and extracellular P                    Physical Exam:     : Incisions are CDI, Dressing is CDI. Moderate tenderness to palpation, compartments soft. NVI distally throughout.    DRAIN OUTPUT 100ML    Assessment:     Primary Problem  <principal problem not specified>  POD 1 Day Post-Op INCISION AND DRAINAGE RIGHT SHOULDER WITH POLY EXCHANGE (Right)      G + COCCI ON CX--AWAITING SENSITIVITY RESULTS--CURRENTLY ON IV VANCO    Plan:     1.     1. Continue PT/OT.  2. Continue DVT prophylaxis.  3. Continue NWB right upper extremity.  4. Anticipate discharge next week , WILL NEED PICC 6 WEEKS OF IV ATBX AND POSSIBLY 6 WEEKS OF ORAL.       Electronically signed by CAIT Murillo on 10/15/2021 at 11:51 CDT

## 2021-10-15 NOTE — PLAN OF CARE
Goal Outcome Evaluation:  Plan of Care Reviewed With: patient           Outcome Summary: Patient is alert and oriented x4, arrived to floor from OR, right shoulder I & D, right arm in sling, hemavac in place 100ml bloody drainage emptied, complaints of pain to right shoulder medicated per MD orders, , O2@3L/NC, VSS, safety maintained, will continue to monitor.

## 2021-10-15 NOTE — THERAPY EVALUATION
Patient Name: Feliberto Knowles  : 1975    MRN: [id number]                              Today's Date: 10/15/2021       Admit Date: 10/14/2021    Visit Dx:     ICD-10-CM ICD-9-CM   1. Postoperative infection  T81.40XA 998.59   2. 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)   • Postoperative infection     Past Medical History:   Diagnosis Date   • AICD (automatic cardioverter/defibrillator) present    • Anger    • Anxiety    • Heart murmur    • Hyperlipidemia    • Hypertrophic obstructive cardiomyopathy (HOCM) (HCC)    • Stroke (HCC)     tia 2017   • Tachycardia      Past Surgical History:   Procedure Laterality Date   • ANKLE SURGERY     • APPENDECTOMY     • CARDIAC CATHETERIZATION      no stents   • CARDIAC DEFIBRILLATOR PLACEMENT     • CARDIAC PACEMAKER PLACEMENT      septalmyectomy   • CARPAL TUNNEL RELEASE     • OTHER SURGICAL HISTORY  2012    septalmyectomy   • SHOULDER ARTHROSCOPY W/ LABRAL REPAIR Right 3/25/2021    Procedure: RIGHT SHOULDER ARTHROSCOPIC REVISION POSTERIOR  LABRAL REPAIR;  Surgeon: Sandro Monsivais MD;  Location: [facility] OR;  Service: Orthopedics;  Laterality: Right;   • TOTAL SHOULDER ARTHROPLASTY W/ DISTAL CLAVICLE EXCISION Right 2021    Procedure: RIGHT REVERSE TOTAL SHOULDER ARTHROPLASTY;  Surgeon: Sandro Monsivais MD;  Location: [facility] OR;  Service: Orthopedics;  Laterality: Right;   • TOTAL SHOULDER ARTHROPLASTY W/ DISTAL CLAVICLE EXCISION Right 10/14/2021    Procedure: INCISION AND DRAINAGE RIGHT SHOULDER WITH POLY EXCHANGE;  Surgeon: Sandro Monsivais MD;  Location: [facility] OR;  Service: Orthopedics;  Laterality: Right;      General Information     Row Name 10/15/21 1015          OT Time and Intention     Document Type evaluation  -MW (r) EJ (t) MW (c)     Mode of Treatment occupational therapy  -MW (r) EJ (t) MW (c)     Row Name 10/15/21 1015          General Information    Patient Profile Reviewed yes  -MW (r) EJ (t) MW (c)     Prior Level of Function independent:; all household mobility; ADL's; home management  -MW (r) EJ (t) MW (c)     Existing Precautions/Restrictions fall  sling  -MW (r) EJ (t) MW (c)     Barriers to Rehab medically complex; previous functional deficit; physical barrier  -MW (r) EJ (t) MW (c)     Row Name 10/15/21 1015          Occupational Profile    Reason for Services/Referral (Occupational Profile) CC: Acute right shoulder periprosthetic infection. H/O:  reverse total shoulder arthroplasty 9/30/2021. S/P: Excisional debridement of a right shoulder periprosthetic infection  -MW (r) EJ (t) MW (c)     Environmental Supports and Barriers (Occupational Profile) tub shower, grab bars, long handled shower head  -MW (r) EJ (t) MW (c)     Patient Goals (Occupational Profile) go home  -MW (r) EJ (t) MW (c)     Row Name 10/15/21 1015          Living Environment    Lives With spouse; child(camron), dependent  -MW (r) EJ (t) MW (c)     Row Name 10/15/21 1015          Home Main Entrance    Number of Stairs, Main Entrance five  -MW (r) EJ (t) MW (c)     Stair Railings, Main Entrance railing on right side (ascending)  -MW (r) EJ (t) MW (c)     Row Name 10/15/21 1015          Stairs Within Home, Primary    Number of Stairs, Within Home, Primary none  -MW (r) EJ (t) MW (c)     Stair Railings, Within Home, Primary none  -MW (r) EJ (t) MW (c)     Row Name 10/15/21 1015          Cognition    Orientation Status (Cognition) oriented x 4  -MW (r) EJ (t) MW (c)     Row Name 10/15/21 1015          Safety Issues, Functional Mobility    Safety Issues Affecting Function (Mobility) friction/shear risk; impulsivity; insight into deficits/self-awareness; awareness of need for assistance; safety precaution awareness; safety  precautions follow-through/compliance  -MW (r) EJ (t) MW (c)     Impairments Affecting Function (Mobility) pain  -MW (r) EJ (t) MW (c)           User Key  (r) = Recorded By, (t) = Taken By, (c) = Cosigned By    Initials Name Provider Type    MW Kay Cabrera, OTR/L Occupational Therapist    EJ Alma Delia Aguila, OT Student OT Student                 Mobility/ADL's     Row Name 10/15/21 1015          Bed Mobility    Bed Mobility bed mobility (all) activities  -MW (r) EJ (t) MW (c)     All Activities, [place] (Bed Mobility) independent  -MW (r) EJ (t) MW (c)     Bed Mobility, Safety Issues decreased use of arms for pushing/pulling  -MW (r) EJ (t) MW (c)     Assistive Device (Bed Mobility) head of bed elevated; bed rails  -MW (r) EJ (t) MW (c)     Row Name 10/15/21 1015          Transfers    Transfers sit-stand transfer  -MW (r) EJ (t) MW (c)     Sit-Stand [place] (Transfers) 1 person assist; modified independence  -MW (r) EJ (t) MW (c)     Row Name 10/15/21 1015          Functional Mobility    Functional Mobility- Ind. Level 1 person; standby assist  -MW (r) EJ (t) MW (c)     Functional Mobility- Comment Ambulates from bed to length of all 4 halls  -MW (r) EJ (t) MW (c)     Row Name 10/15/21 1015          Activities of Daily Living    BADL Assessment/Intervention upper body dressing; lower body dressing; toileting  -MW (r) EJ (t) MW (c)     Row Name 10/15/21 1015          Mobility    Extremity Weight-bearing Status right upper extremity  -MW (r) EJ (t) MW (c)     Right Upper Extremity (Weight-bearing Status) non weight-bearing (NWB)  -MW (r) EJ (t) MW (c)     Row Name 10/15/21 1015          Upper Body Dressing Assessment/Training    [place] Level (Upper Body Dressing) doff; don; minimum assist (75% patient effort)  -MW (r) EJ (t) MW (c)     Position (Upper Body Dressing) edge of bed sitting  -MW (r) EJ (t) MW (c)     Comment (Upper Body Dressing) RUE sling  -MW (r) EJ (t) MW (c)     Row Name 10/15/21  1015          Lower Body Dressing Assessment/Training    [place] Level (Lower Body Dressing) doff; don; socks; independent  -MW (r) EJ (t) MW (c)     Position (Lower Body Dressing) edge of bed sitting  -MW (r) EJ (t) MW (c)     Row Name 10/15/21 1015          Toileting Assessment/Training    [place] Level (Toileting) toileting skills; adjust/manage clothing; perform perineal hygiene; independent  -MW (r) EJ (t) MW (c)     Assistive Devices (Toileting) commode  -MW (r) EJ (t) MW (c)     Position (Toileting) unsupported standing  -MW (r) EJ (t) MW (c)           User Key  (r) = Recorded By, (t) = Taken By, (c) = Cosigned By    Initials Name Provider Type    MW Kay Cabrera, OTR/L Occupational Therapist    Alma Delia Aldridge, OT Student OT Student               Obj/Interventions     Row Name 10/15/21 1015          Sensory Assessment (Somatosensory)    Sensory Assessment (Somatosensory) right UE  -MW (r) EJ (t) MW (c)     Right UE Sensory Assessment general sensation  -MW (r) EJ (t) MW (c)     Row Name 10/15/21 1015          Sensory Interventions    Comment, Sensory Intervention RUE shoulder sensation limited compared to LUE  -MW (r) EJ (t) MW (c)     Row Name 10/15/21 1015          Vision Assessment/Intervention    Visual Impairment/Limitations WFL  -MW (r) EJ (t) MW (c)     Row Name 10/15/21 1015          Range of Motion Comprehensive    General Range of Motion upper extremity range of motion deficits identified  -MW (r) EJ (t) MW (c)     Comment, General Range of Motion RUE ROM not assessed d/t sx. LUE ROM: WFL  -MW (r) EJ (t) MW (c)     Row Name 10/15/21 1015          Strength Comprehensive (MMT)    General Manual Muscle Testing (MMT) Assessment upper extremity strength deficits identified  -MW (r) EJ (t) MW (c)     Comment, General Manual Muscle Testing (MMT) Assessment RUE MMT not assessed d/t sx. LUE MMT: 5/5  -MW (r) EJ (t) MW (c)     Row Name 10/15/21 1015          Balance    Balance Assessment  sitting static balance; sitting dynamic balance; sit to stand dynamic balance; standing static balance; standing dynamic balance  -MW (r) EJ (t) MW (c)     Static Sitting Balance WFL  -MW (r) EJ (t) MW (c)     Dynamic Sitting Balance WFL  -MW (r) EJ (t) MW (c)     Sit to Stand Dynamic Balance WFL  -MW (r) EJ (t) MW (c)     Static Standing Balance WFL  -MW (r) EJ (t) MW (c)     Dynamic Standing Balance WFL  -MW (r) EJ (t) MW (c)           User Key  (r) = Recorded By, (t) = Taken By, (c) = Cosigned By    Initials Name Provider Type    MW Kay Cabrera, OTR/L Occupational Therapist    Alma Delia Aldridge, EVE Student OT Student               Goals/Plan     Row Name 10/15/21 1015          Dressing Goal 1 (OT)    Activity/Device (Dressing Goal 1, OT) dressing skills, all; upper body dressing  -MW (r) EJ (t) MW (c)     [place]/Cues Needed (Dressing Goal 1, OT) supervision required  -MW (r) EJ (t) MW (c)     Time Frame (Dressing Goal 1, OT) long term goal (LTG); 10 days  -MW (r) EJ (t) MW (c)     Strategies/Barriers (Dressing Goal 1, OT) Sling  -MW (r) EJ (t) MW (c)     Progress/Outcome (Dressing Goal 1, OT) goal ongoing  -MW (r) EJ (t) MW (c)     Row Name 10/15/21 1015          Therapy Assessment/Plan (OT)    Planned Therapy Interventions (OT) activity tolerance training; adaptive equipment training; BADL retraining; cognitive/visual perception retraining; functional balance retraining; neuromuscular control/coordination retraining; occupation/activity based interventions; orthotic fabrication/fitting/training; patient/caregiver education/training; ROM/therapeutic exercise; strengthening exercise  -MW (r) EJ (t) MW (c)        OT ASSESSMENT FLOWSHEET (last 12 hours)     OT Evaluation and Treatment     Row Name 10/15/21 1015                   Wound 09/30/21 1025 Right shoulder Incision    Wound - Properties Group Placement Date: 09/30/21  -JR Placement Time: 1025  -JR Present on Hospital Admission: N  -JR Side: Right   -JR Location: shoulder  -JR Primary Wound Type: Incision  -JR        Retired Wound - Properties Group Date first assessed: 09/30/21  -JR Time first assessed: 1025  -JR Present on Hospital Admission: N  -JR Side: Right  -JR Location: shoulder  -JR Primary Wound Type: Incision  -JR                  Wound 10/14/21 1605 Right shoulder Incision    Wound - Properties Group Placement Date: 10/14/21  -ELIZABETH Placement Time: 1605  -ELIZABETH Side: Right  -ELIZABETH Location: shoulder  -ELIZABETH Primary Wound Type: Incision  -ELIZABETH        Retired Wound - Properties Group Date first assessed: 10/14/21  -ELIZABETH Time first assessed: 1605  -ELIZABETH Side: Right  -ELIZABETH Location: shoulder  -ELIZABETH Primary Wound Type: Incision  -ELIZABETH                  OT Goals    Bathing Goal Selection (OT) bathing, OT goal 1  -MW (r) EJ (t) MW (c)        Precaution Goal Selection (OT) precaution, OT goal 1  -MW (r) EJ (t) MW (c)                  Bathing Goal 1 (OT)    Activity/Device (Bathing Goal 1, OT) bathing skills, all; upper body bathing; lower body bathing  -MW (r) EJ (t) MW (c)        [place] Level/Cues Needed (Bathing Goal 1, OT) modified independence; 1 person assist  -MW (r) EJ (t) MW (c)        Time Frame (Bathing Goal 1, OT) long term goal (LTG); 10 days  -MW (r) EJ (t) MW (c)        Progress/Outcomes (Bathing Goal 1, OT) goal ongoing  -MW (r) EJ (t) MW (c)                  Precaution Goal 1 (OT)    Activity (Precaution Goal 1, OT) weight bearing restrictions; demonstrates compliance; demonstrates understanding; during all tasks in daily routine; during ADLs  -MW (r) EJ (t) MW (c)        [place] Level/Cues Needed (Precautions Goal 1, OT) independently  -MW (r) EJ (t) MW (c)        Time Frame (Precaution Goal 1, OT) long term goal (LTG); 10 days  -MW (r) EJ (t) MW (c)        Progress/Outcome (Precaution Goal 1, OT) goal ongoing  -MW (r) EJ (t) MW (c)              User Key  (r) = Recorded By, (t) = Taken By, (c) = Cosigned By    Initials Name Effective Dates    ELIZABETH Matta  Saida KEN RN 06/16/21 -     Lolly Kat RN 06/16/21 -     Kay Ryan OTR/L 08/28/18 -     Alma Delia Aldridge, OT Student 08/04/21 -                      User Key  (r) = Recorded By, (t) = Taken By, (c) = Cosigned By    Initials Name Provider Type    Kay Ryan, OTR/L Occupational Therapist    Alma Delia Aldridge, OT Student OT Student               Clinical Impression     Row Name 10/15/21 1015          Pain Assessment    Additional Documentation Pain Scale: Numbers Pre/Post-Treatment (Group)  -MW (r) EJ (t) MW (c)     Row Name 10/15/21 1015          Pain Scale: Numbers Pre/Post-Treatment    Pretreatment Pain Rating 7/10  -MW (r) EJ (t) MW (c)     Posttreatment Pain Rating 7/10  -MW (r) EJ (t) MW (c)     Pain Location - Side Right  -MW (r) EJ (t) MW (c)     Pain Location neck  -MW (r) EJ (t) MW (c)     Pre/Posttreatment Pain Comment Constant dull pain in shoulder, occasional sharp pains  -MW (r) EJ (t) MW (c)     Pain Intervention(s) Repositioned; Medication (See MAR); Ambulation/increased activity  -MW (r) EJ (t) MW (c)     Row Name 10/15/21 1015          Plan of Care Review    Plan of Care Reviewed With patient  -MW (r) EJ (t) MW (c)     Progress no change  -MW (r) EJ (t) MW (c)     Outcome Summary OT eval completed. pt A&Ox4, c/o pain 7/10 in R shoulder. Pt performs bed mob and sit to stand I'ly. He is impulsive with stand but shows no balance impairments with sitting or standing. Pt educated on sling for RUE as well as precuations and body mechanics needed for maintaining safety and integrity of RUE shoulder. He verbalizes knowledge and previous use of shoulder imobilizer from previous shoulder sx. He demos LBD and toileting I'ly but req mod A for UBD of sling and hospital gown. His greatest limiting factor is the ROM/Strength limitations he has in his RUE d/t sx. He would benefit from cont OT tx to educate on compensatory techniques, safe use of RUE and exercises to prevent  contractures. Reccomend pt dc home with assist  -MW (r) EJ (t) MW (c)     Row Name 10/15/21 1015          Therapy Assessment/Plan (OT)    Patient/Family Therapy Goal Statement (OT) Go home  -MW (r) EJ (t) MW (c)     Rehab Potential (OT) good, to achieve stated therapy goals  -MW (r) EJ (t) MW (c)     Criteria for Skilled Therapeutic Interventions Met (OT) yes; skilled treatment is necessary  -MW (r) EJ (t) MW (c)     Therapy Frequency (OT) 3 times/wk  -MW (r) EJ (t) MW (c)     Predicted Duration of Therapy Intervention (OT) Until dc from this facility  -MW (r) EJ (t) MW (c)     Row Name 10/15/21 1015          Therapy Plan Review/Discharge Plan (OT)    Anticipated Discharge Disposition (OT) home; home with assist  -MW (r) EJ (t) MW (c)     Row Name 10/15/21 1015          Vital Signs    O2 Delivery Pre Treatment nasal cannula  -MW (r) EJ (t) MW (c)     O2 Delivery Intra Treatment room air  -MW (r) EJ (t) MW (c)     O2 Delivery Post Treatment room air  -MW (r) EJ (t) MW (c)     Row Name 10/15/21 1015          Positioning and Restraints    Pre-Treatment Position in bed  -MW (r) EJ (t) MW (c)     Post Treatment Position chair  -MW (r) EJ (t) MW (c)     In Chair sitting; call light within reach; encouraged to call for assist  -MW (r) EJ (t) MW (c)           User Key  (r) = Recorded By, (t) = Taken By, (c) = Cosigned By    Initials Name Provider Type    Kay Ryan, OTR/L Occupational Therapist    Alma Delia Aldridge, OT Student OT Student               Outcome Measures     Row Name 10/15/21 1015          How much help from another is currently needed...    Putting on and taking off regular lower body clothing? 4  -MW (r) EJ (t) MW (c)     Bathing (including washing, rinsing, and drying) 3  -MW (r) EJ (t) MW (c)     Toileting (which includes using toilet bed pan or urinal) 4  -MW (r) EJ (t) MW (c)     Putting on and taking off regular upper body clothing 2  -MW (r) EJ (t) MW (c)     Taking care of personal grooming  (such as brushing teeth) 4  -MW (r) EJ (t) MW (c)     Eating meals 4  -MW (r) EJ (t) MW (c)     AM-PAC 6 Clicks Score (OT) 21  -MW (r) EJ (t)     Row Name 10/15/21 1015          Functional Assessment    Outcome Measure Options AM-PAC 6 Clicks Daily Activity (OT)  -MW (r) EJ (t) MW (c)           User Key  (r) = Recorded By, (t) = Taken By, (c) = Cosigned By    Initials Name Provider Type     Kya Cabrera, OTR/L Occupational Therapist    Alma Delia Aldridge, OT Student OT Student                Occupational Therapy Education                 Title: PT OT SLP Therapies (Done)     Topic: Occupational Therapy (Done)     Point: ADL training (Done)     Description:   Instruct learner(s) on proper safety adaptation and remediation techniques during self care or transfers.   Instruct in proper use of assistive devices.              Learning Progress Summary           Patient Acceptance, E,TB,D, VU,DU by  at 10/15/2021 1105                   Point: Home exercise program (Done)     Description:   Instruct learner(s) on appropriate technique for monitoring, assisting and/or progressing therapeutic exercises/activities.              Learning Progress Summary           Patient Acceptance, E,TB,D, VU,DU by  at 10/15/2021 1105                   Point: Precautions (Done)     Description:   Instruct learner(s) on prescribed precautions during self-care and functional transfers.              Learning Progress Summary           Patient Acceptance, E,TB,D, VU,DU by  at 10/15/2021 1105                   Point: Body mechanics (Done)     Description:   Instruct learner(s) on proper positioning and spine alignment during self-care, functional mobility activities and/or exercises.              Learning Progress Summary           Patient Acceptance, E,TB,D, VU,DU by  at 10/15/2021 1105                               User Key     Initials Effective Dates Name Provider Type [facility] 08/04/21 -  Alma Delia Aguila, OT Student OT  Student OT              OT Recommendation and Plan  Planned Therapy Interventions (OT): activity tolerance training, adaptive equipment training, BADL retraining, cognitive/visual perception retraining, functional balance retraining, neuromuscular control/coordination retraining, occupation/activity based interventions, orthotic fabrication/fitting/training, patient/caregiver education/training, ROM/therapeutic exercise, strengthening exercise  Therapy Frequency (OT): 3 times/wk  Plan of Care Review  Plan of Care Reviewed With: patient  Progress: no change  Outcome Summary: OT eval completed. pt A&Ox4, c/o pain 7/10 in R shoulder. Pt performs bed mob and sit to stand I'ly. He is impulsive with stand but shows no balance impairments with sitting or standing. Pt educated on sling for RUE as well as precuations and body mechanics needed for maintaining safety and integrity of RUE shoulder. He verbalizes knowledge and previous use of shoulder imobilizer from previous shoulder sx. He demos LBD and toileting I'ly but req mod A for UBD of sling and hospital gown. His greatest limiting factor is the ROM/Strength limitations he has in his RUE d/t sx. He would benefit from cont OT tx to educate on compensatory techniques, safe use of RUE and exercises to prevent contractures. Reccomend pt dc home with assist     Time Calculation:    Time Calculation- OT     Row Name 10/15/21 1045             Time Calculation- OT    OT Start Time 1015  +10 mins chart review  -MW (r) EJ (t) MW (c)      OT Stop Time 1045  -MW (r) EJ (t) MW (c)      OT Time Calculation (min) 30 min  -MW (r) EJ (t)      Total Timed Code Minutes- OT 40 minute(s)  -MW (r) EJ (t) MW (c)      OT Received On 10/15/21  -MW (r) EJ (t) MW (c)      OT Goal Re-Cert Due Date 10/25/21  -MW (r) EJ (t) MW (c)              Untimed Charges    OT Eval/Re-eval Minutes 40  -MW (r) EJ (t) MW (c)              Total Minutes    Untimed Charges Total Minutes 40  -MW (r) EJ (t)        Total Minutes 40  -MW (r) EJ (t)            User Key  (r) = Recorded By, (t) = Taken By, (c) = Cosigned By    Initials Name Provider Type    Kay Ryan, OTR/L Occupational Therapist    Rafa Aldridge, OT Student OT Student                       RAFA LIRIANO, OT Student  10/15/2021

## 2021-10-15 NOTE — CONSULTS
INFECTIOUS DISEASES CONSULT NOTE    Patient:  Feliberto Knowles 46 y.o. male  ROOM # 350/1  YOB: 1975  MRN: [id number]  [facility]:  [id number]  Admit date: 10/14/2021   Admitting Physician: Sandro Monsivais MD  Primary Care Physician: Trey Arciniega MD  REFERRING PROVIDER: Sandro Monsivais MD      REASON FOR CONSULTATION :  acute post operative shoulder infection, s/p reverse TSA 9/30/21      HISTORY OF PRESENT ILLNESS: Patient is a very pleasant 46-year-old gentleman who underwent right reverse total shoulder arthroplasty September 30.  He noted he is developed some drainage which appeared normal.  He was seen in the outpatient orthopedic clinic and was started empirically on antibiotic therapy.  He reports at that time it was not red or swollen.  The following day he developed some warmth and swelling of the area.    He did not have fevers but did feel somewhat achy and unwell.    He was taken to surgery per Dr. Monsivais yesterday.  He underwent excisional debridement of the right shoulder and exchange of the glenosphere, humeral tray and polyethylene spacer.     Gram stain revealed gram-positive cocci.  Patient has been on empiric vancomycin    Past Medical History:   Diagnosis Date   • AICD (automatic cardioverter/defibrillator) present    • Anger    • Anxiety    • Heart murmur    • Hyperlipidemia    • Hypertrophic obstructive cardiomyopathy (HOCM) ([facility])    • Stroke (HCC)     tia november 2017   • Tachycardia      Past Surgical History:   Procedure Laterality Date   • ANKLE SURGERY     • APPENDECTOMY     • CARDIAC CATHETERIZATION      no stents   • CARDIAC DEFIBRILLATOR PLACEMENT     • CARDIAC PACEMAKER PLACEMENT      septalmyectomy   • CARPAL TUNNEL RELEASE     • OTHER SURGICAL HISTORY  05/2012    septalmyectomy   • SHOULDER ARTHROSCOPY W/ LABRAL REPAIR Right 3/25/2021    Procedure: RIGHT SHOULDER ARTHROSCOPIC REVISION POSTERIOR  LABRAL REPAIR;  Surgeon: Sandro Monsivais MD;  Location: [facility];  Service: Orthopedics;   Laterality: Right;   • TOTAL SHOULDER ARTHROPLASTY W/ DISTAL CLAVICLE EXCISION Right 9/30/2021    Procedure: RIGHT REVERSE TOTAL SHOULDER ARTHROPLASTY;  Surgeon: Sandro Monsivais MD;  Location:  [facility];  Service: Orthopedics;  Laterality: Right;   • TOTAL SHOULDER ARTHROPLASTY W/ DISTAL CLAVICLE EXCISION Right 10/14/2021    Procedure: INCISION AND DRAINAGE RIGHT SHOULDER WITH POLY EXCHANGE;  Surgeon: Sandro Monsivais MD;  Location:  [facility];  Service: Orthopedics;  Laterality: Right;     Family History   Problem Relation Age of Onset   • Heart disease Mother    • No Known Problems Father      Social History     Socioeconomic History   • Marital status:    Tobacco Use   • Smoking status: Former Smoker     Types: Cigarettes     Quit date: 2011     Years since quitting: 10.7   • Smokeless tobacco: Current User     Types: Chew   Vaping Use   • Vaping Use: Never used   Substance and Sexual Activity   • Alcohol use: No   • Drug use: No   • Sexual activity: Yes     Partners: Female           Current Meds:     Current Facility-Administered Medications   Medication Dose Route Frequency Provider Last Rate Last Admin   • diazePAM (VALIUM) tablet 5 mg  5 mg Oral Q6H PRN Sandro Monsivais MD       • diphenhydrAMINE (BENADRYL) capsule 25 mg  25 mg Oral Q6H PRN Sandro Monsivais MD       • HYDROmorphone (DILAUDID) injection 0.5 mg  0.5 mg Intravenous Q2H PRN Sandro Monsivais MD       • lactated ringers infusion 1,000 mL  1,000 mL Intravenous Continuous Sandro Monsivais MD   Stopped at 10/14/21 1640   • magnesium hydroxide (MILK OF MAGNESIA) suspension 10 mL  10 mL Oral Daily PRN Sandro Monsivais MD   10 mL at 10/15/21 1511   • metoprolol tartrate (LOPRESSOR) tablet 25 mg  25 mg Oral Q12H Sandro Monsivais MD   25 mg at 10/15/21 0811   • naloxone (NARCAN) injection 0.4 mg  0.4 mg Intravenous Q5 Min PRN Sandro Monsiavis MD       • ondansetron (ZOFRAN) injection 4 mg  4 mg Intravenous Q6H PRN Sandro Monsivais MD   4 mg at 10/15/21 1229   • ondansetron (ZOFRAN)  tablet 4 mg  4 mg Oral Q6H PRN Sandro Monsivais MD       • oxyCODONE-acetaminophen (PERCOCET)  MG per tablet 1 tablet  1 tablet Oral Q4H PRN Sandro Monsivais MD   1 tablet at 10/15/21 1357   • oxyCODONE-acetaminophen (PERCOCET) 7.5-325 MG per tablet 1 tablet  1 tablet Oral Q4H PRN Sandro Monsivais MD       • promethazine (PHENERGAN) tablet 12.5 mg  12.5 mg Oral Q4H PRN Sandro Monsivais MD        Or   • promethazine (PHENERGAN) suppository 12.5 mg  12.5 mg Rectal Q4H PRN Sandro Monsivais MD       • sertraline (ZOLOFT) tablet 100 mg  100 mg Oral Daily Sandro Monsivais MD   100 mg at 10/14/21 2154   • sodium chloride 0.9 % infusion  100 mL/hr Intravenous Continuous Sandro Monsivais  mL/hr at 10/15/21 0844 100 mL/hr at 10/15/21 0844       Home Meds:  Prior to Admission medications    Medication Sig Start Date End Date Taking? Authorizing Provider   aspirin (aspirin) 81 MG EC tablet Take 81 mg by mouth Daily.   Yes Izabel Carvalho MD   atorvastatin (LIPITOR) 10 MG tablet Take 10 mg by mouth Daily. Pt uncertain of dose--new med   Yes Izabel Carvalho MD   Cholecalciferol (VITAMIN D3 PO) Take 1 tablet by mouth Daily.   Yes Izabel Carvalho MD   metoprolol tartrate (LOPRESSOR) 25 MG tablet Take 25 mg by mouth Daily.   Yes Izabel Carvalho MD   ondansetron (ZOFRAN) 4 MG tablet Take 1 tablet by mouth Every 8 (Eight) Hours As Needed for Nausea or Vomiting. 9/30/21  Yes Sandro Monsivais MD   oxyCODONE-acetaminophen (PERCOCET)  MG per tablet Take 1 tablet by mouth Every 4 (Four) Hours As Needed for Moderate Pain . 9/30/21  Yes Sandro Monsivais MD   sertraline (ZOLOFT) 100 MG tablet Take 100 mg by mouth Daily.   Yes Izabel Carvalho MD          No Known Allergies    Review of Systems   Constitutional: Negative for fever.   Eyes: Negative for photophobia.   Respiratory: Negative for wheezing.    Cardiovascular: Negative for chest pain.   Gastrointestinal: Negative for vomiting.   Genitourinary: Negative for dysuria.  "  Musculoskeletal: Positive for arthralgias and joint swelling.   Skin: Positive for wound.   Allergic/Immunologic: Negative for immunocompromised state.   Neurological: Negative for weakness.         Vital Signs:  /78 (BP Location: Left arm, Patient Position: Lying)   Pulse 78   Temp 98.1 °F (36.7 °C) (Oral)   Resp 18   Ht 170.5 cm (67.13\")   Wt 116 kg (256 lb 13.4 oz)   SpO2 98%   BMI 40.08 kg/m²   Temp (24hrs), Av °F (36.7 °C), Min:97 °F (36.1 °C), Max:98.6 °F (37 °C)      Physical Exam   General: The patient is nontoxic-appearing lying in bed in no acute distress  HEENT: Sclera anicteric and noninjected.  He placed face mask over his nose and mouth  Respiratory: Effort even and unlabored.  He is not conversationally dyspneic  Right shoulder: Large dressing in place.  Hemovac drain in place with serosanguineous drainage.  Right shoulder immobilized with sling.  Neuro: Alert and oriented, speech clear  Psych: Pleasant cooperative      Results Review:    I reviewed the patient's new clinical results.    Lab Results:  CBC:   Lab Results   Lab 10/14/21  1429   WBC 13.47*   HEMOGLOBIN 10.5*   HEMATOCRIT 31.7*   PLATELETS 262       CMP:   Lab Results   Lab 10/14/21  1429   SODIUM 132*   POTASSIUM 4.4   CHLORIDE 100   CO2 18.0*   BUN 11   CREATININE 0.54*   CALCIUM 8.0*   GLUCOSE 78       Lab Results (last 72 hours)     Procedure Component Value Units Date/Time    Body Fluid Culture - Synovial Fluid, Shoulder, Right [[id number]] Collected: 10/14/21 154    Specimen: Synovial Fluid from Shoulder, Right Updated: 10/15/21 1423     Body Fluid Culture Growth present, too young to evaluate     Gram Stain Many (4+) WBCs seen      Few (2+) Gram positive cocci, intracellular and extracellular    Tissue / Bone Culture - Tissue, Shoulder, Right [[id number]] Collected: 10/14/21 1556    Specimen: Tissue from Shoulder, Right Updated: 10/15/21 1423     Tissue Culture Growth present, too young to evaluate     Gram Stain " Many (4+) WBCs per low power field      Rare (1+) Gram positive cocci    Tissue / Bone Culture - Tissue, Shoulder, Right [[id number]] Collected: 10/14/21 1556    Specimen: Tissue from Shoulder, Right Updated: 10/15/21 1423     Tissue Culture Growth present, too young to evaluate     Gram Stain Many (4+) WBCs per low power field      No organisms seen    Tissue / Bone Culture - Tissue, Shoulder, Right [[id number]] Collected: 10/14/21 1557    Specimen: Tissue from Shoulder, Right Updated: 10/15/21 1421     Tissue Culture No growth     Gram Stain Many (4+) WBCs per low power field      No organisms seen    C-reactive Protein [[id number]]  (Abnormal) Collected: 10/15/21 1236    Specimen: Blood Updated: 10/15/21 1308     C-Reactive Protein 6.92 mg/dL     Anaerobic Culture - Tissue, Shoulder, Right [[id number]] Collected: 10/14/21 1557    Specimen: Tissue from Shoulder, Right Updated: 10/14/21 1640    Anaerobic Culture - Tissue, Shoulder, Right [[id number]] Collected: 10/14/21 1556    Specimen: Tissue from Shoulder, Right Updated: 10/14/21 1640    Anaerobic Culture - Tissue, Shoulder, Right [[id number]] Collected: 10/14/21 1556    Specimen: Tissue from Shoulder, Right Updated: 10/14/21 1640    Basic Metabolic Panel [[id number]]  (Abnormal) Collected: 10/14/21 1429    Specimen: Blood Updated: 10/14/21 1520     Glucose 78 mg/dL      BUN 11 mg/dL      Creatinine 0.54 mg/dL      Sodium 132 mmol/L      Potassium 4.4 mmol/L      Chloride 100 mmol/L      CO2 18.0 mmol/L      Calcium 8.0 mg/dL      eGFR Non African Amer >150 mL/min/1.73      BUN/Creatinine Ratio 20.4     Anion Gap 14.0 mmol/L     Narrative:      GFR Normal >60  Chronic Kidney Disease <60  Kidney Failure <15      COVID PRE-OP / PRE-PROCEDURE SCREENING ORDER (NO ISOLATION) - Swab, Nasal Cavity [[id number]]  (Normal) Collected: 10/14/21 1420    Specimen: Swab from Nasal Cavity Updated: 10/14/21 1500    Narrative:      The following orders were created for panel order COVID  PRE-OP / PRE-PROCEDURE SCREENING ORDER (NO ISOLATION) - Swab, Nasal Cavity.  Procedure                               Abnormality         Status                     ---------                               -----------         ------                     COVID-19,[facility]...[[id number]]  Normal              Final result                 Please view results for these tests on the individual orders.    COVID-19,[facility] IN-HOUSE,Nasal Swab No Transport Media 3-4 HR TAT - Swab, Nasal Cavity [[id number]]  (Normal) Collected: 10/14/21 1420    Specimen: Swab from Nasal Cavity Updated: 10/14/21 1500     COVID19 Not Detected    Narrative:      Fact sheet for providers: https://www.fda.gov/media/513842/download     Fact sheet for patients: https://www.fda.gov/media/642071/download    Test performed by PCR.    Consider negative results in combination with clinical observations, patient history, and epidemiological information.    CBC (No Diff) [[id number]]  (Abnormal) Collected: 10/14/21 1429    Specimen: Blood Updated: 10/14/21 1456     WBC 13.47 10*3/mm3      RBC 3.82 10*6/mm3      Hemoglobin 10.5 g/dL      Hematocrit 31.7 %      MCV 83.0 fL      MCH 27.5 pg      MCHC 33.1 g/dL      RDW 12.4 %      RDW-SD 37.0 fl      MPV 8.6 fL      Platelets 262 10*3/mm3           Estimated Creatinine Clearance: 211.6 mL/min (A) (by C-G formula based on SCr of 0.54 mg/dL (L)).    Culture Results:    Microbiology Results (last 10 days)     Procedure Component Value - Date/Time    Tissue / Bone Culture - Tissue, Shoulder, Right [[id number]] Collected: 10/14/21 1557    Lab Status: Preliminary result Specimen: Tissue from Shoulder, Right Updated: 10/15/21 1421     Tissue Culture No growth     Gram Stain Many (4+) WBCs per low power field      No organisms seen    Tissue / Bone Culture - Tissue, Shoulder, Right [[id number]] Collected: 10/14/21 1556    Lab Status: Preliminary result Specimen: Tissue from Shoulder, Right Updated: 10/15/21 1423      Tissue Culture Growth present, too young to evaluate     Gram Stain Many (4+) WBCs per low power field      Rare (1+) Gram positive cocci    Tissue / Bone Culture - Tissue, Shoulder, Right [[id number]] Collected: 10/14/21 1556    Lab Status: Preliminary result Specimen: Tissue from Shoulder, Right Updated: 10/15/21 1423     Tissue Culture Growth present, too young to evaluate     Gram Stain Many (4+) WBCs per low power field      No organisms seen    Body Fluid Culture - Synovial Fluid, Shoulder, Right [[id number]] Collected: 10/14/21 1546    Lab Status: Preliminary result Specimen: Synovial Fluid from Shoulder, Right Updated: 10/15/21 1423     Body Fluid Culture Growth present, too young to evaluate     Gram Stain Many (4+) WBCs seen      Few (2+) Gram positive cocci, intracellular and extracellular    COVID PRE-OP / PRE-PROCEDURE SCREENING ORDER (NO ISOLATION) - Swab, Nasal Cavity [[id number]]  (Normal) Collected: 10/14/21 1420    Lab Status: Final result Specimen: Swab from Nasal Cavity Updated: 10/14/21 1500    Narrative:      The following orders were created for panel order COVID PRE-OP / PRE-PROCEDURE SCREENING ORDER (NO ISOLATION) - Swab, Nasal Cavity.  Procedure                               Abnormality         Status                     ---------                               -----------         ------                     COVID-19,[facility]...[[id number]]  Normal              Final result                 Please view results for these tests on the individual orders.    COVID-19,[facility] IN-HOUSE,Nasal Swab No Transport Media 3-4 HR TAT - Swab, Nasal Cavity [[id number]]  (Normal) Collected: 10/14/21 1420    Lab Status: Final result Specimen: Swab from Nasal Cavity Updated: 10/14/21 1500     COVID19 Not Detected    Narrative:      Fact sheet for providers: https://www.fda.gov/media/948025/download     Fact sheet for patients: https://www.fda.gov/media/059536/download    Test performed by PCR.    Consider  negative results in combination with clinical observations, patient history, and epidemiological information.             Radiology:   Imaging Results (Last 72 Hours)     Procedure Component Value Units Date/Time    XR Shoulder 2+ View Right [[id number]] Collected: 10/14/21 1710     Updated: 10/15/21 0733    Narrative:      XR SHOULDER 2+ VW RIGHT- 10/14/2021 5:06 PM CDT     HISTORY: Infection and hardware replacment ; T81.40XA-Infection  following a procedure, unspecified, initial encounter      COMPARISON: None       Impression:      1 intraoperative fluoroscopic views submitted. This image is obtained  for procedural guidance. A radiologist was not present for image  acquisition. Please refer to the operative note for more details.  Fluoroscopy time was 0.7 seconds with a dose of 0.18 mGy.   This report was finalized on 10/14/2021 17:12 by Dr. Gregorio Daniel MD.    FL C Arm During Surgery [[id number]] Collected: 10/14/21 1710     Updated: 10/15/21 0733    Narrative:      XR SHOULDER 2+ VW RIGHT- 10/14/2021 5:06 PM CDT     HISTORY: Infection and hardware replacment ; T81.40XA-Infection  following a procedure, unspecified, initial encounter      COMPARISON: None       Impression:      1 intraoperative fluoroscopic views submitted. This image is obtained  for procedural guidance. A radiologist was not present for image  acquisition. Please refer to the operative note for more details.  Fluoroscopy time was 0.7 seconds with a dose of 0.18 mGy.   This report was finalized on 10/14/2021 17:12 by Dr. Gregorio Daniel MD.            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  Gram-positive infection right prosthetic total shoulder-based on Gram stain of surgical specimens.  Cultures pending.    RECOMMENDATION:   Continue vancomycin  Follow-up surgical cultures  Discussed  with patient will formulate a ongoing plan for antibiotic therapy based on identification and susceptibilities.      Michell Amaral MD  10/15/21  15:12 CDT

## 2021-10-16 LAB — CRP SERPL-MCNC: 3.12 MG/DL (ref 0–0.5)

## 2021-10-16 PROCEDURE — 25010000002 ONDANSETRON PER 1 MG: Performed by: ORTHOPAEDIC SURGERY

## 2021-10-16 PROCEDURE — 25010000002 HYDROMORPHONE PER 4 MG: Performed by: ORTHOPAEDIC SURGERY

## 2021-10-16 PROCEDURE — 25010000003 HYDROMORPHONE 1 MG/ML SOLUTION: Performed by: PHYSICIAN ASSISTANT

## 2021-10-16 PROCEDURE — 0 HYDROMORPHONE 1 MG/ML SOLUTION: Performed by: PHYSICIAN ASSISTANT

## 2021-10-16 PROCEDURE — 86140 C-REACTIVE PROTEIN: CPT | Performed by: PHYSICIAN ASSISTANT

## 2021-10-16 PROCEDURE — 25010000002 VANCOMYCIN 10 G RECONSTITUTED SOLUTION: Performed by: ORTHOPAEDIC SURGERY

## 2021-10-16 RX ORDER — ATORVASTATIN CALCIUM 80 MG/1
80 TABLET, FILM COATED ORAL NIGHTLY
COMMUNITY
End: 2023-01-09

## 2021-10-16 RX ORDER — CYCLOBENZAPRINE HCL 10 MG
10 TABLET ORAL 3 TIMES DAILY PRN
COMMUNITY
End: 2023-01-09

## 2021-10-16 RX ORDER — DOCUSATE SODIUM 100 MG/1
100 CAPSULE, LIQUID FILLED ORAL 2 TIMES DAILY
Status: DISCONTINUED | OUTPATIENT
Start: 2021-10-16 | End: 2021-10-20 | Stop reason: HOSPADM

## 2021-10-16 RX ADMIN — METOPROLOL TARTRATE 25 MG: 25 TABLET, FILM COATED ORAL at 21:22

## 2021-10-16 RX ADMIN — OXYCODONE HYDROCHLORIDE AND ACETAMINOPHEN 1 TABLET: 7.5; 325 TABLET ORAL at 00:03

## 2021-10-16 RX ADMIN — HYDROMORPHONE HYDROCHLORIDE 0.5 MG: 1 INJECTION, SOLUTION INTRAMUSCULAR; INTRAVENOUS; SUBCUTANEOUS at 08:43

## 2021-10-16 RX ADMIN — HYDROMORPHONE HYDROCHLORIDE 1 MG: 1 INJECTION, SOLUTION INTRAMUSCULAR; INTRAVENOUS; SUBCUTANEOUS at 13:29

## 2021-10-16 RX ADMIN — OXYCODONE AND ACETAMINOPHEN 1 TABLET: 325; 10 TABLET ORAL at 13:29

## 2021-10-16 RX ADMIN — DOCUSATE SODIUM 100 MG: 100 CAPSULE ORAL at 11:14

## 2021-10-16 RX ADMIN — SERTRALINE 100 MG: 100 TABLET, FILM COATED ORAL at 08:43

## 2021-10-16 RX ADMIN — VANCOMYCIN HYDROCHLORIDE 1500 MG: 10 INJECTION, POWDER, LYOPHILIZED, FOR SOLUTION INTRAVENOUS at 11:13

## 2021-10-16 RX ADMIN — HYDROMORPHONE HYDROCHLORIDE 0.5 MG: 1 INJECTION, SOLUTION INTRAMUSCULAR; INTRAVENOUS; SUBCUTANEOUS at 06:40

## 2021-10-16 RX ADMIN — SODIUM CHLORIDE 100 ML/HR: 9 INJECTION, SOLUTION INTRAVENOUS at 04:46

## 2021-10-16 RX ADMIN — HYDROMORPHONE HYDROCHLORIDE 1 MG: 1 INJECTION, SOLUTION INTRAMUSCULAR; INTRAVENOUS; SUBCUTANEOUS at 18:03

## 2021-10-16 RX ADMIN — DOCUSATE SODIUM 100 MG: 100 CAPSULE ORAL at 21:22

## 2021-10-16 RX ADMIN — OXYCODONE AND ACETAMINOPHEN 1 TABLET: 325; 10 TABLET ORAL at 18:03

## 2021-10-16 RX ADMIN — HYDROMORPHONE HYDROCHLORIDE 0.5 MG: 1 INJECTION, SOLUTION INTRAMUSCULAR; INTRAVENOUS; SUBCUTANEOUS at 01:25

## 2021-10-16 RX ADMIN — HYDROMORPHONE HYDROCHLORIDE 1 MG: 1 INJECTION, SOLUTION INTRAMUSCULAR; INTRAVENOUS; SUBCUTANEOUS at 21:05

## 2021-10-16 RX ADMIN — OXYCODONE AND ACETAMINOPHEN 1 TABLET: 325; 10 TABLET ORAL at 08:48

## 2021-10-16 RX ADMIN — OXYCODONE AND ACETAMINOPHEN 1 TABLET: 325; 10 TABLET ORAL at 04:41

## 2021-10-16 RX ADMIN — ONDANSETRON 4 MG: 2 INJECTION INTRAMUSCULAR; INTRAVENOUS at 19:48

## 2021-10-16 RX ADMIN — METOPROLOL TARTRATE 25 MG: 25 TABLET, FILM COATED ORAL at 08:43

## 2021-10-16 NOTE — PROGRESS NOTES
MD Mohinder Kahn PA-C, [facility]       Orthopaedic Surgery Progress Note      10/16/2021   09:37 CDT    Name:  Feliberto Knowles  MRN:    [id number]     Acct:     [id number]   Room:  [address] [place] [address] Day: 1  POD:    2 Days Post-Op     Admit Date: 10/14/2021  PCP: Trey Arciniega MD        Subjective:     Interval:     S/P  1.   Excisional debridement of a right shoulder periprosthetic infection  2    Right shoulder exchange of the glenosphere, humeral tray and polyethylene spacer    C/o moderate pain. O/w stable.    Pain: 8       Medications:     Allergies: No Known Allergies    Current Meds:   Current Facility-Administered Medications   Medication Dose Route Frequency Provider Last Rate Last Admin   • diazePAM (VALIUM) tablet 5 mg  5 mg Oral Q6H PRN Sandro Monsivais MD       • diphenhydrAMINE (BENADRYL) capsule 25 mg  25 mg Oral Q6H PRN Sandro Monsivais MD       • HYDROmorphone (DILAUDID) injection 0.5 mg  0.5 mg Intravenous Q2H PRN Sandro Monsivais MD   0.5 mg at 10/16/21 0843   • magnesium hydroxide (MILK OF MAGNESIA) suspension 10 mL  10 mL Oral Daily PRN Sandro Monsivais MD   10 mL at 10/15/21 1511   • metoprolol tartrate (LOPRESSOR) tablet 25 mg  25 mg Oral Q12H Snadro Monsivais MD   25 mg at 10/16/21 0843   • naloxone (NARCAN) injection 0.4 mg  0.4 mg Intravenous Q5 Min PRN Sandro Monsivais MD       • ondansetron (ZOFRAN) injection 4 mg  4 mg Intravenous Q6H PRN Sandro Monsivais MD   4 mg at 10/15/21 1229   • ondansetron (ZOFRAN) tablet 4 mg  4 mg Oral Q6H PRN Sandro Monsivais MD       • oxyCODONE-acetaminophen (PERCOCET)  MG per tablet 1 tablet  1 tablet Oral Q4H PRN Sandro Monsivais MD   1 tablet at 10/16/21 0848   • oxyCODONE-acetaminophen (PERCOCET) 7.5-325 MG per tablet 1 tablet  1 tablet Oral Q4H PRN Sandro Monsivais MD   1 tablet at 10/16/21 0003   • promethazine (PHENERGAN) tablet 12.5 mg  12.5 mg Oral Q4H PRN Sandro Monsivais MD        Or   • promethazine (PHENERGAN) suppository 12.5 mg  12.5 mg Rectal Q4H PRN Sandro Monsivais MD      "  • sertraline (ZOLOFT) tablet 100 mg  100 mg Oral Daily Sandro Monsivais MD   100 mg at 10/16/21 0843   • sodium chloride 0.9 % infusion  100 mL/hr Intravenous Continuous Sandro Monsivais  mL/hr at 10/16/21 0446 100 mL/hr at 10/16/21 0446         Data:     Vitals:  /75 (BP Location: Left arm, Patient Position: Lying)   Pulse 77   Temp 98 °F (36.7 °C) (Oral)   Resp 18   Ht 170.5 cm (67.13\")   Wt 116 kg (256 lb 13.4 oz)   SpO2 97%   BMI 40.08 kg/m²   Temp (24hrs), Av.9 °F (36.6 °C), Min:97.6 °F (36.4 °C), Max:98.1 °F (36.7 °C)      I/O (24Hr):    Intake/Output Summary (Last 24 hours) at 10/16/2021 09  Last data filed at 10/16/2021 0441  Gross per 24 hour   Intake --   Output 100 ml   Net -100 ml       Labs:  Lab Results (last 72 hours)     Procedure Component Value Units Date/Time    Body Fluid Culture - Synovial Fluid, Shoulder, Right [[id number]]  (Abnormal) Collected: 10/14/21 1546    Specimen: Synovial Fluid from Shoulder, Right Updated: 10/16/21 0838     Body Fluid Culture Heavy growth (4+) Staphylococcus aureus     Gram Stain Many (4+) WBCs seen      Few (2+) Gram positive cocci, intracellular and extracellular    Tissue / Bone Culture - Tissue, Shoulder, Right [[id number]]  (Abnormal) Collected: 10/14/21 1556    Specimen: Tissue from Shoulder, Right Updated: 10/16/21 0838     Tissue Culture Light growth (2+) Staphylococcus aureus     Gram Stain Many (4+) WBCs per low power field      Rare (1+) Gram positive cocci    Tissue / Bone Culture - Tissue, Shoulder, Right [[id number]]  (Abnormal) Collected: 10/14/21 1556    Specimen: Tissue from Shoulder, Right Updated: 10/16/21 0838     Tissue Culture Light growth (2+) Staphylococcus aureus     Gram Stain Many (4+) WBCs per low power field      No organisms seen    Tissue / Bone Culture - Tissue, Shoulder, Right [[id number]] Collected: 10/14/21 1557    Specimen: Tissue from Shoulder, Right Updated: 10/16/21 0719     Tissue Culture No growth at 2 days     " Gram Stain Many (4+) WBCs per low power field      No organisms seen    C-reactive Protein [[id number]]  (Abnormal) Collected: 10/16/21 0613    Specimen: Blood Updated: 10/16/21 0644     C-Reactive Protein 3.12 mg/dL     C-reactive Protein [[id number]]  (Abnormal) Collected: 10/15/21 1236    Specimen: Blood Updated: 10/15/21 1308     C-Reactive Protein 6.92 mg/dL     Anaerobic Culture - Tissue, Shoulder, Right [[id number]] Collected: 10/14/21 1557    Specimen: Tissue from Shoulder, Right Updated: 10/14/21 1640    Anaerobic Culture - Tissue, Shoulder, Right [[id number]] Collected: 10/14/21 1556    Specimen: Tissue from Shoulder, Right Updated: 10/14/21 1640    Anaerobic Culture - Tissue, Shoulder, Right [[id number]] Collected: 10/14/21 1556    Specimen: Tissue from Shoulder, Right Updated: 10/14/21 1640    Basic Metabolic Panel [[id number]]  (Abnormal) Collected: 10/14/21 1429    Specimen: Blood Updated: 10/14/21 1520     Glucose 78 mg/dL      BUN 11 mg/dL      Creatinine 0.54 mg/dL      Sodium 132 mmol/L      Potassium 4.4 mmol/L      Chloride 100 mmol/L      CO2 18.0 mmol/L      Calcium 8.0 mg/dL      eGFR Non African Amer >150 mL/min/1.73      BUN/Creatinine Ratio 20.4     Anion Gap 14.0 mmol/L     Narrative:      GFR Normal >60  Chronic Kidney Disease <60  Kidney Failure <15      COVID PRE-OP / PRE-PROCEDURE SCREENING ORDER (NO ISOLATION) - Swab, Nasal Cavity [[id number]]  (Normal) Collected: 10/14/21 1420    Specimen: Swab from Nasal Cavity Updated: 10/14/21 1500    Narrative:      The following orders were created for panel order COVID PRE-OP / PRE-PROCEDURE SCREENING ORDER (NO ISOLATION) - Swab, Nasal Cavity.  Procedure                               Abnormality         Status                     ---------                               -----------         ------                     COVID-19,[facility]...[[id number]]  Normal              Final result                 Please view results for these tests on the  individual orders.    COVID-19,[facility] IN-HOUSE,Nasal Swab No Transport Media 3-4 HR TAT - Swab, Nasal Cavity [[id number]]  (Normal) Collected: 10/14/21 1420    Specimen: Swab from Nasal Cavity Updated: 10/14/21 1500     COVID19 Not Detected    Narrative:      Fact sheet for providers: https://www.fda.gov/media/103727/download     Fact sheet for patients: https://www.fda.gov/media/425248/download    Test performed by PCR.    Consider negative results in combination with clinical observations, patient history, and epidemiological information.    CBC (No Diff) [[id number]]  (Abnormal) Collected: 10/14/21 1429    Specimen: Blood Updated: 10/14/21 1456     WBC 13.47 10*3/mm3      RBC 3.82 10*6/mm3      Hemoglobin 10.5 g/dL      Hematocrit 31.7 %      MCV 83.0 fL      MCH 27.5 pg      MCHC 33.1 g/dL      RDW 12.4 %      RDW-SD 37.0 fl      MPV 8.6 fL      Platelets 262 10*3/mm3         Collected Updated Procedure Result Status    10/16/2021 0613 10/16/2021 0644 C-reactive Protein [[id number]]   (Abnormal)   Blood    Final result Component Value Units   C-Reactive Protein 3.12 High  mg/dL           10/15/2021 1236 10/15/2021 1308 C-reactive Protein [[id number]]   (Abnormal)   Blood    Final result Component Value Units   C-Reactive Protein 6.92 High  mg/dL           10/14/2021 1557 10/14/2021 1640 Anaerobic Culture - Tissue, Shoulder, Right [[id number]]   Tissue from Shoulder, Right    In process Component Value   No component results              10/14/2021 1557 10/16/2021 0719 Tissue / Bone Culture - Tissue, Shoulder, Right [[id number]]   Tissue from Shoulder, Right    Preliminary result Component Value   Tissue Culture No growth at 2 days P   Gram Stain Many (4+) WBCs per low power field P    No organisms seen P              10/14/2021 1556 10/14/2021 1640 Anaerobic Culture - Tissue, Shoulder, Right [[id number]]   Tissue from Shoulder, Right    In process Component Value   No component results              10/14/2021 1556  10/14/2021 1640 Anaerobic Culture - Tissue, Shoulder, Right [[id number]]   Tissue from Shoulder, Right    In process Component Value   No component results              10/14/2021 1556 10/16/2021 0838 Tissue / Bone Culture - Tissue, Shoulder, Right [[id number]]   (Abnormal)   Tissue from Shoulder, Right    Preliminary result Component Value   Tissue Culture Light growth (2+) Staphylococcus aureus Abnormal  P   Gram Stain Many (4+) WBCs per low power field P    Rare (1+) Gram positive cocci P              10/14/2021 1556 10/16/2021 0838 Tissue / Bone Culture - Tissue, Shoulder, Right [[id number]]   (Abnormal)   Tissue from Shoulder, Right    Preliminary result Component Value   Tissue Culture Light growth (2+) Staphylococcus aureus Abnormal  P   Gram Stain Many (4+) WBCs per low power field P    No organisms seen P              10/14/2021 1546 10/16/2021 0838 Body Fluid Culture - Synovial Fluid, Shoulder, Right [[id number]]   (Abnormal)   Synovial Fluid from Shoulder, Right    Preliminary result Component Value   Body Fluid Culture Heavy growth (4+) Staphylococcus aureus Abnormal  P   Gram Stain Many (4+) WBCs seen P    Few (2+) Gram positive cocci, intracellular and extracellular P            Physical Exam:     : Incisions are CDI, Dressing is CDI. Moderate tenderness to palpation, compartments soft. NVI distally throughout.      Assessment:     Primary Problem  <principal problem not specified>  POD 2 Days Post-Op INCISION AND DRAINAGE RIGHT SHOULDER WITH POLY EXCHANGE (Right)        Drain output 100ml    Plan:     1. Adjust pain meds, follow sensitivity results.     1. Continue PT/OT.  2. Continue DVT prophylaxis.  3. Continue NWB right upper extremity.  4. Anticipate discharge next week   5: call if needed.       Electronically signed by CAIT Murillo on 10/16/2021 at 09:37 CDT

## 2021-10-16 NOTE — PROGRESS NOTES
"Pharmacy Dosing Service  Pharmacokinetics  Vancomycin Initial Evaluation  Assessment/Action/Plan:  Loading dose?: none  Current Order: Vancomycin 1500 mg IVPB every 12 hours  Current end date:11/27/21  Levels: none ordered at this time  Additional antimicrobial agent(s): none    Vancomycin dosage initiated based on population pharmacokinetic parameters. Pharmacy will continue to follow daily and adjust dose accordingly.     Subjective:  Feliberto Knowles is a 46 y.o. male with a Vancomycin \"Pharmacy to Dose\" consult for the treatment of Bone/Joint infection , day 1 of 42 of treatment.    AUC Model Data:    Regimen: 1500 mg IV every 12 hours.  Start time: 11:34 on 10/16/2021  Exposure target: AUC24 (range)400-600 mg/L.hr   AUC24,ss: 546 mg/L.hr  PAUC*: 80 %  Ctrough,ss: 17.4 mg/L  Pconc*: 38 %  Tox.: 13 %    Objective:  Ht: 170.5 cm (67.13\"); Wt: 116 kg (256 lb 13.4 oz)  Estimated Creatinine Clearance: 211.6 mL/min (A) (by C-G formula based on SCr of 0.54 mg/dL (L)).   Creatinine   Date Value Ref Range Status   10/14/2021 0.54 (L) 0.76 - 1.27 mg/dL Final   09/01/2021 0.86 0.76 - 1.27 mg/dL Final   03/24/2021 0.89 0.76 - 1.27 mg/dL Final   06/25/2020 0.8 0.5 - 1.2 mg/dL Final   04/09/2019 0.9 0.5 - 1.2 mg/dL Final   05/17/2018 0.7 0.5 - 1.2 mg/dL Final      Lab Results   Component Value Date    WBC 13.47 (H) 10/14/2021    WBC 9.34 09/01/2021    WBC 9.60 07/19/2021      Baseline culture results:  Microbiology Results (last 10 days)       Procedure Component Value - Date/Time    Tissue / Bone Culture - Tissue, Shoulder, Right [[id number]] Collected: 10/14/21 1558    Lab Status: Preliminary result Specimen: Tissue from Shoulder, Right Updated: 10/16/21 5066     Tissue Culture No growth at 2 days     Gram Stain Many (4+) WBCs per low power field      No organisms seen    Tissue / Bone Culture - Tissue, Shoulder, Right [[id number]]  (Abnormal) Collected: 10/14/21 0606    Lab Status: Preliminary result Specimen: Tissue from " Shoulder, Right Updated: 10/16/21 0838     Tissue Culture Light growth (2+) Staphylococcus aureus     Gram Stain Many (4+) WBCs per low power field      Rare (1+) Gram positive cocci    Tissue / Bone Culture - Tissue, Shoulder, Right [[id number]]  (Abnormal) Collected: 10/14/21 1556    Lab Status: Preliminary result Specimen: Tissue from Shoulder, Right Updated: 10/16/21 0838     Tissue Culture Light growth (2+) Staphylococcus aureus     Gram Stain Many (4+) WBCs per low power field      No organisms seen    Body Fluid Culture - Synovial Fluid, Shoulder, Right [[id number]]  (Abnormal) Collected: 10/14/21 1546    Lab Status: Preliminary result Specimen: Synovial Fluid from Shoulder, Right Updated: 10/16/21 0838     Body Fluid Culture Heavy growth (4+) Staphylococcus aureus     Gram Stain Many (4+) WBCs seen      Few (2+) Gram positive cocci, intracellular and extracellular    COVID PRE-OP / PRE-PROCEDURE SCREENING ORDER (NO ISOLATION) - Swab, Nasal Cavity [[id number]]  (Normal) Collected: 10/14/21 1420    Lab Status: Final result Specimen: Swab from Nasal Cavity Updated: 10/14/21 1500    Narrative:      The following orders were created for panel order COVID PRE-OP / PRE-PROCEDURE SCREENING ORDER (NO ISOLATION) - Swab, Nasal Cavity.  Procedure                               Abnormality         Status                     ---------                               -----------         ------                     COVID-19,[facility]...[[id number]]  Normal              Final result                 Please view results for these tests on the individual orders.    COVID-19,[facility] IN-HOUSE,Nasal Swab No Transport Media 3-4 HR TAT - Swab, Nasal Cavity [[id number]]  (Normal) Collected: 10/14/21 1420    Lab Status: Final result Specimen: Swab from Nasal Cavity Updated: 10/14/21 1500     COVID19 Not Detected    Narrative:      Fact sheet for providers: https://www.fda.gov/media/475455/download     Fact sheet for patients:  https://www.fda.gov/media/882124/download    Test performed by PCR.    Consider negative results in combination with clinical observations, patient history, and epidemiological information.            Janeth Durán, PharmD  10/16/21 10:42 CDT

## 2021-10-16 NOTE — PLAN OF CARE
Goal Outcome Evaluation:  Plan of Care Reviewed With: patient           Outcome Summary: Patient alert and oriented times 4, up ad quirino, continues to complain to the right shoulder, medicated per MD orders, hemavac in place bloody drainage noted, VSS, safety maintained, will continue to monitor.

## 2021-10-17 LAB
BACTERIA FLD CULT: ABNORMAL
BACTERIA SPEC AEROBE CULT: ABNORMAL
BACTERIA SPEC AEROBE CULT: ABNORMAL
BACTERIA SPEC AEROBE CULT: NORMAL
CRP SERPL-MCNC: 1.58 MG/DL (ref 0–0.5)
GRAM STN SPEC: ABNORMAL
GRAM STN SPEC: NORMAL
GRAM STN SPEC: NORMAL

## 2021-10-17 PROCEDURE — 63710000001 ONDANSETRON PER 8 MG: Performed by: ORTHOPAEDIC SURGERY

## 2021-10-17 PROCEDURE — 25010000002 VANCOMYCIN 10 G RECONSTITUTED SOLUTION: Performed by: ORTHOPAEDIC SURGERY

## 2021-10-17 PROCEDURE — 0 HYDROMORPHONE 1 MG/ML SOLUTION: Performed by: PHYSICIAN ASSISTANT

## 2021-10-17 PROCEDURE — 86140 C-REACTIVE PROTEIN: CPT | Performed by: PHYSICIAN ASSISTANT

## 2021-10-17 PROCEDURE — 25010000003 HYDROMORPHONE 1 MG/ML SOLUTION: Performed by: PHYSICIAN ASSISTANT

## 2021-10-17 RX ADMIN — VANCOMYCIN HYDROCHLORIDE 1500 MG: 10 INJECTION, POWDER, LYOPHILIZED, FOR SOLUTION INTRAVENOUS at 00:12

## 2021-10-17 RX ADMIN — OXYCODONE AND ACETAMINOPHEN 1 TABLET: 325; 10 TABLET ORAL at 05:52

## 2021-10-17 RX ADMIN — OXYCODONE AND ACETAMINOPHEN 1 TABLET: 325; 10 TABLET ORAL at 01:10

## 2021-10-17 RX ADMIN — DOCUSATE SODIUM 100 MG: 100 CAPSULE ORAL at 08:24

## 2021-10-17 RX ADMIN — NAFCILLIN SODIUM 2 G: 2 INJECTION, POWDER, LYOPHILIZED, FOR SOLUTION INTRAMUSCULAR; INTRAVENOUS at 12:14

## 2021-10-17 RX ADMIN — HYDROMORPHONE HYDROCHLORIDE 1 MG: 1 INJECTION, SOLUTION INTRAMUSCULAR; INTRAVENOUS; SUBCUTANEOUS at 19:20

## 2021-10-17 RX ADMIN — ONDANSETRON 4 MG: 4 TABLET, FILM COATED ORAL at 20:13

## 2021-10-17 RX ADMIN — NAFCILLIN SODIUM 2 G: 2 INJECTION, POWDER, LYOPHILIZED, FOR SOLUTION INTRAMUSCULAR; INTRAVENOUS at 20:11

## 2021-10-17 RX ADMIN — METOPROLOL TARTRATE 25 MG: 25 TABLET, FILM COATED ORAL at 08:24

## 2021-10-17 RX ADMIN — DOCUSATE SODIUM 100 MG: 100 CAPSULE ORAL at 20:13

## 2021-10-17 RX ADMIN — HYDROMORPHONE HYDROCHLORIDE 1 MG: 1 INJECTION, SOLUTION INTRAMUSCULAR; INTRAVENOUS; SUBCUTANEOUS at 02:02

## 2021-10-17 RX ADMIN — NAFCILLIN SODIUM 2 G: 2 INJECTION, POWDER, LYOPHILIZED, FOR SOLUTION INTRAMUSCULAR; INTRAVENOUS at 16:51

## 2021-10-17 RX ADMIN — OXYCODONE AND ACETAMINOPHEN 1 TABLET: 325; 10 TABLET ORAL at 22:16

## 2021-10-17 RX ADMIN — OXYCODONE AND ACETAMINOPHEN 1 TABLET: 325; 10 TABLET ORAL at 16:51

## 2021-10-17 RX ADMIN — HYDROMORPHONE HYDROCHLORIDE 1 MG: 1 INJECTION, SOLUTION INTRAMUSCULAR; INTRAVENOUS; SUBCUTANEOUS at 13:18

## 2021-10-17 RX ADMIN — HYDROMORPHONE HYDROCHLORIDE 1 MG: 1 INJECTION, SOLUTION INTRAMUSCULAR; INTRAVENOUS; SUBCUTANEOUS at 08:24

## 2021-10-17 RX ADMIN — SERTRALINE 100 MG: 100 TABLET, FILM COATED ORAL at 08:24

## 2021-10-17 RX ADMIN — HYDROMORPHONE HYDROCHLORIDE 1 MG: 1 INJECTION, SOLUTION INTRAMUSCULAR; INTRAVENOUS; SUBCUTANEOUS at 22:52

## 2021-10-17 RX ADMIN — SODIUM CHLORIDE 100 ML/HR: 9 INJECTION, SOLUTION INTRAVENOUS at 00:12

## 2021-10-17 RX ADMIN — OXYCODONE AND ACETAMINOPHEN 1 TABLET: 325; 10 TABLET ORAL at 12:15

## 2021-10-17 RX ADMIN — METOPROLOL TARTRATE 25 MG: 25 TABLET, FILM COATED ORAL at 20:13

## 2021-10-17 NOTE — PROGRESS NOTES
"Infectious Diseases Progress Note    Patient:  Feliberto Knowles  YOB: 1975  MRN: [id number]   Admit date: 10/14/2021   Admitting Physician: Sandro Monsivais MD  Primary Care Physician: Trey Arciniega MD    Chief Complaint/Interval History: He feels okay.  Comfortable at present.  No rash or skin itching.  No diarrhea.  No pain at IV site.  Patient seen the morning of October 16, 2021.  Note initiated and completed is a late entry the morning of October 17, 2021.    Intake/Output Summary (Last 24 hours) at 10/17/2021 0856  Last data filed at 10/17/2021 0720  Gross per 24 hour   Intake --   Output 3660 ml   Net -3660 ml     Allergies: No Known Allergies  Current Scheduled Medications:   docusate sodium, 100 mg, Oral, BID  metoprolol tartrate, 25 mg, Oral, Q12H  sertraline, 100 mg, Oral, Daily  vancomycin, 1,500 mg, Intravenous, Q12H      Current PRN Medications:  •  diazePAM  •  diphenhydrAMINE  •  HYDROmorphone  •  magnesium hydroxide  •  [DISCONTINUED] HYDROmorphone **AND** naloxone  •  ondansetron  •  ondansetron  •  oxyCODONE-acetaminophen  •  oxyCODONE-acetaminophen  •  promethazine **OR** promethazine    Review of Systems see HPI    Vital Signs:  /66 (BP Location: Left arm, Patient Position: Lying)   Pulse 62   Temp 97.5 °F (36.4 °C) (Oral)   Resp 16   Ht 170.5 cm (67.13\")   Wt 116 kg (256 lb 13.4 oz)   SpO2 97%   BMI 40.08 kg/m²     Physical Exam  Vital signs - reviewed.  Line/IV site - No erythema, warmth, induration, or tenderness.  Right shoulder dressing clean and dry at present  He has drains in place right shoulder    Lab Results:  CBC:   Results from last 7 days   Lab Units 10/14/21  1429   WBC 10*3/mm3 13.47*   HEMOGLOBIN g/dL 10.5*   HEMATOCRIT % 31.7*   PLATELETS 10*3/mm3 262     BMP:  Results from last 7 days   Lab Units 10/14/21  1429   SODIUM mmol/L 132*   POTASSIUM mmol/L 4.4   CHLORIDE mmol/L 100   CO2 mmol/L 18.0*   BUN mg/dL 11   CREATININE mg/dL 0.54*   GLUCOSE mg/dL 78 "   CALCIUM mg/dL 8.0*     Culture Results: Right shoulder culture staph aureus.  Final ID and susceptibility pending.    Impression:   Acute prosthetic joint infection right shoulder.  Staph aureus.  Final susceptibility pending.    Recommendations:   Continue vancomycin  Await susceptibility testing  Transition to nafcillin or cefazolin if MSSA  Work with  and insurance Monday to make home IV antibiotic arrangements    Willie Lyons MD

## 2021-10-17 NOTE — PROGRESS NOTES
"Infectious Diseases Progress Note    Patient:  Feliberto Knowles  YOB: 1975  MRN: [id number]   Admit date: 10/14/2021   Admitting Physician: Sandro Monsivais MD  Primary Care Physician: Trey Arciniega MD    Chief Complaint/Interval History: He feels okay.  He is without new symptoms.  No fevers or chills.  No rash or skin itching.    Intake/Output Summary (Last 24 hours) at 10/17/2021 1203  Last data filed at 10/17/2021 1000  Gross per 24 hour   Intake 240 ml   Output 3060 ml   Net -2820 ml     Allergies: No Known Allergies  Current Scheduled Medications:   docusate sodium, 100 mg, Oral, BID  metoprolol tartrate, 25 mg, Oral, Q12H  sertraline, 100 mg, Oral, Daily  vancomycin, 1,500 mg, Intravenous, Q12H      Current PRN Medications:  •  diazePAM  •  diphenhydrAMINE  •  HYDROmorphone  •  magnesium hydroxide  •  [DISCONTINUED] HYDROmorphone **AND** naloxone  •  ondansetron  •  ondansetron  •  oxyCODONE-acetaminophen  •  oxyCODONE-acetaminophen  •  promethazine **OR** promethazine    Review of Systems see HPI    Vital Signs:  /66 (BP Location: Left arm, Patient Position: Lying)   Pulse 62   Temp 97.5 °F (36.4 °C) (Oral)   Resp 16   Ht 170.5 cm (67.13\")   Wt 116 kg (256 lb 13.4 oz)   SpO2 97%   BMI 40.08 kg/m²     Physical Exam  Vital signs - reviewed.  Line/IV site - No erythema, warmth, induration, or tenderness.  Right shoulder drains remain in place.  Dressing clean and dry.    Lab Results:  CBC:   Results from last 7 days   Lab Units 10/14/21  1429   WBC 10*3/mm3 13.47*   HEMOGLOBIN g/dL 10.5*   HEMATOCRIT % 31.7*   PLATELETS 10*3/mm3 262     BMP:  Results from last 7 days   Lab Units 10/14/21  1429   SODIUM mmol/L 132*   POTASSIUM mmol/L 4.4   CHLORIDE mmol/L 100   CO2 mmol/L 18.0*   BUN mg/dL 11   CREATININE mg/dL 0.54*   GLUCOSE mg/dL 78   CALCIUM mg/dL 8.0*     Culture Results:   Culture right shoulder:  Susceptibility     Staphylococcus aureus     PAL     Clindamycin 0.25 ug/ml Susceptible "     Erythromycin <=0.25 ug/ml Susceptible     Inducible Clindamycin Resistance NEG ug/ml Negative     Oxacillin 0.5 ug/ml Susceptible     Rifampin <=0.5 ug/ml Susceptible     Tetracycline <=1 ug/ml Susceptible     Trimethoprim + Sulfamethoxazole <=10 ug/ml Susceptible     Vancomycin 1 ug/ml Susceptible        Radiology: None  Additional Studies Reviewed: None    Impression:   Acute prosthetic joint infection right shoulder.  MSSA.    Recommendations:   Discontinue vancomycin  Begin treatment with nafcillin  Explore home IV antibiotic treatment with  and insurance tomorrow    Willie Lyons MD

## 2021-10-17 NOTE — PLAN OF CARE
Goal Outcome Evaluation:  Plan of Care Reviewed With: patient, spouse        Progress: improving  Outcome Summary: Pt pain is tolerable with prescribed pain medications. Cultures resulted with new orders from ID.  Up ad quirino. Hemovac in place with minimal drainage. Plan for PICC placement for outpatient antibiotic treatment. WIll continue to monitor.

## 2021-10-17 NOTE — PLAN OF CARE
Goal Outcome Evaluation:              Outcome Summary: Aox4. VSS. C/o pain relieved by IV and PO meds. Up ad quirino. Voiding. Sling in place to RUE. Dressing CDI to RUE. IVF and abx infusing as ordered. Hemovac in place, small drainange. Appears to be resting well this shift. Safety maintained. Call bell within reach. Will continue with plan of care.

## 2021-10-17 NOTE — PROGRESS NOTES
"Pharmacy Dosing Service  Pharmacokinetics  Vancomycin Follow-up Evaluation    Assessment/Action/Plan:  Current Order: Vancomycin 1500 mg IVPB every 12 hours  Current end date:11/27/2021  Levels: Vancomycin trough ordered before dose on 10/18/22549 at 1200   Additional antimicrobial agent(s): none    Continue Vancomycin 1500mg iv q12h. Pharmacy will continue to follow daily and adjust dose accordingly.    Subjective:  Feliberto Knowles is a 46 y.o. male currently on Vancomycin 1500 mg IV every 12 hours for the treatment of bone and/or joint infection, day 2 of 42 of treatment.    AUC Model Data:  Regimen: 1500 mg IV every 12 hours.  Exposure target: AUC24 (range)400-600 mg/L.hr   AUC24,ss: 549 mg/L.hr  PAUC*: 80 %  Ctrough,ss: 18 mg/L  Pconc*: 41 %  Tox.: 14 %    Objective:  Ht: 170.5 cm (67.13\"); Wt: 116 kg (256 lb 13.4 oz)  Estimated Creatinine Clearance: 211.6 mL/min (A) (by C-G formula based on SCr of 0.54 mg/dL (L)).   Creatinine  Date  Value  Ref Range  Status  10/14/2021  0.54 (L)  0.76 - 1.27 mg/dL  Final  09/01/2021  0.86  0.76 - 1.27 mg/dL  Final  03/24/2021  0.89  0.76 - 1.27 mg/dL  Final  06/25/2020  0.8  0.5 - 1.2 mg/dL  Final  04/09/2019  0.9  0.5 - 1.2 mg/dL  Final  05/17/2018  0.7  0.5 - 1.2 mg/dL  Final     Lab Results  Component  Value  Date  WBC  13.47 (H)  10/14/2021  WBC  9.34  09/01/2021  WBC  9.60  07/19/2021      No results found for: VANCOPEAK, VANCOTROUGH, VANCVIVIANOM    Culture Results:  Microbiology Results (last 10 days)     Procedure  Component  Value  -  Date/Time  Tissue / Bone Culture - Tissue, Shoulder, Right [[id number]]  Collected: 10/14/21 2462  Lab Status: Final result  Specimen: Tissue from Shoulder, Right  Updated: 10/17/21 1009  Tissue Culture  No growth at 3 days  Gram Stain  Many (4+) WBCs per low power field  No organisms seen  Tissue / Bone Culture - Tissue, Shoulder, Right [[id number]]  (Abnormal)  (Susceptibility)  Collected: 10/14/21 4652  Lab Status: Final " result  Specimen: Tissue from Shoulder, Right  Updated: 10/17/21 1048  Tissue Culture  Light growth (2+) Staphylococcus aureus  Gram Stain  Many (4+) WBCs per low power field  Rare (1+) Gram positive cocci  Susceptibility   Staphylococcus aureus  PAL  Clindamycin  Susceptible  Erythromycin  Susceptible  Inducible Clindamycin Resistance  Negative  Oxacillin  Susceptible  Rifampin  Susceptible  Tetracycline  Susceptible  Trimethoprim + Sulfamethoxazole  Susceptible  Vancomycin  Susceptible      Linear View  Susceptibility Comments   Staphylococcus aureus  This isolate does not demonstrate inducible clindamycin resistance in vitro.  Tissue / Bone Culture - Tissue, Shoulder, Right [[id number]]  (Abnormal)  Collected: 10/14/21 1556  Lab Status: Final result  Specimen: Tissue from Shoulder, Right  Updated: 10/17/21 1049  Tissue Culture  Light growth (2+) Staphylococcus aureus  Gram Stain  Many (4+) WBCs per low power field  No organisms seen  Narrative:    Refer to previous tissue culture collected on 10/14/2021 at 1656 for MICs.  Body Fluid Culture - Synovial Fluid, Shoulder, Right [[id number]]  (Abnormal)  Collected: 10/14/21 1546  Lab Status: Final result  Specimen: Synovial Fluid from Shoulder, Right  Updated: 10/17/21 1049  Body Fluid Culture  Heavy growth (4+) Staphylococcus aureus  Gram Stain  Many (4+) WBCs seen  Few (2+) Gram positive cocci, intracellular and extracellular  Narrative:    Refer to previous tissue culture collected on 10/14/2021 at 1656 for MICs.  COVID PRE-OP / PRE-PROCEDURE SCREENING ORDER (NO ISOLATION) - Swab, Nasal Cavity [[id number]]  (Normal)  Collected: 10/14/21 1420  Lab Status: Final result  Specimen: Swab from Nasal Cavity  Updated: 10/14/21 1500  Narrative:    The following orders were created for panel order COVID PRE-OP / PRE-PROCEDURE SCREENING ORDER (NO ISOLATION) - Swab, Nasal Cavity.  Procedure                               Abnormality         Status                     ---------                                -----------         ------                     COVID-19,[facility]...[[id number]]  Normal              Final result                 Please view results for these tests on the individual orders.  COVID-19,[facility] IN-HOUSE,Nasal Swab No Transport Media 3-4 HR TAT - Swab, Nasal Cavity [[id number]]  (Normal)  Collected: 10/14/21 1420  Lab Status: Final result  Specimen: Swab from Nasal Cavity  Updated: 10/14/21 1500  COVID19  Not Detected  Narrative:    Fact sheet for providers: https://www.fda.gov/media/505786/download     Fact sheet for patients: https://www.fda.gov/media/255451/download    Test performed by PCR.    Consider negative results in combination with clinical observations, patient history, and epidemiological information.        Gregorio Jorge, PharmD   10/17/21 11:46 CDT

## 2021-10-18 LAB
ALBUMIN SERPL-MCNC: 4 G/DL (ref 3.5–5.2)
ALBUMIN/GLOB SERPL: 1.3 G/DL
ALP SERPL-CCNC: 91 U/L (ref 39–117)
ALT SERPL W P-5'-P-CCNC: 24 U/L (ref 1–41)
ANION GAP SERPL CALCULATED.3IONS-SCNC: 16 MMOL/L (ref 5–15)
AST SERPL-CCNC: 20 U/L (ref 1–40)
BILIRUB SERPL-MCNC: 0.2 MG/DL (ref 0–1.2)
BUN SERPL-MCNC: 10 MG/DL (ref 6–20)
BUN/CREAT SERPL: 12.8 (ref 7–25)
CALCIUM SPEC-SCNC: 9.5 MG/DL (ref 8.6–10.5)
CHLORIDE SERPL-SCNC: 100 MMOL/L (ref 98–107)
CO2 SERPL-SCNC: 20 MMOL/L (ref 22–29)
CREAT SERPL-MCNC: 0.78 MG/DL (ref 0.76–1.27)
CRP SERPL-MCNC: 0.99 MG/DL (ref 0–0.5)
GFR SERPL CREATININE-BSD FRML MDRD: 108 ML/MIN/1.73
GLOBULIN UR ELPH-MCNC: 3 GM/DL
GLUCOSE SERPL-MCNC: 106 MG/DL (ref 65–99)
POTASSIUM SERPL-SCNC: 4.5 MMOL/L (ref 3.5–5.2)
PROT SERPL-MCNC: 7 G/DL (ref 6–8.5)
SODIUM SERPL-SCNC: 136 MMOL/L (ref 136–145)

## 2021-10-18 PROCEDURE — 25010000003 HYDROMORPHONE 1 MG/ML SOLUTION: Performed by: PHYSICIAN ASSISTANT

## 2021-10-18 PROCEDURE — 86140 C-REACTIVE PROTEIN: CPT | Performed by: PHYSICIAN ASSISTANT

## 2021-10-18 PROCEDURE — 25010000002 ONDANSETRON PER 1 MG: Performed by: ORTHOPAEDIC SURGERY

## 2021-10-18 PROCEDURE — 0 HYDROMORPHONE 1 MG/ML SOLUTION: Performed by: PHYSICIAN ASSISTANT

## 2021-10-18 PROCEDURE — 80053 COMPREHEN METABOLIC PANEL: CPT | Performed by: INTERNAL MEDICINE

## 2021-10-18 PROCEDURE — C1751 CATH, INF, PER/CENT/MIDLINE: HCPCS

## 2021-10-18 RX ORDER — LIDOCAINE HYDROCHLORIDE 10 MG/ML
1 INJECTION, SOLUTION INFILTRATION; PERINEURAL ONCE
Status: COMPLETED | OUTPATIENT
Start: 2021-10-18 | End: 2021-10-18

## 2021-10-18 RX ADMIN — NAFCILLIN SODIUM 2 G: 2 INJECTION, POWDER, LYOPHILIZED, FOR SOLUTION INTRAMUSCULAR; INTRAVENOUS at 12:23

## 2021-10-18 RX ADMIN — ONDANSETRON 4 MG: 2 INJECTION INTRAMUSCULAR; INTRAVENOUS at 05:49

## 2021-10-18 RX ADMIN — SERTRALINE 100 MG: 100 TABLET, FILM COATED ORAL at 08:47

## 2021-10-18 RX ADMIN — NAFCILLIN SODIUM 2 G: 2 INJECTION, POWDER, LYOPHILIZED, FOR SOLUTION INTRAMUSCULAR; INTRAVENOUS at 01:43

## 2021-10-18 RX ADMIN — NAFCILLIN SODIUM 2 G: 2 INJECTION, POWDER, LYOPHILIZED, FOR SOLUTION INTRAMUSCULAR; INTRAVENOUS at 05:24

## 2021-10-18 RX ADMIN — HYDROMORPHONE HYDROCHLORIDE 1 MG: 1 INJECTION, SOLUTION INTRAMUSCULAR; INTRAVENOUS; SUBCUTANEOUS at 01:30

## 2021-10-18 RX ADMIN — METOPROLOL TARTRATE 25 MG: 25 TABLET, FILM COATED ORAL at 20:52

## 2021-10-18 RX ADMIN — OXYCODONE AND ACETAMINOPHEN 1 TABLET: 325; 10 TABLET ORAL at 02:47

## 2021-10-18 RX ADMIN — METOPROLOL TARTRATE 25 MG: 25 TABLET, FILM COATED ORAL at 08:47

## 2021-10-18 RX ADMIN — LIDOCAINE HYDROCHLORIDE ANHYDROUS 1 ML: 10 INJECTION, SOLUTION INFILTRATION at 16:32

## 2021-10-18 RX ADMIN — HYDROMORPHONE HYDROCHLORIDE 1 MG: 1 INJECTION, SOLUTION INTRAMUSCULAR; INTRAVENOUS; SUBCUTANEOUS at 17:26

## 2021-10-18 RX ADMIN — DOCUSATE SODIUM 100 MG: 100 CAPSULE ORAL at 20:52

## 2021-10-18 RX ADMIN — ONDANSETRON 4 MG: 2 INJECTION INTRAMUSCULAR; INTRAVENOUS at 17:26

## 2021-10-18 RX ADMIN — DOCUSATE SODIUM 100 MG: 100 CAPSULE ORAL at 08:47

## 2021-10-18 RX ADMIN — HYDROMORPHONE HYDROCHLORIDE 1 MG: 1 INJECTION, SOLUTION INTRAMUSCULAR; INTRAVENOUS; SUBCUTANEOUS at 05:24

## 2021-10-18 RX ADMIN — OXYCODONE AND ACETAMINOPHEN 1 TABLET: 325; 10 TABLET ORAL at 09:00

## 2021-10-18 RX ADMIN — NAFCILLIN SODIUM 2 G: 2 INJECTION, POWDER, LYOPHILIZED, FOR SOLUTION INTRAMUSCULAR; INTRAVENOUS at 17:28

## 2021-10-18 RX ADMIN — NAFCILLIN SODIUM 2 G: 2 INJECTION, POWDER, LYOPHILIZED, FOR SOLUTION INTRAMUSCULAR; INTRAVENOUS at 20:52

## 2021-10-18 RX ADMIN — HYDROMORPHONE HYDROCHLORIDE 1 MG: 1 INJECTION, SOLUTION INTRAMUSCULAR; INTRAVENOUS; SUBCUTANEOUS at 09:59

## 2021-10-18 RX ADMIN — NAFCILLIN SODIUM 2 G: 2 INJECTION, POWDER, LYOPHILIZED, FOR SOLUTION INTRAMUSCULAR; INTRAVENOUS at 08:49

## 2021-10-18 RX ADMIN — OXYCODONE HYDROCHLORIDE AND ACETAMINOPHEN 1 TABLET: 7.5; 325 TABLET ORAL at 18:49

## 2021-10-18 NOTE — PLAN OF CARE
Goal Outcome Evaluation:              Outcome Summary: Pt waiting on approval from insurance for PICC placement so he can DC home with IV antibiotics. Drain DC this shift. C/o of pain with PO and IV pain meds given. A&Ox4. . PPP. Denies n/t Up ad quirino. Call light in reach. Safety maintained. Will cont to monitor.

## 2021-10-18 NOTE — THERAPY DISCHARGE NOTE
Acute Care - Occupational Therapy Discharge Summary  [facility]     Patient Name: Feliberto Knowles  : 1975  MRN: [id number]    Today's Date: 10/18/2021                 Admit Date: 10/14/2021        OT Recommendation and Plan    Visit Dx:    ICD-10-CM ICD-9-CM   1. Postoperative infection  T81.40XA 998.59   2. Decreased activities of daily living (ADL)  Z78.9 V49.89                OT Rehab Goals     Row Name 10/18/21 1400             Bathing Goal 1 (OT)    Activity/Device (Bathing Goal 1, OT) bathing skills, all; upper body bathing; lower body bathing  -TS      [place]ills Level/Cues Needed (Bathing Goal 1, OT) modified independence; 1 person assist  -TS      Time Frame (Bathing Goal 1, OT) long term goal (LTG); 10 days  -TS      Progress/Outcomes (Bathing Goal 1, OT) goal met  per pt  -TS              Dressing Goal 1 (OT)    Activity/Device (Dressing Goal 1, OT) dressing skills, all; upper body dressing  -TS      [place]ills/Cues Needed (Dressing Goal 1, OT) supervision required  -TS      Time Frame (Dressing Goal 1, OT) long term goal (LTG); 10 days  -TS      Strategies/Barriers (Dressing Goal 1, OT) Sling  -TS      Progress/Outcome (Dressing Goal 1, OT) goal met  per pt  -TS              Precaution Goal 1 (OT)    Activity (Precaution Goal 1, OT) weight bearing restrictions; demonstrates compliance; demonstrates understanding; during all tasks in daily routine; during ADLs  -TS      Mills Level/Cues Needed (Precautions Goal 1, OT) independently  -TS      Time Frame (Precaution Goal 1, OT) long term goal (LTG); 10 days  -TS      Progress/Outcome (Precaution Goal 1, OT) goal met  per pt  -TS            User Key  (r) = Recorded By, (t) = Taken By, (c) = Cosigned By    Initials Name Provider Type Discipline    Paulina Causey COTA Occupational Therapy Assistant OT                            OT Discharge Summary  Anticipated Discharge Disposition (OT): home with assist  Reason for Discharge:  Discharge from facility, Maximum functional potential achieved  Outcomes Achieved: Refer to plan of care for updates on goals achieved  Discharge Destination: Home with assist      CLYDE Louise  10/18/2021

## 2021-10-18 NOTE — CASE MANAGEMENT/SOCIAL WORK
Continued Stay Note   [facility]     Patient Name: Feliberto Knowles  MRN: [id number]  Today's Date: 10/18/2021    Admit Date: 10/14/2021     Discharge Plan     Row Name 10/18/21 1147       Plan    Plan [employer] working on IV abx    Patient/Family in Agreement with Plan yes    Plan Comments Spoke with Rashida Rodriguez from [employer] [phone] and they will work on setting up IV abx in home with their companies they use for services. They set up and just need orders, faxed to them at [phone].  They are saying it could be 1-2 days before completion.  Pt aware and PICC line will be provided today.  Will follow.               Discharge Codes    No documentation.                     CAMERON Pandya

## 2021-10-18 NOTE — PAYOR COMM NOTE
"FROM: JEFFREY KUMAR  PHONE: [phone]  FAX: [phone]    CLAIM: [id number]    Feliberto Stoner (46 y.o. Male)             Date of Birth Social Security Number Address Home Phone MRN    1975  [address] [facility] [id number] [phone] [id number]    Denominational Marital Status             Catholic        Admission Date Admission Type Admitting Provider Attending Provider Department, Room/Bed    10/14/21 Elective Sandro Monsivais MD Kern, Brian, MD [facility] 3A, 350/1    Discharge Date Discharge Disposition Discharge Destination                         Attending Provider: Sandro Monsivais MD    Allergies: No Known Allergies    Isolation: None   Infection: None   Code Status: CPR   Advance Care Planning Activity    Ht: 170.5 cm (67.13\")   Wt: 116 kg (256 lb 13.4 oz)    Admission Cmt: None   Principal Problem: None                Active Insurance as of 10/14/2021     Primary Coverage     Payor Plan Insurance Group Employer/Plan Group    WORKERS COMPENSATION MISC WORKERS COMPENSATION      Coverage Address Coverage Phone Number Coverage Fax Number Effective Dates    [address] [address] [phone]  4/1/2020 - None Entered    [facility] [id number]       Subscriber Name Subscriber Birth Date Member ID       FELIBERTO STONER 1975 [id number]                 Emergency Contacts      (Rel.) Home Phone Work Phone Mobile Phone    Janet Stoner (Spouse) -- -- [phone]               Operative/Procedure Notes (last 7 days)      Sandro Monsivais MD at 10/14/21 1340        Operative Report    Pt Name: Feliberto Stoner  MRN: [id number]  YOB: 1975  Date: 10/14/2021    PREOPERATIVE DIAGNOSIS:  1.   Acute right shoulder periprosthetic infection, status post reverse total shoulder arthroplasty 9/30/2021    POSTOPERATIVE DIAGNOSIS:    1.   Acute right shoulder periprosthetic infection, status post reverse total shoulder arthroplasty 9/30/2021    PROCEDURE:    1.   Excisional " debridement of a right shoulder periprosthetic infection  2    Right shoulder exchange of the glenosphere, humeral tray and polyethylene spacer     SURGEON:  Sandro Monsivais M.D.    ASSISTANT: Kailyn Conte    ANESTHESIA:  General    ESTIMATED BLOOD LOSS: 100 cc    COMPLICATIONS:  None.    CONDITION:  Stable.    INDICATION FOR PROCEDURE:  46-year-old male status post a right reverse total shoulder arthroplasty on 9/30/2021 presented to clinic today with complaints of increased pain erythema and drainage from the incision.  Patient had been seen several days prior and placed on Bactrim for a small amount of drainage from the incision site.  Over the last 24 hours he is gotten much worse as he is not feeling well and he has increased pain increased erythema.    DESCRIPTION OF PROCEDURE:  The patient was interviewed in the preanesthesia area where the operative extremity was marked with a marking pen.  The patient was then taken to the operative suite where general endotracheal anesthesia was performed per the anesthesia team.  A timeout was called to confirm the patient, the operative site, the planned procedure, and the administration of antibiotics.  The patient was then prepped and draped in a standard sterile fashion using ChloraPrep.    The prior incision was opened sharply with a 15 blade scalpel.  Purulent fluid was expressed from the wound this was collected and sent off for Gram stain culture and sensitivity.  The superficial shoulder had a in appearance of mixed hematoma with a slimy film and a dark serosanguineous fluid.  Multiple samples of this tissue were obtained with a curette and sent off for Gram stain culture and sensitivity in vision to the fluid sample.  Careful dissection was carried down through the deltopectoral interval.  The joint itself was then inspected.  Similar fluid and tissue were encountered.  The shoulder was located.    Shoulder was then dislocated and the polyethylene spacer and the  metallic tray were removed.  Tissue from around the implant was sent off for Gram stain culture and sensitivity.  I elected to remove any metal that was exposed to the shoulder.  Therefore I removed the humeral tray the polyethylene spacer and the glenosphere.  The device to remove the glenosphere was hook behind the glenosphere and I was able to get it loose.  There was no tissue behind the glenosphere on the baseplate.  The area was irrigated with 9 total liters of pulsatile lavage and 1 L of a [employer] antibiotic solution.  A Betadine brush was used to brush the top of the humeral stem as well as the baseplate.    Between every 3 L of fluid I sharply debrided the muscular tissue small amount of bone as well as fascial tissue using a rongeur 15 blade scalpel a radiofrequency wand and curettes.  I felt I was able to get the wound completely clean particular by taking off the humeral tray and the glenosphere thus removing any metal exposed to the infectious fluid.    A new 40 mm glenosphere was then placed.   I then placed a 12 metallic spacer with a 0 polyethylene spacer.  The shoulder was then reduced and found to be quite stable.  The cephalic vein was tied off with multiple #1 Vicryl sutures.  Care was taken to make sure I would maintain meticulous hemostasis.  A 19 Beninese drain was then exited out distally and laterally and placed into the joint.    The incision was then closed with a 2-0 nylon suture.  Patient was placed in a shoulder immobilizer.  He awoke from anesthesia without difficulty and was transferred the PACU in stable condition.    The patient will be admitted for IV antibiotics.  An infectious disease consult will be ordered and we will await the outcome of the patient's cultures.  The patient will most likely require 6 weeks of IV antibiotics, followed by 6 weeks of oral antibiotics.      Sandro Monsivais M.D.    Electronically signed by Sandro Monsivais MD at 10/14/21 1088           Physician Progress Notes (last 72 hours)      Michell Amaral MD at 10/18/21 0923          INFECTIOUS DISEASES PROGRESS NOTE    Patient:  Feliberto Knowles  YOB: 1975  MRN: [id number]   Admit date: 10/14/2021   Admitting Physician: Sandro Monsivais MD  Primary Care Physician: Trey Arciniega MD    Chief Complaint: Right shoulder pain        Interval History: Patient is doing overall better.  He was on the phone initially.  He asked them to hold and we briefly discussed orchestrating going home including PICC line placement, continuous infusion of IV antibiotics with nafcillin, his wife assisting with dosing, he neurology thinks she works 312-hour shifts.  We discussed briefly alternatives such as every 8 hours cefazolin and the fact that he could potentially administer antibiotics as long as we had a extender on his PICC line.    He recalls the discussions of PICC line previously.  He is not sure that he will need home health but is okay if he needs home health.  If he does not have home health, noted that he would have to come weekly to office or elsewhere to get PICC line dressing change, blood drawn and  antibiotics.    We will place orders to see what his coverage allows.    Allergies: No Known Allergies    Current Meds:     Current Facility-Administered Medications:   •  diazePAM (VALIUM) tablet 5 mg, 5 mg, Oral, Q6H PRN, Sandro Monsivais MD  •  diphenhydrAMINE (BENADRYL) capsule 25 mg, 25 mg, Oral, Q6H PRN, Sandro Monsivais MD  •  docusate sodium (COLACE) capsule 100 mg, 100 mg, Oral, BID, Sandro Monsivais MD, 100 mg at 10/18/21 0847  •  HYDROmorphone (DILAUDID) injection 1 mg, 1 mg, Intravenous, Q2H PRN, Jesus Hdz PA, 1 mg at 10/18/21 0524  •  magnesium hydroxide (MILK OF MAGNESIA) suspension 10 mL, 10 mL, Oral, Daily PRN, Sandro Monsivais MD, 10 mL at 10/15/21 1511  •  metoprolol tartrate (LOPRESSOR) tablet 25 mg, 25 mg, Oral, Q12H, Sandro Monsivais MD, 25 mg at 10/18/21 0847  •  nafcillin 2 g in  "sodium chloride 0.9 % 100 mL IVPB-VTB, 2 g, Intravenous, Q4H, Willie Wall MD, 2 g at 10/18/21 0849  •  [DISCONTINUED] HYDROmorphone (DILAUDID) injection 0.5 mg, 0.5 mg, Intravenous, Q2H PRN, 0.5 mg at 10/15/21 1436 **AND** naloxone (NARCAN) injection 0.4 mg, 0.4 mg, Intravenous, Q5 Min PRN, Sandro Monsivias MD  •  ondansetron (ZOFRAN) injection 4 mg, 4 mg, Intravenous, Q6H PRN, Sandro Monsivais MD, 4 mg at 10/18/21 0549  •  ondansetron (ZOFRAN) tablet 4 mg, 4 mg, Oral, Q6H PRN, Sandro Monsivais MD, 4 mg at 10/17/21 2013  •  oxyCODONE-acetaminophen (PERCOCET)  MG per tablet 1 tablet, 1 tablet, Oral, Q4H PRN, Sandro Monsivais MD, 1 tablet at 10/18/21 0900  •  oxyCODONE-acetaminophen (PERCOCET) 7.5-325 MG per tablet 1 tablet, 1 tablet, Oral, Q4H PRN, Sandro Monsivais MD, 1 tablet at 10/16/21 0003  •  promethazine (PHENERGAN) tablet 12.5 mg, 12.5 mg, Oral, Q4H PRN **OR** promethazine (PHENERGAN) suppository 12.5 mg, 12.5 mg, Rectal, Q4H PRN, Sandro Monsivais MD  •  sertraline (ZOLOFT) tablet 100 mg, 100 mg, Oral, Daily, Sandro Monsivais MD, 100 mg at 10/18/21 0847  •  sodium chloride 0.9 % infusion, 100 mL/hr, Intravenous, Continuous, Sandro Monsivais MD, Last Rate: 100 mL/hr at 10/17/21 0552, 100 mL/hr at 10/17/21 0552      Review of Systems  General: No fevers  Cutaneous: No rash.    Objective     Vital Signs:  Temp (24hrs), Av.9 °F (36.6 °C), Min:97.4 °F (36.3 °C), Max:98.3 °F (36.8 °C)      /83 (BP Location: Left arm, Patient Position: Lying)   Pulse 73   Temp 97.4 °F (36.3 °C) (Oral)   Resp 18   Ht 170.5 cm (67.13\")   Wt 116 kg (256 lb 13.4 oz)   SpO2 97%   BMI 40.08 kg/m²         Physical Exam  General: Patient nontoxic appearing sitting up in bed in no acute distress.  He was initially talking on the phone  HEENT: Sclera anicteric and noninjected  Respiratory: Effort even and unlabored.  He is not conversationally dyspneic  Right shoulder anterior dressing is clean dry and intact.  Right upper extremity immobilized in " a sling.  Neuro: Alert oriented, speech clear  Psych: Pleasant cooperative        Results Review:    I reviewed the patient's new clinical results.    Lab Results:  CBC:   Lab Results   Lab 10/14/21  1429   WBC 13.47*   HEMOGLOBIN 10.5*   HEMATOCRIT 31.7*   PLATELETS 262         CMP:   Lab Results   Lab 10/14/21  1429   SODIUM 132*   POTASSIUM 4.4   CHLORIDE 100   CO2 18.0*   BUN 11   CREATININE 0.54*   CALCIUM 8.0*   GLUCOSE 78       Estimated Creatinine Clearance: 211.6 mL/min (A) (by C-G formula based on SCr of 0.54 mg/dL (L)).    Culture Results:    Microbiology Results (last 10 days)     Procedure Component Value - Date/Time    Anaerobic Culture - Tissue, Shoulder, Right [[id number]]  (Normal) Collected: 10/14/21 1557    Lab Status: Preliminary result Specimen: Tissue from Shoulder, Right Updated: 10/18/21 0656     Anaerobic Culture No anaerobes isolated at 3 days    Tissue / Bone Culture - Tissue, Shoulder, Right [[id number]] Collected: 10/14/21 1557    Lab Status: Final result Specimen: Tissue from Shoulder, Right Updated: 10/17/21 1009     Tissue Culture No growth at 3 days     Gram Stain Many (4+) WBCs per low power field      No organisms seen    Anaerobic Culture - Tissue, Shoulder, Right [[id number]]  (Normal) Collected: 10/14/21 1556    Lab Status: Preliminary result Specimen: Tissue from Shoulder, Right Updated: 10/18/21 0657     Anaerobic Culture No anaerobes isolated at 3 days    Anaerobic Culture - Tissue, Shoulder, Right [[id number]]  (Normal) Collected: 10/14/21 1556    Lab Status: Preliminary result Specimen: Tissue from Shoulder, Right Updated: 10/18/21 0656     Anaerobic Culture No anaerobes isolated at 3 days    Tissue / Bone Culture - Tissue, Shoulder, Right [[id number]]  (Abnormal)  (Susceptibility) Collected: 10/14/21 1556    Lab Status: Final result Specimen: Tissue from Shoulder, Right Updated: 10/17/21 1048     Tissue Culture Light growth (2+) Staphylococcus aureus     Gram Stain Many (4+)  WBCs per low power field      Rare (1+) Gram positive cocci    Susceptibility      Staphylococcus aureus     PAL     Clindamycin Susceptible     Erythromycin Susceptible     Inducible Clindamycin Resistance Negative     Oxacillin Susceptible     Rifampin Susceptible     Tetracycline Susceptible     Trimethoprim + Sulfamethoxazole Susceptible     Vancomycin Susceptible                  Linear View               Susceptibility Comments     Staphylococcus aureus    This isolate does not demonstrate inducible clindamycin resistance in vitro.               Tissue / Bone Culture - Tissue, Shoulder, Right [[id number]]  (Abnormal) Collected: 10/14/21 1556    Lab Status: Final result Specimen: Tissue from Shoulder, Right Updated: 10/17/21 1049     Tissue Culture Light growth (2+) Staphylococcus aureus     Gram Stain Many (4+) WBCs per low power field      No organisms seen    Narrative:      Refer to previous tissue culture collected on 10/14/2021 at 1656 for MICs.      Body Fluid Culture - Synovial Fluid, Shoulder, Right [[id number]]  (Abnormal) Collected: 10/14/21 1546    Lab Status: Final result Specimen: Synovial Fluid from Shoulder, Right Updated: 10/17/21 1049     Body Fluid Culture Heavy growth (4+) Staphylococcus aureus     Gram Stain Many (4+) WBCs seen      Few (2+) Gram positive cocci, intracellular and extracellular    Narrative:      Refer to previous tissue culture collected on 10/14/2021 at 1656 for MICs.    COVID PRE-OP / PRE-PROCEDURE SCREENING ORDER (NO ISOLATION) - Swab, Nasal Cavity [[id number]]  (Normal) Collected: 10/14/21 1420    Lab Status: Final result Specimen: Swab from Nasal Cavity Updated: 10/14/21 1500    Narrative:      The following orders were created for panel order COVID PRE-OP / PRE-PROCEDURE SCREENING ORDER (NO ISOLATION) - Swab, Nasal Cavity.  Procedure                               Abnormality         Status                     ---------                               -----------          ------                     COVID-19,[facility] IN-[facility]...[[id number]]  Normal              Final result                 Please view results for these tests on the individual orders.    COVID-19,[facility] IN-HOUSE,Nasal Swab No Transport Media 3-4 HR TAT - Swab, Nasal Cavity [[id number]]  (Normal) Collected: 10/14/21 1420    Lab Status: Final result Specimen: Swab from Nasal Cavity Updated: 10/14/21 1500     COVID19 Not Detected    Narrative:      Fact sheet for providers: https://www.fda.gov/media/820447/download     Fact sheet for patients: https://www.fda.gov/media/386951/download    Test performed by PCR.    Consider negative results in combination with clinical observations, patient history, and epidemiological information.               Radiology:   Imaging Results (Last 72 Hours)     ** No results found for the last 72 hours. 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 Acute prosthetic right total shoulder infection-original surgery September 30.  Patient status post irrigation debridement and exchange of polyethylene liner  2. Acute infection right total shoulder with methicillin susceptible Staphylococcus aureus.  3. Elevated CRP of 6.92 on October 15-nearly normalized      RECOMMENDATION:   Will order PICC line-discussed with patient  Add CMP to blood in lab for today.    Below copy and pasted to inpatient consult to case management services.  Patient's wife is a registered nurse.  He reports she will be able to help him with IV infusion.  If otherwise does not need home health, and is willing to come to the office weekly for blood draw and PICC line dressing change and to  antibiotics, could arrange for outpatient IV infusion clinic.    · Currently we will plan on nafcillin 12 g IV as continuous infusion over 24 hours for methicillin susceptible Staphylococcus aureus  infection right total shoulder arthroplasty.  Plan will be for 6 weeks with start date October 14, 2021.  Patient is status post incision and drainage and exchange of the polyethylene liner per Dr. Monsivais.  · CBC, CMP, CRP q. Monday and fax to [phone]        Michell Amaral MD  10/18/21  09:23 CDT        Electronically signed by Michell Amaral MD at 10/18/21 0957     Sharan Friend PA-C at 10/18/21 0736          Orthopaedic Surgery Progress Note      10/18/2021   07:36 CDT    Name:  Feliberto Knowles  MRN:    [id number]     Acct:     [id number]   Room:  [address] [place] [address] Day: 1     Admit Date: 10/14/2021  PCP: Trey Arciniega MD        Subjective:     C/C: POD 4 Days Post-Op INCISION AND DRAINAGE RIGHT SHOULDER WITH POLY EXCHANGE (Right)    Interval History: Status: remained stable. Complaints of pain controlled with current medications. Awaiting insurance approval for IV abx. Patient has not had any acute events or new complaints today.  Wound vac in place. Cultures + MSSA.       Pain: improved      Medications:     Allergies: No Known Allergies    Current Meds:   Current Facility-Administered Medications   Medication Dose Route Frequency Provider Last Rate Last Admin   • diazePAM (VALIUM) tablet 5 mg  5 mg Oral Q6H PRN Sandro Monsivais MD       • diphenhydrAMINE (BENADRYL) capsule 25 mg  25 mg Oral Q6H PRN Sandro Monsivais MD       • docusate sodium (COLACE) capsule 100 mg  100 mg Oral BID Sandro Monsivais MD   100 mg at 10/17/21 2013   • HYDROmorphone (DILAUDID) injection 1 mg  1 mg Intravenous Q2H PRN Jesus Hdz PA   1 mg at 10/18/21 0524   • magnesium hydroxide (MILK OF MAGNESIA) suspension 10 mL  10 mL Oral Daily PRN Sandro Monsivais MD   10 mL at 10/15/21 1511   • metoprolol tartrate (LOPRESSOR) tablet 25 mg  25 mg Oral Q12H Sandro Monsivais MD   25 mg at 10/17/21 2013   • nafcillin 2 g in sodium chloride 0.9 % 100 mL IVPB-VTB  2 g Intravenous Q4H Willie Wlal MD   2 g at 10/18/21 0524   • naloxone (NARCAN)  "injection 0.4 mg  0.4 mg Intravenous Q5 Min PRN Sandro Monsivais MD       • ondansetron (ZOFRAN) injection 4 mg  4 mg Intravenous Q6H PRN Sandro Monsivais MD   4 mg at 10/18/21 0549   • ondansetron (ZOFRAN) tablet 4 mg  4 mg Oral Q6H PRN Sandro Monsivais MD   4 mg at 10/17/21 2013   • oxyCODONE-acetaminophen (PERCOCET)  MG per tablet 1 tablet  1 tablet Oral Q4H PRN Sandro Monsivais MD   1 tablet at 10/18/21 0247   • oxyCODONE-acetaminophen (PERCOCET) 7.5-325 MG per tablet 1 tablet  1 tablet Oral Q4H PRSandro Johnson MD   1 tablet at 10/16/21 0003   • promethazine (PHENERGAN) tablet 12.5 mg  12.5 mg Oral Q4H PRN Sandro Monsivais MD        Or   • promethazine (PHENERGAN) suppository 12.5 mg  12.5 mg Rectal Q4H PRN Sandro Monsivais MD       • sertraline (ZOLOFT) tablet 100 mg  100 mg Oral Daily Sandro Monsivais MD   100 mg at 10/17/21 0824   • sodium chloride 0.9 % infusion  100 mL/hr Intravenous Continuous Sandro Monsivais  mL/hr at 10/17/21 0552 100 mL/hr at 10/17/21 0552           Data:     Code Status:    Code Status and Medical Interventions:   Ordered at: 10/15/21 1154     Level Of Support Discussed With:    Patient     Code Status:    CPR     Medical Interventions (Level of Support Prior to Arrest):    Full       Family History   Problem Relation Age of Onset   • Heart disease Mother    • No Known Problems Father        Social History     Socioeconomic History   • Marital status:    Tobacco Use   • Smoking status: Former Smoker     Types: Cigarettes     Quit date: 2011     Years since quitting: 10.8   • Smokeless tobacco: Current User     Types: Chew   Vaping Use   • Vaping Use: Never used   Substance and Sexual Activity   • Alcohol use: No   • Drug use: No   • Sexual activity: Yes     Partners: Female       Vitals:  /83 (BP Location: Left arm, Patient Position: Lying)   Pulse 73   Temp 97.4 °F (36.3 °C) (Oral)   Resp 18   Ht 170.5 cm (67.13\")   Wt 116 kg (256 lb 13.4 oz)   SpO2 97%   BMI 40.08 kg/m²   Temp " (24hrs), Av.9 °F (36.6 °C), Min:97.4 °F (36.3 °C), Max:98.3 °F (36.8 °C)      I/O (24Hr):    Intake/Output Summary (Last 24 hours) at 10/18/2021 0736  Last data filed at 10/18/2021 0400  Gross per 24 hour   Intake 360 ml   Output 1010 ml   Net -650 ml       Labs:  Lab Results (last 72 hours)     Procedure Component Value Units Date/Time    Anaerobic Culture - Tissue, Shoulder, Right [[id number]]  (Normal) Collected: 10/14/21 1556    Specimen: Tissue from Shoulder, Right Updated: 10/18/21 0657     Anaerobic Culture No anaerobes isolated at 3 days    Anaerobic Culture - Tissue, Shoulder, Right [[id number]]  (Normal) Collected: 10/14/21 1556    Specimen: Tissue from Shoulder, Right Updated: 10/18/21 0656     Anaerobic Culture No anaerobes isolated at 3 days    Anaerobic Culture - Tissue, Shoulder, Right [[id number]]  (Normal) Collected: 10/14/21 1557    Specimen: Tissue from Shoulder, Right Updated: 10/18/21 0656     Anaerobic Culture No anaerobes isolated at 3 days    C-reactive Protein [[id number]]  (Abnormal) Collected: 10/18/21 0514    Specimen: Blood Updated: 10/18/21 0627     C-Reactive Protein 0.99 mg/dL     Tissue / Bone Culture - Tissue, Shoulder, Right [[id number]]  (Abnormal) Collected: 10/14/21 155    Specimen: Tissue from Shoulder, Right Updated: 10/17/21 104     Tissue Culture Light growth (2+) Staphylococcus aureus     Gram Stain Many (4+) WBCs per low power field      No organisms seen    Narrative:      Refer to previous tissue culture collected on 10/14/2021 at 1656 for MICs.      Body Fluid Culture - Synovial Fluid, Shoulder, Right [[id number]]  (Abnormal) Collected: 10/14/21 1546    Specimen: Synovial Fluid from Shoulder, Right Updated: 10/17/21 1049     Body Fluid Culture Heavy growth (4+) Staphylococcus aureus     Gram Stain Many (4+) WBCs seen      Few (2+) Gram positive cocci, intracellular and extracellular    Narrative:      Refer to previous tissue culture collected on 10/14/2021 at 1656 for  MICs.    Tissue / Bone Culture - Tissue, Shoulder, Right [[id number]]  (Abnormal)  (Susceptibility) Collected: 10/14/21 1556    Specimen: Tissue from Shoulder, Right Updated: 10/17/21 1048     Tissue Culture Light growth (2+) Staphylococcus aureus     Gram Stain Many (4+) WBCs per low power field      Rare (1+) Gram positive cocci    Susceptibility      Staphylococcus aureus     PAL     Clindamycin Susceptible     Erythromycin Susceptible     Inducible Clindamycin Resistance Negative     Oxacillin Susceptible     Rifampin Susceptible     Tetracycline Susceptible     Trimethoprim + Sulfamethoxazole Susceptible     Vancomycin Susceptible                  Linear View               Susceptibility Comments     Staphylococcus aureus    This isolate does not demonstrate inducible clindamycin resistance in vitro.               Tissue / Bone Culture - Tissue, Shoulder, Right [[id number]] Collected: 10/14/21 1557    Specimen: Tissue from Shoulder, Right Updated: 10/17/21 1009     Tissue Culture No growth at 3 days     Gram Stain Many (4+) WBCs per low power field      No organisms seen    C-reactive Protein [[id number]]  (Abnormal) Collected: 10/17/21 0638    Specimen: Blood Updated: 10/17/21 0710     C-Reactive Protein 1.58 mg/dL     C-reactive Protein [[id number]]  (Abnormal) Collected: 10/16/21 0613    Specimen: Blood Updated: 10/16/21 0644     C-Reactive Protein 3.12 mg/dL     C-reactive Protein [[id number]]  (Abnormal) Collected: 10/15/21 1236    Specimen: Blood Updated: 10/15/21 1308     C-Reactive Protein 6.92 mg/dL               Physical Exam:   General: Resting comfortably, NAD, cooperative with exam. Alert and oriented x 3.   Right Shoulder: Incision is clean, dry, intact. Dressing is Clean, Dry, Intact. Neurovascular intact distally. Hemovac in place. Moderate tenderness to palpation, soft throughout. No signs or symptoms of DVT or PE.      Assessment:     Primary Problem  POD 4 Days Post-Op INCISION AND DRAINAGE RIGHT  "SHOULDER WITH POLY EXCHANGE (Right)   S/P RTSA with prosthetic joint infection, + MSSA       Plan:     1. Continue PT/OT.  2. Continue DVT prophylaxis.  3. Continue NWB right upper extremity.  4. Anticipate discharge today or tomorrow - awaiting insurance approval and plan for IV abx  5. Patient will return to clinic in 7-10 days after discharge  6. D/C wound vac today      Electronically signed by Sharan Friend PA-C on 10/18/2021 at 07:36 CDT       Electronically signed by Sharan Friend PA-C at 10/18/21 0740     Willie Wall MD at 10/17/21 1203          Infectious Diseases Progress Note    Patient:  Feliberto Knowles  YOB: 1975  MRN: [id number]   Admit date: 10/14/2021   Admitting Physician: Sandro Monsivais MD  Primary Care Physician: Trey Arciniega MD    Chief Complaint/Interval History: He feels okay.  He is without new symptoms.  No fevers or chills.  No rash or skin itching.    Intake/Output Summary (Last 24 hours) at 10/17/2021 1203  Last data filed at 10/17/2021 1000  Gross per 24 hour   Intake 240 ml   Output 3060 ml   Net -2820 ml     Allergies: No Known Allergies  Current Scheduled Medications:   docusate sodium, 100 mg, Oral, BID  metoprolol tartrate, 25 mg, Oral, Q12H  sertraline, 100 mg, Oral, Daily  vancomycin, 1,500 mg, Intravenous, Q12H      Current PRN Medications:  •  diazePAM  •  diphenhydrAMINE  •  HYDROmorphone  •  magnesium hydroxide  •  [DISCONTINUED] HYDROmorphone **AND** naloxone  •  ondansetron  •  ondansetron  •  oxyCODONE-acetaminophen  •  oxyCODONE-acetaminophen  •  promethazine **OR** promethazine    Review of Systems see HPI    Vital Signs:  /66 (BP Location: Left arm, Patient Position: Lying)   Pulse 62   Temp 97.5 °F (36.4 °C) (Oral)   Resp 16   Ht 170.5 cm (67.13\")   Wt 116 kg (256 lb 13.4 oz)   SpO2 97%   BMI 40.08 kg/m²     Physical Exam  Vital signs - reviewed.  Line/IV site - No erythema, warmth, induration, or tenderness.  Right shoulder drains remain " in place.  Dressing clean and dry.    Lab Results:  CBC:   Results from last 7 days   Lab Units 10/14/21  1429   WBC 10*3/mm3 13.47*   HEMOGLOBIN g/dL 10.5*   HEMATOCRIT % 31.7*   PLATELETS 10*3/mm3 262     BMP:  Results from last 7 days   Lab Units 10/14/21  1429   SODIUM mmol/L 132*   POTASSIUM mmol/L 4.4   CHLORIDE mmol/L 100   CO2 mmol/L 18.0*   BUN mg/dL 11   CREATININE mg/dL 0.54*   GLUCOSE mg/dL 78   CALCIUM mg/dL 8.0*     Culture Results:   Culture right shoulder:  Susceptibility     Staphylococcus aureus     PAL     Clindamycin 0.25 ug/ml Susceptible     Erythromycin <=0.25 ug/ml Susceptible     Inducible Clindamycin Resistance NEG ug/ml Negative     Oxacillin 0.5 ug/ml Susceptible     Rifampin <=0.5 ug/ml Susceptible     Tetracycline <=1 ug/ml Susceptible     Trimethoprim + Sulfamethoxazole <=10 ug/ml Susceptible     Vancomycin 1 ug/ml Susceptible        Radiology: None  Additional Studies Reviewed: None    Impression:   Acute prosthetic joint infection right shoulder.  MSSA.    Recommendations:   Discontinue vancomycin  Begin treatment with nafcillin  Explore home IV antibiotic treatment with  and insurance tomorrow    Willie Wall MD    Electronically signed by Willie Wall MD at 10/17/21 1205     Willie Wall MD at 10/17/21 0856          Infectious Diseases Progress Note    Patient:  Feliberto Knowles  YOB: 1975  MRN: [id number]   Admit date: 10/14/2021   Admitting Physician: Sandro Monsivais MD  Primary Care Physician: Trey Arciniega MD    Chief Complaint/Interval History: He feels okay.  Comfortable at present.  No rash or skin itching.  No diarrhea.  No pain at IV site.  Patient seen the morning of October 16, 2021.  Note initiated and completed is a late entry the morning of October 17, 2021.    Intake/Output Summary (Last 24 hours) at 10/17/2021 0856  Last data filed at 10/17/2021 0720  Gross per 24 hour   Intake --   Output 3660 ml   Net -3660 ml     Allergies: No  "Known Allergies  Current Scheduled Medications:   docusate sodium, 100 mg, Oral, BID  metoprolol tartrate, 25 mg, Oral, Q12H  sertraline, 100 mg, Oral, Daily  vancomycin, 1,500 mg, Intravenous, Q12H      Current PRN Medications:  •  diazePAM  •  diphenhydrAMINE  •  HYDROmorphone  •  magnesium hydroxide  •  [DISCONTINUED] HYDROmorphone **AND** naloxone  •  ondansetron  •  ondansetron  •  oxyCODONE-acetaminophen  •  oxyCODONE-acetaminophen  •  promethazine **OR** promethazine    Review of Systems see HPI    Vital Signs:  /66 (BP Location: Left arm, Patient Position: Lying)   Pulse 62   Temp 97.5 °F (36.4 °C) (Oral)   Resp 16   Ht 170.5 cm (67.13\")   Wt 116 kg (256 lb 13.4 oz)   SpO2 97%   BMI 40.08 kg/m²     Physical Exam  Vital signs - reviewed.  Line/IV site - No erythema, warmth, induration, or tenderness.  Right shoulder dressing clean and dry at present  He has drains in place right shoulder    Lab Results:  CBC:   Results from last 7 days   Lab Units 10/14/21  1429   WBC 10*3/mm3 13.47*   HEMOGLOBIN g/dL 10.5*   HEMATOCRIT % 31.7*   PLATELETS 10*3/mm3 262     BMP:  Results from last 7 days   Lab Units 10/14/21  1429   SODIUM mmol/L 132*   POTASSIUM mmol/L 4.4   CHLORIDE mmol/L 100   CO2 mmol/L 18.0*   BUN mg/dL 11   CREATININE mg/dL 0.54*   GLUCOSE mg/dL 78   CALCIUM mg/dL 8.0*     Culture Results: Right shoulder culture staph aureus.  Final ID and susceptibility pending.    Impression:   Acute prosthetic joint infection right shoulder.  Staph aureus.  Final susceptibility pending.    Recommendations:   Continue vancomycin  Await susceptibility testing  Transition to nafcillin or cefazolin if MSSA  Work with  and insurance Monday to make home IV antibiotic arrangements    Willie Lyons MD    Electronically signed by Willie Lyons MD at 10/17/21 0882     Jesus Hdz PA at 10/16/21 0974          MD Mohinder Kahn PA-C, [facility]       Orthopaedic Surgery " Progress Note      10/16/2021   09:37 CDT    Name:  Feliberto Knowles  MRN:    [id number]     Acct:     [id number]   Room:  [address] [place] [address] Day: 1  POD:    2 Days Post-Op     Admit Date: 10/14/2021  PCP: Trey Arciniega MD        Subjective:     Interval:     S/P  1.   Excisional debridement of a right shoulder periprosthetic infection  2    Right shoulder exchange of the glenosphere, humeral tray and polyethylene spacer    C/o moderate pain. O/w stable.    Pain: 8       Medications:     Allergies: No Known Allergies    Current Meds:   Current Facility-Administered Medications   Medication Dose Route Frequency Provider Last Rate Last Admin   • diazePAM (VALIUM) tablet 5 mg  5 mg Oral Q6H PRN Sandro Monsivais MD       • diphenhydrAMINE (BENADRYL) capsule 25 mg  25 mg Oral Q6H PRN Sandro Monsivais MD       • HYDROmorphone (DILAUDID) injection 0.5 mg  0.5 mg Intravenous Q2H PRN Sandro Monsivais MD   0.5 mg at 10/16/21 0843   • magnesium hydroxide (MILK OF MAGNESIA) suspension 10 mL  10 mL Oral Daily PRN Sandro Monsivais MD   10 mL at 10/15/21 1511   • metoprolol tartrate (LOPRESSOR) tablet 25 mg  25 mg Oral Q12H Sandro Monsivais MD   25 mg at 10/16/21 0843   • naloxone (NARCAN) injection 0.4 mg  0.4 mg Intravenous Q5 Min PRN Sandro Monsivais MD       • ondansetron (ZOFRAN) injection 4 mg  4 mg Intravenous Q6H PRN Sandro Monsivais MD   4 mg at 10/15/21 1229   • ondansetron (ZOFRAN) tablet 4 mg  4 mg Oral Q6H PRN Sandro Monsivais MD       • oxyCODONE-acetaminophen (PERCOCET)  MG per tablet 1 tablet  1 tablet Oral Q4H PRN Sandro Monsivais MD   1 tablet at 10/16/21 0848   • oxyCODONE-acetaminophen (PERCOCET) 7.5-325 MG per tablet 1 tablet  1 tablet Oral Q4H PRN Sandro Monsivais MD   1 tablet at 10/16/21 0003   • promethazine (PHENERGAN) tablet 12.5 mg  12.5 mg Oral Q4H PRN Sandro Monsivais MD        Or   • promethazine (PHENERGAN) suppository 12.5 mg  12.5 mg Rectal Q4H PRN Sandro Monsivais MD       • sertraline (ZOLOFT) tablet 100 mg  100 mg Oral Daily Sandro Monsivais MD    "100 mg at 10/16/21 0843   • sodium chloride 0.9 % infusion  100 mL/hr Intravenous Continuous Sandro Monsivais  mL/hr at 10/16/21 0446 100 mL/hr at 10/16/21 0446         Data:     Vitals:  /75 (BP Location: Left arm, Patient Position: Lying)   Pulse 77   Temp 98 °F (36.7 °C) (Oral)   Resp 18   Ht 170.5 cm (67.13\")   Wt 116 kg (256 lb 13.4 oz)   SpO2 97%   BMI 40.08 kg/m²   Temp (24hrs), Av.9 °F (36.6 °C), Min:97.6 °F (36.4 °C), Max:98.1 °F (36.7 °C)      I/O (24Hr):    Intake/Output Summary (Last 24 hours) at 10/16/2021 09  Last data filed at 10/16/2021 0441  Gross per 24 hour   Intake --   Output 100 ml   Net -100 ml       Labs:  Lab Results (last 72 hours)     Procedure Component Value Units Date/Time    Body Fluid Culture - Synovial Fluid, Shoulder, Right [[id number]]  (Abnormal) Collected: 10/14/21 154    Specimen: Synovial Fluid from Shoulder, Right Updated: 10/16/21 0838     Body Fluid Culture Heavy growth (4+) Staphylococcus aureus     Gram Stain Many (4+) WBCs seen      Few (2+) Gram positive cocci, intracellular and extracellular    Tissue / Bone Culture - Tissue, Shoulder, Right [[id number]]  (Abnormal) Collected: 10/14/21 1556    Specimen: Tissue from Shoulder, Right Updated: 10/16/21 0838     Tissue Culture Light growth (2+) Staphylococcus aureus     Gram Stain Many (4+) WBCs per low power field      Rare (1+) Gram positive cocci    Tissue / Bone Culture - Tissue, Shoulder, Right [[id number]]  (Abnormal) Collected: 10/14/21 1556    Specimen: Tissue from Shoulder, Right Updated: 10/16/21 0838     Tissue Culture Light growth (2+) Staphylococcus aureus     Gram Stain Many (4+) WBCs per low power field      No organisms seen    Tissue / Bone Culture - Tissue, Shoulder, Right [[id number]] Collected: 10/14/21 1557    Specimen: Tissue from Shoulder, Right Updated: 10/16/21 0719     Tissue Culture No growth at 2 days     Gram Stain Many (4+) WBCs per low power field      No organisms seen    " C-reactive Protein [[id number]]  (Abnormal) Collected: 10/16/21 0613    Specimen: Blood Updated: 10/16/21 0644     C-Reactive Protein 3.12 mg/dL     C-reactive Protein [[id number]]  (Abnormal) Collected: 10/15/21 1236    Specimen: Blood Updated: 10/15/21 1308     C-Reactive Protein 6.92 mg/dL     Anaerobic Culture - Tissue, Shoulder, Right [[id number]] Collected: 10/14/21 1557    Specimen: Tissue from Shoulder, Right Updated: 10/14/21 1640    Anaerobic Culture - Tissue, Shoulder, Right [[id number]] Collected: 10/14/21 1556    Specimen: Tissue from Shoulder, Right Updated: 10/14/21 1640    Anaerobic Culture - Tissue, Shoulder, Right [[id number]] Collected: 10/14/21 1556    Specimen: Tissue from Shoulder, Right Updated: 10/14/21 1640    Basic Metabolic Panel [[id number]]  (Abnormal) Collected: 10/14/21 1429    Specimen: Blood Updated: 10/14/21 1520     Glucose 78 mg/dL      BUN 11 mg/dL      Creatinine 0.54 mg/dL      Sodium 132 mmol/L      Potassium 4.4 mmol/L      Chloride 100 mmol/L      CO2 18.0 mmol/L      Calcium 8.0 mg/dL      eGFR Non African Amer >150 mL/min/1.73      BUN/Creatinine Ratio 20.4     Anion Gap 14.0 mmol/L     Narrative:      GFR Normal >60  Chronic Kidney Disease <60  Kidney Failure <15      COVID PRE-OP / PRE-PROCEDURE SCREENING ORDER (NO ISOLATION) - Swab, Nasal Cavity [[id number]]  (Normal) Collected: 10/14/21 1420    Specimen: Swab from Nasal Cavity Updated: 10/14/21 1500    Narrative:      The following orders were created for panel order COVID PRE-OP / PRE-PROCEDURE SCREENING ORDER (NO ISOLATION) - Swab, Nasal Cavity.  Procedure                               Abnormality         Status                     ---------                               -----------         ------                     COVID-19,[facility]...[[id number]]  Normal              Final result                 Please view results for these tests on the individual orders.    COVID-19,[facility] IN-HOUSE,Nasal Swab No Transport Media  3-4 HR TAT - Swab, Nasal Cavity [[id number]]  (Normal) Collected: 10/14/21 1420    Specimen: Swab from Nasal Cavity Updated: 10/14/21 1500     COVID19 Not Detected    Narrative:      Fact sheet for providers: https://www.fda.gov/media/935825/download     Fact sheet for patients: https://www.fda.gov/media/027455/download    Test performed by PCR.    Consider negative results in combination with clinical observations, patient history, and epidemiological information.    CBC (No Diff) [[id number]]  (Abnormal) Collected: 10/14/21 1429    Specimen: Blood Updated: 10/14/21 1456     WBC 13.47 10*3/mm3      RBC 3.82 10*6/mm3      Hemoglobin 10.5 g/dL      Hematocrit 31.7 %      MCV 83.0 fL      MCH 27.5 pg      MCHC 33.1 g/dL      RDW 12.4 %      RDW-SD 37.0 fl      MPV 8.6 fL      Platelets 262 10*3/mm3         Collected Updated Procedure Result Status    10/16/2021 0613 10/16/2021 0644 C-reactive Protein [[id number]]   (Abnormal)   Blood    Final result Component Value Units   C-Reactive Protein 3.12 High  mg/dL           10/15/2021 1236 10/15/2021 1308 C-reactive Protein [[id number]]   (Abnormal)   Blood    Final result Component Value Units   C-Reactive Protein 6.92 High  mg/dL           10/14/2021 1557 10/14/2021 1640 Anaerobic Culture - Tissue, Shoulder, Right [[id number]]   Tissue from Shoulder, Right    In process Component Value   No component results              10/14/2021 1557 10/16/2021 0719 Tissue / Bone Culture - Tissue, Shoulder, Right [[id number]]   Tissue from Shoulder, Right    Preliminary result Component Value   Tissue Culture No growth at 2 days P   Gram Stain Many (4+) WBCs per low power field P    No organisms seen P              10/14/2021 1556 10/14/2021 1640 Anaerobic Culture - Tissue, Shoulder, Right [[id number]]   Tissue from Shoulder, Right    In process Component Value   No component results              10/14/2021 1556 10/14/2021 1640 Anaerobic Culture - Tissue, Shoulder, Right [[id number]]   Tissue  from Shoulder, Right    In process Component Value   No component results              10/14/2021 1556 10/16/2021 0838 Tissue / Bone Culture - Tissue, Shoulder, Right [[id number]]   (Abnormal)   Tissue from Shoulder, Right    Preliminary result Component Value   Tissue Culture Light growth (2+) Staphylococcus aureus Abnormal  P   Gram Stain Many (4+) WBCs per low power field P    Rare (1+) Gram positive cocci P              10/14/2021 1556 10/16/2021 0838 Tissue / Bone Culture - Tissue, Shoulder, Right [[id number]]   (Abnormal)   Tissue from Shoulder, Right    Preliminary result Component Value   Tissue Culture Light growth (2+) Staphylococcus aureus Abnormal  P   Gram Stain Many (4+) WBCs per low power field P    No organisms seen P              10/14/2021 1546 10/16/2021 0838 Body Fluid Culture - Synovial Fluid, Shoulder, Right [[id number]]   (Abnormal)   Synovial Fluid from Shoulder, Right    Preliminary result Component Value   Body Fluid Culture Heavy growth (4+) Staphylococcus aureus Abnormal  P   Gram Stain Many (4+) WBCs seen P    Few (2+) Gram positive cocci, intracellular and extracellular P            Physical Exam:     : Incisions are CDI, Dressing is CDI. Moderate tenderness to palpation, compartments soft. NVI distally throughout.      Assessment:     Primary Problem  <principal problem not specified>  POD 2 Days Post-Op INCISION AND DRAINAGE RIGHT SHOULDER WITH POLY EXCHANGE (Right)        Drain output 100ml    Plan:     1. Adjust pain meds, follow sensitivity results.     1. Continue PT/OT.  2. Continue DVT prophylaxis.  3. Continue NWB right upper extremity.  4. Anticipate discharge next week   5: call if needed.       Electronically signed by CAIT Murillo on 10/16/2021 at 09:37 CDT     Electronically signed by Jesus Hdz PA at 10/16/21 0942     Jesus Hdz PA at 10/15/21 1151          MD Mohinder Kahn PA-C, [facility]       Orthopaedic Surgery Progress  Note      10/15/2021   11:51 CDT    Name:  Feliberto Knowles  MRN:    [id number]     Acct:     [id number]   Room:  [facility]  IP Day: 1  POD:    1 Day Post-Op     Admit Date: 10/14/2021  PCP: Trey Arciniega MD        Subjective:     Interval:   S/P  1.   Excisional debridement of a right shoulder periprosthetic infection  2    Right shoulder exchange of the glenosphere, humeral tray and polyethylene spacer    STABLE THIS AM. NO C/O.    Pain: 3       Medications:     Allergies: No Known Allergies    Current Meds:   Current Facility-Administered Medications   Medication Dose Route Frequency Provider Last Rate Last Admin   • diazePAM (VALIUM) tablet 5 mg  5 mg Oral Q6H PRN Sandro Monsivais MD       • diphenhydrAMINE (BENADRYL) capsule 25 mg  25 mg Oral Q6H PRN Sandro Monsivais MD       • HYDROmorphone (DILAUDID) injection 0.5 mg  0.5 mg Intravenous Q2H PRN Sandro Monsivais MD   0.5 mg at 10/15/21 1148    And   • naloxone (NARCAN) injection 0.4 mg  0.4 mg Intravenous Q5 Min PRN Sandro Monsivais MD       • lactated ringers infusion 1,000 mL  1,000 mL Intravenous Continuous Sandro Monsivais MD   Stopped at 10/14/21 1640   • magnesium hydroxide (MILK OF MAGNESIA) suspension 10 mL  10 mL Oral Daily PRN Sandro Monsivais MD       • metoprolol tartrate (LOPRESSOR) tablet 25 mg  25 mg Oral Q12H Sandro Monsivais MD   25 mg at 10/15/21 0811   • ondansetron (ZOFRAN) injection 4 mg  4 mg Intravenous Q6H PRN Sandro Monsivais MD       • ondansetron (ZOFRAN) tablet 4 mg  4 mg Oral Q6H PRN Sandro Monsivais MD       • oxyCODONE-acetaminophen (PERCOCET)  MG per tablet 1 tablet  1 tablet Oral Q4H PRN Sandro Monsivais MD   1 tablet at 10/15/21 0844   • oxyCODONE-acetaminophen (PERCOCET) 7.5-325 MG per tablet 1 tablet  1 tablet Oral Q4H PRN Sandro Monsivais MD       • promethazine (PHENERGAN) tablet 12.5 mg  12.5 mg Oral Q4H PRN Sandro Monsivais MD        Or   • promethazine (PHENERGAN) suppository 12.5 mg  12.5 mg Rectal Q4H PRN Sandro Monsivais MD       • sertraline (ZOLOFT) tablet 100 mg   "100 mg Oral Daily Sandro Monsivais MD   100 mg at 10/14/21 2154   • sodium chloride 0.9 % infusion  100 mL/hr Intravenous Continuous Sandro Monsivais  mL/hr at 10/15/21 0844 100 mL/hr at 10/15/21 0844         Data:     Vitals:  /72 (BP Location: Left arm, Patient Position: Lying)   Pulse 82   Temp 98 °F (36.7 °C) (Oral)   Resp 18   Ht 170.5 cm (67.13\")   Wt 116 kg (256 lb 13.4 oz)   SpO2 99%   BMI 40.08 kg/m²   Temp (24hrs), Av.7 °F (36.5 °C), Min:96.2 °F (35.7 °C), Max:98.6 °F (37 °C)      I/O (24Hr):    Intake/Output Summary (Last 24 hours) at 10/15/2021 1151  Last data filed at 10/15/2021 0400  Gross per 24 hour   Intake 800 ml   Output 900 ml   Net -100 ml       Labs:  Lab Results (last 72 hours)     Procedure Component Value Units Date/Time    Tissue / Bone Culture - Tissue, Shoulder, Right [[id number]] Collected: 10/14/21 1557    Specimen: Tissue from Shoulder, Right Updated: 10/15/21 0621     Gram Stain Many (4+) WBCs per low power field      No organisms seen    Tissue / Bone Culture - Tissue, Shoulder, Right [[id number]] Collected: 10/14/21 1556    Specimen: Tissue from Shoulder, Right Updated: 10/15/21 0613     Gram Stain Many (4+) WBCs per low power field      Rare (1+) Gram positive cocci    Tissue / Bone Culture - Tissue, Shoulder, Right [[id number]] Collected: 10/14/21 1556    Specimen: Tissue from Shoulder, Right Updated: 10/15/21 0608     Gram Stain Many (4+) WBCs per low power field      No organisms seen    Body Fluid Culture - Synovial Fluid, Shoulder, Right [[id number]] Collected: 10/14/21 154    Specimen: Synovial Fluid from Shoulder, Right Updated: 10/15/21 0407     Body Fluid Culture Abnormal Stain     Gram Stain Many (4+) WBCs seen      Few (2+) Gram positive cocci, intracellular and extracellular    Anaerobic Culture - Tissue, Shoulder, Right [[id number]] Collected: 10/14/21 1557    Specimen: Tissue from Shoulder, Right Updated: 10/14/21 1640    Anaerobic Culture - Tissue, " Shoulder, Right [[id number]] Collected: 10/14/21 1556    Specimen: Tissue from Shoulder, Right Updated: 10/14/21 1640    Anaerobic Culture - Tissue, Shoulder, Right [[id number]] Collected: 10/14/21 1556    Specimen: Tissue from Shoulder, Right Updated: 10/14/21 1640    Basic Metabolic Panel [[id number]]  (Abnormal) Collected: 10/14/21 1429    Specimen: Blood Updated: 10/14/21 1520     Glucose 78 mg/dL      BUN 11 mg/dL      Creatinine 0.54 mg/dL      Sodium 132 mmol/L      Potassium 4.4 mmol/L      Chloride 100 mmol/L      CO2 18.0 mmol/L      Calcium 8.0 mg/dL      eGFR Non African Amer >150 mL/min/1.73      BUN/Creatinine Ratio 20.4     Anion Gap 14.0 mmol/L     Narrative:      GFR Normal >60  Chronic Kidney Disease <60  Kidney Failure <15      COVID PRE-OP / PRE-PROCEDURE SCREENING ORDER (NO ISOLATION) - Swab, Nasal Cavity [[id number]]  (Normal) Collected: 10/14/21 1420    Specimen: Swab from Nasal Cavity Updated: 10/14/21 1500    Narrative:      The following orders were created for panel order COVID PRE-OP / PRE-PROCEDURE SCREENING ORDER (NO ISOLATION) - Swab, Nasal Cavity.  Procedure                               Abnormality         Status                     ---------                               -----------         ------                     COVID-19,[facility]...[[id number]]  Normal              Final result                 Please view results for these tests on the individual orders.    COVID-19,[facility] IN-HOUSE,Nasal Swab No Transport Media 3-4 HR TAT - Swab, Nasal Cavity [[id number]]  (Normal) Collected: 10/14/21 1420    Specimen: Swab from Nasal Cavity Updated: 10/14/21 1500     COVID19 Not Detected    Narrative:      Fact sheet for providers: https://www.fda.gov/media/260653/download     Fact sheet for patients: https://www.fda.gov/media/490997/download    Test performed by PCR.    Consider negative results in combination with clinical observations, patient history, and epidemiological information.     CBC (No Diff) [[id number]]  (Abnormal) Collected: 10/14/21 1429    Specimen: Blood Updated: 10/14/21 1456     WBC 13.47 10*3/mm3      RBC 3.82 10*6/mm3      Hemoglobin 10.5 g/dL      Hematocrit 31.7 %      MCV 83.0 fL      MCH 27.5 pg      MCHC 33.1 g/dL      RDW 12.4 %      RDW-SD 37.0 fl      MPV 8.6 fL      Platelets 262 10*3/mm3         Collected Updated Procedure Result Status    10/14/2021 1557 10/14/2021 1640 Anaerobic Culture - Tissue, Shoulder, Right [[id number]]   Tissue from Shoulder, Right    In process Component Value   No component results              10/14/2021 1557 10/15/2021 0621 Tissue / Bone Culture - Tissue, Shoulder, Right [[id number]]   Tissue from Shoulder, Right    Preliminary result Component Value   Gram Stain Many (4+) WBCs per low power field P    No organisms seen P              10/14/2021 1556 10/14/2021 1640 Anaerobic Culture - Tissue, Shoulder, Right [[id number]]   Tissue from Shoulder, Right    In process Component Value   No component results              10/14/2021 1556 10/14/2021 1640 Anaerobic Culture - Tissue, Shoulder, Right [[id number]]   Tissue from Shoulder, Right    In process Component Value   No component results              10/14/2021 1556 10/15/2021 0613 Tissue / Bone Culture - Tissue, Shoulder, Right [[id number]]   Tissue from Shoulder, Right    Preliminary result Component Value   Gram Stain Many (4+) WBCs per low power field P    Rare (1+) Gram positive cocci P              10/14/2021 1556 10/15/2021 0608 Tissue / Bone Culture - Tissue, Shoulder, Right [[id number]]   Tissue from Shoulder, Right    Preliminary result Component Value   Gram Stain Many (4+) WBCs per low power field P    No organisms seen P              10/14/2021 1546 10/15/2021 0407 Body Fluid Culture - Synovial Fluid, Shoulder, Right [[id number]]   Synovial Fluid from Shoulder, Right    Preliminary result Component Value   Body Fluid Culture Abnormal Stain P   Gram Stain Many (4+) WBCs seen P    Few (2+)  Gram positive cocci, intracellular and extracellular P                    Physical Exam:     : Incisions are CDI, Dressing is CDI. Moderate tenderness to palpation, compartments soft. NVI distally throughout.    DRAIN OUTPUT 100ML    Assessment:     Primary Problem  <principal problem not specified>  POD 1 Day Post-Op INCISION AND DRAINAGE RIGHT SHOULDER WITH POLY EXCHANGE (Right)      G + COCCI ON CX--AWAITING SENSITIVITY RESULTS--CURRENTLY ON IV VANCO    Plan:     2.     1. Continue PT/OT.  2. Continue DVT prophylaxis.  3. Continue NWB right upper extremity.  4. Anticipate discharge next week , WILL NEED PICC 6 WEEKS OF IV ATBX AND POSSIBLY 6 WEEKS OF ORAL.       Electronically signed by CAIT Murillo on 10/15/2021 at 11:51 CDT     Electronically signed by Jesus Hdz PA at 10/15/21 1159          Consult Notes (last 72 hours)      Michell Amaral MD at 10/15/21 1512      Consult Orders    1. Inpatient Infectious Diseases Consult [[id number]] ordered by Sandro Monsivais MD at 10/14/21 1440               INFECTIOUS DISEASES CONSULT NOTE    Patient:  Feliberto Knowles 46 y.o. male  ROOM # 350/1  YOB: 1975  MRN: [id number]  CSN:  [id number]  Admit date: 10/14/2021   Admitting Physician: Sandro Monsivais MD  Primary Care Physician: Trey Arciniega MD  REFERRING PROVIDER: Sandro Monsivais MD      REASON FOR CONSULTATION :  acute post operative shoulder infection, s/p reverse TSA 9/30/21      HISTORY OF PRESENT ILLNESS: Patient is a very pleasant 46-year-old gentleman who underwent right reverse total shoulder arthroplasty September 30.  He noted he is developed some drainage which appeared normal.  He was seen in the outpatient orthopedic clinic and was started empirically on antibiotic therapy.  He reports at that time it was not red or swollen.  The following day he developed some warmth and swelling of the area.    He did not have fevers but did feel somewhat achy and unwell.    He was taken to  surgery per Dr. Monsivais yesterday.  He underwent excisional debridement of the right shoulder and exchange of the glenosphere, humeral tray and polyethylene spacer.     Gram stain revealed gram-positive cocci.  Patient has been on empiric vancomycin    Past Medical History:   Diagnosis Date   • AICD (automatic cardioverter/defibrillator) present    • Anger    • Anxiety    • Heart murmur    • Hyperlipidemia    • Hypertrophic obstructive cardiomyopathy (HOCM) (HCC)    • Stroke (HCC)     tia november 2017   • Tachycardia      Past Surgical History:   Procedure Laterality Date   • ANKLE SURGERY     • APPENDECTOMY     • CARDIAC CATHETERIZATION      no stents   • CARDIAC DEFIBRILLATOR PLACEMENT     • CARDIAC PACEMAKER PLACEMENT      septalmyectomy   • CARPAL TUNNEL RELEASE     • OTHER SURGICAL HISTORY  05/2012    septalmyectomy   • SHOULDER ARTHROSCOPY W/ LABRAL REPAIR Right 3/25/2021    Procedure: RIGHT SHOULDER ARTHROSCOPIC REVISION POSTERIOR  LABRAL REPAIR;  Surgeon: Sandro Monsivais MD;  Location: [facility] OR;  Service: Orthopedics;  Laterality: Right;   • TOTAL SHOULDER ARTHROPLASTY W/ DISTAL CLAVICLE EXCISION Right 9/30/2021    Procedure: RIGHT REVERSE TOTAL SHOULDER ARTHROPLASTY;  Surgeon: Sandro Monsivais MD;  Location: [facility] OR;  Service: Orthopedics;  Laterality: Right;   • TOTAL SHOULDER ARTHROPLASTY W/ DISTAL CLAVICLE EXCISION Right 10/14/2021    Procedure: INCISION AND DRAINAGE RIGHT SHOULDER WITH POLY EXCHANGE;  Surgeon: Sandro Monsivais MD;  Location: [facility] OR;  Service: Orthopedics;  Laterality: Right;     Family History   Problem Relation Age of Onset   • Heart disease Mother    • No Known Problems Father      Social History     Socioeconomic History   • Marital status:    Tobacco Use   • Smoking status: Former Smoker     Types: Cigarettes     Quit date: 2011     Years since quitting: 10.7   • Smokeless tobacco: Current User     Types: Chew   Vaping Use   • Vaping Use: Never used   Substance and Sexual Activity    • Alcohol use: No   • Drug use: No   • Sexual activity: Yes     Partners: Female           Current Meds:     Current Facility-Administered Medications   Medication Dose Route Frequency Provider Last Rate Last Admin   • diazePAM (VALIUM) tablet 5 mg  5 mg Oral Q6H PRN Sandro Monsivais MD       • diphenhydrAMINE (BENADRYL) capsule 25 mg  25 mg Oral Q6H PRN Sandro Monsivais MD       • HYDROmorphone (DILAUDID) injection 0.5 mg  0.5 mg Intravenous Q2H PRN Sandro Monsivais MD       • lactated ringers infusion 1,000 mL  1,000 mL Intravenous Continuous Sandro Monsivais MD   Stopped at 10/14/21 1640   • magnesium hydroxide (MILK OF MAGNESIA) suspension 10 mL  10 mL Oral Daily PRN Sandro Monsivais MD   10 mL at 10/15/21 1511   • metoprolol tartrate (LOPRESSOR) tablet 25 mg  25 mg Oral Q12H Sandro Monsivais MD   25 mg at 10/15/21 0811   • naloxone (NARCAN) injection 0.4 mg  0.4 mg Intravenous Q5 Min PRN Sandro Monsivais MD       • ondansetron (ZOFRAN) injection 4 mg  4 mg Intravenous Q6H PRN Sandro Monsivais MD   4 mg at 10/15/21 1229   • ondansetron (ZOFRAN) tablet 4 mg  4 mg Oral Q6H PRN Sandro Monsivais MD       • oxyCODONE-acetaminophen (PERCOCET)  MG per tablet 1 tablet  1 tablet Oral Q4H PRN Sandro Monsivais MD   1 tablet at 10/15/21 1357   • oxyCODONE-acetaminophen (PERCOCET) 7.5-325 MG per tablet 1 tablet  1 tablet Oral Q4H PRN Sandro Monsivais MD       • promethazine (PHENERGAN) tablet 12.5 mg  12.5 mg Oral Q4H PRN Sandro Monsivais MD        Or   • promethazine (PHENERGAN) suppository 12.5 mg  12.5 mg Rectal Q4H PRN Sandro Monsivais MD       • sertraline (ZOLOFT) tablet 100 mg  100 mg Oral Daily Sandro Monsivais MD   100 mg at 10/14/21 4804   • sodium chloride 0.9 % infusion  100 mL/hr Intravenous Continuous Sandro Monsivais  mL/hr at 10/15/21 0844 100 mL/hr at 10/15/21 0844       Home Meds:  Prior to Admission medications    Medication Sig Start Date End Date Taking? Authorizing Provider   aspirin (aspirin) 81 MG EC tablet Take 81 mg by mouth Daily.   Yes  "Izabel Carvalho MD   atorvastatin (LIPITOR) 10 MG tablet Take 10 mg by mouth Daily. Pt uncertain of dose--new med   Yes Izabel Carvalho MD   Cholecalciferol (VITAMIN D3 PO) Take 1 tablet by mouth Daily.   Yes Izabel Carvalho MD   metoprolol tartrate (LOPRESSOR) 25 MG tablet Take 25 mg by mouth Daily.   Yes Izabel Carvalho MD   ondansetron (ZOFRAN) 4 MG tablet Take 1 tablet by mouth Every 8 (Eight) Hours As Needed for Nausea or Vomiting. 21  Yes Sandro Monsivais MD   oxyCODONE-acetaminophen (PERCOCET)  MG per tablet Take 1 tablet by mouth Every 4 (Four) Hours As Needed for Moderate Pain . 21  Yes Sandro Monsivais MD   sertraline (ZOLOFT) 100 MG tablet Take 100 mg by mouth Daily.   Yes Izabel Carvalho MD          No Known Allergies    Review of Systems   Constitutional: Negative for fever.   Eyes: Negative for photophobia.   Respiratory: Negative for wheezing.    Cardiovascular: Negative for chest pain.   Gastrointestinal: Negative for vomiting.   Genitourinary: Negative for dysuria.   Musculoskeletal: Positive for arthralgias and joint swelling.   Skin: Positive for wound.   Allergic/Immunologic: Negative for immunocompromised state.   Neurological: Negative for weakness.         Vital Signs:  /78 (BP Location: Left arm, Patient Position: Lying)   Pulse 78   Temp 98.1 °F (36.7 °C) (Oral)   Resp 18   Ht 170.5 cm (67.13\")   Wt 116 kg (256 lb 13.4 oz)   SpO2 98%   BMI 40.08 kg/m²   Temp (24hrs), Av °F (36.7 °C), Min:97 °F (36.1 °C), Max:98.6 °F (37 °C)      Physical Exam   General: The patient is nontoxic-appearing lying in bed in no acute distress  HEENT: Sclera anicteric and noninjected.  He placed face mask over his nose and mouth  Respiratory: Effort even and unlabored.  He is not conversationally dyspneic  Right shoulder: Large dressing in place.  Hemovac drain in place with serosanguineous drainage.  Right shoulder immobilized with sling.  Neuro: Alert and " oriented, speech clear  Psych: Pleasant cooperative      Results Review:    I reviewed the patient's new clinical results.    Lab Results:  CBC:   Lab Results   Lab 10/14/21  1429   WBC 13.47*   HEMOGLOBIN 10.5*   HEMATOCRIT 31.7*   PLATELETS 262       CMP:   Lab Results   Lab 10/14/21  1429   SODIUM 132*   POTASSIUM 4.4   CHLORIDE 100   CO2 18.0*   BUN 11   CREATININE 0.54*   CALCIUM 8.0*   GLUCOSE 78       Lab Results (last 72 hours)     Procedure Component Value Units Date/Time    Body Fluid Culture - Synovial Fluid, Shoulder, Right [[id number]] Collected: 10/14/21 1546    Specimen: Synovial Fluid from Shoulder, Right Updated: 10/15/21 1423     Body Fluid Culture Growth present, too young to evaluate     Gram Stain Many (4+) WBCs seen      Few (2+) Gram positive cocci, intracellular and extracellular    Tissue / Bone Culture - Tissue, Shoulder, Right [[id number]] Collected: 10/14/21 1556    Specimen: Tissue from Shoulder, Right Updated: 10/15/21 1423     Tissue Culture Growth present, too young to evaluate     Gram Stain Many (4+) WBCs per low power field      Rare (1+) Gram positive cocci    Tissue / Bone Culture - Tissue, Shoulder, Right [[id number]] Collected: 10/14/21 1556    Specimen: Tissue from Shoulder, Right Updated: 10/15/21 1423     Tissue Culture Growth present, too young to evaluate     Gram Stain Many (4+) WBCs per low power field      No organisms seen    Tissue / Bone Culture - Tissue, Shoulder, Right [[id number]] Collected: 10/14/21 1557    Specimen: Tissue from Shoulder, Right Updated: 10/15/21 1421     Tissue Culture No growth     Gram Stain Many (4+) WBCs per low power field      No organisms seen    C-reactive Protein [[id number]]  (Abnormal) Collected: 10/15/21 1236    Specimen: Blood Updated: 10/15/21 1308     C-Reactive Protein 6.92 mg/dL     Anaerobic Culture - Tissue, Shoulder, Right [[id number]] Collected: 10/14/21 1557    Specimen: Tissue from Shoulder, Right Updated: 10/14/21 1640     Anaerobic Culture - Tissue, Shoulder, Right [[id number]] Collected: 10/14/21 1556    Specimen: Tissue from Shoulder, Right Updated: 10/14/21 1640    Anaerobic Culture - Tissue, Shoulder, Right [[id number]] Collected: 10/14/21 1556    Specimen: Tissue from Shoulder, Right Updated: 10/14/21 1640    Basic Metabolic Panel [[id number]]  (Abnormal) Collected: 10/14/21 1429    Specimen: Blood Updated: 10/14/21 1520     Glucose 78 mg/dL      BUN 11 mg/dL      Creatinine 0.54 mg/dL      Sodium 132 mmol/L      Potassium 4.4 mmol/L      Chloride 100 mmol/L      CO2 18.0 mmol/L      Calcium 8.0 mg/dL      eGFR Non African Amer >150 mL/min/1.73      BUN/Creatinine Ratio 20.4     Anion Gap 14.0 mmol/L     Narrative:      GFR Normal >60  Chronic Kidney Disease <60  Kidney Failure <15      COVID PRE-OP / PRE-PROCEDURE SCREENING ORDER (NO ISOLATION) - Swab, Nasal Cavity [[id number]]  (Normal) Collected: 10/14/21 1420    Specimen: Swab from Nasal Cavity Updated: 10/14/21 1500    Narrative:      The following orders were created for panel order COVID PRE-OP / PRE-PROCEDURE SCREENING ORDER (NO ISOLATION) - Swab, Nasal Cavity.  Procedure                               Abnormality         Status                     ---------                               -----------         ------                     COVID-19,[facility]...[[id number]]  Normal              Final result                 Please view results for these tests on the individual orders.    COVID-19,[facility] IN-HOUSE,Nasal Swab No Transport Media 3-4 HR TAT - Swab, Nasal Cavity [[id number]]  (Normal) Collected: 10/14/21 1420    Specimen: Swab from Nasal Cavity Updated: 10/14/21 1500     COVID19 Not Detected    Narrative:      Fact sheet for providers: https://www.fda.gov/media/419114/download     Fact sheet for patients: https://www.fda.gov/media/012394/download    Test performed by PCR.    Consider negative results in combination with clinical observations, patient history, and  epidemiological information.    CBC (No Diff) [[id number]]  (Abnormal) Collected: 10/14/21 1429    Specimen: Blood Updated: 10/14/21 1456     WBC 13.47 10*3/mm3      RBC 3.82 10*6/mm3      Hemoglobin 10.5 g/dL      Hematocrit 31.7 %      MCV 83.0 fL      MCH 27.5 pg      MCHC 33.1 g/dL      RDW 12.4 %      RDW-SD 37.0 fl      MPV 8.6 fL      Platelets 262 10*3/mm3           Estimated Creatinine Clearance: 211.6 mL/min (A) (by C-G formula based on SCr of 0.54 mg/dL (L)).    Culture Results:    Microbiology Results (last 10 days)     Procedure Component Value - Date/Time    Tissue / Bone Culture - Tissue, Shoulder, Right [[id number]] Collected: 10/14/21 1557    Lab Status: Preliminary result Specimen: Tissue from Shoulder, Right Updated: 10/15/21 1421     Tissue Culture No growth     Gram Stain Many (4+) WBCs per low power field      No organisms seen    Tissue / Bone Culture - Tissue, Shoulder, Right [[id number]] Collected: 10/14/21 1556    Lab Status: Preliminary result Specimen: Tissue from Shoulder, Right Updated: 10/15/21 1423     Tissue Culture Growth present, too young to evaluate     Gram Stain Many (4+) WBCs per low power field      Rare (1+) Gram positive cocci    Tissue / Bone Culture - Tissue, Shoulder, Right [[id number]] Collected: 10/14/21 1556    Lab Status: Preliminary result Specimen: Tissue from Shoulder, Right Updated: 10/15/21 1423     Tissue Culture Growth present, too young to evaluate     Gram Stain Many (4+) WBCs per low power field      No organisms seen    Body Fluid Culture - Synovial Fluid, Shoulder, Right [[id number]] Collected: 10/14/21 1546    Lab Status: Preliminary result Specimen: Synovial Fluid from Shoulder, Right Updated: 10/15/21 1423     Body Fluid Culture Growth present, too young to evaluate     Gram Stain Many (4+) WBCs seen      Few (2+) Gram positive cocci, intracellular and extracellular    COVID PRE-OP / PRE-PROCEDURE SCREENING ORDER (NO ISOLATION) - Swab, Nasal Cavity  [[id number]]  (Normal) Collected: 10/14/21 1420    Lab Status: Final result Specimen: Swab from Nasal Cavity Updated: 10/14/21 1500    Narrative:      The following orders were created for panel order COVID PRE-OP / PRE-PROCEDURE SCREENING ORDER (NO ISOLATION) - Swab, Nasal Cavity.  Procedure                               Abnormality         Status                     ---------                               -----------         ------                     COVID-19,[facility]...[[id number]]  Normal              Final result                 Please view results for these tests on the individual orders.    COVID-19,[facility] IN-HOUSE,Nasal Swab No Transport Media 3-4 HR TAT - Swab, Nasal Cavity [[id number]]  (Normal) Collected: 10/14/21 1420    Lab Status: Final result Specimen: Swab from Nasal Cavity Updated: 10/14/21 1500     COVID19 Not Detected    Narrative:      Fact sheet for providers: https://www.fda.gov/media/581639/download     Fact sheet for patients: https://www.fda.gov/media/610519/download    Test performed by PCR.    Consider negative results in combination with clinical observations, patient history, and epidemiological information.             Radiology:   Imaging Results (Last 72 Hours)     Procedure Component Value Units Date/Time    XR Shoulder 2+ View Right [[id number]] Collected: 10/14/21 1710     Updated: 10/15/21 0733    Narrative:      XR SHOULDER 2+ VW RIGHT- 10/14/2021 5:06 PM CDT     HISTORY: Infection and hardware replacment ; T81.40XA-Infection  following a procedure, unspecified, initial encounter      COMPARISON: None       Impression:      1 intraoperative fluoroscopic views submitted. This image is obtained  for procedural guidance. A radiologist was not present for image  acquisition. Please refer to the operative note for more details.  Fluoroscopy time was 0.7 seconds with a dose of 0.18 mGy.   This report was finalized on 10/14/2021 17:12 by Dr. Gregorio Daniel MD.    FL C Arm During  Surgery [[id number]] Collected: 10/14/21 1710     Updated: 10/15/21 0733    Narrative:      XR SHOULDER 2+ VW RIGHT- 10/14/2021 5:06 PM CDT     HISTORY: Infection and hardware replacment ; T81.40XA-Infection  following a procedure, unspecified, initial encounter      COMPARISON: None       Impression:      1 intraoperative fluoroscopic views submitted. This image is obtained  for procedural guidance. A radiologist was not present for image  acquisition. Please refer to the operative note for more details.  Fluoroscopy time was 0.7 seconds with a dose of 0.18 mGy.   This report was finalized on 10/14/2021 17:12 by Dr. Gregorio Daniel MD.            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  Gram-positive infection right prosthetic total shoulder-based on Gram stain of surgical specimens.  Cultures pending.    RECOMMENDATION:   Continue vancomycin  Follow-up surgical cultures  Discussed with patient will formulate a ongoing plan for antibiotic therapy based on identification and susceptibilities.      Michell Amaral MD  10/15/21  15:12 CDT          Electronically signed by Michell Amaral MD at 10/15/21 [id number]

## 2021-10-18 NOTE — PROGRESS NOTES
Orthopaedic Surgery Progress Note      10/18/2021   07:36 CDT    Name:  Feliberto Knowles  MRN:    [id number]     Acct:     [id number]   Room:  [facility]/  IP Day: 1     Admit Date: 10/14/2021  PCP: Trey Arciniega MD        Subjective:     C/C: POD 4 Days Post-Op INCISION AND DRAINAGE RIGHT SHOULDER WITH POLY EXCHANGE (Right)    Interval History: Status: remained stable. Complaints of pain controlled with current medications. Awaiting insurance approval for IV abx. Patient has not had any acute events or new complaints today.  Wound vac in place. Cultures + MSSA.       Pain: improved      Medications:     Allergies: No Known Allergies    Current Meds:   Current Facility-Administered Medications   Medication Dose Route Frequency Provider Last Rate Last Admin   • diazePAM (VALIUM) tablet 5 mg  5 mg Oral Q6H PRN Sandro Monsivais MD       • diphenhydrAMINE (BENADRYL) capsule 25 mg  25 mg Oral Q6H PRN Sandro Monsivais MD       • docusate sodium (COLACE) capsule 100 mg  100 mg Oral BID Sandro Monsivais MD   100 mg at 10/17/21 2013   • HYDROmorphone (DILAUDID) injection 1 mg  1 mg Intravenous Q2H PRN Jesus Hdz PA   1 mg at 10/18/21 0524   • magnesium hydroxide (MILK OF MAGNESIA) suspension 10 mL  10 mL Oral Daily PRN Sandro Monsivais MD   10 mL at 10/15/21 1511   • metoprolol tartrate (LOPRESSOR) tablet 25 mg  25 mg Oral Q12H Sandro Monsivais MD   25 mg at 10/17/21 2013   • nafcillin 2 g in sodium chloride 0.9 % 100 mL IVPB-VTB  2 g Intravenous Q4H Willie Wall MD   2 g at 10/18/21 0524   • naloxone (NARCAN) injection 0.4 mg  0.4 mg Intravenous Q5 Min PRN Sandro Monsivais MD       • ondansetron (ZOFRAN) injection 4 mg  4 mg Intravenous Q6H PRN Sandro Monsivais MD   4 mg at 10/18/21 0549   • ondansetron (ZOFRAN) tablet 4 mg  4 mg Oral Q6H PRN Sandro Monsivais MD   4 mg at 10/17/21 2013   • oxyCODONE-acetaminophen (PERCOCET)  MG per tablet 1 tablet  1 tablet Oral Q4H PRN Sandro Monsivais MD   1 tablet at 10/18/21 0247   • oxyCODONE-acetaminophen  "(PERCOCET) 7.5-325 MG per tablet 1 tablet  1 tablet Oral Q4H PRN Sandro Monsivais MD   1 tablet at 10/16/21 0003   • promethazine (PHENERGAN) tablet 12.5 mg  12.5 mg Oral Q4H PRN Sandro Monsivais MD        Or   • promethazine (PHENERGAN) suppository 12.5 mg  12.5 mg Rectal Q4H PRN Sandro Monsivais MD       • sertraline (ZOLOFT) tablet 100 mg  100 mg Oral Daily Sandro Monsivais MD   100 mg at 10/17/21 0824   • sodium chloride 0.9 % infusion  100 mL/hr Intravenous Continuous Sandro Monsivais  mL/hr at 10/17/21 0552 100 mL/hr at 10/17/21 0552           Data:     Code Status:    Code Status and Medical Interventions:   Ordered at: 10/15/21 1154     Level Of Support Discussed With:    Patient     Code Status:    CPR     Medical Interventions (Level of Support Prior to Arrest):    Full       Family History   Problem Relation Age of Onset   • Heart disease Mother    • No Known Problems Father        Social History     Socioeconomic History   • Marital status:    Tobacco Use   • Smoking status: Former Smoker     Types: Cigarettes     Quit date:      Years since quitting: 10.8   • Smokeless tobacco: Current User     Types: Chew   Vaping Use   • Vaping Use: Never used   Substance and Sexual Activity   • Alcohol use: No   • Drug use: No   • Sexual activity: Yes     Partners: Female       Vitals:  /83 (BP Location: Left arm, Patient Position: Lying)   Pulse 73   Temp 97.4 °F (36.3 °C) (Oral)   Resp 18   Ht 170.5 cm (67.13\")   Wt 116 kg (256 lb 13.4 oz)   SpO2 97%   BMI 40.08 kg/m²   Temp (24hrs), Av.9 °F (36.6 °C), Min:97.4 °F (36.3 °C), Max:98.3 °F (36.8 °C)      I/O (24Hr):    Intake/Output Summary (Last 24 hours) at 10/18/2021 0736  Last data filed at 10/18/2021 0400  Gross per 24 hour   Intake 360 ml   Output 1010 ml   Net -650 ml       Labs:  Lab Results (last 72 hours)     Procedure Component Value Units Date/Time    Anaerobic Culture - Tissue, Shoulder, Right [[id number]]  (Normal) Collected: 10/14/21 1556 "    Specimen: Tissue from Shoulder, Right Updated: 10/18/21 0657     Anaerobic Culture No anaerobes isolated at 3 days    Anaerobic Culture - Tissue, Shoulder, Right [[id number]]  (Normal) Collected: 10/14/21 1556    Specimen: Tissue from Shoulder, Right Updated: 10/18/21 0656     Anaerobic Culture No anaerobes isolated at 3 days    Anaerobic Culture - Tissue, Shoulder, Right [[id number]]  (Normal) Collected: 10/14/21 1557    Specimen: Tissue from Shoulder, Right Updated: 10/18/21 0656     Anaerobic Culture No anaerobes isolated at 3 days    C-reactive Protein [[id number]]  (Abnormal) Collected: 10/18/21 0514    Specimen: Blood Updated: 10/18/21 0627     C-Reactive Protein 0.99 mg/dL     Tissue / Bone Culture - Tissue, Shoulder, Right [[id number]]  (Abnormal) Collected: 10/14/21 1556    Specimen: Tissue from Shoulder, Right Updated: 10/17/21 1049     Tissue Culture Light growth (2+) Staphylococcus aureus     Gram Stain Many (4+) WBCs per low power field      No organisms seen    Narrative:      Refer to previous tissue culture collected on 10/14/2021 at 1656 for MICs.      Body Fluid Culture - Synovial Fluid, Shoulder, Right [[id number]]  (Abnormal) Collected: 10/14/21 1546    Specimen: Synovial Fluid from Shoulder, Right Updated: 10/17/21 1049     Body Fluid Culture Heavy growth (4+) Staphylococcus aureus     Gram Stain Many (4+) WBCs seen      Few (2+) Gram positive cocci, intracellular and extracellular    Narrative:      Refer to previous tissue culture collected on 10/14/2021 at 1656 for MICs.    Tissue / Bone Culture - Tissue, Shoulder, Right [[id number]]  (Abnormal)  (Susceptibility) Collected: 10/14/21 1556    Specimen: Tissue from Shoulder, Right Updated: 10/17/21 1048     Tissue Culture Light growth (2+) Staphylococcus aureus     Gram Stain Many (4+) WBCs per low power field      Rare (1+) Gram positive cocci    Susceptibility      Staphylococcus aureus     PAL     Clindamycin Susceptible     Erythromycin  Susceptible     Inducible Clindamycin Resistance Negative     Oxacillin Susceptible     Rifampin Susceptible     Tetracycline Susceptible     Trimethoprim + Sulfamethoxazole Susceptible     Vancomycin Susceptible                  Linear View               Susceptibility Comments     Staphylococcus aureus    This isolate does not demonstrate inducible clindamycin resistance in vitro.               Tissue / Bone Culture - Tissue, Shoulder, Right [[id number]] Collected: 10/14/21 1557    Specimen: Tissue from Shoulder, Right Updated: 10/17/21 1009     Tissue Culture No growth at 3 days     Gram Stain Many (4+) WBCs per low power field      No organisms seen    C-reactive Protein [[id number]]  (Abnormal) Collected: 10/17/21 0638    Specimen: Blood Updated: 10/17/21 0710     C-Reactive Protein 1.58 mg/dL     C-reactive Protein [[id number]]  (Abnormal) Collected: 10/16/21 0613    Specimen: Blood Updated: 10/16/21 0644     C-Reactive Protein 3.12 mg/dL     C-reactive Protein [[id number]]  (Abnormal) Collected: 10/15/21 1236    Specimen: Blood Updated: 10/15/21 1308     C-Reactive Protein 6.92 mg/dL               Physical Exam:   General: Resting comfortably, NAD, cooperative with exam. Alert and oriented x 3.   Right Shoulder: Incision is clean, dry, intact. Dressing is Clean, Dry, Intact. Neurovascular intact distally. Hemovac in place. Moderate tenderness to palpation, soft throughout. No signs or symptoms of DVT or PE.      Assessment:     Primary Problem  POD 4 Days Post-Op INCISION AND DRAINAGE RIGHT SHOULDER WITH POLY EXCHANGE (Right)   S/P RTSA with prosthetic joint infection, + MSSA       Plan:     1. Continue PT/OT.  2. Continue DVT prophylaxis.  3. Continue NWB right upper extremity.  4. Anticipate discharge today or tomorrow - awaiting insurance approval and plan for IV abx  5. Patient will return to clinic in 7-10 days after discharge  6. D/C wound vac today      Electronically signed by Sharan Friend PA-C on  10/18/2021 at 07:36 CDT

## 2021-10-18 NOTE — PROGRESS NOTES
INFECTIOUS DISEASES PROGRESS NOTE    Patient:  Feliberto Knowles  YOB: 1975  MRN: [id number]   Admit date: 10/14/2021   Admitting Physician: Sandro Monsivais MD  Primary Care Physician: Trey Arciniega MD    Chief Complaint: Right shoulder pain        Interval History: Patient is doing overall better.  He was on the phone initially.  He asked them to hold and we briefly discussed orchestrating going home including PICC line placement, continuous infusion of IV antibiotics with nafcillin, his wife assisting with dosing, he neurology thinks she works 312-hour shifts.  We discussed briefly alternatives such as every 8 hours cefazolin and the fact that he could potentially administer antibiotics as long as we had a extender on his PICC line.    He recalls the discussions of PICC line previously.  He is not sure that he will need home health but is okay if he needs home health.  If he does not have home health, noted that he would have to come weekly to office or elsewhere to get PICC line dressing change, blood drawn and  antibiotics.    We will place orders to see what his coverage allows.    Allergies: No Known Allergies    Current Meds:     Current Facility-Administered Medications:   •  diazePAM (VALIUM) tablet 5 mg, 5 mg, Oral, Q6H PRN, Sandro Monsivais MD  •  diphenhydrAMINE (BENADRYL) capsule 25 mg, 25 mg, Oral, Q6H PRN, Sandro Monsivais MD  •  docusate sodium (COLACE) capsule 100 mg, 100 mg, Oral, BID, Sandro Monsivais MD, 100 mg at 10/18/21 0847  •  HYDROmorphone (DILAUDID) injection 1 mg, 1 mg, Intravenous, Q2H PRN, Jesus Hdz PA, 1 mg at 10/18/21 0524  •  magnesium hydroxide (MILK OF MAGNESIA) suspension 10 mL, 10 mL, Oral, Daily PRN, Sandro Monsivais MD, 10 mL at 10/15/21 1511  •  metoprolol tartrate (LOPRESSOR) tablet 25 mg, 25 mg, Oral, Q12H, Sandro Monsivais MD, 25 mg at 10/18/21 0847  •  nafcillin 2 g in sodium chloride 0.9 % 100 mL IVPB-VTB, 2 g, Intravenous, Q4H, Willie Wall MD, 2 g at 10/18/21  "0849  •  [DISCONTINUED] HYDROmorphone (DILAUDID) injection 0.5 mg, 0.5 mg, Intravenous, Q2H PRN, 0.5 mg at 10/15/21 1436 **AND** naloxone (NARCAN) injection 0.4 mg, 0.4 mg, Intravenous, Q5 Min PRN, Sandro Monsivais MD  •  ondansetron (ZOFRAN) injection 4 mg, 4 mg, Intravenous, Q6H PRN, Sandro Monsivais MD, 4 mg at 10/18/21 0549  •  ondansetron (ZOFRAN) tablet 4 mg, 4 mg, Oral, Q6H PRN, Sandro Monsivais MD, 4 mg at 10/17/21 2013  •  oxyCODONE-acetaminophen (PERCOCET)  MG per tablet 1 tablet, 1 tablet, Oral, Q4H PRN, Sandro Monsivais MD, 1 tablet at 10/18/21 0900  •  oxyCODONE-acetaminophen (PERCOCET) 7.5-325 MG per tablet 1 tablet, 1 tablet, Oral, Q4H PRN, Sandro Monsivais MD, 1 tablet at 10/16/21 0003  •  promethazine (PHENERGAN) tablet 12.5 mg, 12.5 mg, Oral, Q4H PRN **OR** promethazine (PHENERGAN) suppository 12.5 mg, 12.5 mg, Rectal, Q4H PRN, Sandro Monsivais MD  •  sertraline (ZOLOFT) tablet 100 mg, 100 mg, Oral, Daily, Sandro Monsivais MD, 100 mg at 10/18/21 0847  •  sodium chloride 0.9 % infusion, 100 mL/hr, Intravenous, Continuous, Sandro Monsivais MD, Last Rate: 100 mL/hr at 10/17/21 0552, 100 mL/hr at 10/17/21 0552      Review of Systems  General: No fevers  Cutaneous: No rash.    Objective     Vital Signs:  Temp (24hrs), Av.9 °F (36.6 °C), Min:97.4 °F (36.3 °C), Max:98.3 °F (36.8 °C)      /83 (BP Location: Left arm, Patient Position: Lying)   Pulse 73   Temp 97.4 °F (36.3 °C) (Oral)   Resp 18   Ht 170.5 cm (67.13\")   Wt 116 kg (256 lb 13.4 oz)   SpO2 97%   BMI 40.08 kg/m²         Physical Exam  General: Patient nontoxic appearing sitting up in bed in no acute distress.  He was initially talking on the phone  HEENT: Sclera anicteric and noninjected  Respiratory: Effort even and unlabored.  He is not conversationally dyspneic  Right shoulder anterior dressing is clean dry and intact.  Right upper extremity immobilized in a sling.  Neuro: Alert oriented, speech clear  Psych: Pleasant cooperative        Results Review: "    I reviewed the patient's new clinical results.    Lab Results:  CBC:   Lab Results   Lab 10/14/21  1429   WBC 13.47*   HEMOGLOBIN 10.5*   HEMATOCRIT 31.7*   PLATELETS 262         CMP:   Lab Results   Lab 10/14/21  1429   SODIUM 132*   POTASSIUM 4.4   CHLORIDE 100   CO2 18.0*   BUN 11   CREATININE 0.54*   CALCIUM 8.0*   GLUCOSE 78       Estimated Creatinine Clearance: 211.6 mL/min (A) (by C-G formula based on SCr of 0.54 mg/dL (L)).    Culture Results:    Microbiology Results (last 10 days)     Procedure Component Value - Date/Time    Anaerobic Culture - Tissue, Shoulder, Right [[id number]]  (Normal) Collected: 10/14/21 1557    Lab Status: Preliminary result Specimen: Tissue from Shoulder, Right Updated: 10/18/21 0656     Anaerobic Culture No anaerobes isolated at 3 days    Tissue / Bone Culture - Tissue, Shoulder, Right [[id number]] Collected: 10/14/21 1557    Lab Status: Final result Specimen: Tissue from Shoulder, Right Updated: 10/17/21 1009     Tissue Culture No growth at 3 days     Gram Stain Many (4+) WBCs per low power field      No organisms seen    Anaerobic Culture - Tissue, Shoulder, Right [[id number]]  (Normal) Collected: 10/14/21 1556    Lab Status: Preliminary result Specimen: Tissue from Shoulder, Right Updated: 10/18/21 0657     Anaerobic Culture No anaerobes isolated at 3 days    Anaerobic Culture - Tissue, Shoulder, Right [[id number]]  (Normal) Collected: 10/14/21 1556    Lab Status: Preliminary result Specimen: Tissue from Shoulder, Right Updated: 10/18/21 0656     Anaerobic Culture No anaerobes isolated at 3 days    Tissue / Bone Culture - Tissue, Shoulder, Right [[id number]]  (Abnormal)  (Susceptibility) Collected: 10/14/21 1556    Lab Status: Final result Specimen: Tissue from Shoulder, Right Updated: 10/17/21 1048     Tissue Culture Light growth (2+) Staphylococcus aureus     Gram Stain Many (4+) WBCs per low power field      Rare (1+) Gram positive cocci    Susceptibility      Staphylococcus  aureus     PAL     Clindamycin Susceptible     Erythromycin Susceptible     Inducible Clindamycin Resistance Negative     Oxacillin Susceptible     Rifampin Susceptible     Tetracycline Susceptible     Trimethoprim + Sulfamethoxazole Susceptible     Vancomycin Susceptible                  Linear View               Susceptibility Comments     Staphylococcus aureus    This isolate does not demonstrate inducible clindamycin resistance in vitro.               Tissue / Bone Culture - Tissue, Shoulder, Right [[id number]]  (Abnormal) Collected: 10/14/21 1556    Lab Status: Final result Specimen: Tissue from Shoulder, Right Updated: 10/17/21 1049     Tissue Culture Light growth (2+) Staphylococcus aureus     Gram Stain Many (4+) WBCs per low power field      No organisms seen    Narrative:      Refer to previous tissue culture collected on 10/14/2021 at 1656 for MICs.      Body Fluid Culture - Synovial Fluid, Shoulder, Right [[id number]]  (Abnormal) Collected: 10/14/21 1546    Lab Status: Final result Specimen: Synovial Fluid from Shoulder, Right Updated: 10/17/21 1049     Body Fluid Culture Heavy growth (4+) Staphylococcus aureus     Gram Stain Many (4+) WBCs seen      Few (2+) Gram positive cocci, intracellular and extracellular    Narrative:      Refer to previous tissue culture collected on 10/14/2021 at 1656 for MICs.    COVID PRE-OP / PRE-PROCEDURE SCREENING ORDER (NO ISOLATION) - Swab, Nasal Cavity [[id number]]  (Normal) Collected: 10/14/21 1420    Lab Status: Final result Specimen: Swab from Nasal Cavity Updated: 10/14/21 1500    Narrative:      The following orders were created for panel order COVID PRE-OP / PRE-PROCEDURE SCREENING ORDER (NO ISOLATION) - Swab, Nasal Cavity.  Procedure                               Abnormality         Status                     ---------                               -----------         ------                     COVID-19,[facility]...[[id number]]  Normal              Final result                  Please view results for these tests on the individual orders.    COVID-19,[facility] IN-HOUSE,Nasal Swab No Transport Media 3-4 HR TAT - Swab, Nasal Cavity [[id number]]  (Normal) Collected: 10/14/21 1420    Lab Status: Final result Specimen: Swab from Nasal Cavity Updated: 10/14/21 1500     COVID19 Not Detected    Narrative:      Fact sheet for providers: https://www.fda.gov/media/522341/download     Fact sheet for patients: https://www.fda.gov/media/171293/download    Test performed by PCR.    Consider negative results in combination with clinical observations, patient history, and epidemiological information.               Radiology:   Imaging Results (Last 72 Hours)     ** No results found for the last 72 hours. 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 Acute prosthetic right total shoulder infection-original surgery September 30.  Patient status post irrigation debridement and exchange of polyethylene liner  2. Acute infection right total shoulder with methicillin susceptible Staphylococcus aureus.  3. Elevated CRP of 6.92 on October 15-nearly normalized      RECOMMENDATION:   Will order PICC line-discussed with patient  Add CMP to blood in lab for today.    Below copy and pasted to inpatient consult to case management services.  Patient's wife is a registered nurse.  He reports she will be able to help him with IV infusion.  If otherwise does not need home health, and is willing to come to the office weekly for blood draw and PICC line dressing change and to  antibiotics, could arrange for outpatient IV infusion clinic.    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  Plan will be for 6 weeks with start date October 14, 2021.   Patient is status post incision and drainage and exchange of the polyethylene liner per Dr. Monsivais.  · CBC, CMP, CRP q. Monday and fax to [phone]        Michell Amaral MD  10/18/21  09:23 CDT

## 2021-10-18 NOTE — NURSING NOTE
Arrived for PICC line placement. Patient states he just talked with worker's compensation people and they cannot tell him if this will be paid for yet and wishes to wait on insertion. Елена notified. States she will discuss with care manager.

## 2021-10-18 NOTE — CONSULTS
Patient or patient's representative gives informed consent after an explanation of the risks (air embolism; catheter occlusion; phlebitis; catheter infection; bloodstream infection; vein thrombosis; hematoma/bleeding at the insertion site; slight discomfort; accidental puncture of an artery, nerve, or tendon; dislodging of device), benefits (longer dwell time, outpatient treatment, medications that cannot run peripherally), and alternatives (short peripheral catheter, centrally inserted central line, or permanent catheter) of PICC placement. Time provided to ask and answer questions.    Pt had 4 [place] single lumen PICC placed in left basilic vein. Pt tolerated procedure well. 47 cm of catheter internal and 1 cm external. Tip confirmation by 3cg. All lumens flush and draw well. Sterile dressing applied.

## 2021-10-18 NOTE — PLAN OF CARE
Goal Outcome Evaluation:              Outcome Summary: Outcome Summary: Aox4. VSS. C/o pain relieved by IV and PO meds. Up ad quirino. Voiding. Sling in place to RUE. Dressing CDI to RUE. IVF and abx infusing as ordered. Hemovac in place, small drainange. Appears to be resting well this shift. Safety maintained. Call bell within reach. Will continue with plan of care

## 2021-10-18 NOTE — DISCHARGE PLACEMENT REQUEST
"Ruth Herbert [phone]  Feliberto Knowles (46 y.o. Male)             Date of Birth Social Security Number Address Home Phone MRN    1975  [address] [id number] [phone] [id number]    [employer] Marital Status             [employer]        Admission Date Admission Type Admitting Provider Attending Provider Department, Room/Bed    10/14/21 Elective Sandro Monsivais MD Kern, Brian, MD [address], 350/1    Discharge Date Discharge Disposition Discharge Destination                         Attending Provider: Sandro Monsivais MD    Allergies: No Known Allergies    Isolation: None   Infection: None   Code Status: CPR   Advance Care Planning Activity    Ht: 170.5 cm (67.13\")   Wt: 116 kg (256 lb 13.4 oz)    Admission Cmt: None   Principal Problem: None                Active Insurance as of 10/14/2021     Primary Coverage     Payor Plan Insurance Group Employer/Plan Group    WORKERS COMPENSATION MISC WORKERS COMPENSATION      Coverage Address Coverage Phone Number Coverage Fax Number Effective Dates    [address] [address] [phone]  2020 - None Entered    [facility] [address]       Subscriber Name Subscriber Birth Date Member ID       FELIBERTO KNOWLES 1975 [id number]                 Emergency Contacts      (Rel.) Home Phone Work Phone Mobile Phone    Janet Knowles (Spouse) -- -- [phone]        [address] [address]  Phone:  [phone]  Fax:  [phone]        Patient: ROOM: 350-1   Feliberto Knowles MRN:  [id number]   [address] [address] :  1975  SSN:    Phone: [phone] Sex:  M   PCP: Trey Arciniega                 Emergency Contact Information      Name Relation Home Work Mobile     Janet Knowles Spouse     [phone]          INSURANCE PAYOR PLAN GROUP # SUBSCRIBER ID   Primary:    WORKERS COMPENSATION [id number]   [id number]   Admitting Diagnosis: " 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  Plan will be for 6 weeks with start date October 14, 2021.  Patient is status post incision and drainage and exchange of the polyethylene liner per Dr. Monsivais.  CBC, CMP, CRP q. Monday and fax to [phone]   >>>>>>>>>>>Patient's wife is a registered nurse.  He reports she will be able to help him with IV infusion.  If otherwise does not need home health, and is willing to come to the office weekly for blood draw, PICC line dressing change and to  antibiotics, could arrange for at our office based outpatient IV infusion clinic.  Contact is DINORAH Núñez office # [phone]  Reason for Consult? Outpatient antibiotics           Authorizing Provider:Michell Amaral MD  Authorizing Provider's NPI: [id number]  Order Entered By: Michell Amaral MD 10/18/2021  9:52 AM     Electronically signed by: Michell Amaral MD 10/18/2021  9:52 AM

## 2021-10-18 NOTE — DISCHARGE SUMMARY
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  At the time of discharge, the patient was afebrile, vitals stable, pain was controlled with oral medication, they were tolerating a by mouth diet, and voiding appropriately. Physical therapy and occupational therapy were consulted.Infectious disease was consulted and provided recommendations regarding patient's care. The patient's hospital course remained uncomplicated. He has no further complaints prior to discharge. Given these findings they were deemed suitable to be discharged.     Patient had PICC line placed prior to discharge. He understands the plan and instructions regarding IV antibiotic administration laid out by Infectious Disease team.      Disposition: Home    Activity:NWB right upper    Wound Instructions: see postop instructions    Diet: regular diet    Resume home meds:   Medications Prior to Admission   Medication Sig Dispense Refill Last Dose   • aspirin (aspirin) 81 MG EC tablet Take 81 mg by mouth Daily.   10/14/2021 at 0900   • atorvastatin (LIPITOR) 80 MG tablet Take 80 mg by mouth Every Night.   10/13/2021   • Cholecalciferol (VITAMIN D3 PO) Take 1 tablet by mouth Daily.   10/14/2021 at 0900   • cyclobenzaprine (FLEXERIL) 10 MG tablet Take 10 mg by mouth 3 (Three) Times a Day As Needed for Muscle Spasms.   Past Week at Unknown time   • metoprolol tartrate (LOPRESSOR) 25  MG tablet Take 25 mg by mouth Daily.   10/14/2021 at 0900   • ondansetron (ZOFRAN) 4 MG tablet Take 1 tablet by mouth Every 8 (Eight) Hours As Needed for Nausea or Vomiting. 20 tablet 0 10/14/2021 at 0900   • oxyCODONE-acetaminophen (PERCOCET)  MG per tablet Take 1 tablet by mouth Every 4 (Four) Hours As Needed for Moderate Pain . 40 tablet 0 10/14/2021 at 0900   • sertraline (ZOLOFT) 100 MG tablet Take 100 mg by mouth Daily.   10/14/2021 at 0900       Prescriptions for:    Percocet  Nafcillin (per infectious disease) - start date October 14, 2021, 6 week course    Return to Clinic: 7-10 days for xrays and incision check  Xrays: yes    Sharan Friend PA-C  10/18/2021  13:00 CDT     ADDENDUM: Patient will stay overnight tonight 10/19/21 with plans for discharge tomorrow morning so that he can receive IV antibiotic infusion tomorrow morning at his scheduled appointment with no delay in treatment.  Discharge date above changed to reflect 10/20/21 discharge date in the AM.

## 2021-10-19 LAB — CRP SERPL-MCNC: 0.66 MG/DL (ref 0–0.5)

## 2021-10-19 PROCEDURE — 86140 C-REACTIVE PROTEIN: CPT | Performed by: PHYSICIAN ASSISTANT

## 2021-10-19 PROCEDURE — 25010000003 HYDROMORPHONE 1 MG/ML SOLUTION: Performed by: PHYSICIAN ASSISTANT

## 2021-10-19 RX ADMIN — OXYCODONE AND ACETAMINOPHEN 1 TABLET: 325; 10 TABLET ORAL at 13:36

## 2021-10-19 RX ADMIN — DOCUSATE SODIUM 100 MG: 100 CAPSULE ORAL at 20:16

## 2021-10-19 RX ADMIN — OXYCODONE AND ACETAMINOPHEN 1 TABLET: 325; 10 TABLET ORAL at 19:17

## 2021-10-19 RX ADMIN — HYDROMORPHONE HYDROCHLORIDE 1 MG: 1 INJECTION, SOLUTION INTRAMUSCULAR; INTRAVENOUS; SUBCUTANEOUS at 23:37

## 2021-10-19 RX ADMIN — NAFCILLIN SODIUM 2 G: 2 INJECTION, POWDER, LYOPHILIZED, FOR SOLUTION INTRAMUSCULAR; INTRAVENOUS at 08:34

## 2021-10-19 RX ADMIN — NAFCILLIN SODIUM 2 G: 2 INJECTION, POWDER, LYOPHILIZED, FOR SOLUTION INTRAMUSCULAR; INTRAVENOUS at 12:46

## 2021-10-19 RX ADMIN — DOCUSATE SODIUM 100 MG: 100 CAPSULE ORAL at 08:35

## 2021-10-19 RX ADMIN — NAFCILLIN SODIUM 2 G: 2 INJECTION, POWDER, LYOPHILIZED, FOR SOLUTION INTRAMUSCULAR; INTRAVENOUS at 00:33

## 2021-10-19 RX ADMIN — SERTRALINE 100 MG: 100 TABLET, FILM COATED ORAL at 08:35

## 2021-10-19 RX ADMIN — METOPROLOL TARTRATE 25 MG: 25 TABLET, FILM COATED ORAL at 08:35

## 2021-10-19 RX ADMIN — METOPROLOL TARTRATE 25 MG: 25 TABLET, FILM COATED ORAL at 20:16

## 2021-10-19 RX ADMIN — HYDROMORPHONE HYDROCHLORIDE 1 MG: 1 INJECTION, SOLUTION INTRAMUSCULAR; INTRAVENOUS; SUBCUTANEOUS at 20:16

## 2021-10-19 RX ADMIN — NAFCILLIN SODIUM 2 G: 2 INJECTION, POWDER, LYOPHILIZED, FOR SOLUTION INTRAMUSCULAR; INTRAVENOUS at 05:00

## 2021-10-19 RX ADMIN — NAFCILLIN SODIUM 2 G: 2 INJECTION, POWDER, LYOPHILIZED, FOR SOLUTION INTRAMUSCULAR; INTRAVENOUS at 17:24

## 2021-10-19 RX ADMIN — NAFCILLIN SODIUM 2 G: 2 INJECTION, POWDER, LYOPHILIZED, FOR SOLUTION INTRAMUSCULAR; INTRAVENOUS at 20:16

## 2021-10-19 RX ADMIN — DIAZEPAM 5 MG: 5 TABLET ORAL at 01:09

## 2021-10-19 RX ADMIN — HYDROMORPHONE HYDROCHLORIDE 1 MG: 1 INJECTION, SOLUTION INTRAMUSCULAR; INTRAVENOUS; SUBCUTANEOUS at 04:56

## 2021-10-19 RX ADMIN — HYDROMORPHONE HYDROCHLORIDE 1 MG: 1 INJECTION, SOLUTION INTRAMUSCULAR; INTRAVENOUS; SUBCUTANEOUS at 15:55

## 2021-10-19 RX ADMIN — OXYCODONE AND ACETAMINOPHEN 1 TABLET: 325; 10 TABLET ORAL at 00:32

## 2021-10-19 NOTE — DISCHARGE PLACEMENT REQUEST
"Ruth Keon [phone]  Feliberto Stoner (46 y.o. Male)             Date of Birth Social Security Number Address Home Phone MRN    1975  [address] [facility] [address] [phone] [id number]    Yaz[employer] Marital Status             Mandaen        Admission Date Admission Type Admitting Provider Attending Provider Department, Room/Bed    10/14/21 Elective Sandro Monsivais MD Kern, Brian, MD [facility] 3A, 350/1    Discharge Date Discharge Disposition Discharge Destination           Home or Self Care              Attending Provider: Sandro Monsivais MD    Allergies: No Known Allergies    Isolation: None   Infection: None   Code Status: CPR   Advance Care Planning Activity    Ht: 170.5 cm (67.13\")   Wt: 116 kg (256 lb 13.4 oz)    Admission Cmt: None   Principal Problem: None                Active Insurance as of 10/14/2021     Primary Coverage     Payor Plan Insurance Group Employer/Plan Group    WORKERS COMPENSATION MISC WORKERS COMPENSATION      Coverage Address Coverage Phone Number Coverage Fax Number Effective Dates    [address] [address] [phone]  2020 - None Entered    [facility] [address]       Subscriber Name Subscriber Birth Date Member ID       FELIBERTO STONER 1975 [id number]                 Emergency Contacts      (Rel.) Home Phone Work Phone Mobile Phone    Janet Stoner (Spouse) -- -- [phone]               Discharge Summary      Sharan Friend PA-C at 10/19/21 0700     Attestation signed by Sandro Monsivais MD at 10/19/21 0823    I have reviewed this documentation and agree.                    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  At the time of discharge, the patient was afebrile, vitals stable, pain was controlled with oral medication, they were tolerating a by mouth diet, and voiding appropriately. Physical therapy and occupational therapy were consulted.Infectious disease was consulted and provided recommendations regarding patient's care. The patient's hospital course remained uncomplicated. He has no further complaints prior to discharge. Given these findings they were deemed suitable to be discharged.     Patient had PICC line placed prior to discharge. He understands the plan and instructions regarding IV antibiotic administration laid out by Infectious Disease team.      Disposition: Home    Activity:NWB right upper    Wound Instructions: see postop instructions    Diet: regular diet    Resume home meds:   Medications Prior to Admission   Medication Sig Dispense Refill Last Dose   • aspirin (aspirin) 81 MG EC tablet Take 81 mg by mouth Daily.   10/14/2021 at 0900   • atorvastatin (LIPITOR) 80 MG tablet Take 80 mg by mouth Every Night.   10/13/2021   • Cholecalciferol (VITAMIN D3 PO) Take 1 tablet by mouth Daily.   10/14/2021 at 0900   • cyclobenzaprine (FLEXERIL) 10 MG tablet Take 10 mg by mouth 3 (Three) Times a Day As Needed for Muscle Spasms.   Past Week at Unknown time   • metoprolol tartrate (LOPRESSOR) 25 MG tablet Take 25 mg by mouth Daily.   10/14/2021 at 0900   • ondansetron (ZOFRAN) 4 MG tablet Take 1 tablet by mouth Every 8 (Eight) Hours As Needed for Nausea or Vomiting. 20 tablet 0 10/14/2021 at 0900   • oxyCODONE-acetaminophen (PERCOCET)  MG per tablet Take 1 tablet by mouth Every 4 (Four) Hours As Needed for Moderate Pain . 40 tablet 0  10/14/2021 at 0900   • sertraline (ZOLOFT) 100 MG tablet Take 100 mg by mouth Daily.   10/14/2021 at 0900       Prescriptions for:    Percocet  Nafcillin (per infectious disease) - start date October 14, 2021, 6 week course    Return to Clinic: 7-10 days for xrays and incision check  Xrays: yes    Sharan Friend PA-C  10/18/2021  13:00 CDT    Electronically signed by Sandro Monsivais MD at 10/19/21 6668

## 2021-10-19 NOTE — PROGRESS NOTES
INFECTIOUS DISEASES PROGRESS NOTE    Patient:  Feliberto Knowles  YOB: 1975  MRN: [id number]   Admit date: 10/14/2021   Admitting Physician: Sandro Monsivais MD  Primary Care Physician: Trey Arciniega MD    Chief Complaint: No new problems        Interval History: PICC line ordered yesterday as was inpatient consult to case management as below.  Yesterday afternoon DINORAH Núñez listed below had not been contacted.      nafcillin 12 g IV as continuous infusion over 24 hours for methicillin susceptible Staphylococcus aureus infection right total shoulder arthroplasty.  Plan will be for 6 weeks with start date October 14, 2021.  Patient is status post incision and drainage and exchange of the polyethylene liner per Dr. Monsivais.   CBC, CMP, CRP q. Monday and fax to [phone]    >>>>>>>>>>>Patient's wife is a registered nurse.  He reports she will be able to help him with IV infusion.  If otherwise does not need home health, and is willing to come to the office weekly for blood draw, PICC line dressing change and to  antibiotics, could arrange for at our office based outpatient IV infusion clinic.  Contact is DINORAH Núñez office # [phone]    Allergies: No Known Allergies    Current Meds:     Current Facility-Administered Medications:   •  diazePAM (VALIUM) tablet 5 mg, 5 mg, Oral, Q6H PRN, Sandro Monsivais MD, 5 mg at 10/19/21 0109  •  diphenhydrAMINE (BENADRYL) capsule 25 mg, 25 mg, Oral, Q6H PRN, Sandro Monsivais MD  •  docusate sodium (COLACE) capsule 100 mg, 100 mg, Oral, BID, Sandro Monsivais MD, 100 mg at 10/19/21 0835  •  HYDROmorphone (DILAUDID) injection 1 mg, 1 mg, Intravenous, Q2H PRN, Jesus Hdz PA, 1 mg at 10/19/21 0456  •  magnesium hydroxide (MILK OF MAGNESIA) suspension 10 mL, 10 mL, Oral, Daily PRN, Sandro Monsivais MD, 10 mL at 10/15/21 1511  •  metoprolol tartrate (LOPRESSOR) tablet 25 mg, 25 mg, Oral, Q12H, Sandro Monsivais MD, 25 mg at 10/19/21 0835  •  nafcillin 2 g in sodium chloride 0.9 % 100 mL  "IVPB-VTB, 2 g, Intravenous, Q4H, Willie Wall MD, 2 g at 10/19/21 0834  •  [DISCONTINUED] HYDROmorphone (DILAUDID) injection 0.5 mg, 0.5 mg, Intravenous, Q2H PRN, 0.5 mg at 10/15/21 1436 **AND** naloxone (NARCAN) injection 0.4 mg, 0.4 mg, Intravenous, Q5 Min PRN, Sandro Monsivais MD  •  ondansetron (ZOFRAN) injection 4 mg, 4 mg, Intravenous, Q6H PRN, Sandro Monsivais MD, 4 mg at 10/18/21 1726  •  ondansetron (ZOFRAN) tablet 4 mg, 4 mg, Oral, Q6H PRN, Sandro Monsivais MD, 4 mg at 10/17/21 2013  •  oxyCODONE-acetaminophen (PERCOCET)  MG per tablet 1 tablet, 1 tablet, Oral, Q4H PRN, Sandro Monsivais MD, 1 tablet at 10/19/21 0032  •  oxyCODONE-acetaminophen (PERCOCET) 7.5-325 MG per tablet 1 tablet, 1 tablet, Oral, Q4H PRN, Sandro Monsivais MD, 1 tablet at 10/18/21 1849  •  promethazine (PHENERGAN) tablet 12.5 mg, 12.5 mg, Oral, Q4H PRN **OR** promethazine (PHENERGAN) suppository 12.5 mg, 12.5 mg, Rectal, Q4H PRN, Sandro Monsivais MD  •  sertraline (ZOLOFT) tablet 100 mg, 100 mg, Oral, Daily, Sandro Monsivais MD, 100 mg at 10/19/21 0835  •  sodium chloride 0.9 % infusion, 100 mL/hr, Intravenous, Continuous, Sandro Monsivais MD, Last Rate: 100 mL/hr at 10/17/21 0552, 100 mL/hr at 10/17/21 0552      Review of Systems  General: No fevers  Cutaneous: No rash.    Objective     Vital Signs:  Temp (24hrs), Av.1 °F (36.7 °C), Min:97.7 °F (36.5 °C), Max:98.5 °F (36.9 °C)      /58   Pulse 72   Temp 97.7 °F (36.5 °C) (Oral)   Resp 18   Ht 170.5 cm (67.13\")   Wt 116 kg (256 lb 13.4 oz)   SpO2 96%   BMI 40.08 kg/m²         Physical Exam  General: Patient comfortable appearing lying in bed in no acute distress  HEENT: Sclera anicteric and noninjected  Respiratory: Effort even and unlabored.  He is not conversationally dyspneic  Right shoulder anterior dressing is clean dry and intact.  Right upper extremity remains immobilized in a sling.  Neuro: Alert oriented, speech clear  Psych: Pleasant cooperative      Left upper extremity PICC line " is in place      Results Review:    I reviewed the patient's new clinical results.    Lab Results:  CBC:   Lab Results   Lab 10/14/21  1429   WBC 13.47*   HEMOGLOBIN 10.5*   HEMATOCRIT 31.7*   PLATELETS 262         CMP:   Lab Results   Lab 10/14/21  1429 10/18/21  0514   SODIUM 132* 136   POTASSIUM 4.4 4.5   CHLORIDE 100 100   CO2 18.0* 20.0*   BUN 11 10   CREATININE 0.54* 0.78   CALCIUM 8.0* 9.5   BILIRUBIN  --  0.2   ALK PHOS  --  91   ALT (SGPT)  --  24   AST (SGOT)  --  20   GLUCOSE 78 106*       Estimated Creatinine Clearance: 146.5 mL/min (by C-G formula based on SCr of 0.78 mg/dL).    Culture Results:    Microbiology Results (last 10 days)     Procedure Component Value - Date/Time    Anaerobic Culture - Tissue, Shoulder, Right [[id number]]  (Normal) Collected: 10/14/21 1557    Lab Status: Preliminary result Specimen: Tissue from Shoulder, Right Updated: 10/18/21 0656     Anaerobic Culture No anaerobes isolated at 3 days    Tissue / Bone Culture - Tissue, Shoulder, Right [[id number]] Collected: 10/14/21 1557    Lab Status: Final result Specimen: Tissue from Shoulder, Right Updated: 10/17/21 1009     Tissue Culture No growth at 3 days     Gram Stain Many (4+) WBCs per low power field      No organisms seen    Anaerobic Culture - Tissue, Shoulder, Right [[id number]]  (Normal) Collected: 10/14/21 1556    Lab Status: Preliminary result Specimen: Tissue from Shoulder, Right Updated: 10/18/21 0657     Anaerobic Culture No anaerobes isolated at 3 days    Anaerobic Culture - Tissue, Shoulder, Right [[id number]]  (Normal) Collected: 10/14/21 1556    Lab Status: Preliminary result Specimen: Tissue from Shoulder, Right Updated: 10/18/21 0656     Anaerobic Culture No anaerobes isolated at 3 days    Tissue / Bone Culture - Tissue, Shoulder, Right [[id number]]  (Abnormal)  (Susceptibility) Collected: 10/14/21 1556    Lab Status: Final result Specimen: Tissue from Shoulder, Right Updated: 10/17/21 1048     Tissue Culture Light  growth (2+) Staphylococcus aureus     Gram Stain Many (4+) WBCs per low power field      Rare (1+) Gram positive cocci    Susceptibility      Staphylococcus aureus     PAL     Clindamycin Susceptible     Erythromycin Susceptible     Inducible Clindamycin Resistance Negative     Oxacillin Susceptible     Rifampin Susceptible     Tetracycline Susceptible     Trimethoprim + Sulfamethoxazole Susceptible     Vancomycin Susceptible                  Linear View               Susceptibility Comments     Staphylococcus aureus    This isolate does not demonstrate inducible clindamycin resistance in vitro.               Tissue / Bone Culture - Tissue, Shoulder, Right [[id number]]  (Abnormal) Collected: 10/14/21 1556    Lab Status: Final result Specimen: Tissue from Shoulder, Right Updated: 10/17/21 1049     Tissue Culture Light growth (2+) Staphylococcus aureus     Gram Stain Many (4+) WBCs per low power field      No organisms seen    Narrative:      Refer to previous tissue culture collected on 10/14/2021 at 1656 for MICs.      Body Fluid Culture - Synovial Fluid, Shoulder, Right [[id number]]  (Abnormal) Collected: 10/14/21 1546    Lab Status: Final result Specimen: Synovial Fluid from Shoulder, Right Updated: 10/17/21 1049     Body Fluid Culture Heavy growth (4+) Staphylococcus aureus     Gram Stain Many (4+) WBCs seen      Few (2+) Gram positive cocci, intracellular and extracellular    Narrative:      Refer to previous tissue culture collected on 10/14/2021 at 1656 for MICs.    COVID PRE-OP / PRE-PROCEDURE SCREENING ORDER (NO ISOLATION) - Swab, Nasal Cavity [[id number]]  (Normal) Collected: 10/14/21 1420    Lab Status: Final result Specimen: Swab from Nasal Cavity Updated: 10/14/21 1500    Narrative:      The following orders were created for panel order COVID PRE-OP / PRE-PROCEDURE SCREENING ORDER (NO ISOLATION) - Swab, Nasal Cavity.  Procedure                               Abnormality         Status                      ---------                               -----------         ------                     COVID-19,[facility]...[[id number]]  Normal              Final result                 Please view results for these tests on the individual orders.    COVID-19,[facility] IN-HOUSE,Nasal Swab No Transport Media 3-4 HR TAT - Swab, Nasal Cavity [[id number]]  (Normal) Collected: 10/14/21 1420    Lab Status: Final result Specimen: Swab from Nasal Cavity Updated: 10/14/21 1500     COVID19 Not Detected    Narrative:      Fact sheet for providers: https://www.fda.gov/media/887509/download     Fact sheet for patients: https://www.fda.gov/media/205666/download    Test performed by PCR.    Consider negative results in combination with clinical observations, patient history, and epidemiological information.               Radiology:   Imaging Results (Last 72 Hours)     ** No results found for the last 72 hours. 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 Acute prosthetic right total shoulder infection-original surgery September 30.  Patient status post irrigation debridement and exchange of polyethylene liner  2. Acute infection right total shoulder with methicillin susceptible Staphylococcus aureus.  3. Elevated CRP of 6.92 on October 15-nearly normalized      RECOMMENDATION:     Below copy and pasted to inpatient consult to case management services.    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  Plan will be for 6 weeks with start date October 14, 2021.  Patient is status post incision and drainage and exchange of the polyethylene liner per Dr. Monsivais.  · CBC, CMP, CRP q. Monday and fax to [phone]    >>>>>>>>>>>Patient's wife is a registered nurse.  He reports she will be  able to help him with IV infusion.  If otherwise does not need home health, and is willing to come to the office weekly for blood draw, PICC line dressing change and to  antibiotics, could arrange for at our office based outpatient IV infusion clinic.  Contact is DINORAH Núñez office # [phone]    Michell Amaral MD  10/19/21  09:52 CDT

## 2021-10-19 NOTE — PLAN OF CARE
Goal Outcome Evaluation:  Plan of Care Reviewed With: patient         Patient alert and oriented X4. Resting in bed. VSS. RA. Frequent pain , relief with pain medication. PICC site dressing clean and dry.NO signs of CLABSI. Eating and drinking well. Voiding in urinal. Incision site dressing site looks clean and dry. Waiting for discharge with IV antibiotics via PICC line. Wife at bed site since afternoon. Continue to monitor.

## 2021-10-19 NOTE — DISCHARGE PLACEMENT REQUEST
"Ruth Herbert [phone]  Feliberto Knowles (46 y.o. Male)             Date of Birth Social Security Number Address Home Phone MRN    1975  [address] [address] [phone] [id number]    Sabianism Marital Status             Quaker        Admission Date Admission Type Admitting Provider Attending Provider Department, Room/Bed    10/14/21 Elective Sandro Monsivais MD Kern, Brian, MD [facility] 3A, 350/1    Discharge Date Discharge Disposition Discharge Destination           Home or Self Care              Attending Provider: Sandro Monsivais MD    Allergies: No Known Allergies    Isolation: None   Infection: None   Code Status: CPR   Advance Care Planning Activity    Ht: 170.5 cm (67.13\")   Wt: 116 kg (256 lb 13.4 oz)    Admission Cmt: None   Principal Problem: None                Active Insurance as of 10/14/2021     Primary Coverage     Payor Plan Insurance Group Employer/Plan Group    WORKERS COMPENSATION MISC WORKERS COMPENSATION      Coverage Address Coverage Phone Number Coverage Fax Number Effective Dates    [address] [address] [phone]  4/1/2020 - None Entered    [facility] [id number]       Subscriber Name Subscriber Birth Date Member ID       FELIBERTO KNOWLES 1975 [id number]                 Emergency Contacts      (Rel.) Home Phone Work Phone Mobile Phone    Janet Knowles (Spouse) -- -- [phone]        Ambulatory Referral to Home Health [[id number]] (Order [id number])  Order  Date: 10/19/2021 Department: [address] Ordering/Authorizing: Sandro Monsivais MD       Order History  Outpatient  Date/Time Action Taken User Additional Information   10/19/21 1421 Sign Melanie Dorman, RN Ordering Mode: Verbal with readback     Order Details    Frequency Duration Priority Order Class   None None Routine Internal Referral     Start Date/Time    Start Date   10/19/21     Order Information    Order Date Service Start Date Start Time   10/19/21 " Medicine 10/19/21      Comments    Currently we will plan on nafcillin 12 g IV as continuous infusion over 24 hours for methicillin susceptible Staphylococcus aureus infection right total shoulder arthroplasty.  Plan will be for 6 weeks with start date October 14, 2021.  Patient is status post incision and drainage and exchange of the polyethylene liner per Dr. Monsivais.   ·   CBC, CMP, CRP q. Monday and fax to [phone]                Reference Links    Associated Reports External References   View Encounter Current Health Referral Information     Order Questions    Question Answer   Face to Face Visit Date: 10/19/2021   Follow-up provider for Plan of Care? I will be treating the patient on an ongoing basis.  Please send me the Plan of Care for signature.   Follow-up provider: BECKI MONSIVAIS   Reason/Clinical Findings s/p hospitalization for infection right shoulder s/p total replacement.   Describe mobility limitations that make leaving home difficult: difficult to leave home r/t pain/recent surgery/ pain med. not driving. picc line with long term antibx therapy   Nursing/Therapeutic Services Requested Skilled Nursing   Skilled nursing orders: Medication education    Pain management    Infusion therapy    PICC line care/instruction    Cardiopulmonary assessments    Neurovascular assessments   Frequency: 1 Week 1            Verbal Order Info    Action Created on Order Mode Entered by Responsible Provider Signed by Signed on   Ordering 10/19/21 1421 Verbal with readback Melanie Dorman, Becki Wooten MD       Source Order Set / Preference List    Preference List   [facility] FACILITY REFERRALS [[id number]]     Clinical Indications     ICD-10-CM ICD-9-CM   Infection of prosthetic total shoulder joint, initial encounter ([facility])  - Primary     T84.59XA  Z96.619 996.66  V43.61   Postoperative infection     T81.40XA 998.59   Decreased activities of daily living (ADL)     Z78.9 V49.89   Postoperative infection, unspecified type, initial  encounter     T81.40XA 998.59     Reprint Order Requisition    Ambulatory Referral to Home Health (Order #[id number]) on 10/19/21     Encounter    View Encounter            Order Provider Info        Office phone Pager E-mail   Ordering User Melanie Dorman RN -- -- --   Authorizing Provider Sandro Monsivais [phone] -- --   Attending Provider Sandro Monsivais [phone] -- --   Entered By Melanie Dorman RN -- -- --   Ordering Provider Sandro Monsivais [phone] -- --     Tracking Reports    Cosign Tracking Order Transmittal Tracking   Authorized by:  Sandro Monsivais MD  (NPI: [id number])           Lab Component SmartPhrase Guide    Ambulatory Referral to Home Health (Order #[id number]) on 10/19/21

## 2021-10-19 NOTE — NURSING NOTE
Hand-off given to Asya COPELAND. Pt wife who works on [employer] pulled pt peripheral IV. No neuro changes.

## 2021-10-19 NOTE — CASE MANAGEMENT/SOCIAL WORK
Continued Stay Note  DARIO Dunham     Patient Name: Feliberto Knowles  MRN: [id number]  Today's Date: 10/19/2021    Admit Date: 10/14/2021     Discharge Plan     Row Name 10/19/21 0838       Plan    Plan Workers Comp    Patient/Family in Agreement with Plan yes    Plan Comments Left message for Rashida Rodriguez from [employer] [phone] to see if everything has been arranged so pt can be discharged. Will follow.    4325- HL orders sent to Rashida from [employer]. She is saying she is unsure at present if this will all be set up today but will call SW upon completion.                Discharge Codes    No documentation.               Expected Discharge Date and Time     Expected Discharge Date Expected Discharge Time    Oct 19, 2021             CAMERON Pandya

## 2021-10-20 ENCOUNTER — READMISSION MANAGEMENT (OUTPATIENT)
Dept: CALL CENTER | Facility: HOSPITAL | Age: 46
End: 2021-10-20

## 2021-10-20 VITALS
TEMPERATURE: 98.4 F | BODY MASS INDEX: 40.31 KG/M2 | SYSTOLIC BLOOD PRESSURE: 146 MMHG | RESPIRATION RATE: 18 BRPM | DIASTOLIC BLOOD PRESSURE: 65 MMHG | HEART RATE: 65 BPM | HEIGHT: 67 IN | WEIGHT: 256.84 LBS | OXYGEN SATURATION: 99 %

## 2021-10-20 LAB
ALBUMIN SERPL-MCNC: 4 G/DL (ref 3.5–5.2)
ALBUMIN/GLOB SERPL: 1.6 G/DL
ALP SERPL-CCNC: 88 U/L (ref 39–117)
ALT SERPL W P-5'-P-CCNC: 18 U/L (ref 1–41)
ANION GAP SERPL CALCULATED.3IONS-SCNC: 7 MMOL/L (ref 5–15)
AST SERPL-CCNC: 18 U/L (ref 1–40)
BACTERIA SPEC ANAEROBE CULT: NORMAL
BASOPHILS # BLD AUTO: 0.07 10*3/MM3 (ref 0–0.2)
BASOPHILS NFR BLD AUTO: 0.8 % (ref 0–1.5)
BILIRUB SERPL-MCNC: 0.3 MG/DL (ref 0–1.2)
BUN SERPL-MCNC: 9 MG/DL (ref 6–20)
BUN/CREAT SERPL: 12.9 (ref 7–25)
CALCIUM SPEC-SCNC: 9.1 MG/DL (ref 8.6–10.5)
CHLORIDE SERPL-SCNC: 102 MMOL/L (ref 98–107)
CO2 SERPL-SCNC: 30 MMOL/L (ref 22–29)
CREAT SERPL-MCNC: 0.7 MG/DL (ref 0.76–1.27)
CRP SERPL-MCNC: 0.45 MG/DL (ref 0–0.5)
DEPRECATED RDW RBC AUTO: 37.8 FL (ref 37–54)
EOSINOPHIL # BLD AUTO: 0.04 10*3/MM3 (ref 0–0.4)
EOSINOPHIL NFR BLD AUTO: 0.5 % (ref 0.3–6.2)
ERYTHROCYTE [DISTWIDTH] IN BLOOD BY AUTOMATED COUNT: 12.5 % (ref 12.3–15.4)
GFR SERPL CREATININE-BSD FRML MDRD: 122 ML/MIN/1.73
GLOBULIN UR ELPH-MCNC: 2.5 GM/DL
GLUCOSE SERPL-MCNC: 101 MG/DL (ref 65–99)
HCT VFR BLD AUTO: 38.4 % (ref 37.5–51)
HGB BLD-MCNC: 13 G/DL (ref 13–17.7)
IMM GRANULOCYTES # BLD AUTO: 0.11 10*3/MM3 (ref 0–0.05)
IMM GRANULOCYTES NFR BLD AUTO: 1.3 % (ref 0–0.5)
LYMPHOCYTES # BLD AUTO: 2.08 10*3/MM3 (ref 0.7–3.1)
LYMPHOCYTES NFR BLD AUTO: 24.3 % (ref 19.6–45.3)
MCH RBC QN AUTO: 28.3 PG (ref 26.6–33)
MCHC RBC AUTO-ENTMCNC: 33.9 G/DL (ref 31.5–35.7)
MCV RBC AUTO: 83.7 FL (ref 79–97)
MONOCYTES # BLD AUTO: 0.66 10*3/MM3 (ref 0.1–0.9)
MONOCYTES NFR BLD AUTO: 7.7 % (ref 5–12)
NEUTROPHILS NFR BLD AUTO: 5.59 10*3/MM3 (ref 1.7–7)
NEUTROPHILS NFR BLD AUTO: 65.4 % (ref 42.7–76)
NRBC BLD AUTO-RTO: 0 /100 WBC (ref 0–0.2)
PLATELET # BLD AUTO: 397 10*3/MM3 (ref 140–450)
PMV BLD AUTO: 8.7 FL (ref 6–12)
POTASSIUM SERPL-SCNC: 4 MMOL/L (ref 3.5–5.2)
PROT SERPL-MCNC: 6.5 G/DL (ref 6–8.5)
RBC # BLD AUTO: 4.59 10*6/MM3 (ref 4.14–5.8)
SODIUM SERPL-SCNC: 139 MMOL/L (ref 136–145)
WBC # BLD AUTO: 8.55 10*3/MM3 (ref 3.4–10.8)

## 2021-10-20 PROCEDURE — 80053 COMPREHEN METABOLIC PANEL: CPT | Performed by: INTERNAL MEDICINE

## 2021-10-20 PROCEDURE — 86140 C-REACTIVE PROTEIN: CPT | Performed by: PHYSICIAN ASSISTANT

## 2021-10-20 PROCEDURE — 85025 COMPLETE CBC W/AUTO DIFF WBC: CPT | Performed by: INTERNAL MEDICINE

## 2021-10-20 PROCEDURE — 25010000003 HYDROMORPHONE 1 MG/ML SOLUTION: Performed by: PHYSICIAN ASSISTANT

## 2021-10-20 RX ADMIN — HYDROMORPHONE HYDROCHLORIDE 1 MG: 1 INJECTION, SOLUTION INTRAMUSCULAR; INTRAVENOUS; SUBCUTANEOUS at 04:27

## 2021-10-20 RX ADMIN — DOCUSATE SODIUM 100 MG: 100 CAPSULE ORAL at 09:46

## 2021-10-20 RX ADMIN — METOPROLOL TARTRATE 25 MG: 25 TABLET, FILM COATED ORAL at 09:46

## 2021-10-20 RX ADMIN — NAFCILLIN SODIUM 2 G: 2 INJECTION, POWDER, LYOPHILIZED, FOR SOLUTION INTRAMUSCULAR; INTRAVENOUS at 08:27

## 2021-10-20 RX ADMIN — HYDROMORPHONE HYDROCHLORIDE 1 MG: 1 INJECTION, SOLUTION INTRAMUSCULAR; INTRAVENOUS; SUBCUTANEOUS at 08:17

## 2021-10-20 RX ADMIN — NAFCILLIN SODIUM 2 G: 2 INJECTION, POWDER, LYOPHILIZED, FOR SOLUTION INTRAMUSCULAR; INTRAVENOUS at 04:13

## 2021-10-20 RX ADMIN — OXYCODONE AND ACETAMINOPHEN 1 TABLET: 325; 10 TABLET ORAL at 09:57

## 2021-10-20 RX ADMIN — DIAZEPAM 5 MG: 5 TABLET ORAL at 01:10

## 2021-10-20 RX ADMIN — SERTRALINE 100 MG: 100 TABLET, FILM COATED ORAL at 09:46

## 2021-10-20 RX ADMIN — NAFCILLIN SODIUM 2 G: 2 INJECTION, POWDER, LYOPHILIZED, FOR SOLUTION INTRAMUSCULAR; INTRAVENOUS at 00:09

## 2021-10-20 NOTE — CASE MANAGEMENT/SOCIAL WORK
Continued Stay Note  DARIO Dunham     Patient Name: Feliberto Knowles  MRN: [id number]  Today's Date: 10/20/2021    Admit Date: 10/14/2021     Discharge Plan     Row Name 10/20/21 1507       Plan    Plan Home Health being set up with [employer]    Patient/Family in Agreement with Plan yes    Plan Comments Pt has left hospital before Workers [employer] approved IV abx and HH set up.  They called this SW saying it has almost been finalized but working on final details of HH.  She is aware to get with pt as he is now at home.  Provided contact for Saida at Dr. Amaral's infusion office so Workers [employer] can get with her as this was not approved to be covered through them. Called and left message for Saida to find out if she has any different information and to give details.               Discharge Codes    No documentation.               Expected Discharge Date and Time     Expected Discharge Date Expected Discharge Time    Oct 19, 2021             CAMERON Pandya

## 2021-10-20 NOTE — PLAN OF CARE
Goal Outcome Evaluation:            A&Ox4.  VSS.  Dressing to right arm changed.  Pt. Reported dressing came off.  Sling to rt arm.  Pt requesting new sling.  PO and IV push prn pain meds given as ordered with C/O pain with good relief.  Abx given as ordered.  PICC dressing CDI.  CHG this morning.  Will continue to monitor.

## 2021-10-21 NOTE — OUTREACH NOTE
Prep Survey      Responses   [facility] facility patient discharged from? [place]   Is LACE score < 7 ? No   Emergency Room discharge w/ pulse ox? No   Eligibility Readm Mgmt   Discharge diagnosis Infection of prosthetic total shoulder joint    Does the patient have one of the following disease processes/diagnoses(primary or secondary)? General Surgery   Does the patient have Home health ordered? Yes   What is the Home health agency?  set up through    Is there a DME ordered? No   Comments regarding appointments Appts needed   Prep survey completed? Yes          Hawa Quinonez RN

## 2021-10-26 ENCOUNTER — READMISSION MANAGEMENT (OUTPATIENT)
Dept: CALL CENTER | Facility: HOSPITAL | Age: 46
End: 2021-10-26

## 2021-10-26 NOTE — OUTREACH NOTE
General Surgery Week 1 Survey      Responses   [facility] [place] [facility] [place]   Does the patient have one of the following disease processes/diagnoses(primary or secondary)? General Surgery   Week 1 attempt successful? Yes   Call start time 1118   Call end time 1119   Discharge diagnosis Infection of prosthetic total shoulder joint    Meds reviewed with patient/caregiver? Yes   Is the patient having any side effects they believe may be caused by any medication additions or changes? No   Does the patient have all medications related to this admission filled (includes all antibiotics, pain medications, etc.) Yes   Is the patient taking all medications as directed (includes completed medication regime)? Yes   Does the patient have a follow up appointment scheduled with their surgeon? Yes   Has the patient kept scheduled appointments due by today? Yes   Comments had his f/u with ortho doctor today   What is the Home health agency?  set up through    Has home health visited the patient within 72 hours of discharge? Yes   Psychosocial issues? No   Did the patient receive a copy of their discharge instructions? Yes   Nursing interventions Reviewed instructions with patient   What is the patient's perception of their health status since discharge? Improving   Nursing interventions Nurse provided patient education   Is the patient /caregiver able to teach back basic post-op care? Lifting as instructed by MD in discharge instructions,  Do not remove steri-strips,  Keep incision areas clean,dry and protected,  No tub bath, swimming, or hot tub until instructed by MD   Is the patient/caregiver able to teach back signs and symptoms of incisional infection? Fever   Is the patient/caregiver able to teach back steps to recovery at home? Set small, achievable goals for return to baseline health,  Rest and rebuild strength, gradually increase activity   Is the patient/caregiver able to teach back the hierarchy of  who to call/visit for symptoms/problems? PCP, Specialist, Home health nurse, Urgent Care, ED, 911 Yes   Week 1 call completed? Yes   Wrap up additional comments Doing well, no questions or concerns, says he just left f/u appt with ortho doctor.          Mayela Lyn RN

## 2021-10-27 ENCOUNTER — HOSPITAL ENCOUNTER (EMERGENCY)
Facility: HOSPITAL | Age: 46
Discharge: HOME | End: 2021-10-27
Attending: EMERGENCY MEDICINE | Admitting: EMERGENCY MEDICINE

## 2021-10-27 ENCOUNTER — APPOINTMENT (OUTPATIENT)
Dept: GENERAL RADIOLOGY | Facility: HOSPITAL | Age: 46
End: 2021-10-27

## 2021-10-27 ENCOUNTER — APPOINTMENT (OUTPATIENT)
Dept: CT IMAGING | Facility: HOSPITAL | Age: 46
End: 2021-10-27

## 2021-10-27 VITALS
HEART RATE: 73 BPM | TEMPERATURE: 98.2 F | OXYGEN SATURATION: 97 % | BODY MASS INDEX: 40.81 KG/M2 | SYSTOLIC BLOOD PRESSURE: 133 MMHG | RESPIRATION RATE: 18 BRPM | DIASTOLIC BLOOD PRESSURE: 96 MMHG | WEIGHT: 260 LBS | HEIGHT: 67 IN

## 2021-10-27 DIAGNOSIS — R07.89 ATYPICAL CHEST PAIN: Primary | ICD-10-CM

## 2021-10-27 LAB
ALBUMIN SERPL-MCNC: 4.3 G/DL (ref 3.5–5.2)
ALBUMIN/GLOB SERPL: 1.8 G/DL
ALP SERPL-CCNC: 83 U/L (ref 39–117)
ALT SERPL W P-5'-P-CCNC: 12 U/L (ref 1–41)
ANION GAP SERPL CALCULATED.3IONS-SCNC: 9 MMOL/L (ref 5–15)
AST SERPL-CCNC: 14 U/L (ref 1–40)
BASOPHILS # BLD AUTO: 0.06 10*3/MM3 (ref 0–0.2)
BASOPHILS NFR BLD AUTO: 0.6 % (ref 0–1.5)
BILIRUB SERPL-MCNC: 0.2 MG/DL (ref 0–1.2)
BUN SERPL-MCNC: 10 MG/DL (ref 6–20)
BUN/CREAT SERPL: 15.6 (ref 7–25)
CALCIUM SPEC-SCNC: 8.9 MG/DL (ref 8.6–10.5)
CHLORIDE SERPL-SCNC: 106 MMOL/L (ref 98–107)
CO2 SERPL-SCNC: 25 MMOL/L (ref 22–29)
CREAT SERPL-MCNC: 0.64 MG/DL (ref 0.76–1.27)
DEPRECATED RDW RBC AUTO: 39.4 FL (ref 37–54)
EOSINOPHIL # BLD AUTO: 0.03 10*3/MM3 (ref 0–0.4)
EOSINOPHIL NFR BLD AUTO: 0.3 % (ref 0.3–6.2)
ERYTHROCYTE [DISTWIDTH] IN BLOOD BY AUTOMATED COUNT: 13.4 % (ref 12.3–15.4)
GFR SERPL CREATININE-BSD FRML MDRD: 135 ML/MIN/1.73
GLOBULIN UR ELPH-MCNC: 2.4 GM/DL
GLUCOSE SERPL-MCNC: 103 MG/DL (ref 65–99)
HCT VFR BLD AUTO: 36.5 % (ref 37.5–51)
HGB BLD-MCNC: 12.2 G/DL (ref 13–17.7)
HOLD SPECIMEN: NORMAL
HOLD SPECIMEN: NORMAL
IMM GRANULOCYTES # BLD AUTO: 0.05 10*3/MM3 (ref 0–0.05)
IMM GRANULOCYTES NFR BLD AUTO: 0.5 % (ref 0–0.5)
LYMPHOCYTES # BLD AUTO: 1.57 10*3/MM3 (ref 0.7–3.1)
LYMPHOCYTES NFR BLD AUTO: 16.4 % (ref 19.6–45.3)
MCH RBC QN AUTO: 27.8 PG (ref 26.6–33)
MCHC RBC AUTO-ENTMCNC: 33.4 G/DL (ref 31.5–35.7)
MCV RBC AUTO: 83.1 FL (ref 79–97)
MONOCYTES # BLD AUTO: 0.58 10*3/MM3 (ref 0.1–0.9)
MONOCYTES NFR BLD AUTO: 6.1 % (ref 5–12)
NEUTROPHILS NFR BLD AUTO: 7.29 10*3/MM3 (ref 1.7–7)
NEUTROPHILS NFR BLD AUTO: 76.1 % (ref 42.7–76)
NRBC BLD AUTO-RTO: 0 /100 WBC (ref 0–0.2)
PLATELET # BLD AUTO: 354 10*3/MM3 (ref 140–450)
PMV BLD AUTO: 8.6 FL (ref 6–12)
POTASSIUM SERPL-SCNC: 3.9 MMOL/L (ref 3.5–5.2)
PROT SERPL-MCNC: 6.7 G/DL (ref 6–8.5)
RBC # BLD AUTO: 4.39 10*6/MM3 (ref 4.14–5.8)
SODIUM SERPL-SCNC: 140 MMOL/L (ref 136–145)
TROPONIN T SERPL-MCNC: <0.01 NG/ML (ref 0–0.03)
TROPONIN T SERPL-MCNC: <0.01 NG/ML (ref 0–0.03)
WBC # BLD AUTO: 9.58 10*3/MM3 (ref 3.4–10.8)
WHOLE BLOOD HOLD SPECIMEN: NORMAL
WHOLE BLOOD HOLD SPECIMEN: NORMAL

## 2021-10-27 PROCEDURE — 84484 ASSAY OF TROPONIN QUANT: CPT | Performed by: EMERGENCY MEDICINE

## 2021-10-27 PROCEDURE — 80053 COMPREHEN METABOLIC PANEL: CPT | Performed by: EMERGENCY MEDICINE

## 2021-10-27 PROCEDURE — 85025 COMPLETE CBC W/AUTO DIFF WBC: CPT | Performed by: EMERGENCY MEDICINE

## 2021-10-27 PROCEDURE — 71275 CT ANGIOGRAPHY CHEST: CPT

## 2021-10-27 PROCEDURE — 0 IOPAMIDOL PER 1 ML: Performed by: EMERGENCY MEDICINE

## 2021-10-27 PROCEDURE — 99283 EMERGENCY DEPT VISIT LOW MDM: CPT

## 2021-10-27 PROCEDURE — 93005 ELECTROCARDIOGRAM TRACING: CPT | Performed by: EMERGENCY MEDICINE

## 2021-10-27 PROCEDURE — 93010 ELECTROCARDIOGRAM REPORT: CPT | Performed by: INTERNAL MEDICINE

## 2021-10-27 PROCEDURE — 71045 X-RAY EXAM CHEST 1 VIEW: CPT

## 2021-10-27 RX ORDER — SODIUM CHLORIDE 0.9 % (FLUSH) 0.9 %
10 SYRINGE (ML) INJECTION AS NEEDED
Status: DISCONTINUED | OUTPATIENT
Start: 2021-10-27 | End: 2021-10-27 | Stop reason: HOSPADM

## 2021-10-27 RX ADMIN — IOPAMIDOL 100 ML: 755 INJECTION, SOLUTION INTRAVENOUS at 20:58

## 2021-10-28 LAB
QT INTERVAL: 440 MS
QT INTERVAL: 448 MS
QTC INTERVAL: 481 MS
QTC INTERVAL: 497 MS

## 2021-10-29 ENCOUNTER — LAB (OUTPATIENT)
Dept: LAB | Facility: HOSPITAL | Age: 46
End: 2021-10-29

## 2021-10-29 ENCOUNTER — TRANSCRIBE ORDERS (OUTPATIENT)
Dept: LAB | Facility: HOSPITAL | Age: 46
End: 2021-10-29

## 2021-10-29 DIAGNOSIS — M25.511 RIGHT SHOULDER PAIN, UNSPECIFIED CHRONICITY: Primary | ICD-10-CM

## 2021-10-29 DIAGNOSIS — M25.511 RIGHT SHOULDER PAIN, UNSPECIFIED CHRONICITY: ICD-10-CM

## 2021-10-29 DIAGNOSIS — T84.59XA INFECTION AND INFLAMMATORY REACTION DUE TO OTHER INTERNAL JOINT PROSTHESIS, INITIAL ENCOUNTER (HCC): ICD-10-CM

## 2021-10-29 LAB
CRP SERPL-MCNC: 0.32 MG/DL (ref 0–0.5)
ERYTHROCYTE [SEDIMENTATION RATE] IN BLOOD: 13 MM/HR (ref 0–15)

## 2021-10-29 PROCEDURE — 36415 COLL VENOUS BLD VENIPUNCTURE: CPT

## 2021-10-29 PROCEDURE — 86140 C-REACTIVE PROTEIN: CPT

## 2021-10-29 PROCEDURE — 85652 RBC SED RATE AUTOMATED: CPT

## 2021-11-03 ENCOUNTER — READMISSION MANAGEMENT (OUTPATIENT)
Dept: CALL CENTER | Facility: HOSPITAL | Age: 46
End: 2021-11-03

## 2021-11-03 NOTE — OUTREACH NOTE
General Surgery Week 2 Survey      Responses   [facility] patient discharged from? [place]   Does the patient have one of the following disease processes/diagnoses(primary or secondary)? General Surgery   Week 2 attempt successful? Yes   Call start time 1733   Call end time 1736   Discharge diagnosis Infection of prosthetic total shoulder joint    Meds reviewed with patient/caregiver? Yes   Is the patient having any side effects they believe may be caused by any medication additions or changes? No   Does the patient have all medications related to this admission filled (includes all antibiotics, pain medications, etc.) Yes   Is the patient taking all medications as directed (includes completed medication regime)? Yes   Medication comments Doing IV antibiotics at home   Does the patient have a follow up appointment scheduled with their surgeon? Yes   Has the patient kept scheduled appointments due by today? Yes   Comments had fu with surgeon today   What is the Home health agency?  set up through    Has home health visited the patient within 72 hours of discharge? Yes   Psychosocial issues? No   Did the patient receive a copy of their discharge instructions? Yes   Nursing interventions Reviewed instructions with patient   What is the patient's perception of their health status since discharge? Improving   Nursing interventions Nurse provided patient education   Is the patient /caregiver able to teach back basic post-op care? Lifting as instructed by MD in discharge instructions,  Keep incision areas clean,dry and protected   Is the patient/caregiver able to teach back signs and symptoms of incisional infection? Fever,  Increased redness, swelling or pain at the incisonal site,  Increased drainage or bleeding   Is the patient/caregiver able to teach back steps to recovery at home? Set small, achievable goals for return to baseline health,  Rest and rebuild strength, gradually increase activity   If the patient  is a current smoker, are they able to teach back resources for cessation? Not a smoker   Is the patient/caregiver able to teach back the hierarchy of who to call/visit for symptoms/problems? PCP, Specialist, Home health nurse, Urgent Care, ED, 911 Yes   Week 2 call completed? Yes   Wrap up additional comments States he saw his surgeon and is healing well, still having some pain. Has pain medication for this          Anika Griffith RN

## 2021-11-10 ENCOUNTER — READMISSION MANAGEMENT (OUTPATIENT)
Dept: CALL CENTER | Facility: HOSPITAL | Age: 46
End: 2021-11-10

## 2021-11-10 NOTE — OUTREACH NOTE
General Surgery Week 3 Survey      Responses   [facility] patient discharged from? [place]   Does the patient have one of the following disease processes/diagnoses(primary or secondary)? General Surgery   Week 3 attempt successful? No   Unsuccessful attempts Attempt 1          Radha Taylor RN

## 2021-11-12 ENCOUNTER — READMISSION MANAGEMENT (OUTPATIENT)
Dept: CALL CENTER | Facility: HOSPITAL | Age: 46
End: 2021-11-12

## 2021-11-12 NOTE — OUTREACH NOTE
General Surgery Week 3 Survey      Responses   [facility], [place] patient discharged from? [place]   Does the patient have one of the following disease processes/diagnoses(primary or secondary)? General Surgery   Week 3 attempt successful? 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 Yes   Is the patient having any side effects they believe may be caused by any medication additions or changes? No   Does the patient have all medications related to this admission filled (includes all antibiotics, pain medications, etc.) Yes   Is the patient taking all medications as directed (includes completed medication regime)? Yes   Does the patient have a follow up appointment scheduled with their surgeon? Yes   Has the patient kept scheduled appointments due by today? Yes   Psychosocial issues? No   What is the patient's perception of their health status since discharge? Improving   Nursing interventions Nurse provided patient education   Is the patient /caregiver able to teach back basic post-op care? Take showers only when approved by MD-sponge bathe until then,  No tub bath, swimming, or hot tub until instructed by MD,  Keep incision areas clean,dry and protected,  Lifting as instructed by MD in discharge instructions   Is the patient/caregiver able to teach back signs and symptoms of incisional infection? Increased redness, swelling or pain at the incisonal site,  Increased drainage or bleeding,  Incisional warmth,  Pus or odor from incision,  Fever   Is the patient/caregiver able to teach back steps to recovery at home? Rest and rebuild strength, gradually increase activity,  Eat a well-balance diet   Week 3 call completed? Yes   Wrap up additional comments Pt states he is doing ok. No questions/concerns.          Suzanne Caballero RN

## 2021-11-15 ENCOUNTER — LAB (OUTPATIENT)
Dept: LAB | Facility: HOSPITAL | Age: 46
End: 2021-11-15

## 2021-11-15 ENCOUNTER — TRANSCRIBE ORDERS (OUTPATIENT)
Dept: ADMINISTRATIVE | Facility: HOSPITAL | Age: 46
End: 2021-11-15

## 2021-11-15 DIAGNOSIS — Z96.659 INFECTION OF PROSTHETIC KNEE JOINT, INITIAL ENCOUNTER (HCC): ICD-10-CM

## 2021-11-15 DIAGNOSIS — Z48.89 OTHER SPECIFIED AFTERCARE FOLLOWING SURGERY: ICD-10-CM

## 2021-11-15 DIAGNOSIS — M25.511 RIGHT SHOULDER PAIN, UNSPECIFIED CHRONICITY: ICD-10-CM

## 2021-11-15 DIAGNOSIS — T84.59XA INFECTION OF PROSTHETIC KNEE JOINT, INITIAL ENCOUNTER (HCC): ICD-10-CM

## 2021-11-15 DIAGNOSIS — M25.511 RIGHT SHOULDER PAIN, UNSPECIFIED CHRONICITY: Primary | ICD-10-CM

## 2021-11-15 PROCEDURE — 86140 C-REACTIVE PROTEIN: CPT

## 2021-11-15 PROCEDURE — 36415 COLL VENOUS BLD VENIPUNCTURE: CPT

## 2021-11-16 ENCOUNTER — LAB (OUTPATIENT)
Dept: LAB | Facility: HOSPITAL | Age: 46
End: 2021-11-16

## 2021-11-16 LAB
CRP SERPL-MCNC: 0.45 MG/DL (ref 0–0.5)
ERYTHROCYTE [SEDIMENTATION RATE] IN BLOOD: 15 MM/HR (ref 0–15)

## 2021-11-16 PROCEDURE — 36415 COLL VENOUS BLD VENIPUNCTURE: CPT

## 2021-11-16 PROCEDURE — 85652 RBC SED RATE AUTOMATED: CPT

## 2021-11-23 ENCOUNTER — TRANSCRIBE ORDERS (OUTPATIENT)
Dept: ADMINISTRATIVE | Facility: HOSPITAL | Age: 46
End: 2021-11-23

## 2021-11-23 ENCOUNTER — READMISSION MANAGEMENT (OUTPATIENT)
Dept: CALL CENTER | Facility: HOSPITAL | Age: 46
End: 2021-11-23

## 2021-11-23 DIAGNOSIS — T84.59XA PROSTHETIC SHOULDER INFECTION, INITIAL ENCOUNTER (HCC): Primary | ICD-10-CM

## 2021-11-23 DIAGNOSIS — Z96.619 PROSTHETIC SHOULDER INFECTION, INITIAL ENCOUNTER (HCC): Primary | ICD-10-CM

## 2021-11-23 NOTE — OUTREACH NOTE
"General Surgery Week 4 Survey      Responses   [facility] patient discharged from? [place]   Does the patient have one of the following disease processes/diagnoses(primary or secondary)? General Surgery   Week 4 attempt successful? 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 Yes   Medication comments Continues antibiotics should finish next week   Has the patient kept scheduled appointments due by today? Yes   Comments Has called Dr. Monsivais today and waiting for a call back.   Has called [employer] RN waiting for call back.     Is the patient still receiving Home Health Services? N/A   What is the patient's perception of their health status since discharge? Worsening  [Increased pain to shoulder.]   Additional teach back comments Has a \"knot\" on shoulder \"rock hard\" no redness/inflammation. MD is aware of knot but over the last few days the pain has increased, has stabbing pain with certain movements.     Week 4 call completed? Yes   Would the patient like one additional call? Yes   Wrap up additional comments Awaiting for call from Dr Monsivais office Laura Feliciano RN called patient during this call.          Radha Taylor RN  "

## 2021-11-29 ENCOUNTER — HOSPITAL ENCOUNTER (OUTPATIENT)
Dept: CT IMAGING | Facility: HOSPITAL | Age: 46
Discharge: HOME | End: 2021-11-29
Admitting: PHYSICIAN ASSISTANT

## 2021-11-29 DIAGNOSIS — T84.59XA PROSTHETIC SHOULDER INFECTION, INITIAL ENCOUNTER (HCC): ICD-10-CM

## 2021-11-29 DIAGNOSIS — Z96.619 PROSTHETIC SHOULDER INFECTION, INITIAL ENCOUNTER (HCC): ICD-10-CM

## 2021-11-29 PROCEDURE — 73200 CT UPPER EXTREMITY W/O DYE: CPT

## 2021-12-02 ENCOUNTER — READMISSION MANAGEMENT (OUTPATIENT)
Dept: CALL CENTER | Facility: HOSPITAL | Age: 46
End: 2021-12-02

## 2021-12-02 NOTE — OUTREACH NOTE
General Surgery Week 5 Survey      Responses   [facility] patient discharged from? [place]   Does the patient have one of the following disease processes/diagnoses(primary or secondary)? General Surgery   Week 5 attempt successful? 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    Is the patient taking all medications as directed (includes completed medication regime)? Yes   Has the patient kept scheduled appointments due by today? Yes   Is the patient still receiving Home Health Services? N/A   Psychosocial issues? No   Comments Patient has started outpt PT today.    What is the patient's perception of their health status since discharge? Improving   Nursing interventions Nurse provided patient education   Is the patient/caregiver able to teach back steps to recovery at home? Rest and rebuild strength, gradually increase activity   Is the patient/caregiver able to teach back the hierarchy of who to call/visit for symptoms/problems? PCP, Specialist, Home health nurse, Urgent Care, ED, 911 Yes   Graduated Yes   Is the patient interested in additional calls from an ambulatory ?  NOTE:  applies to high risk patients requiring additional follow-up. No   Did the patient feel the follow up calls were helpful during their recovery period? Yes   Was the number of calls appropriate? Yes   Wrap up additional comments Patient reports that he is doing well this week. Denies any new concerns or questions today.           Anika Garber RN

## 2021-12-28 PROCEDURE — U0004 COV-19 TEST NON-CDC HGH THRU: HCPCS | Performed by: NURSE PRACTITIONER

## 2022-09-21 ENCOUNTER — HOSPITAL ENCOUNTER (EMERGENCY)
Facility: HOSPITAL | Age: 47
Discharge: HOME | End: 2022-09-21
Admitting: EMERGENCY MEDICINE

## 2022-09-21 ENCOUNTER — APPOINTMENT (OUTPATIENT)
Dept: CT IMAGING | Facility: HOSPITAL | Age: 47
End: 2022-09-21

## 2022-09-21 VITALS
TEMPERATURE: 98.5 F | SYSTOLIC BLOOD PRESSURE: 128 MMHG | WEIGHT: 258 LBS | RESPIRATION RATE: 18 BRPM | HEART RATE: 73 BPM | HEIGHT: 67 IN | DIASTOLIC BLOOD PRESSURE: 82 MMHG | OXYGEN SATURATION: 98 % | BODY MASS INDEX: 40.49 KG/M2

## 2022-09-21 DIAGNOSIS — K42.9 UMBILICAL HERNIA WITHOUT OBSTRUCTION AND WITHOUT GANGRENE: ICD-10-CM

## 2022-09-21 DIAGNOSIS — R93.3 ABNORMAL CT SCAN, SIGMOID COLON: Primary | ICD-10-CM

## 2022-09-21 DIAGNOSIS — K40.90 INGUINAL HERNIA WITHOUT OBSTRUCTION OR GANGRENE, RECURRENCE NOT SPECIFIED, UNSPECIFIED LATERALITY: ICD-10-CM

## 2022-09-21 DIAGNOSIS — K76.0 FATTY LIVER: ICD-10-CM

## 2022-09-21 LAB
ALBUMIN SERPL-MCNC: 4.6 G/DL (ref 3.5–5.2)
ALBUMIN/GLOB SERPL: 1.8 G/DL
ALP SERPL-CCNC: 73 U/L (ref 39–117)
ALT SERPL W P-5'-P-CCNC: 16 U/L (ref 1–41)
ANION GAP SERPL CALCULATED.3IONS-SCNC: 10 MMOL/L (ref 5–15)
APTT PPP: 32.5 SECONDS (ref 24.1–35)
AST SERPL-CCNC: 27 U/L (ref 1–40)
BASOPHILS # BLD AUTO: 0.05 10*3/MM3 (ref 0–0.2)
BASOPHILS NFR BLD AUTO: 0.7 % (ref 0–1.5)
BILIRUB SERPL-MCNC: 0.6 MG/DL (ref 0–1.2)
BUN SERPL-MCNC: 8 MG/DL (ref 6–20)
BUN/CREAT SERPL: 8.7 (ref 7–25)
CALCIUM SPEC-SCNC: 9.1 MG/DL (ref 8.6–10.5)
CHLORIDE SERPL-SCNC: 103 MMOL/L (ref 98–107)
CO2 SERPL-SCNC: 26 MMOL/L (ref 22–29)
CREAT SERPL-MCNC: 0.92 MG/DL (ref 0.76–1.27)
D-LACTATE SERPL-SCNC: 1.2 MMOL/L (ref 0.5–2)
DEPRECATED RDW RBC AUTO: 41.6 FL (ref 37–54)
DEVELOPER EXPIRATION DATE: NORMAL
DEVELOPER LOT NUMBER: 225
EGFRCR SERPLBLD CKD-EPI 2021: 103.9 ML/MIN/1.73
EOSINOPHIL # BLD AUTO: 0 10*3/MM3 (ref 0–0.4)
EOSINOPHIL NFR BLD AUTO: 0 % (ref 0.3–6.2)
ERYTHROCYTE [DISTWIDTH] IN BLOOD BY AUTOMATED COUNT: 13.6 % (ref 12.3–15.4)
EXPIRATION DATE: NORMAL
FECAL OCCULT BLOOD SCREEN, POC: NEGATIVE
GLOBULIN UR ELPH-MCNC: 2.6 GM/DL
GLUCOSE SERPL-MCNC: 94 MG/DL (ref 65–99)
HCT VFR BLD AUTO: 45.6 % (ref 37.5–51)
HGB BLD-MCNC: 16 G/DL (ref 13–17.7)
IMM GRANULOCYTES # BLD AUTO: 0.05 10*3/MM3 (ref 0–0.05)
IMM GRANULOCYTES NFR BLD AUTO: 0.7 % (ref 0–0.5)
INR PPP: 1.1 (ref 0.91–1.09)
LIPASE SERPL-CCNC: 58 U/L (ref 13–60)
LYMPHOCYTES # BLD AUTO: 1.87 10*3/MM3 (ref 0.7–3.1)
LYMPHOCYTES NFR BLD AUTO: 24.5 % (ref 19.6–45.3)
Lab: 225
MCH RBC QN AUTO: 29.6 PG (ref 26.6–33)
MCHC RBC AUTO-ENTMCNC: 35.1 G/DL (ref 31.5–35.7)
MCV RBC AUTO: 84.3 FL (ref 79–97)
MONOCYTES # BLD AUTO: 0.79 10*3/MM3 (ref 0.1–0.9)
MONOCYTES NFR BLD AUTO: 10.4 % (ref 5–12)
NEGATIVE CONTROL: NEGATIVE
NEUTROPHILS NFR BLD AUTO: 4.87 10*3/MM3 (ref 1.7–7)
NEUTROPHILS NFR BLD AUTO: 63.7 % (ref 42.7–76)
NRBC BLD AUTO-RTO: 0 /100 WBC (ref 0–0.2)
PLATELET # BLD AUTO: 283 10*3/MM3 (ref 140–450)
PMV BLD AUTO: 8.6 FL (ref 6–12)
POSITIVE CONTROL: POSITIVE
POTASSIUM SERPL-SCNC: 4.1 MMOL/L (ref 3.5–5.2)
PROCALCITONIN SERPL-MCNC: 0.15 NG/ML (ref 0–0.25)
PROT SERPL-MCNC: 7.2 G/DL (ref 6–8.5)
PROTHROMBIN TIME: 13.8 SECONDS (ref 11.9–14.6)
RBC # BLD AUTO: 5.41 10*6/MM3 (ref 4.14–5.8)
SARS-COV-2 RNA PNL SPEC NAA+PROBE: NOT DETECTED
SODIUM SERPL-SCNC: 139 MMOL/L (ref 136–145)
WBC NRBC COR # BLD: 7.63 10*3/MM3 (ref 3.4–10.8)

## 2022-09-21 PROCEDURE — 85610 PROTHROMBIN TIME: CPT | Performed by: PHYSICIAN ASSISTANT

## 2022-09-21 PROCEDURE — 96375 TX/PRO/DX INJ NEW DRUG ADDON: CPT

## 2022-09-21 PROCEDURE — 83605 ASSAY OF LACTIC ACID: CPT | Performed by: PHYSICIAN ASSISTANT

## 2022-09-21 PROCEDURE — 85730 THROMBOPLASTIN TIME PARTIAL: CPT | Performed by: PHYSICIAN ASSISTANT

## 2022-09-21 PROCEDURE — 83690 ASSAY OF LIPASE: CPT | Performed by: PHYSICIAN ASSISTANT

## 2022-09-21 PROCEDURE — 96374 THER/PROPH/DIAG INJ IV PUSH: CPT

## 2022-09-21 PROCEDURE — 80053 COMPREHEN METABOLIC PANEL: CPT | Performed by: PHYSICIAN ASSISTANT

## 2022-09-21 PROCEDURE — 99284 EMERGENCY DEPT VISIT MOD MDM: CPT

## 2022-09-21 PROCEDURE — 74177 CT ABD & PELVIS W/CONTRAST: CPT

## 2022-09-21 PROCEDURE — 87635 SARS-COV-2 COVID-19 AMP PRB: CPT | Performed by: PHYSICIAN ASSISTANT

## 2022-09-21 PROCEDURE — 82270 OCCULT BLOOD FECES: CPT | Performed by: PHYSICIAN ASSISTANT

## 2022-09-21 PROCEDURE — 25010000002 IOPAMIDOL 61 % SOLUTION: Performed by: PHYSICIAN ASSISTANT

## 2022-09-21 PROCEDURE — 25010000002 ONDANSETRON PER 1 MG: Performed by: PHYSICIAN ASSISTANT

## 2022-09-21 PROCEDURE — 84145 PROCALCITONIN (PCT): CPT | Performed by: PHYSICIAN ASSISTANT

## 2022-09-21 PROCEDURE — 85025 COMPLETE CBC W/AUTO DIFF WBC: CPT | Performed by: PHYSICIAN ASSISTANT

## 2022-09-21 RX ORDER — ONDANSETRON 2 MG/ML
4 INJECTION INTRAMUSCULAR; INTRAVENOUS ONCE
Status: COMPLETED | OUTPATIENT
Start: 2022-09-21 | End: 2022-09-21

## 2022-09-21 RX ORDER — HYOSCYAMINE SULFATE 0.125 MG
250 TABLET,DISINTEGRATING ORAL ONCE
Status: COMPLETED | OUTPATIENT
Start: 2022-09-21 | End: 2022-09-21

## 2022-09-21 RX ORDER — AMOXICILLIN AND CLAVULANATE POTASSIUM 875; 125 MG/1; MG/1
1 TABLET, FILM COATED ORAL 2 TIMES DAILY
Qty: 20 TABLET | Refills: 0 | Status: SHIPPED | OUTPATIENT
Start: 2022-09-21 | End: 2022-10-01

## 2022-09-21 RX ORDER — FAMOTIDINE 10 MG/ML
20 INJECTION, SOLUTION INTRAVENOUS ONCE
Status: COMPLETED | OUTPATIENT
Start: 2022-09-21 | End: 2022-09-21

## 2022-09-21 RX ORDER — SODIUM CHLORIDE 0.9 % (FLUSH) 0.9 %
10 SYRINGE (ML) INJECTION AS NEEDED
Status: DISCONTINUED | OUTPATIENT
Start: 2022-09-21 | End: 2022-09-22 | Stop reason: HOSPADM

## 2022-09-21 RX ADMIN — IOPAMIDOL 100 ML: 612 INJECTION, SOLUTION INTRAVENOUS at 21:49

## 2022-09-21 RX ADMIN — ONDANSETRON 4 MG: 2 INJECTION INTRAMUSCULAR; INTRAVENOUS at 20:54

## 2022-09-21 RX ADMIN — SODIUM CHLORIDE 1000 ML: 9 INJECTION, SOLUTION INTRAVENOUS at 20:53

## 2022-09-21 RX ADMIN — HYOSCYAMINE SULFATE 0.25 MG: 0.12 TABLET, ORALLY DISINTEGRATING ORAL at 21:22

## 2022-09-21 RX ADMIN — FAMOTIDINE 20 MG: 10 INJECTION INTRAVENOUS at 20:54

## 2022-09-22 NOTE — DISCHARGE INSTRUCTIONS
Please complete your antibiotics in their entirety even if you begin to feel better.  Please follow-up with your primary care provider within the next 48 hours for reevaluation, to discuss referral for an upper endoscopy scope as well as colonoscopy, as well as for continued outpatient evaluation.  Should she develop any new or worsening symptoms please return to the ER for further evaluation.  As we discussed please avoid Imodium or any other medications that slow diarrhea as these will keep toxins in your body longer than normal.  Incidentally you have hernias throughout your abdomen for which you can continue to follow-up with your primary care provider.

## 2022-09-22 NOTE — ED PROVIDER NOTES
"Subjective   History of Present Illness    Patient is a 47-year-old male presenting to ED with diarrhea and black stools.  PMH significant for history of gastric ulcers, history of TBI, history of stroke, status post appendectomy.  Patient states last week his 5-year-old and 8-year-old child came home with a stomach bug which multiple family members developed.  4 days ago patient developed similar symptoms with epigastric discomfort, nausea, and persistent diarrhea.  Patient states that he felt very fatigued and was sleeping much more than normal however over the past couple days he has begun to have black tarry coffee-ground stools.  Patient denies any bright red blood per rectum, hematemesis or bleeding of any other source including hemoptysis, epistaxis, hematuria.  Patient states he became concerned because approximately 20 years ago he was diagnosed with a gastric ulcer which had similar onset of symptoms.  Patient states at that time he had colonoscopies and EGDs but has had none since.  Patient states he began having low-grade fevers and diaphoresis today which concerned him at which time he presents for further evaluation.  Patient describes that all of his pain is localized as a burning in the epigastric region and it does not matter what he drinks stating \"orange juice and water both at the same way.\"  Patient states a week ago he made dietary changes by giving him all caffeinated products including his daily soda.  Patient is still using chewing tobacco.  Patient states that he does not take medications daily or on an empty stomach including low frequency use of Tylenol/NSAIDs/aspirin.  Patient denies use of alcohol.  Patient states since 20 years ago he has had no problems with ulcers and does not take any routine PPIs.  Patient denies any increased stress, high acidic nature to his diet. Patient did state he has been using pepto bismol to alleviate his symptoms.     Records reviewed show patient was " last seen in the ED on 10/27/2021 for atypical chest pain.    Patient last seen outpatient urgent care on 12/28/2021 for COVID screening.    Review of Systems   Constitutional: Positive for fever (subjective).   HENT: Negative.    Eyes: Negative.    Respiratory: Negative.    Cardiovascular: Negative.    Gastrointestinal: Positive for abdominal pain (epigastric burning), blood in stool (black), diarrhea and nausea. Negative for constipation and vomiting.        Denies hematemesis   Genitourinary: Negative.  Negative for dysuria, flank pain and hematuria.   Musculoskeletal: Negative.    Skin: Negative.    Neurological: Negative.    Psychiatric/Behavioral: Negative.    All other systems reviewed and are negative.      Past Medical History:   Diagnosis Date   • AICD (automatic cardioverter/defibrillator) present    • Anger    • Anxiety    • Heart murmur    • Hyperlipidemia    • Hypertrophic obstructive cardiomyopathy (HOCM) ([facility])    • Stroke ([facility])     tia november 2017   • Tachycardia    • TBI (traumatic brain injury) ([facility])        No Known Allergies    Past Surgical History:   Procedure Laterality Date   • ANKLE SURGERY     • APPENDECTOMY     • CARDIAC CATHETERIZATION      no stents   • CARDIAC DEFIBRILLATOR PLACEMENT     • CARDIAC PACEMAKER PLACEMENT      septalmyectomy   • CARPAL TUNNEL RELEASE     • OTHER SURGICAL HISTORY  05/2012    septalmyectomy   • SHOULDER ARTHROSCOPY W/ LABRAL REPAIR Right 3/25/2021    Procedure: RIGHT SHOULDER ARTHROSCOPIC REVISION POSTERIOR  LABRAL REPAIR;  Surgeon: Sandro Monsivais MD;  Location: [facility] OR;  Service: Orthopedics;  Laterality: Right;   • TOTAL SHOULDER ARTHROPLASTY W/ DISTAL CLAVICLE EXCISION Right 9/30/2021    Procedure: RIGHT REVERSE TOTAL SHOULDER ARTHROPLASTY;  Surgeon: Sandro Monsivais MD;  Location:  [facility] OR;  Service: Orthopedics;  Laterality: Right;   • TOTAL SHOULDER ARTHROPLASTY W/ DISTAL CLAVICLE EXCISION Right 10/14/2021    Procedure: INCISION AND DRAINAGE RIGHT SHOULDER  WITH POLY EXCHANGE;  Surgeon: Sandro Monsivais MD;  Location: [facility] OR;  Service: Orthopedics;  Laterality: Right;       Family History   Problem Relation Age of Onset   • Heart disease Mother    • No Known Problems Father        Social History     Socioeconomic History   • Marital status:    Tobacco Use   • Smoking status: Former Smoker     Types: Cigarettes     Quit date:      Years since quittin.7   • Smokeless tobacco: Current User     Types: Chew   Vaping Use   • Vaping Use: Never used   Substance and Sexual Activity   • Alcohol use: No   • Drug use: No   • Sexual activity: Yes     Partners: Female           Objective   Physical Exam  Vitals and nursing note reviewed. Exam conducted with a chaperone present (Noemy COPELAND).   Constitutional:       General: He is not in acute distress.     Appearance: Normal appearance. He is well-developed and well-groomed. He is obese. He is not toxic-appearing or diaphoretic.   HENT:      Head: Normocephalic.      Mouth/Throat:      Mouth: Mucous membranes are moist.      Pharynx: Oropharynx is clear.   Eyes:      General: No scleral icterus.     Conjunctiva/sclera: Conjunctivae normal.      Pupils: Pupils are equal, round, and reactive to light.   Cardiovascular:      Rate and Rhythm: Normal rate and regular rhythm.   Pulmonary:      Effort: Pulmonary effort is normal.      Breath sounds: Normal breath sounds.   Abdominal:      General: Bowel sounds are normal.      Palpations: Abdomen is soft.      Tenderness: There is abdominal tenderness (epigastric).   Genitourinary:     Rectum: Guaiac result negative.   Musculoskeletal:         General: Normal range of motion.      Cervical back: Normal range of motion and neck supple.   Skin:     General: Skin is warm and dry.      Coloration: Skin is not jaundiced or pale.   Neurological:      Mental Status: He is alert and oriented to person, place, and time.      Gait: Gait normal.   Psychiatric:         Attention and  Perception: Attention normal.         Mood and Affect: Mood normal.         Speech: Speech normal.         Behavior: Behavior normal. Behavior is cooperative.         Procedures           ED Course                                           MDM  Number of Diagnoses or Management Options     Amount and/or Complexity of Data Reviewed  Clinical lab tests: ordered and reviewed  Tests in the radiology section of CPT®: reviewed and ordered  Tests in the medicine section of CPT®: ordered and reviewed  Decide to obtain previous medical records or to obtain history from someone other than the patient: yes  Review and summarize past medical records: yes  Discuss the patient with other providers: yes (Dr. Sean Cedillo (attending))    Patient Progress  Patient progress: improved        Patient is a 47-year-old male presenting to ED with diarrhea and black stools.  PMH significant for history of gastric ulcers, history of TBI, history of stroke, status post appendectomy.  Work up unremarkable with no acute findings on CBC, CMP, normal lipase, normal lactic acid and procalcitonin.  COVID testing negative.  CT imaging revealed: Fatty infiltration of liver, inguinal hernias, umbilical hernia, anterior abdominal wall hernia, wall thickening of the proximal sigmoid colon, small peritoneal and retroperitoneal lymph nodes likely reactive.  Patient's Hemoccult testing was negative.  Advised that with negative Hemoccult and stable H&H it is likely that the black quality he was observing was due to the Pepto-Bismol.  Discussed need for compliance with antibiotics for treatment of colitis and need to follow-up with his primary care provider within the next 48 hours for reevaluation as well as referral for an upper EGD as well as colonoscopy.  Discussed need for continued dietary modifications to include avoiding all tobacco products, citric/acidic diet, as well is minimizing stress.  Discussed strict return precautions any for immediate  return to ED should he develop any new or worsening symptoms.  Patient is very appreciative, tolerating p.o. fluids without difficulty, with stable vital signs with no further questions, terms, needs at this time and is stable for discharge.    Final diagnoses:   Abnormal CT scan, sigmoid colon   Fatty liver   Inguinal hernia without obstruction or gangrene, recurrence not specified, unspecified laterality   Umbilical hernia without obstruction and without gangrene       ED Disposition  ED Disposition     ED Disposition   Discharge    Condition   Stable    Comment   --             Nakul Hdz, DO  [address] [place] [address]  [phone]    Schedule an appointment as soon as possible for a visit in 2 days      [facility] Emergency Department  [address] [place] [id number] [phone]    As needed         Medication List      New Prescriptions    amoxicillin-clavulanate 875-125 MG per tablet  Commonly known as: AUGMENTIN  Take 1 tablet by mouth 2 (Two) Times a Day for 10 days.           Where to Get Your Medications      These medications were sent to [facility] - [place], [place] - [address] - [phone]  - [phone] FX  [address], [facility] [address]    Phone: [phone]   · amoxicillin-clavulanate 875-125 MG per tablet          Alphonso Cramer PA-C  09/21/22 [id number]

## 2022-09-26 NOTE — PAYOR COMM NOTE
"9/21 ER VISIT        Feliberto Stoner (47 y.o. Male)             Date of Birth   1975    Social Security Number       Address   [address] [place] [address]    Home Phone   [phone]    MRN   [id number]       Ev[employer]   [employer]    Marital Status                               Admission Date   9/21/22    Admission Type   Emergency    Admitting Provider       Attending Provider       Department, Room/Bed   [facility], [place]       Discharge Date   9/21/2022    Discharge Disposition   Home or Self Care    Discharge Destination                               Attending Provider: (none)   Allergies: No Known Allergies    Isolation: None   Infection: C.difficile (rule out) (09/21/22), COVID (rule out) (09/21/22)   Code Status: Prior   Advance Care Planning Activity    Ht: 170.2 cm (67\")   Wt: 117 kg (258 lb)    Admission Cmt: None   Principal Problem: None                Active Insurance as of 9/21/2022     Primary Coverage     Payor Plan Insurance Group Employer/Plan Group    [facility] [employer]      Payor Plan Address Payor Plan Phone Number Payor Plan Fax Number Effective Dates    [address] [phone]  12/28/2021 - None Entered    [facility] [id number]       Subscriber Name Subscriber Birth Date Member ID       FELIBERTO STONER 1975                  Emergency Contacts      (Rel.) Home Phone Work Phone Mobile Phone    Janet Stoner (Spouse) -- -- [phone]               Emergency Department Notes      Alphonso Cramer PA-C at 09/21/22 1933     Attestation signed by Richard Cedillo Jr., MD at 09/21/22 2323        SUPERVISE: For this patient encounter, I reviewed the APC's documentation, treatment plan, and medical decision making.  Richard Cedillo Jr, MD 9/21/2022 23:23 CDT                         Subjective   History of Present Illness    Patient is a 47-year-old male presenting to ED with " "diarrhea and black stools.  PMH significant for history of gastric ulcers, history of TBI, history of stroke, status post appendectomy.  Patient states last week his 5-year-old and 8-year-old child came home with a stomach bug which multiple family members developed.  4 days ago patient developed similar symptoms with epigastric discomfort, nausea, and persistent diarrhea.  Patient states that he felt very fatigued and was sleeping much more than normal however over the past couple days he has begun to have black tarry coffee-ground stools.  Patient denies any bright red blood per rectum, hematemesis or bleeding of any other source including hemoptysis, epistaxis, hematuria.  Patient states he became concerned because approximately 20 years ago he was diagnosed with a gastric ulcer which had similar onset of symptoms.  Patient states at that time he had colonoscopies and EGDs but has had none since.  Patient states he began having low-grade fevers and diaphoresis today which concerned him at which time he presents for further evaluation.  Patient describes that all of his pain is localized as a burning in the epigastric region and it does not matter what he drinks stating \"orange juice and water both at the same way.\"  Patient states a week ago he made dietary changes by giving him all caffeinated products including his daily soda.  Patient is still using chewing tobacco.  Patient states that he does not take medications daily or on an empty stomach including low frequency use of Tylenol/NSAIDs/aspirin.  Patient denies use of alcohol.  Patient states since 20 years ago he has had no problems with ulcers and does not take any routine PPIs.  Patient denies any increased stress, high acidic nature to his diet. Patient did state he has been using pepto bismol to alleviate his symptoms.     Records reviewed show patient was last seen in the ED on 10/27/2021 for atypical chest pain.    Patient last seen outpatient urgent " care on 12/28/2021 for COVID screening.    Review of Systems   Constitutional: Positive for fever (subjective).   HENT: Negative.    Eyes: Negative.    Respiratory: Negative.    Cardiovascular: Negative.    Gastrointestinal: Positive for abdominal pain (epigastric burning), blood in stool (black), diarrhea and nausea. Negative for constipation and vomiting.        Denies hematemesis   Genitourinary: Negative.  Negative for dysuria, flank pain and hematuria.   Musculoskeletal: Negative.    Skin: Negative.    Neurological: Negative.    Psychiatric/Behavioral: Negative.    All other systems reviewed and are negative.      Past Medical History:   Diagnosis Date   • AICD (automatic cardioverter/defibrillator) present    • Anger    • Anxiety    • Heart murmur    • Hyperlipidemia    • Hypertrophic obstructive cardiomyopathy (HOCM) (Formerly [facility])    • Stroke (Formerly [facility])     tia november 2017   • Tachycardia    • TBI (traumatic brain injury) (Formerly [facility])        No Known Allergies    Past Surgical History:   Procedure Laterality Date   • ANKLE SURGERY     • APPENDECTOMY     • CARDIAC CATHETERIZATION      no stents   • CARDIAC DEFIBRILLATOR PLACEMENT     • CARDIAC PACEMAKER PLACEMENT      septalmyectomy   • CARPAL TUNNEL RELEASE     • OTHER SURGICAL HISTORY  05/2012    septalmyectomy   • SHOULDER ARTHROSCOPY W/ LABRAL REPAIR Right 3/25/2021    Procedure: RIGHT SHOULDER ARTHROSCOPIC REVISION POSTERIOR  LABRAL REPAIR;  Surgeon: Sandro Monsivais MD;  Location: [facility] OR;  Service: Orthopedics;  Laterality: Right;   • TOTAL SHOULDER ARTHROPLASTY W/ DISTAL CLAVICLE EXCISION Right 9/30/2021    Procedure: RIGHT REVERSE TOTAL SHOULDER ARTHROPLASTY;  Surgeon: Sandro Monsivais MD;  Location: [facility] OR;  Service: Orthopedics;  Laterality: Right;   • TOTAL SHOULDER ARTHROPLASTY W/ DISTAL CLAVICLE EXCISION Right 10/14/2021    Procedure: INCISION AND DRAINAGE RIGHT SHOULDER WITH POLY EXCHANGE;  Surgeon: Sandro Monsivais MD;  Location: [facility] OR;  Service: Orthopedics;   Laterality: Right;       Family History   Problem Relation Age of Onset   • Heart disease Mother    • No Known Problems Father        Social History     Socioeconomic History   • Marital status:    Tobacco Use   • Smoking status: Former Smoker     Types: Cigarettes     Quit date:      Years since quittin.7   • Smokeless tobacco: Current User     Types: Chew   Vaping Use   • Vaping Use: Never used   Substance and Sexual Activity   • Alcohol use: No   • Drug use: No   • Sexual activity: Yes     Partners: Female           Objective   Physical Exam  Vitals and nursing note reviewed. Exam conducted with a chaperone present (Noemy COPELAND).   Constitutional:       General: He is not in acute distress.     Appearance: Normal appearance. He is well-developed and well-groomed. He is obese. He is not toxic-appearing or diaphoretic.   HENT:      Head: Normocephalic.      Mouth/Throat:      Mouth: Mucous membranes are moist.      Pharynx: Oropharynx is clear.   Eyes:      General: No scleral icterus.     Conjunctiva/sclera: Conjunctivae normal.      Pupils: Pupils are equal, round, and reactive to light.   Cardiovascular:      Rate and Rhythm: Normal rate and regular rhythm.   Pulmonary:      Effort: Pulmonary effort is normal.      Breath sounds: Normal breath sounds.   Abdominal:      General: Bowel sounds are normal.      Palpations: Abdomen is soft.      Tenderness: There is abdominal tenderness (epigastric).   Genitourinary:     Rectum: Guaiac result negative.   Musculoskeletal:         General: Normal range of motion.      Cervical back: Normal range of motion and neck supple.   Skin:     General: Skin is warm and dry.      Coloration: Skin is not jaundiced or pale.   Neurological:      Mental Status: He is alert and oriented to person, place, and time.      Gait: Gait normal.   Psychiatric:         Attention and Perception: Attention normal.         Mood and Affect: Mood normal.         Speech: Speech  normal.         Behavior: Behavior normal. Behavior is cooperative.         Procedures          ED Course                                           MDM  Number of Diagnoses or Management Options     Amount and/or Complexity of Data Reviewed  Clinical lab tests: ordered and reviewed  Tests in the radiology section of CPT®: reviewed and ordered  Tests in the medicine section of CPT®: ordered and reviewed  Decide to obtain previous medical records or to obtain history from someone other than the patient: yes  Review and summarize past medical records: yes  Discuss the patient with other providers: yes (Dr. Sean Cedillo (attending))    Patient Progress  Patient progress: improved        Patient is a 47-year-old male presenting to ED with diarrhea and black stools.  PMH significant for history of gastric ulcers, history of TBI, history of stroke, status post appendectomy.  Work up unremarkable with no acute findings on CBC, CMP, normal lipase, normal lactic acid and procalcitonin.  COVID testing negative.  CT imaging revealed: Fatty infiltration of liver, inguinal hernias, umbilical hernia, anterior abdominal wall hernia, wall thickening of the proximal sigmoid colon, small peritoneal and retroperitoneal lymph nodes likely reactive.  Patient's Hemoccult testing was negative.  Advised that with negative Hemoccult and stable H&H it is likely that the black quality he was observing was due to the Pepto-Bismol.  Discussed need for compliance with antibiotics for treatment of colitis and need to follow-up with his primary care provider within the next 48 hours for reevaluation as well as referral for an upper EGD as well as colonoscopy.  Discussed need for continued dietary modifications to include avoiding all tobacco products, citric/acidic diet, as well is minimizing stress.  Discussed strict return precautions any for immediate return to ED should he develop any new or worsening symptoms.  Patient is very appreciative,  tolerating p.o. fluids without difficulty, with stable vital signs with no further questions, terms, needs at this time and is stable for discharge.    Final diagnoses:   Abnormal CT scan, sigmoid colon   Fatty liver   Inguinal hernia without obstruction or gangrene, recurrence not specified, unspecified laterality   Umbilical hernia without obstruction and without gangrene       ED Disposition  ED Disposition     ED Disposition   Discharge    Condition   Stable    Comment   --             Nakul Hdz, DO  [address] [place] [address]  [phone]    Schedule an appointment as soon as possible for a visit in 2 days      [facility]  [address] [place] [id number] [phone]    As needed         Medication List      New Prescriptions    amoxicillin-clavulanate 875-125 MG per tablet  Commonly known as: AUGMENTIN  Take 1 tablet by mouth 2 (Two) Times a Day for 10 days.           Where to Get Your Medications      These medications were sent to [facility] - [place], [place] - [address] - [phone] [facility] [phone] FX  [address], [facility] [address]    Phone: [phone]   · amoxicillin-clavulanate 875-125 MG per tablet          Alphonso Cramer PA-C  09/21/22 2320      Electronically signed by Richard Cedillo Jr., MD at 09/21/22 2323     Noemy Fountain RN at 09/21/22 2127        Patient dropped one of his pills. Pharmacy to send replacement       Electronically signed by Noemy Fountain RN at 09/21/22 2128     Noemy Fountain RN at 09/21/22 2206        Patient given replacement Anaspaz that he dropped previously.     Electronically signed by Noemy Fountain, RN at 09/21/22 2207         Facility-Administered Medications as of 9/21/2022   Medication Dose Route Frequency Provider Last Rate Last Admin   • [COMPLETED] famotidine (PEPCID) injection 20 mg  20 mg Intravenous Once Alphonso Cramer PA-C   20 mg at  "09/21/22 2054   • [COMPLETED] hyoscyamine sulfate (ANASPAZ) disintegrating tablet 0.25 mg  250 mcg Oral Once Alphonso Cramer PA-C   0.25 mg at 09/21/22 2122   • [COMPLETED] iopamidol (ISOVUE-300) 61 % injection 100 mL  100 mL Intravenous Once in imaging Alphonso Cramer PA-C   100 mL at 09/21/22 2149   • [COMPLETED] ondansetron (ZOFRAN) injection 4 mg  4 mg Intravenous Once Alphonso Cramer PA-C   4 mg at 09/21/22 2054   • [COMPLETED] sodium chloride 0.9 % bolus 1,000 mL  1,000 mL Intravenous Once Alphonso Cramer PA-C   Stopped at 09/21/22 2209     Lab Results (all)     Procedure Component Value Units Date/Time    POC Occult Blood Stool [[id number]]  (Normal) Collected: 09/21/22 2217    Specimen: Stool from Per Rectum Updated: 09/21/22 2218     Fecal Occult Blood Negative     Lot Number \[id number]\     Expiration Date \03/31/2023\     DEVELOPER LOT NUMBER [id number]     DEVELOPER EXPIRATION DATE \03/31/2023\     Positive Control Positive     Negative Control Negative    Procalcitonin [[id number]]  (Normal) Collected: 09/21/22 2052    Specimen: Blood Updated: 09/21/22 2129     Procalcitonin 0.15 ng/mL     Narrative:      As a Marker for Sepsis (Non-Neonates):    1. <0.5 ng/mL represents a low risk of severe sepsis and/or septic shock.  2. >2 ng/mL represents a high risk of severe sepsis and/or septic shock.    As a Marker for Lower Respiratory Tract Infections that require antibiotic therapy:    PCT on Admission    Antibiotic Therapy       6-12 Hrs later    >0.5                Strongly Recommended  >0.25 - <0.5        Recommended   0.1 - 0.25          Discouraged              Remeasure/reassess PCT  <0.1                Strongly Discouraged     Remeasure/reassess PCT    As 28 day mortality risk marker: \"Change in Procalcitonin Result\" (>80% or <=80%) if Day 0 (or Day 1) and Day 4 values are available. Refer to http://www.[facility]-pct-calculator.com    Change in PCT <=80%  A decrease of PCT levels below or equal to 80% defines a " positive change in PCT test result representing a higher risk for 28-day all-cause mortality of patients diagnosed with severe sepsis for septic shock.    Change in PCT >80%  A decrease of PCT levels of more than 80% defines a negative change in PCT result representing a lower risk for 28-day all-cause mortality of patients diagnosed with severe sepsis or septic shock.       COVID-19,[facility] IN-HOUSE,Nasal Swab No Transport Media 3-4 HR TAT - Swab, Nasal Cavity [[id number]]  (Normal) Collected: 09/21/22 2038    Specimen: Swab from Nasal Cavity Updated: 09/21/22 2128     COVID19 Not Detected    Narrative:      Fact sheet for providers: https://www.fda.gov/media/920871/download     Fact sheet for patients: https://www.fda.gov/media/115825/download    Test performed by PCR.    Consider negative results in combination with clinical observations, patient history, and epidemiological information.    Comprehensive Metabolic Panel [[id number]] Collected: 09/21/22 2052    Specimen: Blood Updated: 09/21/22 2123     Glucose 94 mg/dL      BUN 8 mg/dL      Creatinine 0.92 mg/dL      Sodium 139 mmol/L      Potassium 4.1 mmol/L      Chloride 103 mmol/L      CO2 26.0 mmol/L      Calcium 9.1 mg/dL      Total Protein 7.2 g/dL      Albumin 4.60 g/dL      ALT (SGPT) 16 U/L      AST (SGOT) 27 U/L      Alkaline Phosphatase 73 U/L      Total Bilirubin 0.6 mg/dL      Globulin 2.6 gm/dL      A/G Ratio 1.8 g/dL      BUN/Creatinine Ratio 8.7     Anion Gap 10.0 mmol/L      eGFR 103.9 mL/min/1.73      Comment: National Kidney Foundation and American Society of Nephrology (ASN) Task Force recommended calculation based on the Chronic Kidney Disease Epidemiology Collaboration (CKD-EPI) equation refit without adjustment for race.       Narrative:      GFR Normal >60  Chronic Kidney Disease <60  Kidney Failure <15      Lipase [[id number]]  (Normal) Collected: 09/21/22 2052    Specimen: Blood Updated: 09/21/22 2119     Lipase 58 U/L     Lactic Acid,  Plasma [[id number]]  (Normal) Collected: 09/21/22 2052    Specimen: Blood Updated: 09/21/22 2118     Lactate 1.2 mmol/L     Protime-INR [[id number]]  (Abnormal) Collected: 09/21/22 2052    Specimen: Blood Updated: 09/21/22 2111     Protime 13.8 Seconds      INR 1.10    aPTT [[id number]]  (Normal) Collected: 09/21/22 2052    Specimen: Blood Updated: 09/21/22 2111     PTT 32.5 seconds     CBC & Differential [[id number]]  (Abnormal) Collected: 09/21/22 2052    Specimen: Blood Updated: 09/21/22 2101    Narrative:      The following orders were created for panel order CBC & Differential.  Procedure                               Abnormality         Status                     ---------                               -----------         ------                     CBC Auto Differential[[id number]]        Abnormal            Final result                 Please view results for these tests on the individual orders.    CBC Auto Differential [[id number]]  (Abnormal) Collected: 09/21/22 2052    Specimen: Blood Updated: 09/21/22 2101     WBC 7.63 10*3/mm3      RBC 5.41 10*6/mm3      Hemoglobin 16.0 g/dL      Hematocrit 45.6 %      MCV 84.3 fL      MCH 29.6 pg      MCHC 35.1 g/dL      RDW 13.6 %      RDW-SD 41.6 fl      MPV 8.6 fL      Platelets 283 10*3/mm3      Neutrophil % 63.7 %      Lymphocyte % 24.5 %      Monocyte % 10.4 %      Eosinophil % 0.0 %      Basophil % 0.7 %      Immature Grans % 0.7 %      Neutrophils, Absolute 4.87 10*3/mm3      Lymphocytes, Absolute 1.87 10*3/mm3      Monocytes, Absolute 0.79 10*3/mm3      Eosinophils, Absolute 0.00 10*3/mm3      Basophils, Absolute 0.05 10*3/mm3      Immature Grans, Absolute 0.05 10*3/mm3      nRBC 0.0 /100 WBC           Imaging Results (All)     Procedure Component Value Units Date/Time    CT Abdomen Pelvis With Contrast [[id number]] Collected: 09/21/22 2151     Updated: 09/21/22 2201    Narrative:      EXAMINATION:  CT ABDOMEN PELVIS W CONTRAST-  9/21/2022 9:45 PM CDT     HISTORY:  Epigastric pain, black stools, excessive diarrhea and fever.     TECHNIQUE: Spiral CT was performed of the abdomen and pelvis with IV  contrast. Multiplanar images were reconstructed.     DLP: 160 mGy-cm. Automated dosage reduction technique was utilized to  reduce patient dose.     COMPARISON: No comparison study.      LUNG BASES: Pacemaker wires are noted. Heart size upper limits of  normal. The lung bases are essentially clear.     LIVER AND SPLEEN: There is fatty infiltration of the liver. There are no  dense gallstones. The spleen is unremarkable.     PANCREAS: No pancreatic mass or inflammatory change.     KIDNEYS AND ADRENALS: The kidneys and adrenal glands demonstrate no  focal abnormality. The urinary bladder is decompressed.     BOWEL: No oral contrast was given. Gastric wall thickening is probably  due to underdistention. Small bowel loops are nondilated. There has been  prior appendectomy. There is underdistention of most of the colon. There  does appear to be some wall thickening of the proximal sigmoid colon.  There is mild diverticulosis of the colon.     OTHER: There are small fat-containing inguinal hernias. There is a 2.9  cm transverse diameter fat-containing umbilical hernia. There are 2 or 3  fat-containing anterior abdominal wall hernias in the upper midline  abdomen. There are degenerative changes of the spine. There are small  peritoneal and retroperitoneal lymph nodes.       Impression:      1. Fatty infiltration of the liver.  2. Small fat-containing inguinal hernias. A 2.9 cm transverse diameter  fat-containing umbilical hernia. There are 2 or 3 fat-containing  anterior abdominal wall hernias in the midline in the upper abdomen.  3. Wall thickening of the proximal sigmoid colon may be infectious or  inflammatory. Neoplasm less likely. Artifact of underdistention less  likely. There is diverticulosis in the area. There is no stranding of  the adjacent fat to suggest acute diverticulitis. GI  "follow-up  recommended.  4. No small bowel distention. Prior appendectomy. Small peritoneal and  retroperitoneal lymph nodes are likely reactive.     The full report of this exam was immediately signed and available to the  emergency room. The patient is currently in the emergency room.  This report was finalized on 09/21/2022 21:58 by Dr. Jt Corona MD.          Orders (all)      Start     Ordered    09/23/22 1942  Auto Discontinue GI Panel in 48 Hours if not Collected  ONCE GI PANEL,   Status:  Canceled         09/21/22 1941 09/23/22 1942  Auto Discontinue in 48 Hours if not Collected  ONCE CDIFF,   Status:  Canceled         09/21/22 1941 09/21/22 2148  iopamidol (ISOVUE-300) 61 % injection 100 mL  Once in Imaging         09/21/22 2146 09/21/22 2041  hyoscyamine sulfate (ANASPAZ) disintegrating tablet 0.25 mg  Once         09/21/22 2039 09/21/22 2039  CT Abdomen Pelvis With Contrast  1 Time Imaging         09/21/22 2039 09/21/22 1946  sodium chloride 0.9 % bolus 1,000 mL  Once         09/21/22 1944 09/21/22 1946  famotidine (PEPCID) injection 20 mg  Once         09/21/22 1944 09/21/22 1946  ondansetron (ZOFRAN) injection 4 mg  Once         09/21/22 1944 09/21/22 1945  Lactic Acid, Plasma  Once         09/21/22 1944 09/21/22 1945  Procalcitonin  Once         09/21/22 1944 09/21/22 1945  POC Occult Blood Stool  Once         09/21/22 1944 09/21/22 1945  Insert peripheral IV  Once,   Status:  Canceled        \"And\" Linked Group Details    09/21/22 1944 09/21/22 1945  Cardiac Monitoring  Continuous,   Status:  Canceled         09/21/22 1944    09/21/22 1945  Pulse Oximetry, Continuous  Continuous         09/21/22 1944 09/21/22 1944  sodium chloride 0.9 % flush 10 mL  As Needed,   Status:  Discontinued        \"And\" Linked Group Details    09/21/22 1944 09/21/22 1944  COVID-19,[facility] IN-HOUSE,Nasal Swab No Transport Media 3-4 HR TAT - Swab, Nasal Cavity  Once         " 09/21/22 1944    09/21/22 1942  Gastrointestinal Panel, PCR - Stool, Per Rectum  Once         09/21/22 1941    09/21/22 1942  Clostridioides difficile Toxin - Stool, Per Rectum  Once         09/21/22 1941 09/21/22 1942  Patient Isolation Contact Spore  Continuous,   Status:  Canceled        Comments: Isolation Precautions Per Clostridium Difficile (CDI) Infection Control Policy / Process    09/21/22 1941 09/21/22 1942  Clostridioides difficile Toxin, PCR - Stool, Per Rectum  PROCEDURE ONCE         09/21/22 1941    09/21/22 1942  CBC & Differential  Once         09/21/22 1944    09/21/22 1942  Comprehensive Metabolic Panel  Once         09/21/22 1944    09/21/22 1942  Protime-INR  Once         09/21/22 1944    09/21/22 1942  Lipase  Once         09/21/22 1944    09/21/22 1942  aPTT  Once         09/21/22 1944    09/21/22 1942  CBC Auto Differential  PROCEDURE ONCE         09/21/22 1944    09/21/22 0000  amoxicillin-clavulanate (AUGMENTIN) 875-125 MG per tablet  2 Times Daily         09/21/22 2219                Physician Progress Notes (last 7 days)  Notes from 09/19/22 0646 through 09/26/22 0646   No notes of this type exist for this encounter.

## 2023-01-09 PROCEDURE — 87636 SARSCOV2 & INF A&B AMP PRB: CPT

## 2023-02-01 ENCOUNTER — APPOINTMENT (OUTPATIENT)
Dept: CT IMAGING | Facility: HOSPITAL | Age: 48
End: 2023-02-01
Payer: OTHER MISCELLANEOUS

## 2023-02-01 ENCOUNTER — HOSPITAL ENCOUNTER (EMERGENCY)
Facility: HOSPITAL | Age: 48
Discharge: HOME | End: 2023-02-01
Attending: STUDENT IN AN ORGANIZED HEALTH CARE EDUCATION/TRAINING PROGRAM | Admitting: STUDENT IN AN ORGANIZED HEALTH CARE EDUCATION/TRAINING PROGRAM
Payer: OTHER MISCELLANEOUS
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
## 2023-02-01 DIAGNOSIS — S06.0X0A CONCUSSION WITHOUT LOSS OF CONSCIOUSNESS, INITIAL ENCOUNTER: Primary | ICD-10-CM

## 2023-02-01 DIAGNOSIS — M54.2 PAIN OF NECK WITH RECENT TRAUMATIC INJURY: ICD-10-CM

## 2023-02-01 PROCEDURE — 99284 EMERGENCY DEPT VISIT MOD MDM: CPT

## 2023-02-01 PROCEDURE — 72125 CT NECK SPINE W/O DYE: CPT

## 2023-02-01 PROCEDURE — 63710000001 ONDANSETRON ODT 4 MG TABLET DISPERSIBLE: Performed by: STUDENT IN AN ORGANIZED HEALTH CARE EDUCATION/TRAINING PROGRAM

## 2023-02-01 PROCEDURE — 70450 CT HEAD/BRAIN W/O DYE: CPT

## 2023-02-01 RX ORDER — ONDANSETRON 4 MG/1
4 TABLET, ORALLY DISINTEGRATING ORAL ONCE
Status: COMPLETED | OUTPATIENT
Start: 2023-02-01 | End: 2023-02-01

## 2023-02-01 RX ORDER — ACETAMINOPHEN 500 MG
1000 TABLET ORAL ONCE
Status: COMPLETED | OUTPATIENT
Start: 2023-02-01 | End: 2023-02-01

## 2023-02-01 RX ORDER — IBUPROFEN 400 MG/1
600 TABLET ORAL ONCE
Status: COMPLETED | OUTPATIENT
Start: 2023-02-01 | End: 2023-02-01

## 2023-02-01 RX ADMIN — ACETAMINOPHEN 1000 MG: 500 TABLET, FILM COATED ORAL at 22:29

## 2023-02-01 RX ADMIN — ONDANSETRON 4 MG: 4 TABLET, ORALLY DISINTEGRATING ORAL at 21:27

## 2023-02-01 RX ADMIN — IBUPROFEN 600 MG: 400 TABLET, FILM COATED ORAL at 22:29

## 2023-02-02 VITALS
HEIGHT: 66 IN | TEMPERATURE: 97.6 F | OXYGEN SATURATION: 95 % | BODY MASS INDEX: 43.39 KG/M2 | WEIGHT: 270 LBS | RESPIRATION RATE: 20 BRPM | HEART RATE: 89 BPM | DIASTOLIC BLOOD PRESSURE: 97 MMHG | SYSTOLIC BLOOD PRESSURE: 139 MMHG

## 2023-02-02 NOTE — DISCHARGE INSTRUCTIONS
Today you are seen after your fall your CT imaging is negative your symptoms are most consistent with a concussion.  He may continue to experience headaches for the next few days for up to a few weeks.  Also follow-up with her primary care provider for reevaluation.  If her symptoms worsen prior please immediately return to the emergency department.

## 2023-02-02 NOTE — ED PROVIDER NOTES
"EMERGENCY DEPARTMENT ATTENDING NOTE    Patient Name: Feliberto Knowles    Chief Complaint   Patient presents with   • Fall       PATIENT PRESENTATION:  Feliberto Knowles is a 47 y.o. male with no significant past medical history presents emergency department after a ground-level slip and fall on ice with strike to his head with headache and neck pain.    Patient states he is getting of his truck when he slipped on the ice landed onto his back and then hit the back of his head against part of his truck.  He has had a headache with some nausea since the event.  Denies any vomiting to me.  Denies any vision change hearing change any numbness weakness tingling his arms or legs.  He denies any back pain states he has some neck pain sort of near his head rating from his head.  Denies any worsening neck pain with range of motion prior to arrival to the emergency department.  No chest pain or shortness of breath no abdominal pain no pain to any of his extremities.  He did not lose consciousness during the event he is not on blood thinners.      PHYSICAL EXAM:   VS: /97 (BP Location: Left arm, Patient Position: Sitting)   Pulse 89   Temp 97.6 °F (36.4 °C) (Oral)   Resp 20   Ht 167.6 cm (66\")   Wt 122 kg (270 lb)   SpO2 95%   BMI 43.58 kg/m²   GENERAL: Well-appearing middle-age man sitting up in stretcher no acute distress; well-nourished, well-developed, awake, alert, no acute distress, nontoxic appearing, comfortable  EYES: PERRL, sclerae anicteric, extraocular movements grossly intact, symmetric lids  EARS, NOSE, MOUTH, THROAT: atraumatic external nose and ears, moist mucous membranes  NECK: Symmetric, trachea midline, no thyromegaly, no adenopathy, no meningismus  RESPIRATORY: Unlabored respiratory effort, clear to auscultation bilaterally, good air movement  CARDIOVASCULAR: No murmurs or gallops, peripheral pulses 2+ and equal in all extremities  GI: Soft, nontender, nondistended, bowel sounds present, no " hepatosplenomegaly  LYMPHATIC: no lymphadenopathy  MUSCULOSKELETAL/EXTREMITIES: No midline cervical thoracic or lumbar spine tenderness or step-offs to palpation; otherwise all extremities without obvious deformity, no cyanosis or clubbing  SKIN: warm and dry with no obvious rashes  NEUROLOGIC: moving all 4 extremities symmetrically, CN II-XII grossly intact; GCS 15  PSYCHIATRIC: alert, pleasant and cooperative. Appropriate mood and affect.      MEDICAL DECISION MAKING:    Feliberto Knowles is a 47 y.o. male presents emergency department after falling on ice with strike to his head with acute headache.    Differential Diagnosis Considered: Epidural hematoma, subdural hematoma, subarachnoid hemorrhage, concussion, cervical spine fracture    Imaging Ordered:   CT Head Without Contrast   Final Result   1. No acute intracranial abnormality is seen.                                                       This report was finalized on 02/01/2023 21:50 by Dr. Shai Alejandro MD.      CT Cervical Spine Without Contrast   Final Result   1. No acute bony abnormality.                                                       This report was finalized on 02/01/2023 21:51 by Dr. Shai Alejandro MD.          My imaging interpretation: CT of the head and cervical spine with no acute abnormality.    ED Course and Re-evaluation: 48yo M presented to the emergency room after a fall on ice with strike to his head with intractable headache and associated neck pain.  Initial exam is reassuring GCS 15 and no neurologic findings on exam his pain from his neck appears musculoskeletal but given mechanism concern for possible traumatic injury.  No evidence of cervical spine fracture.  CT of the head has no acute abnormality.  Given consultation symptoms most likely concussion in the setting of his significant traumatic mechanism.  Patient was given a dose of Tylenol and ibuprofen.  Also given Zofran for nausea.  He was discharged home with plan follow-up  with primary care provider within 2 days for further management and return to the emergency department immediately if symptoms worsen      ED Diagnosis:  Concussion without loss of consciousness, initial encounter; Fall from ground level; Pain of neck with recent traumatic injury    Disposition: to home  Follow up plan: PCP follow up within 2 days, return to ED immediately if symptoms worsen        Signed:  Sylvester Carranza MD  Emergency Medicine Physician    Please note that portions of this note were completed with a voice recognition program.      Sylvester Carranza MD  02/02/23 0714

## 2023-02-05 ENCOUNTER — HOSPITAL ENCOUNTER (EMERGENCY)
Facility: HOSPITAL | Age: 48
Discharge: HOME | End: 2023-02-05
Admitting: STUDENT IN AN ORGANIZED HEALTH CARE EDUCATION/TRAINING PROGRAM
Payer: OTHER MISCELLANEOUS

## 2023-02-05 ENCOUNTER — APPOINTMENT (OUTPATIENT)
Dept: CT IMAGING | Facility: HOSPITAL | Age: 48
End: 2023-02-05
Payer: OTHER MISCELLANEOUS

## 2023-02-05 ENCOUNTER — NURSE TRIAGE (OUTPATIENT)
Dept: CALL CENTER | Facility: HOSPITAL | Age: 48
End: 2023-02-05
Payer: OTHER GOVERNMENT

## 2023-02-05 VITALS
HEART RATE: 99 BPM | TEMPERATURE: 98.2 F | WEIGHT: 282 LBS | HEIGHT: 67 IN | OXYGEN SATURATION: 96 % | BODY MASS INDEX: 44.26 KG/M2 | SYSTOLIC BLOOD PRESSURE: 151 MMHG | DIASTOLIC BLOOD PRESSURE: 97 MMHG | RESPIRATION RATE: 18 BRPM

## 2023-02-05 DIAGNOSIS — S06.0XAA CONCUSSION WITH UNKNOWN LOSS OF CONSCIOUSNESS STATUS, INITIAL ENCOUNTER: Primary | ICD-10-CM

## 2023-02-05 PROCEDURE — 70450 CT HEAD/BRAIN W/O DYE: CPT

## 2023-02-05 PROCEDURE — 25010000002 DIPHENHYDRAMINE PER 50 MG: Performed by: NURSE PRACTITIONER

## 2023-02-05 PROCEDURE — 25010000002 PROCHLORPERAZINE 10 MG/2ML SOLUTION: Performed by: NURSE PRACTITIONER

## 2023-02-05 PROCEDURE — 96374 THER/PROPH/DIAG INJ IV PUSH: CPT

## 2023-02-05 PROCEDURE — 25010000002 KETOROLAC TROMETHAMINE PER 15 MG: Performed by: NURSE PRACTITIONER

## 2023-02-05 PROCEDURE — 96375 TX/PRO/DX INJ NEW DRUG ADDON: CPT

## 2023-02-05 PROCEDURE — 99283 EMERGENCY DEPT VISIT LOW MDM: CPT

## 2023-02-05 RX ORDER — PROCHLORPERAZINE EDISYLATE 5 MG/ML
5 INJECTION INTRAMUSCULAR; INTRAVENOUS EVERY 6 HOURS PRN
Status: DISCONTINUED | OUTPATIENT
Start: 2023-02-05 | End: 2023-02-05 | Stop reason: HOSPADM

## 2023-02-05 RX ORDER — KETOROLAC TROMETHAMINE 10 MG/1
10 TABLET, FILM COATED ORAL EVERY 8 HOURS PRN
Qty: 15 TABLET | Refills: 0 | Status: SHIPPED | OUTPATIENT
Start: 2023-02-05

## 2023-02-05 RX ORDER — ONDANSETRON 4 MG/1
4 TABLET, ORALLY DISINTEGRATING ORAL EVERY 6 HOURS PRN
Qty: 15 TABLET | Refills: 0 | Status: SHIPPED | OUTPATIENT
Start: 2023-02-05

## 2023-02-05 RX ORDER — KETOROLAC TROMETHAMINE 15 MG/ML
15 INJECTION, SOLUTION INTRAMUSCULAR; INTRAVENOUS ONCE
Status: COMPLETED | OUTPATIENT
Start: 2023-02-05 | End: 2023-02-05

## 2023-02-05 RX ORDER — DIPHENHYDRAMINE HYDROCHLORIDE 50 MG/ML
25 INJECTION INTRAMUSCULAR; INTRAVENOUS ONCE
Status: COMPLETED | OUTPATIENT
Start: 2023-02-05 | End: 2023-02-05

## 2023-02-05 RX ADMIN — PROCHLORPERAZINE EDISYLATE 5 MG: 5 INJECTION INTRAMUSCULAR; INTRAVENOUS at 17:10

## 2023-02-05 RX ADMIN — DIPHENHYDRAMINE HYDROCHLORIDE 25 MG: 50 INJECTION, SOLUTION INTRAMUSCULAR; INTRAVENOUS at 17:10

## 2023-02-05 RX ADMIN — KETOROLAC TROMETHAMINE 15 MG: 15 INJECTION, SOLUTION INTRAMUSCULAR; INTRAVENOUS at 18:59

## 2023-02-05 NOTE — TELEPHONE ENCOUNTER
"Pt. Fell Wed.  slipped on ice and hit back of his head, he was seen in ER at [facility], ct scan, was clear, has concussion, but headache is getting worse, now blurred vision, and pain rated 9, nausea continues. Feeling worse he states. Had wife look at pupils and his eye is moving involuntary, sent to ER>     Reason for Disposition  • [1] Loss of vision or double vision AND [2] present now    Additional Information  • Negative: [1] ACUTE NEURO SYMPTOM AND [2] present now  (DEFINITION: difficult to awaken OR confused thinking and talking OR slurred speech OR weakness of arms OR unsteady walking)  • Negative: Knocked out (unconscious) > 1 minute  • Negative: Seizure (convulsion) occurred  (Exception: prior history of seizures and now alert and without Acute Neuro Symptoms)  • Negative: Penetrating head injury (e.g., knife, gun shot wound, metal object)  • Negative: [1] Major bleeding (e.g., actively dripping or spurting) AND [2] can't be stopped  • Negative: [1] Dangerous mechanism of injury (e.g., MVA, diving, trampoline, contact sports, fall > 10 feet or 3 meters) AND [2] NECK pain AND [3] began < 1 hour after injury  • Negative: Sounds like a life-threatening emergency to the triager  • Negative: [1] Diagnosed with concussion AND [2] within last 14 days  • Negative: [1] Traumatic brain injury (mTBI; concussion) AND [2] more than 14 days since head injury  • Negative: Can't remember what happened (amnesia)  • Negative: Vomiting once or more  • Negative: Watery or blood-tinged fluid dripping from the NOSE or EARS now  (Exception: tears from crying or nosebleed from nasal trauma)  • Negative: [1] One or two \"black eyes\" (bruising, purple color of eyelids) AND [2] onset within 24 hours of head injury  • Negative: Large swelling or bruise > 2 inches (5 cm)    Answer Assessment - Initial Assessment Questions  1. MECHANISM: \"How did the injury happen?\" For falls, ask: \"What height did you fall from?\" and \"What surface did " "you fall against?\"       Slipped on ice hit ead  2. ONSET: \"When did the injury happen?\" (Minutes or hours ago)       4 days ago  3. NEUROLOGIC SYMPTOMS: \"Was there any loss of consciousness?\" \"Are there any other neurological symptoms?\"       no  4. MENTAL STATUS: \"Does the person know who he is, who you are, and where he is?\"       Knows everything  5. LOCATION: \"What part of the head was hit?\"       Back of head  6. SCALP APPEARANCE: \"What does the scalp look like? Is it bleeding now?\" If Yes, ask: \"Is it difficult to stop?\"       Small cut  7. SIZE: For cuts, bruises, or swelling, ask: \"How large is it?\" (e.g., inches or centimeters)       Small cut  8. PAIN: \"Is there any pain?\" If Yes, ask: \"How bad is it?\"  (e.g., Scale 1-10; or mild, moderate, severe)      Pain rated 9  9. TETANUS: For any breaks in the skin, ask: \"When was the last tetanus booster?\"      Up to date  10. OTHER SYMPTOMS: \"Do you have any other symptoms?\" (e.g., neck pain, vomiting)        Head ache, nauseated, blurred vision  11. PREGNANCY: \"Is there any chance you are pregnant?\" \"When was your last menstrual period?\"        no    Protocols used: HEAD INJURY-ADULT-AH      "

## 2023-02-05 NOTE — ED PROVIDER NOTES
Subjective   History of Present Illness  Patient is a 47-year-old male who presents to the ER with chief complaints of headache.  Patient was evaluated in this emergency department on February 1, 2023 secondary to a fall.  He fell hitting his head on ice.  He had a CT scan of his head and cervical spine both of which were negative.  Patient continues to have a headache in particular to the posterior aspect of his scalp with radiating symptoms to the top of his scalp.  He has pain behind his eyes bilaterally and complains of photophobia.  Patient has had continued dizziness, nausea, and a few episodes of vomiting.  He has had no recorded fevers.  He feels headache symptoms are worse than previously.  Past medical history significant for AICD, anger issues, anxiety, heart murmur, hyperlipidemia, hypertrophic obstructive cardiomyopathy, stroke, tachycardia, traumatic brain injury.        Review of Systems   Constitutional: Negative.  Negative for fever.   HENT: Negative for congestion.    Eyes: Positive for photophobia.   Respiratory: Negative.  Negative for cough and shortness of breath.    Cardiovascular: Negative.  Negative for chest pain.   Gastrointestinal: Positive for nausea and vomiting. Negative for abdominal pain.   Genitourinary: Negative.    Musculoskeletal: Negative.  Negative for back pain and neck pain.   Skin: Negative.    Neurological: Positive for headaches.   All other systems reviewed and are negative.      Past Medical History:   Diagnosis Date   • AICD (automatic cardioverter/defibrillator) present    • Anger    • Anxiety    • Heart murmur    • Hyperlipidemia    • Hypertrophic obstructive cardiomyopathy (HOCM) (HCC)    • Stroke (HCC)     tia november 2017   • Tachycardia    • TBI (traumatic brain injury)        No Known Allergies    Past Surgical History:   Procedure Laterality Date   • ANKLE SURGERY     • APPENDECTOMY     • CARDIAC CATHETERIZATION      no stents   • CARDIAC DEFIBRILLATOR  PLACEMENT     • CARDIAC PACEMAKER PLACEMENT      septalmyectomy   • CARPAL TUNNEL RELEASE     • OTHER SURGICAL HISTORY  2012    septalmyectomy   • SHOULDER ARTHROSCOPY W/ LABRAL REPAIR Right 3/25/2021    Procedure: RIGHT SHOULDER ARTHROSCOPIC REVISION POSTERIOR  LABRAL REPAIR;  Surgeon: Sandro Monsivais MD;  Location: [facility] OR;  Service: Orthopedics;  Laterality: Right;   • TOTAL SHOULDER ARTHROPLASTY W/ DISTAL CLAVICLE EXCISION Right 2021    Procedure: RIGHT REVERSE TOTAL SHOULDER ARTHROPLASTY;  Surgeon: Sandro Monsivais MD;  Location: [facility] OR;  Service: Orthopedics;  Laterality: Right;   • TOTAL SHOULDER ARTHROPLASTY W/ DISTAL CLAVICLE EXCISION Right 10/14/2021    Procedure: INCISION AND DRAINAGE RIGHT SHOULDER WITH POLY EXCHANGE;  Surgeon: Sandro Monsivais MD;  Location: [facility] OR;  Service: Orthopedics;  Laterality: Right;       Family History   Problem Relation Age of Onset   • Heart disease Mother    • No Known Problems Father        Social History     Socioeconomic History   • Marital status:    Tobacco Use   • Smoking status: Former     Types: Cigarettes     Quit date:      Years since quittin.1   • Smokeless tobacco: Current     Types: Chew   Vaping Use   • Vaping Use: Never used   Substance and Sexual Activity   • Alcohol use: No   • Drug use: No   • Sexual activity: Yes     Partners: Female           Objective   Physical Exam  Vitals and nursing note reviewed.   Constitutional:       General: He is not in acute distress.     Appearance: He is well-developed. He is not diaphoretic.   HENT:      Head: Atraumatic.      Right Ear: External ear normal.      Left Ear: External ear normal.      Nose: Nose normal.      Mouth/Throat:      Pharynx: Oropharynx is clear.   Eyes:      General: No scleral icterus.     Extraocular Movements: Extraocular movements intact.      Conjunctiva/sclera: Conjunctivae normal.   Neck:      Thyroid: No thyromegaly.      Vascular: No JVD.   Cardiovascular:      Rate  and Rhythm: Normal rate and regular rhythm.      Heart sounds: Normal heart sounds. No murmur heard.  Pulmonary:      Effort: Pulmonary effort is normal. No respiratory distress.      Breath sounds: Normal breath sounds. No wheezing or rales.   Chest:      Chest wall: No tenderness.   Abdominal:      General: Bowel sounds are normal. There is no distension.      Palpations: Abdomen is soft. There is no mass.      Tenderness: There is no abdominal tenderness. There is no guarding or rebound.   Musculoskeletal:         General: Normal range of motion.      Cervical back: Normal range of motion and neck supple.   Lymphadenopathy:      Cervical: No cervical adenopathy.   Skin:     General: Skin is warm and dry.      Coloration: Skin is not pale.      Findings: No erythema or rash.   Neurological:      Mental Status: He is alert and oriented to person, place, and time.      Cranial Nerves: No cranial nerve deficit.      Coordination: Coordination normal.      Deep Tendon Reflexes: Reflexes are normal and symmetric.   Psychiatric:         Mood and Affect: Mood normal.         Behavior: Behavior normal.         Thought Content: Thought content normal.         Judgment: Judgment normal.         Procedures           ED Course  ED Course as of 02/06/23 0910   Sun Feb 05, 2023   1817 IMPRESSION:  1. No acute intracranial abnormality is seen.       [TW]      ED Course User Index  [TW] Paulina Butterfield APRN                                           Medical Decision Making  Patient is a 47-year-old male who presents to the ER with chief complaints of headache.  Patient was evaluated in this emergency department on February 1, 2023 secondary to a fall.  He fell hitting his head on ice.  He had a CT scan of his head and cervical spine both of which were negative.  Patient continues to have dizziness, nausea, and a few episodes of vomiting.  He has had no recorded fevers.  He feels headache symptoms are worse than previously.   Past medical history significant for AICD, anger issues, anxiety, heart murmur, hyperlipidemia, hypertrophic obstructive cardiomyopathy, stroke, tachycardia, traumatic brain injury.    Differential diagnosis: Intracranial hemorrhage, concussion, migraine, and other    Patient was recently seen secondary to a fall.  CT scan of his head and neck were negative on that date.  We rescanned his head today due to worsening headache symptoms.  CT scan was negative.  He likely has a migraine secondary to a concussion.  Medications given in the ER has helped with his symptoms.  We have given him concussion and postconcussion headache syndrome precautions.  He will need follow-up with neurology with continued symptoms.      Concussion with unknown loss of consciousness status, initial encounter: acute illness or injury  Amount and/or Complexity of Data Reviewed  External Data Reviewed: radiology and notes.  Labs:  Decision-making details documented in ED Course.  Radiology: ordered. Decision-making details documented in ED Course.  ECG/medicine tests:  Decision-making details documented in ED Course.      Risk  Prescription drug management.          Final diagnoses:   Concussion with unknown loss of consciousness status, initial encounter       ED Disposition  ED Disposition     ED Disposition   Discharge    Condition   Good    Comment   --             No follow-up provider specified.       Medication List      New Prescriptions    ketorolac 10 MG tablet  Commonly known as: TORADOL  Take 1 tablet by mouth Every 8 (Eight) Hours As Needed for Moderate Pain.     ondansetron ODT 4 MG disintegrating tablet  Commonly known as: ZOFRAN-ODT  Place 1 tablet on the tongue Every 6 (Six) Hours As Needed for Nausea.           Where to Get Your Medications      These medications were sent to [facility]/pharmacy #[id number] - [place], [place] - [address] AT ACROSS FROM [address]  [phone] [facility] [phone]   [address] [facility] [address]     Hours: 24-hours Phone: [phone]   · ketorolac 10 MG tablet  · ondansetron ODT 4 MG disintegrating tablet          Paulina Butterfield, APRN  02/06/23 0921

## 2023-02-06 NOTE — DISCHARGE INSTRUCTIONS
You may take benadryl as well which is excellent for these type of headaches from injury. Concussive headaches have to typically be treated somewhat differently. Be sure to push fluids, rest, avoid too much brain stimulation as previously discussed, avoid physical activity that causes jarring or sudden movement of head, eat healthy foods, slowly advance activity.    F/u with pcp for neurology referral with continued sxs

## 2023-02-08 ENCOUNTER — TELEPHONE (OUTPATIENT)
Dept: NEUROSURGERY | Age: 48
End: 2023-02-08

## 2023-02-08 NOTE — TELEPHONE ENCOUNTER
Kiarra Tate with [facility] called to see if a patient has been scheduled from referral. Kiarra Tate stated the patient need to be schedule as soon as possible. Please advise.

## 2023-02-09 NOTE — TELEPHONE ENCOUNTER
Called Christine Reyes back at [employer] works had to leave her vm, left on her vm the date and time of the patients appt 2- @ 8:30 with EG. Also told her that is she had anything sooner we would reach out to the patient to let them know.  If she had any questions to please call our office at [phone]

## 2023-02-20 ENCOUNTER — OFFICE VISIT (OUTPATIENT)
Dept: NEUROLOGY | Age: 48
End: 2023-02-20
Payer: COMMERCIAL

## 2023-02-20 VITALS
OXYGEN SATURATION: 99 % | WEIGHT: 250 LBS | SYSTOLIC BLOOD PRESSURE: 142 MMHG | HEART RATE: 89 BPM | HEIGHT: 66 IN | DIASTOLIC BLOOD PRESSURE: 82 MMHG | BODY MASS INDEX: 40.18 KG/M2

## 2023-02-20 DIAGNOSIS — F07.81 POST CONCUSSION SYNDROME: Primary | ICD-10-CM

## 2023-02-20 PROCEDURE — 99204 OFFICE O/P NEW MOD 45 MIN: CPT | Performed by: NURSE PRACTITIONER

## 2023-02-20 RX ORDER — NORTRIPTYLINE HYDROCHLORIDE 50 MG/1
50 CAPSULE ORAL NIGHTLY
Qty: 30 CAPSULE | Refills: 3 | Status: SHIPPED | OUTPATIENT
Start: 2023-02-20

## 2023-02-20 RX ORDER — UBROGEPANT 100 MG/1
TABLET ORAL
Qty: 16 TABLET | Refills: 3 | Status: SHIPPED | OUTPATIENT
Start: 2023-02-20
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
## 2023-02-20 NOTE — PROGRESS NOTES

## 2023-03-15 ENCOUNTER — OFFICE VISIT (OUTPATIENT)
Dept: NEUROLOGY | Age: 48
End: 2023-03-15
Payer: COMMERCIAL

## 2023-03-15 VITALS
HEIGHT: 66 IN | DIASTOLIC BLOOD PRESSURE: 88 MMHG | OXYGEN SATURATION: 97 % | HEART RATE: 84 BPM | BODY MASS INDEX: 40.18 KG/M2 | WEIGHT: 250 LBS | SYSTOLIC BLOOD PRESSURE: 132 MMHG

## 2023-03-15 DIAGNOSIS — F07.81 POST CONCUSSION SYNDROME: Primary | ICD-10-CM

## 2023-03-15 PROCEDURE — 99213 OFFICE O/P EST LOW 20 MIN: CPT | Performed by: NURSE PRACTITIONER
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
## 2023-08-09 ENCOUNTER — HOSPITAL ENCOUNTER (EMERGENCY)
Facility: HOSPITAL | Age: 48
Discharge: HOME | End: 2023-08-09
Attending: EMERGENCY MEDICINE | Admitting: EMERGENCY MEDICINE
Payer: OTHER GOVERNMENT

## 2023-08-09 ENCOUNTER — APPOINTMENT (OUTPATIENT)
Dept: GENERAL RADIOLOGY | Facility: HOSPITAL | Age: 48
End: 2023-08-09
Payer: OTHER GOVERNMENT

## 2023-08-09 ENCOUNTER — APPOINTMENT (OUTPATIENT)
Dept: CARDIOLOGY | Facility: HOSPITAL | Age: 48
End: 2023-08-09
Payer: OTHER GOVERNMENT

## 2023-08-09 VITALS
TEMPERATURE: 98.1 F | SYSTOLIC BLOOD PRESSURE: 109 MMHG | RESPIRATION RATE: 18 BRPM | OXYGEN SATURATION: 93 % | HEIGHT: 67 IN | WEIGHT: 280 LBS | HEART RATE: 82 BPM | DIASTOLIC BLOOD PRESSURE: 65 MMHG | BODY MASS INDEX: 43.95 KG/M2

## 2023-08-09 DIAGNOSIS — R07.9 CHEST PAIN, UNSPECIFIED TYPE: ICD-10-CM

## 2023-08-09 DIAGNOSIS — R42 VERTIGO: Primary | ICD-10-CM

## 2023-08-09 LAB
ALBUMIN SERPL-MCNC: 4.2 G/DL (ref 3.5–5.2)
ALBUMIN/GLOB SERPL: 1.7 G/DL
ALP SERPL-CCNC: 74 U/L (ref 39–117)
ALT SERPL W P-5'-P-CCNC: 38 U/L (ref 1–41)
ANION GAP SERPL CALCULATED.3IONS-SCNC: 9 MMOL/L (ref 5–15)
AST SERPL-CCNC: 40 U/L (ref 1–40)
BASOPHILS # BLD AUTO: 0.05 10*3/MM3 (ref 0–0.2)
BASOPHILS NFR BLD AUTO: 0.6 % (ref 0–1.5)
BH CV STRESS BP STAGE 1: NORMAL
BH CV STRESS BP STAGE 2: NORMAL
BH CV STRESS BP STAGE 3: NORMAL
BH CV STRESS DURATION MIN STAGE 1: 3
BH CV STRESS DURATION MIN STAGE 2: 3
BH CV STRESS DURATION MIN STAGE 3: 1
BH CV STRESS DURATION SEC STAGE 1: 0
BH CV STRESS DURATION SEC STAGE 2: 0
BH CV STRESS DURATION SEC STAGE 3: 27
BH CV STRESS GRADE STAGE 1: 10
BH CV STRESS GRADE STAGE 2: 12
BH CV STRESS GRADE STAGE 3: 14
BH CV STRESS HR STAGE 1: 111
BH CV STRESS HR STAGE 2: 127
BH CV STRESS HR STAGE 3: 145
BH CV STRESS METS STAGE 1: 5
BH CV STRESS METS STAGE 2: 7.5
BH CV STRESS METS STAGE 3: 10
BH CV STRESS PROTOCOL 1: NORMAL
BH CV STRESS RECOVERY BP: NORMAL MMHG
BH CV STRESS RECOVERY HR: 99 BPM
BH CV STRESS SPEED STAGE 1: 1.7
BH CV STRESS SPEED STAGE 2: 2.5
BH CV STRESS SPEED STAGE 3: 3.4
BH CV STRESS STAGE 1: 1
BH CV STRESS STAGE 2: 2
BH CV STRESS STAGE 3: 3
BILIRUB SERPL-MCNC: 0.7 MG/DL (ref 0–1.2)
BUN SERPL-MCNC: 11 MG/DL (ref 6–20)
BUN/CREAT SERPL: 12 (ref 7–25)
CALCIUM SPEC-SCNC: 8.6 MG/DL (ref 8.6–10.5)
CHLORIDE SERPL-SCNC: 104 MMOL/L (ref 98–107)
CO2 SERPL-SCNC: 23 MMOL/L (ref 22–29)
CREAT SERPL-MCNC: 0.92 MG/DL (ref 0.76–1.27)
D DIMER PPP FEU-MCNC: 0.32 MCGFEU/ML (ref 0–0.5)
DEPRECATED RDW RBC AUTO: 38.8 FL (ref 37–54)
EGFRCR SERPLBLD CKD-EPI 2021: 103.2 ML/MIN/1.73
EOSINOPHIL # BLD AUTO: 0.02 10*3/MM3 (ref 0–0.4)
EOSINOPHIL NFR BLD AUTO: 0.2 % (ref 0.3–6.2)
ERYTHROCYTE [DISTWIDTH] IN BLOOD BY AUTOMATED COUNT: 12.8 % (ref 12.3–15.4)
GEN 5 2HR TROPONIN T REFLEX: 12 NG/L
GLOBULIN UR ELPH-MCNC: 2.5 GM/DL
GLUCOSE SERPL-MCNC: 94 MG/DL (ref 65–99)
HCT VFR BLD AUTO: 51.6 % (ref 37.5–51)
HGB BLD-MCNC: 17.5 G/DL (ref 13–17.7)
IMM GRANULOCYTES # BLD AUTO: 0.05 10*3/MM3 (ref 0–0.05)
IMM GRANULOCYTES NFR BLD AUTO: 0.6 % (ref 0–0.5)
LYMPHOCYTES # BLD AUTO: 1.87 10*3/MM3 (ref 0.7–3.1)
LYMPHOCYTES NFR BLD AUTO: 22.2 % (ref 19.6–45.3)
MAGNESIUM SERPL-MCNC: 2.1 MG/DL (ref 1.6–2.6)
MAXIMAL PREDICTED HEART RATE: 173 BPM
MCH RBC QN AUTO: 28.6 PG (ref 26.6–33)
MCHC RBC AUTO-ENTMCNC: 33.9 G/DL (ref 31.5–35.7)
MCV RBC AUTO: 84.3 FL (ref 79–97)
MONOCYTES # BLD AUTO: 0.88 10*3/MM3 (ref 0.1–0.9)
MONOCYTES NFR BLD AUTO: 10.5 % (ref 5–12)
NEUTROPHILS NFR BLD AUTO: 5.54 10*3/MM3 (ref 1.7–7)
NEUTROPHILS NFR BLD AUTO: 65.9 % (ref 42.7–76)
NRBC BLD AUTO-RTO: 0 /100 WBC (ref 0–0.2)
NT-PROBNP SERPL-MCNC: 163.5 PG/ML (ref 0–450)
PERCENT MAX PREDICTED HR: 83.82 %
PLATELET # BLD AUTO: 282 10*3/MM3 (ref 140–450)
PMV BLD AUTO: 9.5 FL (ref 6–12)
POTASSIUM SERPL-SCNC: 4.1 MMOL/L (ref 3.5–5.2)
PROT SERPL-MCNC: 6.7 G/DL (ref 6–8.5)
RBC # BLD AUTO: 6.12 10*6/MM3 (ref 4.14–5.8)
SODIUM SERPL-SCNC: 136 MMOL/L (ref 136–145)
STRESS BASELINE BP: NORMAL MMHG
STRESS BASELINE HR: 93 BPM
STRESS PERCENT HR: 99 %
STRESS POST ESTIMATED WORKLOAD: 10 METS
STRESS POST EXERCISE DUR MIN: 7 MIN
STRESS POST EXERCISE DUR SEC: 27 SEC
STRESS POST PEAK BP: NORMAL MMHG
STRESS POST PEAK HR: 145 BPM
STRESS TARGET HR: 147 BPM
TROPONIN T DELTA: -4 NG/L
TROPONIN T SERPL HS-MCNC: 16 NG/L
WBC NRBC COR # BLD: 8.41 10*3/MM3 (ref 3.4–10.8)

## 2023-08-09 PROCEDURE — 83880 ASSAY OF NATRIURETIC PEPTIDE: CPT | Performed by: EMERGENCY MEDICINE

## 2023-08-09 PROCEDURE — 36415 COLL VENOUS BLD VENIPUNCTURE: CPT

## 2023-08-09 PROCEDURE — 83735 ASSAY OF MAGNESIUM: CPT | Performed by: EMERGENCY MEDICINE

## 2023-08-09 PROCEDURE — 99284 EMERGENCY DEPT VISIT MOD MDM: CPT

## 2023-08-09 PROCEDURE — 85025 COMPLETE CBC W/AUTO DIFF WBC: CPT | Performed by: EMERGENCY MEDICINE

## 2023-08-09 PROCEDURE — 93018 CV STRESS TEST I&R ONLY: CPT | Performed by: INTERNAL MEDICINE

## 2023-08-09 PROCEDURE — 85379 FIBRIN DEGRADATION QUANT: CPT | Performed by: EMERGENCY MEDICINE

## 2023-08-09 PROCEDURE — 80053 COMPREHEN METABOLIC PANEL: CPT | Performed by: EMERGENCY MEDICINE

## 2023-08-09 PROCEDURE — 93005 ELECTROCARDIOGRAM TRACING: CPT | Performed by: EMERGENCY MEDICINE

## 2023-08-09 PROCEDURE — 71045 X-RAY EXAM CHEST 1 VIEW: CPT

## 2023-08-09 PROCEDURE — 93010 ELECTROCARDIOGRAM REPORT: CPT | Performed by: INTERNAL MEDICINE

## 2023-08-09 PROCEDURE — 84484 ASSAY OF TROPONIN QUANT: CPT | Performed by: EMERGENCY MEDICINE

## 2023-08-09 PROCEDURE — 93017 CV STRESS TEST TRACING ONLY: CPT

## 2023-08-09 RX ORDER — SODIUM CHLORIDE 0.9 % (FLUSH) 0.9 %
10 SYRINGE (ML) INJECTION AS NEEDED
Status: DISCONTINUED | OUTPATIENT
Start: 2023-08-09 | End: 2023-08-09 | Stop reason: HOSPADM

## 2023-08-09 RX ORDER — MECLIZINE HYDROCHLORIDE 25 MG/1
50 TABLET ORAL ONCE
Status: COMPLETED | OUTPATIENT
Start: 2023-08-09 | End: 2023-08-09

## 2023-08-09 RX ORDER — MECLIZINE HYDROCHLORIDE 25 MG/1
25 TABLET ORAL 3 TIMES DAILY PRN
Qty: 30 TABLET | Refills: 0 | Status: SHIPPED | OUTPATIENT
Start: 2023-08-09

## 2023-08-09 RX ADMIN — MECLIZINE HYDROCLORIDE 50 MG: 25 TABLET ORAL at 07:59

## 2023-08-09 NOTE — ED PROVIDER NOTES
"Subjective   History of Present Illness  Patient presents the emergency room with a complaint to me initially of dizziness or lightheadedness.  The computer says chest pain but his actual initial symptoms were dizziness.  He says that he had them while he was at work and walk around with other coworkers.  He does not describe it as the room spinning but is more of a lightheadedness and and unsteadiness feeling.  He says things felt \"wavy\".  He went on warm to the break room and sitting down that seem to make it somewhat better as he got still.  It is made worse whenever he turns his head.  He does say that he developed some discomfort in his chest while sitting in the break room.  He got nauseated but not actually throw up.  He denies any shortness of breath or diaphoresis.  He does have a history of cardiomyopathy with surgery to remove part of the thickened septum in the past but no history of coronary artery disease.  He has had dizziness in the past but never anything this bad.  He denies any tinnitus or head trauma.  He says he still feels little discomfort in his chest now but is no real pain.    History provided by:  Patient   used: No    Dizziness  Quality:  Lightheadedness  Severity:  Severe  Onset quality:  Sudden  Duration:  1 hour  Timing:  Constant  Progression:  Unchanged  Chronicity:  New  Context: head movement and standing up    Context: not when bending over, not with bowel movement, not with ear pain, not with eye movement, not with inactivity, not with loss of consciousness, not with medication, not with physical activity and not when urinating    Relieved by:  Being still  Worsened by:  Nothing  Ineffective treatments:  None tried  Associated symptoms: chest pain and nausea    Associated symptoms: no blood in stool, no diarrhea, no headaches, no hearing loss, no palpitations, no shortness of breath, no syncope, no tinnitus, no vision changes, no vomiting and no weakness  "   Risk factors: no anemia, no heart disease, no hx of stroke, no hx of vertigo, no Meniere's disease, no multiple medications and no new medications      Review of Systems   Constitutional: Negative.    HENT: Negative.  Negative for hearing loss and tinnitus.    Respiratory: Negative.  Negative for shortness of breath.    Cardiovascular:  Positive for chest pain. Negative for palpitations and syncope.   Gastrointestinal:  Positive for nausea. Negative for blood in stool, diarrhea and vomiting.   Genitourinary: Negative.    Musculoskeletal: Negative.    Skin: Negative.    Neurological:  Positive for dizziness. Negative for weakness and headaches.   Psychiatric/Behavioral: Negative.     All other systems reviewed and are negative.    Past Medical History:   Diagnosis Date    AICD (automatic cardioverter/defibrillator) present     Anger     Anxiety     Heart murmur     Hyperlipidemia     Hypertrophic obstructive cardiomyopathy (HOCM)     Stroke     tia november 2017    Tachycardia     TBI (traumatic brain injury)        No Known Allergies    Past Surgical History:   Procedure Laterality Date    ANKLE SURGERY      APPENDECTOMY      CARDIAC CATHETERIZATION      no stents    CARDIAC DEFIBRILLATOR PLACEMENT      CARDIAC PACEMAKER PLACEMENT      septalmyectomy    CARPAL TUNNEL RELEASE      OTHER SURGICAL HISTORY  05/2012    septalmyectomy    SHOULDER ARTHROSCOPY W/ LABRAL REPAIR Right 3/25/2021    Procedure: RIGHT SHOULDER ARTHROSCOPIC REVISION POSTERIOR  LABRAL REPAIR;  Surgeon: Sandro Monsivais MD;  Location: [facility] OR;  Service: Orthopedics;  Laterality: Right;    TOTAL SHOULDER ARTHROPLASTY W/ DISTAL CLAVICLE EXCISION Right 9/30/2021    Procedure: RIGHT REVERSE TOTAL SHOULDER ARTHROPLASTY;  Surgeon: Sandro Monsivais MD;  Location: [facility] OR;  Service: Orthopedics;  Laterality: Right;    TOTAL SHOULDER ARTHROPLASTY W/ DISTAL CLAVICLE EXCISION Right 10/14/2021    Procedure: INCISION AND DRAINAGE RIGHT SHOULDER WITH POLY EXCHANGE;   Surgeon: Sandro Monsivais MD;  Location: [facility] OR;  Service: Orthopedics;  Laterality: Right;       Family History   Problem Relation Age of Onset    Heart disease Mother     No Known Problems Father        Social History     Socioeconomic History    Marital status:    Tobacco Use    Smoking status: Former     Types: Cigarettes     Quit date:      Years since quittin.6    Smokeless tobacco: Current     Types: Chew   Vaping Use    Vaping Use: Never used   Substance and Sexual Activity    Alcohol use: No    Drug use: No    Sexual activity: Yes     Partners: Female       Prior to Admission medications    Medication Sig Start Date End Date Taking? Authorizing Provider   ketorolac (TORADOL) 10 MG tablet Take 1 tablet by mouth Every 8 (Eight) Hours As Needed for Moderate Pain. 23   Paulina Butterfield APRN   metoprolol tartrate (LOPRESSOR) 25 MG tablet Take 25 mg by mouth Daily.    ProviderIzabel MD   ondansetron ODT (ZOFRAN-ODT) 4 MG disintegrating tablet Place 1 tablet on the tongue Every 6 (Six) Hours As Needed for Nausea. 23   Paulina Butterfield APRN   sertraline (ZOLOFT) 100 MG tablet Take 100 mg by mouth Daily.    ProviderIzabel MD       Medications   sodium chloride 0.9 % flush 10 mL (has no administration in time range)   meclizine (ANTIVERT) tablet 50 mg (50 mg Oral Given 23 0759)       Vitals:    23 1545   BP: 117/86   Pulse: 90   Resp:    Temp:    SpO2:          Objective   Physical Exam  Vitals and nursing note reviewed.   Constitutional:       Appearance: He is well-developed.   HENT:      Head: Normocephalic and atraumatic.   Cardiovascular:      Rate and Rhythm: Normal rate and regular rhythm.   Pulmonary:      Effort: Pulmonary effort is normal.      Breath sounds: Normal breath sounds.   Chest:      Comments: Midline chest scar  Abdominal:      General: Bowel sounds are normal.      Palpations: Abdomen is soft.   Musculoskeletal:         General: Normal  "range of motion.      Cervical back: Normal range of motion and neck supple.   Skin:     General: Skin is warm and dry.   Neurological:      General: No focal deficit present.      Mental Status: He is alert and oriented to person, place, and time.   Psychiatric:         Mood and Affect: Mood normal.         Behavior: Behavior normal.       Procedures         Lab Results (last 24 hours)       Procedure Component Value Units Date/Time    CBC & Differential [[id number]]  (Abnormal) Collected: 08/09/23 0827    Specimen: Blood Updated: 08/09/23 0837    Narrative:      The following orders were created for panel order CBC & Differential.  Procedure                               Abnormality         Status                     ---------                               -----------         ------                     CBC Auto Differential[[id number]]        Abnormal            Final result                 Please view results for these tests on the individual orders.    D-dimer, Quantitative [[id number]]  (Normal) Collected: 08/09/23 0827    Specimen: Blood Updated: 08/09/23 0854     D-Dimer, Quantitative 0.32 MCGFEU/mL     Narrative:      According to the assay 's published package insert, a normal (<0.50 MCGFEU/mL) D-dimer result in conjunction with a non-high clinical probability assessment, excludes deep vein thrombosis (DVT) and pulmonary embolism (PE) with high sensitivity.    D-dimer values increase with age and this can make VTE exclusion of an older population difficult. To address this, the American College of Physicians, based on best available evidence and recent guidelines, recommends that clinicians use age-adjusted D-dimer thresholds in patients greater than 50 years of age with: a) a low probability of PE who do not meet all Pulmonary Embolism Rule Out Criteria, or b) in those with intermediate probability of PE.   The formula for an age-adjusted D-dimer cut-off is \"age/100\".  For example, a 60 year old " patient would have an age-adjusted cut-off of 0.60 MCGFEU/mL and an 80 year old 0.80 MCGFEU/mL.    CBC Auto Differential [[id number]]  (Abnormal) Collected: 08/09/23 0827    Specimen: Blood Updated: 08/09/23 0837     WBC 8.41 10*3/mm3      RBC 6.12 10*6/mm3      Hemoglobin 17.5 g/dL      Hematocrit 51.6 %      MCV 84.3 fL      MCH 28.6 pg      MCHC 33.9 g/dL      RDW 12.8 %      RDW-SD 38.8 fl      MPV 9.5 fL      Platelets 282 10*3/mm3      Neutrophil % 65.9 %      Lymphocyte % 22.2 %      Monocyte % 10.5 %      Eosinophil % 0.2 %      Basophil % 0.6 %      Immature Grans % 0.6 %      Neutrophils, Absolute 5.54 10*3/mm3      Lymphocytes, Absolute 1.87 10*3/mm3      Monocytes, Absolute 0.88 10*3/mm3      Eosinophils, Absolute 0.02 10*3/mm3      Basophils, Absolute 0.05 10*3/mm3      Immature Grans, Absolute 0.05 10*3/mm3      nRBC 0.0 /100 WBC     Comprehensive Metabolic Panel [[id number]] Collected: 08/09/23 0856    Specimen: Blood Updated: 08/09/23 0936     Glucose 94 mg/dL      BUN 11 mg/dL      Creatinine 0.92 mg/dL      Sodium 136 mmol/L      Potassium 4.1 mmol/L      Comment: Slight hemolysis detected by analyzer. Results may be affected.        Chloride 104 mmol/L      CO2 23.0 mmol/L      Calcium 8.6 mg/dL      Total Protein 6.7 g/dL      Albumin 4.2 g/dL      ALT (SGPT) 38 U/L      AST (SGOT) 40 U/L      Comment: Slight hemolysis detected by analyzer. Results may be affected.        Alkaline Phosphatase 74 U/L      Total Bilirubin 0.7 mg/dL      Globulin 2.5 gm/dL      A/G Ratio 1.7 g/dL      BUN/Creatinine Ratio 12.0     Anion Gap 9.0 mmol/L      eGFR 103.2 mL/min/1.73     Narrative:      GFR Normal >60  Chronic Kidney Disease <60  Kidney Failure <15      High Sensitivity Troponin T [[id number]]  (Abnormal) Collected: 08/09/23 0856    Specimen: Blood Updated: 08/09/23 0932     HS Troponin T 16 ng/L     Narrative:      High Sensitive Troponin T Reference Range:  <10.0 ng/L- Negative Female for AMI  <15.0 ng/L-  Negative Male for AMI  >=10 - Abnormal Female indicating possible myocardial injury.  >=15 - Abnormal Male indicating possible myocardial injury.   Clinicians would have to utilize clinical acumen, EKG, Troponin, and serial changes to determine if it is an Acute Myocardial Infarction or myocardial injury due to an underlying chronic condition.         BNP [[id number]]  (Normal) Collected: 08/09/23 0856    Specimen: Blood Updated: 08/09/23 0933     proBNP 163.5 pg/mL     Narrative:      Among patients with dyspnea, NT-proBNP is highly sensitive for the detection of acute congestive heart failure. In addition NT-proBNP of <300 pg/ml effectively rules out acute congestive heart failure with 99% negative predictive value.      Magnesium [[id number]]  (Normal) Collected: 08/09/23 0856    Specimen: Blood Updated: 08/09/23 0933     Magnesium 2.1 mg/dL     High Sensitivity Troponin T 2Hr [[id number]]  (Abnormal) Collected: 08/09/23 1235    Specimen: Blood Updated: 08/09/23 1305     HS Troponin T 12 ng/L      Troponin T Delta -4 ng/L     Narrative:      High Sensitive Troponin T Reference Range:  <10.0 ng/L- Negative Female for AMI  <15.0 ng/L- Negative Male for AMI  >=10 - Abnormal Female indicating possible myocardial injury.  >=15 - Abnormal Male indicating possible myocardial injury.   Clinicians would have to utilize clinical acumen, EKG, Troponin, and serial changes to determine if it is an Acute Myocardial Infarction or myocardial injury due to an underlying chronic condition.                 XR Chest 1 View   Final Result       No acute cardiopulmonary abnormality.           This report was finalized on 08/09/2023 08:19 by  Ronni Forrester DO.          ED Course  ED Course as of 08/09/23 1703   Wed Aug 09, 2023   1031 Patient resting comfortably with no pain at the present time.  He states that he still has some lightheadedness.  I told him are waiting on the second set of enzymes. [TR]   1701 I talked with the patient  initially and his lab work is all turned out fine except he did have a delta of 4 on his troponin.  I told him I do not think it is his heart at all and that he just has vertigo and then had some nonspecific chest pain but states he did have this delta that said that current recommendations would be to get a stress test.  He first declined but then decided he did not want a stress test.  We managed to do 1 but he could not complete it because of dizziness.  He got off the treadmill.  As far as it could be read at that point it seemed to be okay but obviously could not be cleared entirely because if he could not finish the test.  I then offered to keep him overnight and get a chemical stress test in the morning but he does not want to do that.  He says he will follow-up with his family doctor for further testing if indicated.  At the present time he is pain-free and stable we will treat his vertigo.  He is discharged in stable condition. [TR]      ED Course User Index  [TR] Richard Cedillo Jr., MD          MDM  Number of Diagnoses or Management Options  Chest pain, unspecified type: new and requires workup  Vertigo: new and requires workup     Amount and/or Complexity of Data Reviewed  Clinical lab tests: ordered and reviewed  Tests in the radiology section of CP[facility]: ordered and reviewed  Tests in the medicine section of CP[facility]: ordered and reviewed    Risk of Complications, Morbidity, and/or Mortality  Presenting problems: moderate  Diagnostic procedures: moderate  Management options: moderate    Patient Progress  Patient progress: stable      Final diagnoses:   Vertigo   Chest pain, unspecified type          Richard Cedillo Jr., MD  08/09/23 1717

## 2023-08-12 LAB
QT INTERVAL: 414 MS
QTC INTERVAL: 509 MS

## 2023-11-15 ENCOUNTER — OFFICE VISIT (OUTPATIENT)
Dept: NEUROLOGY | Age: 48
End: 2023-11-15
Payer: OTHER GOVERNMENT

## 2023-11-15 VITALS
HEIGHT: 66 IN | HEART RATE: 88 BPM | SYSTOLIC BLOOD PRESSURE: 140 MMHG | DIASTOLIC BLOOD PRESSURE: 90 MMHG | BODY MASS INDEX: 40.18 KG/M2 | WEIGHT: 250 LBS | OXYGEN SATURATION: 100 %

## 2023-11-15 DIAGNOSIS — R42 DIZZINESS: Primary | ICD-10-CM

## 2023-11-15 DIAGNOSIS — R51.9 NONINTRACTABLE EPISODIC HEADACHE, UNSPECIFIED HEADACHE TYPE: ICD-10-CM

## 2023-11-15 PROCEDURE — 99213 OFFICE O/P EST LOW 20 MIN: CPT | Performed by: NURSE PRACTITIONER

## 2023-11-15 RX ORDER — OXYBUTYNIN CHLORIDE 5 MG/1
TABLET, EXTENDED RELEASE ORAL
COMMUNITY
Start: 2023-10-20 | End: 2024-10-20

## 2023-11-15 RX ORDER — TADALAFIL 20 MG/1
TABLET ORAL
COMMUNITY
Start: 2023-10-23

## 2023-11-15 RX ORDER — LEVOTHYROXINE SODIUM 0.07 MG/1
75 TABLET ORAL
COMMUNITY
Start: 2023-05-03

## 2023-11-15 RX ORDER — LISINOPRIL 10 MG/1
TABLET ORAL
COMMUNITY

## 2023-11-15 NOTE — PROGRESS NOTES
REVIEW OF SYSTEMS    Constitutional: []Fever []Sweats []Chills [] Recent Injury [x] Denies all unless marked  HEENT:[x]Headache  [] Head Injury [] Hearing Loss  [] Sore Throat  [] Ear Ache [x] Denies all unless marked  Spine:  [] Neck pain  [] Back pain  [] Sciaticia  [x] Denies all unless marked  Cardiovascular:[]Heart Disease []Palpitations [] Chest Pain   [x] Denies all unless marked  Pulmonary: []Shortness of Breath []Cough   [x] Denies all unless marked  Psychiatric/Behavioral:[] Depression [] Anxiety [x] Denies all unless marked  Gastrointestinal: []Nausea  []Vomiting  []Abdominal Pain  []Constipation  []Diarrhea  [x] Denies all unless marked  Genitourinary:   [] Frequency  [] Urgency  [] Dysuria [] Incontinence  [x] Denies all unless marked  Extremities: []Pain  []Swelling  [x] Denies all unless marked  Musculoskeletal: [] Myalgias  [] Joint Pain  [] Arthritis [] Muscle Cramps [] Muscle Twitches  [x] Denies all unless marked  Sleep: []Insomnia[]Snoring []Restless Legs  []Sleep Apnea  []Daytime Sleepiness  [x] Denies all unless marked  Skin:[] Rash [] Color Change [x] Denies all unless marked   Neurological:[x]Visual Disturbance [] Memory Loss []Loss of Balance []Slurred Speech []Weakness []Seizures  [x] Dizziness [x] Denies all unless marked
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
## 2023-11-28 ENCOUNTER — TELEPHONE (OUTPATIENT)
Dept: NEUROLOGY | Age: 48
End: 2023-11-28

## 2023-11-28 NOTE — TELEPHONE ENCOUNTER
Abelardo Hashimoto called  to inform office that [facility] in [place] does not have his order for the MRI. Please resend to Fax: [phone]    Also, Salbador Rivers is checking the status of his short term disability forms from [address] [place] [address]. He says they are needing the forms completed along with his medical records requested. Salbador Rivers will call them again today and have the forms resent to the office.

## 2023-11-28 NOTE — TELEPHONE ENCOUNTER
Refaxed MRI orders at this time. I spoke with patient letting him know we have STD forms and will fill them out asap. Pt voiced understanding.

## 2023-11-29 NOTE — TELEPHONE ENCOUNTER
Patient called in stating they are needing copy refaxed over to [facility] for order for the MRI. The one they received they can't read.  Please resend to Fax: [phone]  Thank you

## 2023-12-05 ENCOUNTER — HOSPITAL ENCOUNTER (OUTPATIENT)
Dept: VASCULAR LAB | Age: 48
Discharge: HOME | End: 2023-12-05
Payer: OTHER GOVERNMENT

## 2023-12-05 DIAGNOSIS — R42 DIZZINESS: ICD-10-CM

## 2023-12-05 PROCEDURE — 93880 EXTRACRANIAL BILAT STUDY: CPT

## 2023-12-11 NOTE — PROGRESS NOTES
Subjective    Mr. Knowles is 48 y.o. male    Chief Complaint: Vasectomy Consult    History of Present Illness  The patient has been pondering the option of a vasectomy for>5 years. Anatomically this is a lower genital tract issue/procedure. With regard to context of the decision, he presently has 5 children. He is . Associated/Relevant symptoms/signs include None. He voices no additional questions about birth control options.     The following portions of the patient's history were reviewed and updated as appropriate: allergies, current medications, past family history, past medical history, past social history, past surgical history and problem list.    Review of Systems      Current Outpatient Medications:     empagliflozin (JARDIANCE) 10 MG tablet tablet, Take 1 tablet by mouth Daily., Disp: , Rfl:     lisinopril (PRINIVIL,ZESTRIL) 10 MG tablet, Lisinopril, Disp: , Rfl:     metoprolol tartrate (LOPRESSOR) 25 MG tablet, Take 1 tablet by mouth Daily., Disp: , Rfl:     oxybutynin XL (DITROPAN-XL) 5 MG 24 hr tablet, TAKE ONE TABLET BY MOUTH ONCE A DAY FOR OVERACTIVE BLADDER SWALLOW WHOLE, DO NOT CRUSH OR CHEW., Disp: , Rfl:     Semaglutide, 1 MG/DOSE, (OZEMPIC) 4 MG/3ML solution pen-injector, Inject 0.5 mL under the skin into the appropriate area as directed., Disp: , Rfl:     sertraline (ZOLOFT) 25 MG tablet, Take 1 tablet by mouth., Disp: , Rfl:     tadalafil (CIALIS) 20 MG tablet, , Disp: , Rfl:     testosterone cypionate (DEPO-TESTOSTERONE) 100 MG/ML solution injection, Inject 1 mL into the appropriate muscle as directed by prescriber 2 (Two) Times a Week., Disp: , Rfl:     ALPRAZolam (Xanax) 2 MG tablet, Take 1 tablet by mouth At Night As Needed for Anxiety. Take 30 minutes prior to procedure, Disp: 1 tablet, Rfl: 0    HYDROcodone-acetaminophen (Norco) 5-325 MG per tablet, Take 1 tablet by mouth Every 6 (Six) Hours As Needed for Severe Pain., Disp: 8 tablet, Rfl: 0    Past Medical History:   Diagnosis  Date    AICD (automatic cardioverter/defibrillator) present     Anger     Anxiety     Heart murmur     Hyperlipidemia     Hypertrophic obstructive cardiomyopathy (HOCM)     Stroke     tia 2017    Tachycardia     TBI (traumatic brain injury)        Past Surgical History:   Procedure Laterality Date    ANKLE SURGERY      APPENDECTOMY      CARDIAC CATHETERIZATION      no stents    CARDIAC DEFIBRILLATOR PLACEMENT      CARDIAC PACEMAKER PLACEMENT      septalmyectomy    CARPAL TUNNEL RELEASE      OTHER SURGICAL HISTORY  2012    septalmyectomy    SHOULDER ARTHROSCOPY W/ LABRAL REPAIR Right 3/25/2021    Procedure: RIGHT SHOULDER ARTHROSCOPIC REVISION POSTERIOR  LABRAL REPAIR;  Surgeon: Sandro Monsivais MD;  Location:  [facility] OR;  Service: Orthopedics;  Laterality: Right;    TOTAL SHOULDER ARTHROPLASTY W/ DISTAL CLAVICLE EXCISION Right 2021    Procedure: RIGHT REVERSE TOTAL SHOULDER ARTHROPLASTY;  Surgeon: Sandro Monsivais MD;  Location:  [facility];  Service: Orthopedics;  Laterality: Right;    TOTAL SHOULDER ARTHROPLASTY W/ DISTAL CLAVICLE EXCISION Right 10/14/2021    Procedure: INCISION AND DRAINAGE RIGHT SHOULDER WITH POLY EXCHANGE;  Surgeon: Sandro Monsivais MD;  Location:  [facility];  Service: Orthopedics;  Laterality: Right;       Social History     Socioeconomic History    Marital status:    Tobacco Use    Smoking status: Former     Types: Cigarettes     Quit date:      Years since quittin.9    Smokeless tobacco: Current     Types: Chew   Vaping Use    Vaping Use: Never used   Substance and Sexual Activity    Alcohol use: No    Drug use: No    Sexual activity: Yes     Partners: Female       Family History   Problem Relation Age of Onset    Heart disease Mother     No Known Problems Father        Objective    There were no vitals taken for this visit.    Physical Exam  Genitourinary:     Penis: Normal.       Testes: Normal.      Comments: Vas palpable bilaterally            Results for orders placed  or performed during the hospital encounter of 08/09/23   Comprehensive Metabolic Panel    Specimen: Blood   Result Value Ref Range    Glucose 94 65 - 99 mg/dL    BUN 11 6 - 20 mg/dL    Creatinine 0.92 0.76 - 1.27 mg/dL    Sodium 136 136 - 145 mmol/L    Potassium 4.1 3.5 - 5.2 mmol/L    Chloride 104 98 - 107 mmol/L    CO2 23.0 22.0 - 29.0 mmol/L    Calcium 8.6 8.6 - 10.5 mg/dL    Total Protein 6.7 6.0 - 8.5 g/dL    Albumin 4.2 3.5 - 5.2 g/dL    ALT (SGPT) 38 1 - 41 U/L    AST (SGOT) 40 1 - 40 U/L    Alkaline Phosphatase 74 39 - 117 U/L    Total Bilirubin 0.7 0.0 - 1.2 mg/dL    Globulin 2.5 gm/dL    A/G Ratio 1.7 g/dL    BUN/Creatinine Ratio 12.0 7.0 - 25.0    Anion Gap 9.0 5.0 - 15.0 mmol/L    eGFR 103.2 >60.0 mL/min/1.73   High Sensitivity Troponin T    Specimen: Blood   Result Value Ref Range    HS Troponin T 16 (H) <15 ng/L   D-dimer, Quantitative    Specimen: Blood   Result Value Ref Range    D-Dimer, Quantitative 0.32 0.00 - 0.50 MCGFEU/mL   BNP    Specimen: Blood   Result Value Ref Range    proBNP 163.5 0.0 - 450.0 pg/mL   Magnesium    Specimen: Blood   Result Value Ref Range    Magnesium 2.1 1.6 - 2.6 mg/dL   CBC Auto Differential    Specimen: Blood   Result Value Ref Range    WBC 8.41 3.40 - 10.80 10*3/mm3    RBC 6.12 (H) 4.14 - 5.80 10*6/mm3    Hemoglobin 17.5 13.0 - 17.7 g/dL    Hematocrit 51.6 (H) 37.5 - 51.0 %    MCV 84.3 79.0 - 97.0 fL    MCH 28.6 26.6 - 33.0 pg    MCHC 33.9 31.5 - 35.7 g/dL    RDW 12.8 12.3 - 15.4 %    RDW-SD 38.8 37.0 - 54.0 fl    MPV 9.5 6.0 - 12.0 fL    Platelets 282 140 - 450 10*3/mm3    Neutrophil % 65.9 42.7 - 76.0 %    Lymphocyte % 22.2 19.6 - 45.3 %    Monocyte % 10.5 5.0 - 12.0 %    Eosinophil % 0.2 (L) 0.3 - 6.2 %    Basophil % 0.6 0.0 - 1.5 %    Immature Grans % 0.6 (H) 0.0 - 0.5 %    Neutrophils, Absolute 5.54 1.70 - 7.00 10*3/mm3    Lymphocytes, Absolute 1.87 0.70 - 3.10 10*3/mm3    Monocytes, Absolute 0.88 0.10 - 0.90 10*3/mm3    Eosinophils, Absolute 0.02 0.00 - 0.40  10*3/mm3    Basophils, Absolute 0.05 0.00 - 0.20 10*3/mm3    Immature Grans, Absolute 0.05 0.00 - 0.05 10*3/mm3    nRBC 0.0 0.0 - 0.2 /100 WBC   High Sensitivity Troponin T 2Hr    Specimen: Blood   Result Value Ref Range    HS Troponin T 12 <15 ng/L    Troponin T Delta -4 (L) >=-4 - <+4 ng/L   Treadmill Stress Test   Result Value Ref Range    BH CV STRESS PROTOCOL 1 Jose     Stage 1 1.0     HR Stage 1 111     BP Stage 1 147/57     Duration Min Stage 1 3     Duration Sec Stage 1 0     Grade Stage 1 10     Speed Stage 1 1.7     BH CV STRESS METS STAGE 1 5.0     Stage 2 2.0     HR Stage 2 127     BP Stage 2 UTO     Duration Min Stage 2 3     Duration Sec Stage 2 0     Grade Stage 2 12     Speed Stage 2 2.5     BH CV STRESS METS STAGE 2 7.5     Stage 3 3.0     HR Stage 3 145     BP Stage 3 113/96     Duration Min Stage 3 1     Duration Sec Stage 3 27     Grade Stage 3 14     Speed Stage 3 3.4     BH CV STRESS METS STAGE 3 10.0     Baseline HR 93 bpm    Baseline /86 mmHg    Peak  bpm    Peak /96 mmHg    Recovery HR 99 bpm    Recovery /80 mmHg    Target HR (85%) 147 bpm    Max. Pred. HR (100%) 173 bpm    Percent Max Pred HR 83.82 %    Exercise duration (min) 7 min    Exercise duration (sec) 27 sec    Estimated workload 10.0 METS    Percent Target HR 99 %   ECG 12 Lead Chest Pain   Result Value Ref Range    QT Interval 414 ms    QTC Interval 509 ms     Assessment and Plan    Diagnoses and all orders for this visit:    1. Encounter for vasectomy counseling (Primary)  -     Vasectomy; Future  -     HYDROcodone-acetaminophen (Norco) 5-325 MG per tablet; Take 1 tablet by mouth Every 6 (Six) Hours As Needed for Severe Pain.  Dispense: 8 tablet; Refill: 0  -     ALPRAZolam (Xanax) 2 MG tablet; Take 1 tablet by mouth At Night As Needed for Anxiety. Take 30 minutes prior to procedure  Dispense: 1 tablet; Refill: 0          He was given the consent form, pre-vasectomy instruction sheet, and vasectomy  booklet. I extensively reviewed with him the likely postoperative recuperative period as well as the need to continue to use contraception until he is notified by us of his sterility. He will have a semen analysis after 20-30 ejaculations. He understands the potential side effects of local anesthesia, bleeding, scrotal hematoma, wound infection, epididymal orchitis, epididymal congestion,  1% risk chronic testicular pain potentially requiring further surgery, sperm granuloma, antisperm antibodies, early recanalization, spontaneous recanalization with pregnancy after demonstration of azoospermia risk of 1 in 2000 and the possible association with prostate cancer. He is aware of alternatives to vasectomy. He has given this careful consideration and wishes to proceed with a vasectomy.

## 2023-12-15 ENCOUNTER — OFFICE VISIT (OUTPATIENT)
Dept: UROLOGY | Facility: CLINIC | Age: 48
End: 2023-12-15
Payer: OTHER GOVERNMENT

## 2023-12-15 DIAGNOSIS — Z30.09 ENCOUNTER FOR VASECTOMY COUNSELING: Primary | ICD-10-CM

## 2023-12-15 PROCEDURE — 99203 OFFICE O/P NEW LOW 30 MIN: CPT | Performed by: UROLOGY

## 2023-12-15 RX ORDER — TADALAFIL 20 MG/1
TABLET ORAL
COMMUNITY
Start: 2023-10-23

## 2023-12-15 RX ORDER — ALPRAZOLAM 2 MG/1
2 TABLET ORAL NIGHTLY PRN
Qty: 1 TABLET | Refills: 0 | Status: SHIPPED | OUTPATIENT
Start: 2023-12-15

## 2023-12-15 RX ORDER — TESTOSTERONE CYPIONATE 100 MG/ML
100 INJECTION, SOLUTION INTRAMUSCULAR 2 TIMES WEEKLY
COMMUNITY

## 2023-12-15 RX ORDER — SERTRALINE HYDROCHLORIDE 25 MG/1
25 TABLET, FILM COATED ORAL
COMMUNITY

## 2023-12-15 RX ORDER — HYDROCODONE BITARTRATE AND ACETAMINOPHEN 5; 325 MG/1; MG/1
1 TABLET ORAL EVERY 6 HOURS PRN
Qty: 8 TABLET | Refills: 0 | Status: SHIPPED | OUTPATIENT
Start: 2023-12-15

## 2023-12-15 RX ORDER — LISINOPRIL 10 MG/1
TABLET ORAL
COMMUNITY

## 2023-12-15 RX ORDER — OXYBUTYNIN CHLORIDE 5 MG/1
TABLET, EXTENDED RELEASE ORAL
COMMUNITY
Start: 2023-10-20 | End: 2024-10-21

## 2023-12-27 ENCOUNTER — TELEPHONE (OUTPATIENT)
Dept: NEUROLOGY | Age: 48
End: 2023-12-27

## 2023-12-27 NOTE — TELEPHONE ENCOUNTER
Patient called stating that his short term disability is requiring more information for him to be off work and they're stating they need a letter and or documentation about patients functional impairment and treatment plan instruction.     Patients work environment is highly contaminated, he's confined and sometimes on scuffling that it two stories high. Patient is in a high stress environment.     Patient is aware that your out the the office until the 2nd and this will not be addressed until then   Patient voiced understanding

## 2024-01-02 NOTE — TELEPHONE ENCOUNTER
We can send my last office note to the short term disability company. Right now he has additional testing that is being planned/completed and will be able to get a better treatment plan once that is done.

## 2024-01-04 NOTE — TELEPHONE ENCOUNTER
Called and spoke with patient on 1/3/24. Pt voiced he was unsure what he and I had spoke about earlier in the day. I explained that I was attempting to get in touch with his leave reps however I had not heard from them. Pt voiced understanding.

## 2024-01-10 ENCOUNTER — TELEPHONE (OUTPATIENT)
Dept: NEUROLOGY | Age: 49
End: 2024-01-10

## 2024-01-10 NOTE — TELEPHONE ENCOUNTER
Patient called into office requesting his MRI results that he had three days ago. We have not received results yet at this time, verified with MA of provider.     Will contact patient when results are received.

## 2024-01-18 ENCOUNTER — TELEPHONE (OUTPATIENT)
Dept: NEUROLOGY | Age: 49
End: 2024-01-18

## 2024-01-18 NOTE — TELEPHONE ENCOUNTER
Pt stopped by office today ,Requesting letter for work due to std case closed .states if he dose not have something for work by 1/23/2024 . His employment will be terminated . Also needing MRI results . Please call patient .

## 2024-01-18 NOTE — TELEPHONE ENCOUNTER
Called patient spoke with him. He voiced his [employer] company has closed his case and will be terminated in less than a week. He explained he would have to have a detailed letter explaining why he could not return to work. He is still complaining of dizziness that comes and goes at random, blurred vision and headache, this is all abut the same as last visit. He did explain that he is very stressed at the moment due to being a single parent with no income. He is not opposed to going back to work if/when it is safe for him to.     I did call and request MRI be overnighted to our facility. Pt is aware it may be Monday before I get back in touch with him and voiced understanding.

## 2024-01-19 NOTE — TELEPHONE ENCOUNTER
Unfortunately I was not the provider that took him off work so ultimately I believe the decision to return him to work/give clearance is up to that provider. If the patient can safely return to his full job duties then I am ok with him returning to work given the brief nature of his dizziness and intermittent headaches he is having. We will need to review his images and go from there from a work up standpoint.    [Hyperlipidemia] : hyperlipidemia [Palpitations] : palpitations

## 2024-02-09 ENCOUNTER — TELEPHONE (OUTPATIENT)
Dept: UROLOGY | Facility: CLINIC | Age: 49
End: 2024-02-09
Payer: OTHER GOVERNMENT

## 2024-02-12 ENCOUNTER — TELEPHONE (OUTPATIENT)
Dept: UROLOGY | Facility: CLINIC | Age: 49
End: 2024-02-12
Payer: OTHER GOVERNMENT

## 2024-02-22 ENCOUNTER — OFFICE VISIT (OUTPATIENT)
Age: 49
End: 2024-02-22
Payer: OTHER GOVERNMENT

## 2024-02-22 VITALS
HEART RATE: 104 BPM | BODY MASS INDEX: 40.19 KG/M2 | RESPIRATION RATE: 20 BRPM | SYSTOLIC BLOOD PRESSURE: 110 MMHG | OXYGEN SATURATION: 95 % | WEIGHT: 249 LBS | TEMPERATURE: 98.7 F | DIASTOLIC BLOOD PRESSURE: 70 MMHG

## 2024-02-22 DIAGNOSIS — J06.9 UPPER RESPIRATORY TRACT INFECTION, UNSPECIFIED TYPE: Primary | ICD-10-CM

## 2024-02-22 DIAGNOSIS — J02.9 SORE THROAT: ICD-10-CM

## 2024-02-22 LAB
INFLUENZA A ANTIBODY: NORMAL
INFLUENZA B ANTIBODY: NORMAL
S PYO AG THROAT QL: NORMAL

## 2024-02-22 PROCEDURE — 99203 OFFICE O/P NEW LOW 30 MIN: CPT | Performed by: NURSE PRACTITIONER

## 2024-02-22 PROCEDURE — 96372 THER/PROPH/DIAG INJ SC/IM: CPT | Performed by: NURSE PRACTITIONER

## 2024-02-22 RX ORDER — DEXAMETHASONE SODIUM PHOSPHATE 10 MG/ML
8 INJECTION INTRAMUSCULAR; INTRAVENOUS ONCE
Status: COMPLETED | OUTPATIENT
Start: 2024-02-22 | End: 2024-02-22

## 2024-02-22 RX ADMIN — DEXAMETHASONE SODIUM PHOSPHATE 8 MG: 10 INJECTION INTRAMUSCULAR; INTRAVENOUS at 16:45

## 2024-02-22 ASSESSMENT — ENCOUNTER SYMPTOMS
EYE DISCHARGE: 0
COLOR CHANGE: 0
COUGH: 1
SORE THROAT: 0
EYE PAIN: 0
ABDOMINAL DISTENTION: 0
ABDOMINAL PAIN: 0
STRIDOR: 0
SHORTNESS OF BREATH: 0
SINUS PRESSURE: 0
WHEEZING: 0
CHEST TIGHTNESS: 0
TROUBLE SWALLOWING: 0

## 2024-02-22 NOTE — PROGRESS NOTES
Medication was administered by Shelley Ross MA at 4:47 PM.    Medication: dexamethasone  Amount: 8mg  Route: intramuscular  Site: Dorsogluteal right    Patient tolerated well.  
given educational materials - see patient instructions below.     Patient Instructions   Encourage fluids, Tylenol/Ibuprofen, OTC decongestants   Strep/flu negative.  Covid pending  If symptoms worsen or fail to improve follow-up with office or PCP  If SOB, chest pain, or high persistent fevers occur, go to ER    Patient verbalized understanding and agrees to plan        Electronically signed by ARLEN Saucedo CNP on 2/22/2024 at 4:44 PM

## 2024-02-22 NOTE — PATIENT INSTRUCTIONS
Encourage fluids, Tylenol/Ibuprofen, OTC decongestants   Strep/flu negative.  Covid pending  If symptoms worsen or fail to improve follow-up with office or PCP  If SOB, chest pain, or high persistent fevers occur, go to ER    Patient verbalized understanding and agrees to plan

## 2024-02-23 LAB — SARS-COV-2 N GENE RESP QL NAA+PROBE: NOT DETECTED

## 2024-10-10 ENCOUNTER — HOSPITAL ENCOUNTER (EMERGENCY)
Age: 49
Discharge: HOME | End: 2024-10-10
Payer: OTHER GOVERNMENT

## 2024-10-10 ENCOUNTER — APPOINTMENT (OUTPATIENT)
Dept: GENERAL RADIOLOGY | Age: 49
End: 2024-10-10
Payer: OTHER GOVERNMENT

## 2024-10-10 VITALS
HEART RATE: 90 BPM | DIASTOLIC BLOOD PRESSURE: 75 MMHG | TEMPERATURE: 97.5 F | WEIGHT: 250 LBS | RESPIRATION RATE: 18 BRPM | BODY MASS INDEX: 40.35 KG/M2 | OXYGEN SATURATION: 95 % | SYSTOLIC BLOOD PRESSURE: 116 MMHG

## 2024-10-10 DIAGNOSIS — J06.9 VIRAL UPPER RESPIRATORY TRACT INFECTION: Primary | ICD-10-CM

## 2024-10-10 LAB
ALBUMIN SERPL-MCNC: 4.8 G/DL (ref 3.5–5.2)
ALP SERPL-CCNC: 95 U/L (ref 40–129)
ALT SERPL-CCNC: 22 U/L (ref 5–41)
ANION GAP SERPL CALCULATED.3IONS-SCNC: 13 MMOL/L (ref 7–19)
AST SERPL-CCNC: 29 U/L (ref 5–40)
B PARAP IS1001 DNA NPH QL NAA+NON-PROBE: NOT DETECTED
B PERT.PT PRMT NPH QL NAA+NON-PROBE: NOT DETECTED
BASOPHILS # BLD: 0.1 K/UL (ref 0–0.2)
BASOPHILS NFR BLD: 0.6 % (ref 0–1)
BILIRUB SERPL-MCNC: 0.8 MG/DL (ref 0.2–1.2)
BUN SERPL-MCNC: 14 MG/DL (ref 6–20)
C PNEUM DNA NPH QL NAA+NON-PROBE: NOT DETECTED
CALCIUM SERPL-MCNC: 9.2 MG/DL (ref 8.6–10)
CHLORIDE SERPL-SCNC: 100 MMOL/L (ref 98–111)
CO2 SERPL-SCNC: 26 MMOL/L (ref 22–29)
CREAT SERPL-MCNC: 1.2 MG/DL (ref 0.7–1.2)
D DIMER PPP FEU-MCNC: 0.34 UG/ML FEU (ref 0–0.48)
EOSINOPHIL # BLD: 0 K/UL (ref 0–0.6)
EOSINOPHIL NFR BLD: 0.2 % (ref 0–5)
ERYTHROCYTE [DISTWIDTH] IN BLOOD BY AUTOMATED COUNT: 12.8 % (ref 11.5–14.5)
FLUAV RNA NPH QL NAA+NON-PROBE: NOT DETECTED
FLUBV RNA NPH QL NAA+NON-PROBE: NOT DETECTED
GLUCOSE SERPL-MCNC: 82 MG/DL (ref 70–99)
HADV DNA NPH QL NAA+NON-PROBE: NOT DETECTED
HCOV 229E RNA NPH QL NAA+NON-PROBE: NOT DETECTED
HCOV HKU1 RNA NPH QL NAA+NON-PROBE: NOT DETECTED
HCOV NL63 RNA NPH QL NAA+NON-PROBE: NOT DETECTED
HCOV OC43 RNA NPH QL NAA+NON-PROBE: DETECTED
HCT VFR BLD AUTO: 50.1 % (ref 42–52)
HGB BLD-MCNC: 16.9 G/DL (ref 14–18)
HMPV RNA NPH QL NAA+NON-PROBE: NOT DETECTED
HPIV1 RNA NPH QL NAA+NON-PROBE: NOT DETECTED
HPIV2 RNA NPH QL NAA+NON-PROBE: NOT DETECTED
HPIV3 RNA NPH QL NAA+NON-PROBE: NOT DETECTED
HPIV4 RNA NPH QL NAA+NON-PROBE: NOT DETECTED
IMM GRANULOCYTES # BLD: 0.1 K/UL
LYMPHOCYTES # BLD: 1.8 K/UL (ref 1.1–4.5)
LYMPHOCYTES NFR BLD: 15.8 % (ref 20–40)
M PNEUMO DNA NPH QL NAA+NON-PROBE: NOT DETECTED
MCH RBC QN AUTO: 29.2 PG (ref 27–31)
MCHC RBC AUTO-ENTMCNC: 33.7 G/DL (ref 33–37)
MCV RBC AUTO: 86.5 FL (ref 80–94)
MONOCYTES # BLD: 1.5 K/UL (ref 0–0.9)
MONOCYTES NFR BLD: 13.6 % (ref 0–10)
NEUTROPHILS # BLD: 7.8 K/UL (ref 1.5–7.5)
NEUTS SEG NFR BLD: 68.8 % (ref 50–65)
PLATELET # BLD AUTO: 268 K/UL (ref 130–400)
PMV BLD AUTO: 8.8 FL (ref 9.4–12.4)
POTASSIUM SERPL-SCNC: 4.6 MMOL/L (ref 3.5–5)
PROT SERPL-MCNC: 7.4 G/DL (ref 6.4–8.3)
RBC # BLD AUTO: 5.79 M/UL (ref 4.7–6.1)
REASON FOR REJECTION: NORMAL
REJECTED TEST: NORMAL
RSV RNA NPH QL NAA+NON-PROBE: NOT DETECTED
RV+EV RNA NPH QL NAA+NON-PROBE: NOT DETECTED
SARS-COV-2 RNA NPH QL NAA+NON-PROBE: NOT DETECTED
SODIUM SERPL-SCNC: 139 MMOL/L (ref 136–145)
TROPONIN, HIGH SENSITIVITY: 12 NG/L (ref 0–22)
WBC # BLD AUTO: 11.4 K/UL (ref 4.8–10.8)

## 2024-10-10 PROCEDURE — 84484 ASSAY OF TROPONIN QUANT: CPT

## 2024-10-10 PROCEDURE — 99285 EMERGENCY DEPT VISIT HI MDM: CPT

## 2024-10-10 PROCEDURE — 96375 TX/PRO/DX INJ NEW DRUG ADDON: CPT

## 2024-10-10 PROCEDURE — 85379 FIBRIN DEGRADATION QUANT: CPT

## 2024-10-10 PROCEDURE — 36415 COLL VENOUS BLD VENIPUNCTURE: CPT

## 2024-10-10 PROCEDURE — 71045 X-RAY EXAM CHEST 1 VIEW: CPT

## 2024-10-10 PROCEDURE — 85025 COMPLETE CBC W/AUTO DIFF WBC: CPT

## 2024-10-10 PROCEDURE — 93005 ELECTROCARDIOGRAM TRACING: CPT | Performed by: PHYSICIAN ASSISTANT

## 2024-10-10 PROCEDURE — 80053 COMPREHEN METABOLIC PANEL: CPT

## 2024-10-10 PROCEDURE — 6360000002 HC RX W HCPCS: Performed by: PHYSICIAN ASSISTANT

## 2024-10-10 PROCEDURE — 0202U NFCT DS 22 TRGT SARS-COV-2: CPT

## 2024-10-10 PROCEDURE — 96374 THER/PROPH/DIAG INJ IV PUSH: CPT

## 2024-10-10 RX ORDER — ONDANSETRON 2 MG/ML
4 INJECTION INTRAMUSCULAR; INTRAVENOUS ONCE
Status: COMPLETED | OUTPATIENT
Start: 2024-10-10 | End: 2024-10-10

## 2024-10-10 RX ORDER — BENZONATATE 200 MG/1
200 CAPSULE ORAL 3 TIMES DAILY PRN
Qty: 20 CAPSULE | Refills: 0 | Status: SHIPPED | OUTPATIENT
Start: 2024-10-10 | End: 2024-10-17

## 2024-10-10 RX ORDER — MORPHINE SULFATE 4 MG/ML
4 INJECTION, SOLUTION INTRAMUSCULAR; INTRAVENOUS ONCE
Status: COMPLETED | OUTPATIENT
Start: 2024-10-10 | End: 2024-10-10

## 2024-10-10 RX ORDER — METHYLPREDNISOLONE 4 MG
TABLET, DOSE PACK ORAL
Qty: 1 KIT | Refills: 0 | Status: SHIPPED | OUTPATIENT
Start: 2024-10-10

## 2024-10-10 RX ORDER — DEXAMETHASONE SODIUM PHOSPHATE 10 MG/ML
10 INJECTION, SOLUTION INTRAMUSCULAR; INTRAVENOUS ONCE
Status: DISCONTINUED | OUTPATIENT
Start: 2024-10-10 | End: 2024-10-10

## 2024-10-10 RX ADMIN — ONDANSETRON 4 MG: 2 INJECTION INTRAMUSCULAR; INTRAVENOUS at 19:24

## 2024-10-10 RX ADMIN — MORPHINE SULFATE 4 MG: 4 INJECTION, SOLUTION INTRAMUSCULAR; INTRAVENOUS at 19:24

## 2024-10-10 ASSESSMENT — ENCOUNTER SYMPTOMS
NAUSEA: 0
COUGH: 1
RHINORRHEA: 1
SINUS PRESSURE: 1
SHORTNESS OF BREATH: 1
DIARRHEA: 0
VOMITING: 0
ABDOMINAL PAIN: 0

## 2024-10-10 NOTE — ED PROVIDER NOTES
[facility] EMERGENCY DEPT  eMERGENCY dEPARTMENT eNCOUnter      Pt Name: Franck Hough  MRN: [id number]  Birthdate 1975  Date of evaluation: 10/10/2024  Provider: JUAN MIGUEL Ruelas    CHIEF COMPLAINT       Chief Complaint   Patient presents with    Pleurisy     Sick for a few days, pain with coughing now         HISTORY OF PRESENT ILLNESS   (Location/Symptom, Timing/Onset,Context/Setting, Quality, Duration, Modifying Factors, Severity)  Note limiting factors.   Franck Hough is a 49 y.o. male with history including cardiomyopathy, left bundle branch block, high cholesterol, s/p pacemaker placement, s/p cardiac septectomy who presents to the emergency department with complaint of chest and pleuritic pain.  Patient explains that he has been sick for the last 2 to 3 days with sinus pain and pressure.  He thought it was mild upper respiratory symptoms.  However, today he woke up and is having chest pain that is substernal.  He states he gets worse with deep breathing and coughing.  He does also note associated shortness of breath.  His chest pain and shortness of breath gets worse with exertion.  He also states symptoms get worse with coughing.  He denies any fever.    NursingNotes were reviewed.    REVIEW OF SYSTEMS    (2-9 systems for level 4, 10 or more for level 5)     Review of Systems   Constitutional:  Positive for fatigue. Negative for chills and fever.   HENT:  Positive for congestion, rhinorrhea and sinus pressure.    Respiratory:  Positive for cough and shortness of breath.    Cardiovascular:  Positive for chest pain. Negative for leg swelling.   Gastrointestinal:  Negative for abdominal pain, diarrhea, nausea and vomiting.   Musculoskeletal:  Positive for myalgias.   Neurological:  Positive for light-headedness and headaches. Negative for dizziness, weakness and numbness.   All other systems reviewed and are negative.           PAST MEDICALHISTORY     Past Medical History:   Diagnosis Date    Cardiomyopathy

## 2024-10-12 LAB
EKG P AXIS: 28 DEGREES
EKG P-R INTERVAL: 198 MS
EKG Q-T INTERVAL: 380 MS
EKG QRS DURATION: 150 MS
EKG QTC CALCULATION (BAZETT): 437 MS
EKG T AXIS: 147 DEGREES

## 2024-10-12 PROCEDURE — 93010 ELECTROCARDIOGRAM REPORT: CPT | Performed by: INTERNAL MEDICINE

## 2024-10-18 ENCOUNTER — TELEPHONE (OUTPATIENT)
Age: 49
End: 2024-10-18

## 2024-11-05 ENCOUNTER — OFFICE VISIT (OUTPATIENT)
Age: 49
End: 2024-11-05
Payer: OTHER GOVERNMENT

## 2024-11-05 DIAGNOSIS — M25.511 RIGHT SHOULDER PAIN, UNSPECIFIED CHRONICITY: Primary | ICD-10-CM

## 2024-11-05 DIAGNOSIS — Z96.611 S/P REVERSE TOTAL SHOULDER ARTHROPLASTY, RIGHT: ICD-10-CM

## 2024-11-05 PROCEDURE — 99213 OFFICE O/P EST LOW 20 MIN: CPT | Performed by: ORTHOPAEDIC SURGERY

## 2024-11-05 RX ORDER — LIDOCAINE 50 MG/G
PATCH TOPICAL
COMMUNITY
Start: 2024-10-18

## 2024-11-05 RX ORDER — HYDROCODONE BITARTRATE AND ACETAMINOPHEN 5; 325 MG/1; MG/1
1 TABLET ORAL EVERY 8 HOURS PRN
COMMUNITY

## 2024-11-05 RX ORDER — CYCLOBENZAPRINE HCL 10 MG
10 TABLET ORAL
COMMUNITY
Start: 2024-09-16

## 2024-11-05 RX ORDER — METOPROLOL SUCCINATE 25 MG/1
12.5 TABLET, EXTENDED RELEASE ORAL
COMMUNITY
Start: 2024-09-30

## 2024-11-05 NOTE — PROGRESS NOTES
Orthopaedic Clinic Note - Established Patient    NAME:  Franck Hough   : 1975  MRN: [id number]      2024      CHIEF COMPLAINT: had concerns including Shoulder Pain.      HISTORY OF PRESENT ILLNESS:    49-year-old male here today for follow-up for his right reverse total shoulder arthroplasty.  He is still having some pain though he thinks he is slightly better than it was at his last visit on 2024.  He has been light duty he is requesting to go back full duty.  He did have a CT scan which was negative.    Patient is currently not taking any pain medication.  He states he is scheduled to have an MRI of his lumbar spine.       Past Medical History:        Diagnosis Date   • Cardiomyopathy (HCC)    • Concussion    • Left bundle branch block    • Pacemaker        Past Surgical History:        Procedure Laterality Date   • ANKLE SURGERY Right    • APPENDECTOMY     • CARDIAC SURGERY  2012    cardiomypopathy with obstruction   • SHOULDER SURGERY Right 2020    RIGHT SHOULDER DIAGNOSTIC ARTHROSCOPY SUBPECTORALIS BICEP TENDONESISI, POSTERIOR LABRAL REPAIR, ANTERIOR LABRAL REPAIR, CONDOPLASTY GLENOID performed by Sancho Pratt MD at [facility] OR       Current Medications:   Prior to Admission medications    Medication Sig Start Date End Date Taking? Authorizing Provider   cyclobenzaprine (FLEXERIL) 10 MG tablet Take 1 tablet by mouth 24  Yes ProviderMassimo MD   lidocaine (LIDODERM) 5 % Place onto the skin 10/18/24  Yes Massimo Barcenas MD   metoprolol succinate (TOPROL XL) 25 MG extended release tablet Take 0.5 tablets by mouth 24  Yes ProviderMassimo MD   HYDROcodone-acetaminophen (NORCO) 5-325 MG per tablet Take 1 tablet by mouth every 8 hours as needed for Pain. Max Daily Amount: 3 tablets    Massimo Barcenas MD   methylPREDNISolone (MEDROL, ELIZABETH,) 4 MG tablet Take by mouth. 10/10/24   Abhishek Hein PA   empagliflozin (JARDIANCE) 10 MG tablet Take 1 tablet by mouth Every

## 2024-12-19 ENCOUNTER — TELEPHONE (OUTPATIENT)
Dept: NEUROSURGERY | Age: 49
End: 2024-12-19

## 2024-12-19 NOTE — TELEPHONE ENCOUNTER
[facility] New Patient Questionnaire    Diagnosis/Reason for Referral?    DX: M48.061 (ICD-10-CM) - Spinal stenosis of lumbar region     2. Who is completing questionnaire?      Patient X Caregiver Family      3. Has the patient had any previous spinal/brain surgeries?     NO      A. If yes, what is the name of the facility in which the surgery was performed?       B. Procedure/Surgery performed?       C. Who was the surgeon?       D. When was the surgery?    MM/YY       E. Did the patient improve after the surgery?        4. Is this a second opinion?   If yes, Dr. Ziegler would like to review patient first before making the appointment.      5. Have MRI Images been obtain within the last year?     Yes X  No      XR  CT     If yes, [facility]   If yes, what part of the body?     Lumbar X Cervical  Thoracic  Brain     If yes, when was it obtained?      11/26/2024    Note: if the scan was performed at a facility other than [facility], the disc will need to be brought to the appointment or we need to reach out to obtain the disc.     A. Was the patient instructed to provide the disc?      Yes   No X      8. Has the patient had a NCV/EMG within the last year?      Yes  No X     If yes, where was it performed and date?      MM/YY  Location:      9. Has the patient been to Physical Therapy?      Yes  No X     If yes, what location, how long attended, and last visit?    Location:        Therapy Lasted:    Date of Last Visit:      10. Has the patient been to Pain Management?     Yes  No X     If yes, what location and last visit     Location:   Last Visit:   Is it helping?

## 2025-01-30 ENCOUNTER — OFFICE VISIT (OUTPATIENT)
Dept: NEUROSURGERY | Age: 50
End: 2025-01-30
Payer: OTHER GOVERNMENT

## 2025-01-30 VITALS
HEART RATE: 89 BPM | SYSTOLIC BLOOD PRESSURE: 131 MMHG | HEIGHT: 66 IN | DIASTOLIC BLOOD PRESSURE: 82 MMHG | WEIGHT: 267 LBS | BODY MASS INDEX: 42.91 KG/M2

## 2025-01-30 DIAGNOSIS — G89.29 CHRONIC MIDLINE LOW BACK PAIN WITH BILATERAL SCIATICA: Primary | ICD-10-CM

## 2025-01-30 DIAGNOSIS — M54.41 CHRONIC MIDLINE LOW BACK PAIN WITH BILATERAL SCIATICA: Primary | ICD-10-CM

## 2025-01-30 DIAGNOSIS — M54.42 CHRONIC MIDLINE LOW BACK PAIN WITH BILATERAL SCIATICA: Primary | ICD-10-CM

## 2025-01-30 DIAGNOSIS — M51.362 DEGENERATION OF INTERVERTEBRAL DISC OF LUMBAR REGION WITH DISCOGENIC BACK PAIN AND LOWER EXTREMITY PAIN: ICD-10-CM

## 2025-01-30 PROCEDURE — 99204 OFFICE O/P NEW MOD 45 MIN: CPT | Performed by: NURSE PRACTITIONER

## 2025-01-30 ASSESSMENT — ENCOUNTER SYMPTOMS
EYES NEGATIVE: 1
GASTROINTESTINAL NEGATIVE: 1
BACK PAIN: 1
RESPIRATORY NEGATIVE: 1

## 2025-01-30 NOTE — PROGRESS NOTES
Review of Systems   Constitutional: Negative.    HENT: Negative.     Eyes: Negative.    Respiratory: Negative.     Cardiovascular: Negative.    Gastrointestinal: Negative.    Genitourinary: Negative.    Musculoskeletal:  Positive for back pain and myalgias.   Skin: Negative.    Neurological:  Positive for weakness.   Endo/Heme/Allergies: Negative.    Psychiatric/Behavioral: Negative.         
appears well-developed and well-nourished.   Eyes - conjunctiva normal.  Pupils react to light  Ear, nose, throat - hearing intact to finger rub, No scars, masses, or lesions over external nose or ears, no atrophy oftongue  Neck- symmetric, no masses noted, no jugular vein distension  Respiration- chest wall appears symmetric, good expansion, normal effort without use of accessory muscles  Musculoskeletal - no significant wasting of muscles noted, no bony deformities, gait no gross ataxia  Extremities- no clubbing, cyanosis oredema  Skin - warm, dry, and intact.  No rash, erythema, or pallor.  Psychiatric - mood, affect, and behavior appear normal.     Neurologic Examination  Awake, Alert and oriented x 4  Normal speech pattern, following commands    Motor:  RIGHT:  iliopsoas 5/5    hamstring 5/5    quadriceps 5/5    EHL 5/5   Tibialis anterior 5/5    Gastrocnemius 5/5    LEFT:   iliopsoas 5/5    hamstring 5/5    quadriceps 5/5    EHL 5/5   Tibialis anterior 5/5    Gastrocnemius 5/5    No deficits to light touch or pinprick sensation  Reflexes are 2+ and symmetric        DATA and IMAGING:    Nursing/pcp notes, imaging, labs, and vitals reviewed.     PT,OT and/or speech notes reviewed    Lab Results   Component Value Date    WBC 11.4 (H) 10/10/2024    HGB 16.9 10/10/2024    HCT 50.1 10/10/2024    MCV 86.5 10/10/2024     10/10/2024     Lab Results   Component Value Date     10/10/2024    K 4.6 10/10/2024     10/10/2024    CO2 26 10/10/2024    BUN 14 10/10/2024    CREATININE 1.2 10/10/2024    GLUCOSE 82 10/10/2024    CALCIUM 9.2 10/10/2024    BILITOT 0.8 10/10/2024    ALKPHOS 95 10/10/2024    AST 29 10/10/2024    ALT 22 10/10/2024    LABGLOM 74 10/10/2024     Lab Results   Component Value Date    INR 1.02 04/09/2019    PROTIME 12.8 04/09/2019     EXAM:  MRI OF THE LUMBAR SPINE WITHOUT CONTRAST         HISTORY:   intervertebral disc disorder.       TECHNIQUE:  Multiplanar imaging of the lumbar spine

## 2025-02-26 ENCOUNTER — TELEPHONE (OUTPATIENT)
Dept: NEUROSURGERY | Age: 50
End: 2025-02-26

## 2025-02-26 NOTE — TELEPHONE ENCOUNTER
Spoke with Jumana at [facility] to follow up on RFS for Pain Management referral, and was informed that Franck will be seen at [facility] with Dr. Mckeon for his pain management.    Appt date : March 6, 2025 at 10:00 am.     Referral closed .

## 2025-03-05 ENCOUNTER — HOSPITAL ENCOUNTER (EMERGENCY)
Facility: HOSPITAL | Age: 50
Discharge: HOME | End: 2025-03-06
Attending: STUDENT IN AN ORGANIZED HEALTH CARE EDUCATION/TRAINING PROGRAM
Payer: OTHER MISCELLANEOUS

## 2025-03-05 DIAGNOSIS — S60.00XA CONTUSION OF FINGER OF RIGHT HAND, UNSPECIFIED FINGER, INITIAL ENCOUNTER: Primary | ICD-10-CM

## 2025-03-05 PROCEDURE — 99283 EMERGENCY DEPT VISIT LOW MDM: CPT

## 2025-03-06 ENCOUNTER — APPOINTMENT (OUTPATIENT)
Dept: GENERAL RADIOLOGY | Facility: HOSPITAL | Age: 50
End: 2025-03-06
Payer: OTHER MISCELLANEOUS

## 2025-03-06 VITALS
SYSTOLIC BLOOD PRESSURE: 134 MMHG | OXYGEN SATURATION: 94 % | HEART RATE: 98 BPM | WEIGHT: 240 LBS | TEMPERATURE: 98.1 F | HEIGHT: 66 IN | RESPIRATION RATE: 20 BRPM | BODY MASS INDEX: 38.57 KG/M2 | DIASTOLIC BLOOD PRESSURE: 95 MMHG

## 2025-03-06 PROCEDURE — 25010000002 MORPHINE PER 10 MG: Performed by: STUDENT IN AN ORGANIZED HEALTH CARE EDUCATION/TRAINING PROGRAM

## 2025-03-06 PROCEDURE — 73130 X-RAY EXAM OF HAND: CPT

## 2025-03-06 PROCEDURE — 96372 THER/PROPH/DIAG INJ SC/IM: CPT

## 2025-03-06 RX ORDER — KETOROLAC TROMETHAMINE 10 MG/1
10 TABLET, FILM COATED ORAL ONCE
Status: COMPLETED | OUTPATIENT
Start: 2025-03-06 | End: 2025-03-06

## 2025-03-06 RX ORDER — NAPROXEN 500 MG/1
500 TABLET ORAL 2 TIMES DAILY PRN
Qty: 10 TABLET | Refills: 0 | Status: SHIPPED | OUTPATIENT
Start: 2025-03-06

## 2025-03-06 RX ORDER — CYCLOBENZAPRINE HCL 5 MG
5 TABLET ORAL 3 TIMES DAILY PRN
Qty: 15 TABLET | Refills: 0 | Status: SHIPPED | OUTPATIENT
Start: 2025-03-06

## 2025-03-06 RX ADMIN — KETOROLAC TROMETHAMINE 10 MG: 10 TABLET, FILM COATED ORAL at 00:57

## 2025-03-06 RX ADMIN — MORPHINE SULFATE 4 MG: 4 INJECTION, SOLUTION INTRAMUSCULAR; INTRAVENOUS at 00:57

## 2025-03-06 NOTE — ED PROVIDER NOTES
Subjective   History of Present Illness  The patient is a 29-year-old male with a history of hypertrophic cardiomyopathy s/p septal myectomy in 2012 who presents to the ED following a work-related right hand injury. 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 The patient reports pain across his second, third, and fourth fingers. His past medical history is significant for hypertrophic cardiomyopathy, managed with cardiac medications, and he denies being on blood thinners. He denies any other injuries from the incident.        Review of Systems    Past Medical History:   Diagnosis Date    AICD (automatic cardioverter/defibrillator) present     Anger     Anxiety     Heart murmur     Hyperlipidemia     Hypertrophic obstructive cardiomyopathy (HOCM)     Stroke     tia november 2017    Tachycardia     TBI (traumatic brain injury)        No Known Allergies    Past Surgical History:   Procedure Laterality Date    ANKLE SURGERY      APPENDECTOMY      CARDIAC CATHETERIZATION      no stents    CARDIAC DEFIBRILLATOR PLACEMENT      CARDIAC PACEMAKER PLACEMENT      septalmyectomy    CARPAL TUNNEL RELEASE      OTHER SURGICAL HISTORY  05/2012    septalmyectomy    SHOULDER ARTHROSCOPY W/ LABRAL REPAIR Right 3/25/2021    Procedure: RIGHT SHOULDER ARTHROSCOPIC REVISION POSTERIOR  LABRAL REPAIR;  Surgeon: Sandro Monsivais MD;  Location: [facility] OR;  Service: Orthopedics;  Laterality: Right;    TOTAL SHOULDER ARTHROPLASTY W/ DISTAL CLAVICLE EXCISION Right 9/30/2021    Procedure: RIGHT REVERSE TOTAL SHOULDER ARTHROPLASTY;  Surgeon: Sandro Monsivais MD;  Location: [facility] OR;  Service: Orthopedics;  Laterality: Right;    TOTAL SHOULDER ARTHROPLASTY W/ DISTAL CLAVICLE EXCISION Right 10/14/2021    Procedure: INCISION AND DRAINAGE RIGHT SHOULDER WITH POLY EXCHANGE;  Surgeon: Sandro Monsivais MD;  Location:  [facility] OR;  Service:  Orthopedics;  Laterality: Right;       Family History   Problem Relation Age of Onset    Heart disease Mother     No Known Problems Father        Social History     Socioeconomic History    Marital status:    Tobacco Use    Smoking status: Former     Current packs/day: 0.00     Types: Cigarettes     Quit date:      Years since quittin.1    Smokeless tobacco: Current     Types: Chew   Vaping Use    Vaping status: Never Used   Substance and Sexual Activity    Alcohol use: No    Drug use: No    Sexual activity: Yes     Partners: Female           Objective   Physical Exam    Constitutional:  General: Patient is not in acute distress.  Appearance: Patient is not diaphoretic.    HENT:  Head: Normocephalic and atraumatic.  Right Ear: External ear normal.  Left Ear: External ear normal.  Nose: Nose normal.    Eyes:  General: No scleral icterus.  Conjunctiva/sclera: Conjunctivae normal.    Cardiovascular:  Rate and Rhythm: Normal rate and regular rhythm.  Heart sounds: No friction rub.    Pulmonary:  Effort: Pulmonary effort is normal. No respiratory distress.  Breath sounds: No stridor. No wheezing.    Musculoskeletal:  General: No deformity.  **Right Hand: Swelling noted across second, third, and fourth fingers. Pain on palpation of these digits, with fourth finger noted to be most painful. Range of motion limited by pain. Neurovascularly intact.**    Skin:  General: Skin is warm and dry. No rashes present. Normal color. Normal turgor.    Neurological:  General: No focal deficit present.  Mental Status: Alert and oriented.    Procedures           ED Course  ED Course as of 25 0124   Thu Mar 06, 2025   0121 Patient is much better after pain medication.  He is not driving.  Wet read on my interpretation negative for digital fracture or hand fracture.  Patient remains neurovascularly intact.  Will discharge with strict return precautions for head contusion.  Patient knows that I am not a radiologist, and  that official x-ray reads have not been posted yet.  He was encouraged to follow-up imaging with [facility].  Encouraged to return to the emergency room with worsening swelling, skin color changes, numbness or weakness. [SG]      ED Course User Index  [SG] Zander Reina MD                                                       Medical Decision Making  Patient presents with right hand injury after industrial accident involving crush mechanism.    Given the concerning mechanism of injury and physical exam findings, X-rays of the right hand were ordered, focusing on the second through fifth digits to evaluate for possible fracture. While clinical exam shows maintained function, the possibility of small fractures cannot be excluded given the significant mechanism of injury.    Pain management was initiated with morphine IM and Toradol for anti-inflammatory effects.    Patient remains neurovascularly intact throughout ED course.    Amount and/or Complexity of Data Reviewed  Radiology: ordered.    Risk  Prescription drug management.        Final diagnoses:   Contusion of finger of right hand, unspecified finger, initial encounter       ED Disposition  ED Disposition       ED Disposition   Discharge    Condition   Stable    Comment   --               Nakul Hdz, DO  [address] [facility] [address]  [phone]    In 1 week           Medication List        New Prescriptions      cyclobenzaprine 5 MG tablet  Commonly known as: FLEXERIL  Take 1 tablet by mouth 3 (Three) Times a Day As Needed for Muscle Spasms.     naproxen 500 MG EC tablet  Commonly known as: EC NAPROSYN  Take 1 tablet by mouth 2 (Two) Times a Day As Needed for Mild Pain.               Where to Get Your Medications        These medications were sent to [facility] - [place], [place] - [address]  - [phone]  - [phone] FX  [address] [place] [address]      Phone: [phone]   cyclobenzaprine 5 MG tablet  naproxen  500 MG EC tablet            Zander Reina MD  03/06/25 0124

## 2025-04-22 ENCOUNTER — OFFICE VISIT (OUTPATIENT)
Dept: VASCULAR SURGERY | Facility: CLINIC | Age: 50
End: 2025-04-22
Payer: OTHER GOVERNMENT

## 2025-04-22 ENCOUNTER — PATIENT ROUNDING (BHMG ONLY) (OUTPATIENT)
Dept: VASCULAR SURGERY | Facility: CLINIC | Age: 50
End: 2025-04-22

## 2025-04-22 VITALS
OXYGEN SATURATION: 97 % | HEIGHT: 66 IN | SYSTOLIC BLOOD PRESSURE: 138 MMHG | BODY MASS INDEX: 40.66 KG/M2 | HEART RATE: 87 BPM | WEIGHT: 253 LBS | DIASTOLIC BLOOD PRESSURE: 90 MMHG

## 2025-04-22 DIAGNOSIS — M54.16 LUMBAR RADICULOPATHY: Primary | ICD-10-CM

## 2025-04-22 PROCEDURE — 99204 OFFICE O/P NEW MOD 45 MIN: CPT | Performed by: SURGERY

## 2025-04-22 NOTE — PROGRESS NOTES
04/22/2025      MICHAEL Cook MD  [address]  [facility] [address]  [place] [address]    Feliberto Knowles  1975    Chief Complaint   Patient presents with    Establish Care     ALIF   Patient states he has no concerns at this time  They denied any stroke like Sx    Lumbar radiculopathy       Dear MICHAEL Cook MD:      HPI  I had the pleasure of seeing your patient Feliberto Knowles in the office today.  Thank you kindly for this consultation.  As you recall, Feliberto Knowles is a 49 y.o.  male who you are currently following for chronic back pain.  Feliberto Knowlesis scheduled for artificial disc replacement of L4-5 and anterior lumbar interbody fusion of L5-S1 with Dr. Cook on 4/30/25.  The patient denies any history of DVT.    Past Medical History:   Diagnosis Date    AICD (automatic cardioverter/defibrillator) present     Anger     Anxiety     Heart murmur     Hyperlipidemia     Hypertrophic obstructive cardiomyopathy (HOCM)     Stroke     tia november 2017    Tachycardia     TBI (traumatic brain injury)        Past Surgical History:   Procedure Laterality Date    ANKLE SURGERY      APPENDECTOMY      CARDIAC CATHETERIZATION      no stents    CARDIAC DEFIBRILLATOR PLACEMENT      CARDIAC PACEMAKER PLACEMENT      septalmyectomy    CARPAL TUNNEL RELEASE      OTHER SURGICAL HISTORY  05/2012    septalmyectomy    SHOULDER ARTHROSCOPY W/ LABRAL REPAIR Right 3/25/2021    Procedure: RIGHT SHOULDER ARTHROSCOPIC REVISION POSTERIOR  LABRAL REPAIR;  Surgeon: Sandro Monsivais MD;  Location:  [facility] OR;  Service: Orthopedics;  Laterality: Right;    TOTAL SHOULDER ARTHROPLASTY W/ DISTAL CLAVICLE EXCISION Right 9/30/2021    Procedure: RIGHT REVERSE TOTAL SHOULDER ARTHROPLASTY;  Surgeon: Sandro Monsivais MD;  Location:  [facility] OR;  Service: Orthopedics;  Laterality: Right;    TOTAL SHOULDER ARTHROPLASTY W/ DISTAL CLAVICLE EXCISION Right 10/14/2021    Procedure: INCISION AND DRAINAGE RIGHT SHOULDER WITH POLY EXCHANGE;  Surgeon: Loi  "MD Sandro;  Location:  [facility] OR;  Service: Orthopedics;  Laterality: Right;       Family History   Problem Relation Age of Onset    Heart disease Mother     No Known Problems Father        Social History     Socioeconomic History    Marital status:    Tobacco Use    Smoking status: Former     Current packs/day: 0.00     Types: Cigarettes     Quit date:      Years since quittin.3    Smokeless tobacco: Current     Types: Chew   Vaping Use    Vaping status: Never Used   Substance and Sexual Activity    Alcohol use: No    Drug use: No    Sexual activity: Yes     Partners: Female       No Known Allergies    Current Outpatient Medications   Medication Instructions    ALPRAZolam (XANAX) 2 mg, Oral, Nightly PRN, Take 30 minutes prior to procedure    cyclobenzaprine (FLEXERIL) 5 mg, Oral, 3 Times Daily PRN    empagliflozin (JARDIANCE) 10 MG tablet tablet 1 tablet, Oral, Daily    lisinopril (PRINIVIL,ZESTRIL) 10 MG tablet Lisinopril    metoprolol tartrate (LOPRESSOR) 25 mg, Oral, Daily    naproxen (EC NAPROSYN) 500 mg, Oral, 2 Times Daily PRN    ondansetron (ZOFRAN) 4 mg, Oral, Every 6 Hours PRN    Semaglutide, 1 MG/DOSE, (OZEMPIC) 4 MG/3ML solution pen-injector 0.5 mL, Subcutaneous    sertraline (ZOLOFT) 25 mg, Oral    tadalafil (CIALIS) 20 MG tablet     testosterone cypionate (DEPO-TESTOSTERONE) 100 mg, Intramuscular, 2 Times Weekly        Review of Systems   Constitutional: Negative.    HENT: Negative.     Eyes: Negative.    Respiratory: Negative.     Cardiovascular: Negative.    Gastrointestinal: Negative.    Endocrine: Negative.    Genitourinary: Negative.    Musculoskeletal:  Positive for arthralgias and back pain.   Skin: Negative.    Allergic/Immunologic: Negative.    Neurological: Negative.    Hematological: Negative.    Psychiatric/Behavioral: Negative.     All other systems reviewed and are negative.      /90   Pulse 87   Ht 167.6 cm (66\")   Wt 115 kg (253 lb)   SpO2 97%   BMI 40.84 kg/m² "   Physical Exam  Vitals and nursing note reviewed.   Constitutional:       Appearance: Normal appearance. He is well-developed. He is obese.   HENT:      Head: Normocephalic and atraumatic.   Eyes:      General: No scleral icterus.     Pupils: Pupils are equal, round, and reactive to light.   Neck:      Thyroid: No thyromegaly.      Vascular: No carotid bruit or JVD.   Cardiovascular:      Rate and Rhythm: Normal rate and regular rhythm.      Pulses:           Carotid pulses are 2+ on the right side and 2+ on the left side.       Femoral pulses are 2+ on the right side and 2+ on the left side.       Popliteal pulses are 2+ on the right side and 2+ on the left side.        Dorsalis pedis pulses are 2+ on the right side and 2+ on the left side.        Posterior tibial pulses are 2+ on the right side and 2+ on the left side.      Heart sounds: Normal heart sounds.   Pulmonary:      Effort: Pulmonary effort is normal.      Breath sounds: Normal breath sounds.   Abdominal:      General: Bowel sounds are normal. There is no distension or abdominal bruit.      Palpations: Abdomen is soft. There is no mass.      Tenderness: There is no abdominal tenderness.   Musculoskeletal:         General: Normal range of motion.      Cervical back: Neck supple.      Comments: Lumbar pain   Lymphadenopathy:      Cervical: No cervical adenopathy.   Skin:     General: Skin is warm and dry.   Neurological:      Mental Status: He is alert and oriented to person, place, and time.      Cranial Nerves: No cranial nerve deficit.      Sensory: No sensory deficit.   Psychiatric:         Mood and Affect: Mood normal.         Behavior: Behavior normal.         Thought Content: Thought content normal.         Judgment: Judgment normal.         No results found.    Patient Active Problem List   Diagnosis    Dizziness    Transient ischemic attack (TIA)    Hypertrophic obstructive cardiomyopathy    Essential hypertension, new diagnosis    History of  stroke    Infection of prosthetic total shoulder joint    Postoperative infection    Lumbar radiculopathy         ICD-10-CM ICD-9-CM   1. Lumbar radiculopathy  M54.16 724.4           Plan: After thoroughly evaluating Feliberto Knowles, I believe the best course of action is to proceed with artifical disc replacement of L4-5 and anterior lumbar interbody fusion of L5-S1.  Risks of ALIF were discussed and include, but are not limited to, bleeding, infection, nerve damage, vessel damage, retrograde ejaculation, bowel damage, ureteral damage, DVT, MI, stroke, and death.  The patient understands these risks and wishes to proceed with procedure.  The patient can continue taking their current medication regimen as previously planned.  This was all discussed in full with complete understanding.    Thank you for allowing me to participate in the care of your patient.  Please do not hesitate with any questions or concerns.  I will keep you aware of any further encounters with Feliberto Knowles.        Sincerely yours,         Clark Guillen, DO

## 2025-04-22 NOTE — LETTER
April 22, 2025     Nakul Hdz DO  [address] [facility] [address]    Patient: Feliberto Knowles   YOB: 1975   Date of Visit: 4/22/2025     Dear Nakul Hdz DO:       Thank you for referring Feliberto Knowles to me for evaluation. Below are the relevant portions of my assessment and plan of care.    If you have questions, please do not hesitate to call me. I look forward to following Feliberto along with you.         Sincerely,        Clark Guillen DO        CC: MD Wilmer Khan Griffin K, DO  04/22/25 0850  Sign when Signing Visit  04/22/2025      MICHAEL Cook MD  [address] [facility]  [address]  [place] [address]    Feliberto Knowles  1975    Chief Complaint   Patient presents with   • Establish Care     ALIF   Patient states he has no concerns at this time  They denied any stroke like Sx   • Lumbar radiculopathy       Dear MICHAEL Cook MD:      HPI  I had the pleasure of seeing your patient Feliberto Knowles in the office today.  Thank you kindly for this consultation.  As you recall, Feliberto Knowles is a 49 y.o.  male who you are currently following for chronic back pain.  Feliberto Knowlesis scheduled for artificial disc replacement of L4-5 and anterior lumbar interbody fusion of L5-S1 with Dr. Cook on 4/30/25.  The patient denies any history of DVT.    Past Medical History:   Diagnosis Date   • AICD (automatic cardioverter/defibrillator) present    • Anger    • Anxiety    • Heart murmur    • Hyperlipidemia    • Hypertrophic obstructive cardiomyopathy (HOCM)    • Stroke     tia november 2017   • Tachycardia    • TBI (traumatic brain injury)        Past Surgical History:   Procedure Laterality Date   • ANKLE SURGERY     • APPENDECTOMY     • CARDIAC CATHETERIZATION      no stents   • CARDIAC DEFIBRILLATOR PLACEMENT     • CARDIAC PACEMAKER PLACEMENT      septalmyectomy   • CARPAL TUNNEL RELEASE     • OTHER SURGICAL HISTORY  05/2012    septalmyectomy   • SHOULDER  ARTHROSCOPY W/ LABRAL REPAIR Right 3/25/2021    Procedure: RIGHT SHOULDER ARTHROSCOPIC REVISION POSTERIOR  LABRAL REPAIR;  Surgeon: Sandro Monsivais MD;  Location:  [facility] OR;  Service: Orthopedics;  Laterality: Right;   • TOTAL SHOULDER ARTHROPLASTY W/ DISTAL CLAVICLE EXCISION Right 2021    Procedure: RIGHT REVERSE TOTAL SHOULDER ARTHROPLASTY;  Surgeon: Sandro Monsivais MD;  Location:  [facility] OR;  Service: Orthopedics;  Laterality: Right;   • TOTAL SHOULDER ARTHROPLASTY W/ DISTAL CLAVICLE EXCISION Right 10/14/2021    Procedure: INCISION AND DRAINAGE RIGHT SHOULDER WITH POLY EXCHANGE;  Surgeon: Sandro Monsivais MD;  Location:  [facility] OR;  Service: Orthopedics;  Laterality: Right;       Family History   Problem Relation Age of Onset   • Heart disease Mother    • No Known Problems Father        Social History     Socioeconomic History   • Marital status:    Tobacco Use   • Smoking status: Former     Current packs/day: 0.00     Types: Cigarettes     Quit date:      Years since quittin.3   • Smokeless tobacco: Current     Types: Chew   Vaping Use   • Vaping status: Never Used   Substance and Sexual Activity   • Alcohol use: No   • Drug use: No   • Sexual activity: Yes     Partners: Female       No Known Allergies    Current Outpatient Medications   Medication Instructions   • ALPRAZolam (XANAX) 2 mg, Oral, Nightly PRN, Take 30 minutes prior to procedure   • cyclobenzaprine (FLEXERIL) 5 mg, Oral, 3 Times Daily PRN   • empagliflozin (JARDIANCE) 10 MG tablet tablet 1 tablet, Oral, Daily   • lisinopril (PRINIVIL,ZESTRIL) 10 MG tablet Lisinopril   • metoprolol tartrate (LOPRESSOR) 25 mg, Oral, Daily   • naproxen (EC NAPROSYN) 500 mg, Oral, 2 Times Daily PRN   • ondansetron (ZOFRAN) 4 mg, Oral, Every 6 Hours PRN   • Semaglutide, 1 MG/DOSE, (OZEMPIC) 4 MG/3ML solution pen-injector 0.5 mL, Subcutaneous   • sertraline (ZOLOFT) 25 mg, Oral   • tadalafil (CIALIS) 20 MG tablet    • testosterone cypionate (DEPO-TESTOSTERONE)  "100 mg, Intramuscular, 2 Times Weekly        Review of Systems   Constitutional: Negative.    HENT: Negative.     Eyes: Negative.    Respiratory: Negative.     Cardiovascular: Negative.    Gastrointestinal: Negative.    Endocrine: Negative.    Genitourinary: Negative.    Musculoskeletal:  Positive for arthralgias and back pain.   Skin: Negative.    Allergic/Immunologic: Negative.    Neurological: Negative.    Hematological: Negative.    Psychiatric/Behavioral: Negative.     All other systems reviewed and are negative.      /90   Pulse 87   Ht 167.6 cm (66\")   Wt 115 kg (253 lb)   SpO2 97%   BMI 40.84 kg/m²   Physical Exam  Vitals and nursing note reviewed.   Constitutional:       Appearance: Normal appearance. He is well-developed. He is obese.   HENT:      Head: Normocephalic and atraumatic.   Eyes:      General: No scleral icterus.     Pupils: Pupils are equal, round, and reactive to light.   Neck:      Thyroid: No thyromegaly.      Vascular: No carotid bruit or JVD.   Cardiovascular:      Rate and Rhythm: Normal rate and regular rhythm.      Pulses:           Carotid pulses are 2+ on the right side and 2+ on the left side.       Femoral pulses are 2+ on the right side and 2+ on the left side.       Popliteal pulses are 2+ on the right side and 2+ on the left side.        Dorsalis pedis pulses are 2+ on the right side and 2+ on the left side.        Posterior tibial pulses are 2+ on the right side and 2+ on the left side.      Heart sounds: Normal heart sounds.   Pulmonary:      Effort: Pulmonary effort is normal.      Breath sounds: Normal breath sounds.   Abdominal:      General: Bowel sounds are normal. There is no distension or abdominal bruit.      Palpations: Abdomen is soft. There is no mass.      Tenderness: There is no abdominal tenderness.   Musculoskeletal:         General: Normal range of motion.      Cervical back: Neck supple.      Comments: Lumbar pain   Lymphadenopathy:      Cervical: No " cervical adenopathy.   Skin:     General: Skin is warm and dry.   Neurological:      Mental Status: He is alert and oriented to person, place, and time.      Cranial Nerves: No cranial nerve deficit.      Sensory: No sensory deficit.   Psychiatric:         Mood and Affect: Mood normal.         Behavior: Behavior normal.         Thought Content: Thought content normal.         Judgment: Judgment normal.         No results found.    Patient Active Problem List   Diagnosis   • Dizziness   • Transient ischemic attack (TIA)   • Hypertrophic obstructive cardiomyopathy   • Essential hypertension, new diagnosis   • History of stroke   • Infection of prosthetic total shoulder joint   • Postoperative infection   • Lumbar radiculopathy         ICD-10-CM ICD-9-CM   1. Lumbar radiculopathy  M54.16 724.4           Plan: After thoroughly evaluating Feliberto Knowles, I believe the best course of action is to proceed with artifical disc replacement of L4-5 and anterior lumbar interbody fusion of L5-S1.  Risks of ALIF were discussed and include, but are not limited to, bleeding, infection, nerve damage, vessel damage, retrograde ejaculation, bowel damage, ureteral damage, DVT, MI, stroke, and death.  The patient understands these risks and wishes to proceed with procedure.  The patient can continue taking their current medication regimen as previously planned.  This was all discussed in full with complete understanding.    Thank you for allowing me to participate in the care of your patient.  Please do not hesitate with any questions or concerns.  I will keep you aware of any further encounters with Feliberto Knowles.        Sincerely yours,         Clark Guillen, DO

## 2025-04-22 NOTE — PROGRESS NOTES
April 22, 2025    Hello, may I speak with Feliberto Knowles?    My name is MELISSA      I am  with [facility] [place] [place] [facility]  [address], [address]  [place] [address] [phone].    Before we get started may I verify your date of birth? 1975    I am calling to officially welcome you to our practice and ask about your recent visit. Is this a good time to talk? yes    Tell me about your visit with us. What things went well?  EVERYTHING WENT WELL ALL QUESTIONS WERE ANSWERED       We're always looking for ways to make our patients' experiences even better. Do you have recommendations on ways we may improve?  no    Overall were you satisfied with your first visit to our practice? yes       I appreciate you taking the time to speak with me today. Is there anything else I can do for you? no      Thank you, and have a great day.

## 2025-04-23 ENCOUNTER — TELEPHONE (OUTPATIENT)
Dept: VASCULAR SURGERY | Facility: CLINIC | Age: 50
End: 2025-04-23
Payer: OTHER GOVERNMENT

## 2025-04-23 NOTE — TELEPHONE ENCOUNTER
Spoke with wife and patient is unable to get [facility]. Spoke with Dr. Cook's office and they are getting an appointment for patient at [facility].. Informed patient and wife.

## 2025-04-24 ENCOUNTER — PRE-ADMISSION TESTING (OUTPATIENT)
Dept: PREADMISSION TESTING | Facility: HOSPITAL | Age: 50
End: 2025-04-24
Payer: OTHER GOVERNMENT

## 2025-04-24 VITALS
WEIGHT: 246.03 LBS | HEART RATE: 82 BPM | RESPIRATION RATE: 20 BRPM | DIASTOLIC BLOOD PRESSURE: 83 MMHG | SYSTOLIC BLOOD PRESSURE: 142 MMHG | OXYGEN SATURATION: 98 % | HEIGHT: 67 IN | BODY MASS INDEX: 38.62 KG/M2

## 2025-04-24 LAB
ALBUMIN SERPL-MCNC: 4.3 G/DL (ref 3.5–5.2)
ALBUMIN/GLOB SERPL: 1.6 G/DL
ALP SERPL-CCNC: 81 U/L (ref 39–117)
ALT SERPL W P-5'-P-CCNC: 26 U/L (ref 1–41)
ANION GAP SERPL CALCULATED.3IONS-SCNC: 11 MMOL/L (ref 5–15)
APTT PPP: 29.8 SECONDS (ref 24.5–36)
AST SERPL-CCNC: 21 U/L (ref 1–40)
BILIRUB SERPL-MCNC: 0.4 MG/DL (ref 0–1.2)
BILIRUB UR QL STRIP: NEGATIVE
BUN SERPL-MCNC: 17 MG/DL (ref 6–20)
BUN/CREAT SERPL: 17.7 (ref 7–25)
CALCIUM SPEC-SCNC: 9.1 MG/DL (ref 8.6–10.5)
CHLORIDE SERPL-SCNC: 101 MMOL/L (ref 98–107)
CLARITY UR: CLEAR
CO2 SERPL-SCNC: 24 MMOL/L (ref 22–29)
COLOR UR: YELLOW
CREAT SERPL-MCNC: 0.96 MG/DL (ref 0.76–1.27)
DEPRECATED RDW RBC AUTO: 37.1 FL (ref 37–54)
EGFRCR SERPLBLD CKD-EPI 2021: 97.5 ML/MIN/1.73
ERYTHROCYTE [DISTWIDTH] IN BLOOD BY AUTOMATED COUNT: 12.7 % (ref 12.3–15.4)
GLOBULIN UR ELPH-MCNC: 2.7 GM/DL
GLUCOSE SERPL-MCNC: 97 MG/DL (ref 65–99)
GLUCOSE UR STRIP-MCNC: ABNORMAL MG/DL
HCT VFR BLD AUTO: 48.1 % (ref 37.5–51)
HGB BLD-MCNC: 17.2 G/DL (ref 13–17.7)
HGB UR QL STRIP.AUTO: NEGATIVE
INR PPP: 1 (ref 0.91–1.09)
KETONES UR QL STRIP: NEGATIVE
LEUKOCYTE ESTERASE UR QL STRIP.AUTO: NEGATIVE
MCH RBC QN AUTO: 29.1 PG (ref 26.6–33)
MCHC RBC AUTO-ENTMCNC: 35.8 G/DL (ref 31.5–35.7)
MCV RBC AUTO: 81.4 FL (ref 79–97)
NITRITE UR QL STRIP: NEGATIVE
PH UR STRIP.AUTO: 5.5 [PH] (ref 5–8)
PLATELET # BLD AUTO: 288 10*3/MM3 (ref 140–450)
PMV BLD AUTO: 9.1 FL (ref 6–12)
POTASSIUM SERPL-SCNC: 4.1 MMOL/L (ref 3.5–5.2)
PROT SERPL-MCNC: 7 G/DL (ref 6–8.5)
PROT UR QL STRIP: NEGATIVE
PROTHROMBIN TIME: 13.7 SECONDS (ref 11.8–14.8)
RBC # BLD AUTO: 5.91 10*6/MM3 (ref 4.14–5.8)
SODIUM SERPL-SCNC: 136 MMOL/L (ref 136–145)
SP GR UR STRIP: 1.03 (ref 1–1.03)
UROBILINOGEN UR QL STRIP: ABNORMAL
WBC NRBC COR # BLD AUTO: 10.66 10*3/MM3 (ref 3.4–10.8)

## 2025-04-24 PROCEDURE — 85730 THROMBOPLASTIN TIME PARTIAL: CPT

## 2025-04-24 PROCEDURE — 85610 PROTHROMBIN TIME: CPT

## 2025-04-24 PROCEDURE — 80053 COMPREHEN METABOLIC PANEL: CPT

## 2025-04-24 PROCEDURE — 93005 ELECTROCARDIOGRAM TRACING: CPT

## 2025-04-24 PROCEDURE — 85027 COMPLETE CBC AUTOMATED: CPT

## 2025-04-24 PROCEDURE — 81003 URINALYSIS AUTO W/O SCOPE: CPT

## 2025-04-24 PROCEDURE — 36415 COLL VENOUS BLD VENIPUNCTURE: CPT

## 2025-04-24 RX ORDER — LEVOTHYROXINE SODIUM 25 UG/1
25 TABLET ORAL
COMMUNITY

## 2025-04-24 NOTE — DISCHARGE INSTRUCTIONS
Preparing for Surgery  Follow these instructions before the procedure:  Several days or weeks before your procedure  Medication(s) you need to stop   __ONE WEEK_____ days/week prior to surgery: SEMAGLUTIDE      Ask your health care provider about:  Changing or stopping your regular medicines. This is especially important if you are taking diabetes medicines or blood thinners.  Taking medicines such as aspirin and ibuprofen. These medicines can thin your blood. Do not take these medicines unless your health care provider tells you to take them.  Taking over-the-counter medicines, vitamins, herbs, and supplements.    Contact your surgeon if you:  Develop a fever of more than 100.4°F (38°C) or other feelings of illness during the 48 hours before your surgery.  Have symptoms that get worse.  Have questions or concerns about your surgery.  If you are going home the same day of your surgery you will need to arrange for a responsible adult, age 18 years old or older, to drive you home from the hospital and stay with you for 24 hours. Verification of the  will be made prior to any procedure requiring sedation. You may not go home in a taxi or any form of public transportation by yourself.     Day before your procedure  Medication(s) you need to stop the day before your surgery:LISINOPRIL    24 hours before your procedure DO NOT drink alcoholic beverages or smoke.  24 hours before your procedure STOP taking Erectile Dysfunction medication (i.e.,Cialis, Viagra)   You may be asked to shower with a germ-killing soap.  Day of your procedure   You may take the following medication(s) the morning of surgery with a sip of water: XANAX, METOPROLOL      8 hours before your scheduled arrival time, STOP all food, any dairy products, and full liquids. This includes hard candy, chewing gum or mints. This is extremely important to prevent serious complications.     Up to 2 hours before your scheduled arrival time, you may have clear  liquids no cream, powder, or pulp of any kind. Safe options are water, black coffee, plain tea, soda, Gatorade/Powerade, clear broth, apple juice.    2 hours before your scheduled arrival time, STOP drinking clear liquids.    You may need to take another shower with a germ-killing soap before you leave home in the morning. Do not use perfumes, colognes, or body lotions.  Wear comfortable loose-fitting clothing.  Remove all jewelry including body piercing and rings, dark colored nail polish, and make up prior to arrival at the hospital. Leave all valuables at home.   Bring your hearing aids if you rely on them.  Do not wear contact lenses. If you wear eyeglasses remember to bring a case to store them in while you are in surgery.  Do not use denture adhesives since you will be asked to remove them during your surgery.    You do not need to bring your home medications into the hospital.   Bring your sleep apnea device with you on the day of your surgery (if this applies to you).  If you have an Inspire implant for sleep apnea, please bring the remote with you on the day of surgery.  If you wear portable oxygen, bring it with you.   If you are staying overnight, you may bring a bag of items you may need such as slippers, robe and a change of clothes for your discharge. You may want to leave these items in the car until you are ready for them since your family will take your belongings when you leave the pre-operative area.  Arrive at the hospital as scheduled by the office. You will be asked to arrive 2 hours prior to your surgery time in order to prepare for your procedure.  When you arrive at the hospital  Go to the registration desk located at the main entrance of the hospital.  After registration is completed, you will be given a beeper and a sticker sheet. Take the stickers to Outpatient Surgery and place in the tray at the end of the desk to notify the staff that you have arrived and registered.   Return to the  lobby to wait. You are not always called back according to the time of arrival but rather the time your doctor will be ready.  When your beeper lights up and vibrates proceed through the double doors, under the stairs, and a member of the Outpatient Surgery staff will escort you to your preoperative room.

## 2025-04-25 LAB
QT INTERVAL: 446 MS
QTC INTERVAL: 518 MS

## 2025-04-30 ENCOUNTER — APPOINTMENT (OUTPATIENT)
Dept: GENERAL RADIOLOGY | Facility: HOSPITAL | Age: 50
DRG: 448 | End: 2025-04-30
Payer: OTHER GOVERNMENT

## 2025-04-30 ENCOUNTER — ANESTHESIA (OUTPATIENT)
Dept: PERIOP | Facility: HOSPITAL | Age: 50
DRG: 448 | End: 2025-04-30
Payer: OTHER GOVERNMENT

## 2025-04-30 ENCOUNTER — HOSPITAL ENCOUNTER (INPATIENT)
Facility: HOSPITAL | Age: 50
LOS: 2 days | Discharge: HOME | DRG: 448 | End: 2025-05-02
Attending: ORTHOPAEDIC SURGERY | Admitting: ORTHOPAEDIC SURGERY
Payer: OTHER GOVERNMENT

## 2025-04-30 ENCOUNTER — ANESTHESIA EVENT (OUTPATIENT)
Dept: PERIOP | Facility: HOSPITAL | Age: 50
DRG: 448 | End: 2025-04-30
Payer: OTHER GOVERNMENT

## 2025-04-30 DIAGNOSIS — M54.16 LUMBAR RADICULOPATHY: ICD-10-CM

## 2025-04-30 DIAGNOSIS — M51.362 DEGENERATION OF INTERVERTEBRAL DISC OF LUMBAR REGION WITH DISCOGENIC BACK PAIN AND LOWER EXTREMITY PAIN: ICD-10-CM

## 2025-04-30 DIAGNOSIS — Z74.09 IMPAIRED MOBILITY: Primary | ICD-10-CM

## 2025-04-30 LAB
ABO GROUP BLD: NORMAL
BLD GP AB SCN SERPL QL: NEGATIVE
RH BLD: POSITIVE
T&S EXPIRATION DATE: NORMAL

## 2025-04-30 PROCEDURE — 25010000002 MIDAZOLAM PER 1MG: Performed by: ANESTHESIOLOGY

## 2025-04-30 PROCEDURE — 25010000002 ONDANSETRON PER 1 MG: Performed by: ORTHOPAEDIC SURGERY

## 2025-04-30 PROCEDURE — 25010000002 HYDROMORPHONE PER 4 MG: Performed by: ANESTHESIOLOGY

## 2025-04-30 PROCEDURE — 25010000002 SUGAMMADEX 200 MG/2ML SOLUTION

## 2025-04-30 PROCEDURE — 25010000002 LIDOCAINE PF 2% 2 % SOLUTION

## 2025-04-30 PROCEDURE — C1713 ANCHOR/SCREW BN/BN,TIS/BN: HCPCS | Performed by: ORTHOPAEDIC SURGERY

## 2025-04-30 PROCEDURE — 25810000003 SODIUM CHLORIDE 0.9 % SOLUTION: Performed by: ORTHOPAEDIC SURGERY

## 2025-04-30 PROCEDURE — 25810000003 LACTATED RINGERS PER 1000 ML: Performed by: ORTHOPAEDIC SURGERY

## 2025-04-30 PROCEDURE — 25010000002 FENTANYL CITRATE (PF) 50 MCG/ML SOLUTION

## 2025-04-30 PROCEDURE — 86900 BLOOD TYPING SEROLOGIC ABO: CPT | Performed by: ORTHOPAEDIC SURGERY

## 2025-04-30 PROCEDURE — 0SG30A0 FUSION OF LUMBOSACRAL JOINT WITH INTERBODY FUSION DEVICE, ANTERIOR APPROACH, ANTERIOR COLUMN, OPEN APPROACH: ICD-10-PCS | Performed by: ORTHOPAEDIC SURGERY

## 2025-04-30 PROCEDURE — 25010000002 PROPOFOL 10 MG/ML EMULSION

## 2025-04-30 PROCEDURE — 01NB0ZZ RELEASE LUMBAR NERVE, OPEN APPROACH: ICD-10-PCS | Performed by: ORTHOPAEDIC SURGERY

## 2025-04-30 PROCEDURE — 4A11X4G MONITORING OF PERIPHERAL NERVOUS ELECTRICAL ACTIVITY, INTRAOPERATIVE, EXTERNAL APPROACH: ICD-10-PCS | Performed by: ORTHOPAEDIC SURGERY

## 2025-04-30 PROCEDURE — 25010000002 FENTANYL CITRATE (PF) 50 MCG/ML SOLUTION: Performed by: ANESTHESIOLOGY

## 2025-04-30 PROCEDURE — 25010000002 DEXAMETHASONE PER 1 MG

## 2025-04-30 PROCEDURE — C1765 ADHESION BARRIER: HCPCS | Performed by: ORTHOPAEDIC SURGERY

## 2025-04-30 PROCEDURE — 86901 BLOOD TYPING SEROLOGIC RH(D): CPT | Performed by: ORTHOPAEDIC SURGERY

## 2025-04-30 PROCEDURE — 25010000002 CEFAZOLIN PER 500 MG: Performed by: ORTHOPAEDIC SURGERY

## 2025-04-30 PROCEDURE — 25010000002 HYDROMORPHONE PER 4 MG: Performed by: ORTHOPAEDIC SURGERY

## 2025-04-30 PROCEDURE — 97165 OT EVAL LOW COMPLEX 30 MIN: CPT

## 2025-04-30 PROCEDURE — 97605 NEG PRS WND THER DME<=50SQCM: CPT | Performed by: SURGERY

## 2025-04-30 PROCEDURE — 22857 TOT DISC ARTHRP 1NTRSPC LMBR: CPT | Performed by: SURGERY

## 2025-04-30 PROCEDURE — 72100 X-RAY EXAM L-S SPINE 2/3 VWS: CPT

## 2025-04-30 PROCEDURE — 97161 PT EVAL LOW COMPLEX 20 MIN: CPT | Performed by: PHYSICAL THERAPIST

## 2025-04-30 PROCEDURE — 76000 FLUOROSCOPY <1 HR PHYS/QHP: CPT

## 2025-04-30 PROCEDURE — 25810000003 SODIUM CHLORIDE PER 500 ML: Performed by: ORTHOPAEDIC SURGERY

## 2025-04-30 PROCEDURE — 74018 RADEX ABDOMEN 1 VIEW: CPT

## 2025-04-30 PROCEDURE — 25010000002 DROPERIDOL PER 5 MG

## 2025-04-30 PROCEDURE — 0SB20ZZ EXCISION OF LUMBAR VERTEBRAL DISC, OPEN APPROACH: ICD-10-PCS | Performed by: ORTHOPAEDIC SURGERY

## 2025-04-30 PROCEDURE — 0SG00A0 FUSION OF LUMBAR VERTEBRAL JOINT WITH INTERBODY FUSION DEVICE, ANTERIOR APPROACH, ANTERIOR COLUMN, OPEN APPROACH: ICD-10-PCS | Performed by: ORTHOPAEDIC SURGERY

## 2025-04-30 PROCEDURE — 86850 RBC ANTIBODY SCREEN: CPT | Performed by: ORTHOPAEDIC SURGERY

## 2025-04-30 PROCEDURE — 25010000002 DEXAMETHASONE PER 1 MG: Performed by: ANESTHESIOLOGY

## 2025-04-30 PROCEDURE — 0SB40ZZ EXCISION OF LUMBOSACRAL DISC, OPEN APPROACH: ICD-10-PCS | Performed by: ORTHOPAEDIC SURGERY

## 2025-04-30 DEVICE — ALIF SA GRAFT BOLT, Ø8.5 X 25 MM
Type: IMPLANTABLE DEVICE | Site: SPINE LUMBAR | Status: FUNCTIONAL
Brand: IDENTITI

## 2025-04-30 DEVICE — KT HEMOST ABS SURGIFOAM PORCN 1GRAM: Type: IMPLANTABLE DEVICE | Site: ABDOMEN | Status: FUNCTIONAL

## 2025-04-30 DEVICE — LIGACLIP MCA MULTIPLE CLIP APPLIERS, 20 SMALL CLIPS
Type: IMPLANTABLE DEVICE | Site: ABDOMEN | Status: FUNCTIONAL
Brand: LIGACLIP

## 2025-04-30 DEVICE — SUPERIOR ENDPLATE MEDIUM 3°-STERILE
Type: IMPLANTABLE DEVICE | Site: SPINE LUMBAR | Status: FUNCTIONAL
Brand: PRODISC L

## 2025-04-30 DEVICE — POLYETHYLENE INLAY W/TANTALUM MARKER/MEDIUM-10MM-STERILE
Type: IMPLANTABLE DEVICE | Site: SPINE LUMBAR | Status: FUNCTIONAL
Brand: PRODISC L

## 2025-04-30 DEVICE — IDENTITI ALIF SA SPACER, 8 X 38 X 28 MM, 20°
Type: IMPLANTABLE DEVICE | Site: SPINE LUMBAR | Status: FUNCTIONAL
Brand: IDENTITI

## 2025-04-30 DEVICE — INFERIOR ENDPLATE MEDIUM 8°-STERILE
Type: IMPLANTABLE DEVICE | Site: SPINE LUMBAR | Status: FUNCTIONAL
Brand: PRODISC L

## 2025-04-30 DEVICE — IDENTITI ALIF SA LATERAL SCREW, Ø5 X 30 MM
Type: IMPLANTABLE DEVICE | Site: SPINE LUMBAR | Status: FUNCTIONAL
Brand: IDENTITI

## 2025-04-30 DEVICE — LIGACLIP MCA MULTIPLE CLIP APPLIERS, 30 MEDIUM CLIPS
Type: IMPLANTABLE DEVICE | Site: ABDOMEN | Status: FUNCTIONAL
Brand: LIGACLIP

## 2025-04-30 DEVICE — HEMOST ABS SURGIFOAM SZ100 8X12 10MM: Type: IMPLANTABLE DEVICE | Site: ABDOMEN | Status: FUNCTIONAL

## 2025-04-30 DEVICE — VERSAWRAP IS AN ABSORBABLE IMPLANT (DEVICE), DESIGNED TO SERVE AS AN INTERFACE BETWEEN TARGET TISSUES AND SURROUNDING TISSUES TO PROVIDE A NON-CONSTRICTING, PROTECTIVE ENCASEMENT. VERSAWRAP CONSISTS OF A CLEAR SHEET AND A WETTING SOLUTION. THE CLEAR SHEET IS A THIN MEMBRANE OF CROSSLINKED CALCIUM ALGINATE AND GLYCOSAMINOGLYCAN. VERSAWRAP SHEET IS EASY TO HANDLE, CONFORMABLE, AND IS DESIGNED FOR PLACEMENT UNDER, AROUND, OR OVER INJURED TISSUES AND/OR SURROUNDING TISSUES. VERSAWRAP SHEET IS SUPPLIED STERILE, NON-PYROGENIC, FOR SINGLE USE, IN DOUBLE PEEL POUCHES. THE VERSAWRAP SOLUTION IS APPLIED TO THE SHEET TO RENDER THE SHEET A GELATINOUS, TISSUE ADHERENT LAYER (GEL IN SITU). THE AQUEOUS CITRATE SOLUTION IS PROVIDED STERILE, NON-PYROGENIC, FOR SINGLE USE, IN A DROPPER, PACKAGED IN A DOUBLE PEEL POUCH.
Type: IMPLANTABLE DEVICE | Site: SPINE LUMBAR | Status: FUNCTIONAL
Brand: VERSAWRAP

## 2025-04-30 DEVICE — ALLOGRFT CORT NMP FIBR FZD 11.1CC LG LNG STRL: Type: IMPLANTABLE DEVICE | Site: SPINE LUMBAR | Status: FUNCTIONAL

## 2025-04-30 RX ORDER — DROPERIDOL 2.5 MG/ML
INJECTION, SOLUTION INTRAMUSCULAR; INTRAVENOUS AS NEEDED
Status: DISCONTINUED | OUTPATIENT
Start: 2025-04-30 | End: 2025-04-30 | Stop reason: SURG

## 2025-04-30 RX ORDER — LABETALOL HYDROCHLORIDE 5 MG/ML
5 INJECTION, SOLUTION INTRAVENOUS
Status: DISCONTINUED | OUTPATIENT
Start: 2025-04-30 | End: 2025-04-30 | Stop reason: HOSPADM

## 2025-04-30 RX ORDER — SODIUM CHLORIDE, SODIUM LACTATE, POTASSIUM CHLORIDE, CALCIUM CHLORIDE 600; 310; 30; 20 MG/100ML; MG/100ML; MG/100ML; MG/100ML
100 INJECTION, SOLUTION INTRAVENOUS CONTINUOUS
Status: DISPENSED | OUTPATIENT
Start: 2025-04-30 | End: 2025-05-01

## 2025-04-30 RX ORDER — FLUMAZENIL 0.1 MG/ML
0.2 INJECTION INTRAVENOUS AS NEEDED
Status: DISCONTINUED | OUTPATIENT
Start: 2025-04-30 | End: 2025-04-30 | Stop reason: HOSPADM

## 2025-04-30 RX ORDER — LEVOTHYROXINE SODIUM 25 UG/1
25 TABLET ORAL
Status: DISCONTINUED | OUTPATIENT
Start: 2025-05-01 | End: 2025-05-02 | Stop reason: HOSPADM

## 2025-04-30 RX ORDER — LIDOCAINE HYDROCHLORIDE 20 MG/ML
INJECTION, SOLUTION EPIDURAL; INFILTRATION; INTRACAUDAL; PERINEURAL AS NEEDED
Status: DISCONTINUED | OUTPATIENT
Start: 2025-04-30 | End: 2025-04-30 | Stop reason: SURG

## 2025-04-30 RX ORDER — NALOXONE HCL 0.4 MG/ML
0.4 VIAL (ML) INJECTION
Status: DISCONTINUED | OUTPATIENT
Start: 2025-04-30 | End: 2025-05-02 | Stop reason: HOSPADM

## 2025-04-30 RX ORDER — GABAPENTIN 300 MG/1
300 CAPSULE ORAL 3 TIMES DAILY
COMMUNITY

## 2025-04-30 RX ORDER — ONDANSETRON 2 MG/ML
4 INJECTION INTRAMUSCULAR; INTRAVENOUS EVERY 6 HOURS PRN
Status: DISCONTINUED | OUTPATIENT
Start: 2025-04-30 | End: 2025-05-02 | Stop reason: HOSPADM

## 2025-04-30 RX ORDER — HYDROMORPHONE HYDROCHLORIDE 1 MG/ML
0.5 INJECTION, SOLUTION INTRAMUSCULAR; INTRAVENOUS; SUBCUTANEOUS
Status: DISCONTINUED | OUTPATIENT
Start: 2025-04-30 | End: 2025-05-01

## 2025-04-30 RX ORDER — SODIUM CHLORIDE, SODIUM LACTATE, POTASSIUM CHLORIDE, CALCIUM CHLORIDE 600; 310; 30; 20 MG/100ML; MG/100ML; MG/100ML; MG/100ML
1000 INJECTION, SOLUTION INTRAVENOUS CONTINUOUS
Status: DISPENSED | OUTPATIENT
Start: 2025-04-30 | End: 2025-05-01

## 2025-04-30 RX ORDER — DEXAMETHASONE SODIUM PHOSPHATE 4 MG/ML
INJECTION, SOLUTION INTRA-ARTICULAR; INTRALESIONAL; INTRAMUSCULAR; INTRAVENOUS; SOFT TISSUE AS NEEDED
Status: DISCONTINUED | OUTPATIENT
Start: 2025-04-30 | End: 2025-04-30 | Stop reason: SURG

## 2025-04-30 RX ORDER — CYCLOBENZAPRINE HCL 10 MG
5 TABLET ORAL 3 TIMES DAILY PRN
Status: DISCONTINUED | OUTPATIENT
Start: 2025-04-30 | End: 2025-05-02 | Stop reason: HOSPADM

## 2025-04-30 RX ORDER — BISACODYL 5 MG/1
5 TABLET, DELAYED RELEASE ORAL DAILY PRN
Status: DISCONTINUED | OUTPATIENT
Start: 2025-04-30 | End: 2025-05-02 | Stop reason: HOSPADM

## 2025-04-30 RX ORDER — SODIUM CHLORIDE, SODIUM LACTATE, POTASSIUM CHLORIDE, CALCIUM CHLORIDE 600; 310; 30; 20 MG/100ML; MG/100ML; MG/100ML; MG/100ML
100 INJECTION, SOLUTION INTRAVENOUS CONTINUOUS PRN
Status: DISCONTINUED | OUTPATIENT
Start: 2025-04-30 | End: 2025-04-30 | Stop reason: HOSPADM

## 2025-04-30 RX ORDER — MIDAZOLAM HYDROCHLORIDE 2 MG/2ML
2 INJECTION, SOLUTION INTRAMUSCULAR; INTRAVENOUS
Status: DISCONTINUED | OUTPATIENT
Start: 2025-04-30 | End: 2025-04-30 | Stop reason: HOSPADM

## 2025-04-30 RX ORDER — TADALAFIL 20 MG/1
20 TABLET ORAL
COMMUNITY

## 2025-04-30 RX ORDER — NALOXONE HCL 0.4 MG/ML
0.04 VIAL (ML) INJECTION AS NEEDED
Status: DISCONTINUED | OUTPATIENT
Start: 2025-04-30 | End: 2025-04-30 | Stop reason: HOSPADM

## 2025-04-30 RX ORDER — SODIUM CHLORIDE 9 MG/ML
40 INJECTION, SOLUTION INTRAVENOUS AS NEEDED
Status: DISCONTINUED | OUTPATIENT
Start: 2025-04-30 | End: 2025-04-30 | Stop reason: HOSPADM

## 2025-04-30 RX ORDER — HYDROMORPHONE HYDROCHLORIDE 1 MG/ML
0.5 INJECTION, SOLUTION INTRAMUSCULAR; INTRAVENOUS; SUBCUTANEOUS
Status: DISCONTINUED | OUTPATIENT
Start: 2025-04-30 | End: 2025-04-30 | Stop reason: HOSPADM

## 2025-04-30 RX ORDER — SODIUM CHLORIDE 9 MG/ML
INJECTION, SOLUTION INTRAVENOUS AS NEEDED
Status: DISCONTINUED | OUTPATIENT
Start: 2025-04-30 | End: 2025-04-30 | Stop reason: HOSPADM

## 2025-04-30 RX ORDER — SODIUM CHLORIDE 9 MG/ML
40 INJECTION, SOLUTION INTRAVENOUS AS NEEDED
Status: DISCONTINUED | OUTPATIENT
Start: 2025-04-30 | End: 2025-05-02 | Stop reason: HOSPADM

## 2025-04-30 RX ORDER — OXYCODONE HCL 20 MG/1
20 TABLET, FILM COATED, EXTENDED RELEASE ORAL ONCE
Status: COMPLETED | OUTPATIENT
Start: 2025-04-30 | End: 2025-04-30

## 2025-04-30 RX ORDER — METOPROLOL TARTRATE 25 MG/1
25 TABLET, FILM COATED ORAL DAILY
Status: DISCONTINUED | OUTPATIENT
Start: 2025-04-30 | End: 2025-05-02 | Stop reason: HOSPADM

## 2025-04-30 RX ORDER — PROPOFOL 10 MG/ML
VIAL (ML) INTRAVENOUS AS NEEDED
Status: DISCONTINUED | OUTPATIENT
Start: 2025-04-30 | End: 2025-04-30 | Stop reason: SURG

## 2025-04-30 RX ORDER — ACETAMINOPHEN 650 MG/1
650 SUPPOSITORY RECTAL EVERY 4 HOURS PRN
Status: DISCONTINUED | OUTPATIENT
Start: 2025-04-30 | End: 2025-05-02 | Stop reason: HOSPADM

## 2025-04-30 RX ORDER — OXYCODONE AND ACETAMINOPHEN 10; 325 MG/1; MG/1
1 TABLET ORAL EVERY 4 HOURS PRN
Status: DISCONTINUED | OUTPATIENT
Start: 2025-04-30 | End: 2025-05-02 | Stop reason: HOSPADM

## 2025-04-30 RX ORDER — ONDANSETRON 2 MG/ML
4 INJECTION INTRAMUSCULAR; INTRAVENOUS
Status: DISCONTINUED | OUTPATIENT
Start: 2025-04-30 | End: 2025-04-30 | Stop reason: HOSPADM

## 2025-04-30 RX ORDER — ACETAMINOPHEN 325 MG/1
650 TABLET ORAL EVERY 4 HOURS PRN
Status: DISCONTINUED | OUTPATIENT
Start: 2025-04-30 | End: 2025-05-02 | Stop reason: HOSPADM

## 2025-04-30 RX ORDER — SODIUM CHLORIDE 0.9 % (FLUSH) 0.9 %
3-10 SYRINGE (ML) INJECTION AS NEEDED
Status: DISCONTINUED | OUTPATIENT
Start: 2025-04-30 | End: 2025-04-30 | Stop reason: HOSPADM

## 2025-04-30 RX ORDER — ROCURONIUM BROMIDE 10 MG/ML
INJECTION, SOLUTION INTRAVENOUS AS NEEDED
Status: DISCONTINUED | OUTPATIENT
Start: 2025-04-30 | End: 2025-04-30 | Stop reason: SURG

## 2025-04-30 RX ORDER — SODIUM CHLORIDE 0.9 % (FLUSH) 0.9 %
3 SYRINGE (ML) INJECTION EVERY 12 HOURS SCHEDULED
Status: DISCONTINUED | OUTPATIENT
Start: 2025-04-30 | End: 2025-04-30 | Stop reason: HOSPADM

## 2025-04-30 RX ORDER — ACETAMINOPHEN 500 MG
1000 TABLET ORAL ONCE
Status: COMPLETED | OUTPATIENT
Start: 2025-04-30 | End: 2025-04-30

## 2025-04-30 RX ORDER — SODIUM CHLORIDE 0.9 % (FLUSH) 0.9 %
10 SYRINGE (ML) INJECTION EVERY 12 HOURS SCHEDULED
Status: DISCONTINUED | OUTPATIENT
Start: 2025-04-30 | End: 2025-04-30 | Stop reason: HOSPADM

## 2025-04-30 RX ORDER — POLYETHYLENE GLYCOL 3350 17 G/17G
17 POWDER, FOR SOLUTION ORAL DAILY PRN
Status: DISCONTINUED | OUTPATIENT
Start: 2025-04-30 | End: 2025-05-02 | Stop reason: HOSPADM

## 2025-04-30 RX ORDER — LIDOCAINE HYDROCHLORIDE 10 MG/ML
0.5 INJECTION, SOLUTION EPIDURAL; INFILTRATION; INTRACAUDAL; PERINEURAL ONCE AS NEEDED
Status: DISCONTINUED | OUTPATIENT
Start: 2025-04-30 | End: 2025-04-30 | Stop reason: HOSPADM

## 2025-04-30 RX ORDER — SODIUM CHLORIDE 0.9 % (FLUSH) 0.9 %
3 SYRINGE (ML) INJECTION AS NEEDED
Status: DISCONTINUED | OUTPATIENT
Start: 2025-04-30 | End: 2025-04-30 | Stop reason: HOSPADM

## 2025-04-30 RX ORDER — SODIUM CHLORIDE 0.9 % (FLUSH) 0.9 %
10 SYRINGE (ML) INJECTION AS NEEDED
Status: DISCONTINUED | OUTPATIENT
Start: 2025-04-30 | End: 2025-04-30 | Stop reason: HOSPADM

## 2025-04-30 RX ORDER — MAGNESIUM HYDROXIDE 1200 MG/15ML
LIQUID ORAL AS NEEDED
Status: DISCONTINUED | OUTPATIENT
Start: 2025-04-30 | End: 2025-04-30 | Stop reason: HOSPADM

## 2025-04-30 RX ORDER — BISACODYL 10 MG
10 SUPPOSITORY, RECTAL RECTAL DAILY PRN
Status: DISCONTINUED | OUTPATIENT
Start: 2025-04-30 | End: 2025-05-02 | Stop reason: HOSPADM

## 2025-04-30 RX ORDER — ONDANSETRON 4 MG/1
4 TABLET, ORALLY DISINTEGRATING ORAL EVERY 6 HOURS PRN
Status: DISCONTINUED | OUTPATIENT
Start: 2025-04-30 | End: 2025-05-02 | Stop reason: HOSPADM

## 2025-04-30 RX ORDER — FENTANYL CITRATE 50 UG/ML
50 INJECTION, SOLUTION INTRAMUSCULAR; INTRAVENOUS
Status: DISCONTINUED | OUTPATIENT
Start: 2025-04-30 | End: 2025-04-30 | Stop reason: HOSPADM

## 2025-04-30 RX ORDER — SUCCINYLCHOLINE/SOD CL,ISO/PF 200MG/10ML
SYRINGE (ML) INTRAVENOUS AS NEEDED
Status: DISCONTINUED | OUTPATIENT
Start: 2025-04-30 | End: 2025-04-30 | Stop reason: SURG

## 2025-04-30 RX ORDER — AMOXICILLIN 250 MG
2 CAPSULE ORAL 2 TIMES DAILY
Status: DISCONTINUED | OUTPATIENT
Start: 2025-04-30 | End: 2025-05-02 | Stop reason: HOSPADM

## 2025-04-30 RX ORDER — SODIUM CHLORIDE 0.9 % (FLUSH) 0.9 %
3 SYRINGE (ML) INJECTION EVERY 12 HOURS SCHEDULED
Status: DISCONTINUED | OUTPATIENT
Start: 2025-04-30 | End: 2025-05-02 | Stop reason: HOSPADM

## 2025-04-30 RX ORDER — LISINOPRIL 10 MG/1
10 TABLET ORAL DAILY
Status: DISCONTINUED | OUTPATIENT
Start: 2025-04-30 | End: 2025-05-02 | Stop reason: HOSPADM

## 2025-04-30 RX ORDER — FENTANYL CITRATE 50 UG/ML
INJECTION, SOLUTION INTRAMUSCULAR; INTRAVENOUS AS NEEDED
Status: DISCONTINUED | OUTPATIENT
Start: 2025-04-30 | End: 2025-04-30 | Stop reason: SURG

## 2025-04-30 RX ORDER — METOPROLOL SUCCINATE 25 MG/1
12.5 TABLET, EXTENDED RELEASE ORAL DAILY
COMMUNITY

## 2025-04-30 RX ORDER — OXYCODONE AND ACETAMINOPHEN 10; 325 MG/1; MG/1
1 TABLET ORAL EVERY 4 HOURS PRN
Status: DISCONTINUED | OUTPATIENT
Start: 2025-04-30 | End: 2025-04-30 | Stop reason: HOSPADM

## 2025-04-30 RX ORDER — OXYCODONE AND ACETAMINOPHEN 7.5; 325 MG/1; MG/1
1 TABLET ORAL EVERY 4 HOURS PRN
Status: DISCONTINUED | OUTPATIENT
Start: 2025-04-30 | End: 2025-05-02 | Stop reason: HOSPADM

## 2025-04-30 RX ORDER — OXYBUTYNIN CHLORIDE 5 MG/1
5 TABLET ORAL DAILY
COMMUNITY

## 2025-04-30 RX ORDER — ACETAMINOPHEN 160 MG/5ML
650 SOLUTION ORAL EVERY 4 HOURS PRN
Status: DISCONTINUED | OUTPATIENT
Start: 2025-04-30 | End: 2025-05-02 | Stop reason: HOSPADM

## 2025-04-30 RX ORDER — SODIUM CHLORIDE 0.9 % (FLUSH) 0.9 %
10 SYRINGE (ML) INJECTION AS NEEDED
Status: DISCONTINUED | OUTPATIENT
Start: 2025-04-30 | End: 2025-05-02 | Stop reason: HOSPADM

## 2025-04-30 RX ORDER — SODIUM CHLORIDE 9 MG/ML
100 INJECTION, SOLUTION INTRAVENOUS CONTINUOUS
Status: DISPENSED | OUTPATIENT
Start: 2025-04-30 | End: 2025-05-01

## 2025-04-30 RX ORDER — DEXAMETHASONE SODIUM PHOSPHATE 4 MG/ML
4 INJECTION, SOLUTION INTRA-ARTICULAR; INTRALESIONAL; INTRAMUSCULAR; INTRAVENOUS; SOFT TISSUE ONCE AS NEEDED
Status: COMPLETED | OUTPATIENT
Start: 2025-04-30 | End: 2025-04-30

## 2025-04-30 RX ADMIN — SODIUM CHLORIDE, POTASSIUM CHLORIDE, SODIUM LACTATE AND CALCIUM CHLORIDE: 600; 310; 30; 20 INJECTION, SOLUTION INTRAVENOUS at 10:42

## 2025-04-30 RX ADMIN — METOPROLOL TARTRATE 25 MG: 25 TABLET, FILM COATED ORAL at 15:08

## 2025-04-30 RX ADMIN — CEFAZOLIN 2000 MG: 2 INJECTION, POWDER, FOR SOLUTION INTRAMUSCULAR; INTRAVENOUS at 07:40

## 2025-04-30 RX ADMIN — LISINOPRIL 10 MG: 10 TABLET ORAL at 15:08

## 2025-04-30 RX ADMIN — SODIUM CHLORIDE 100 ML/HR: 9 INJECTION, SOLUTION INTRAVENOUS at 15:09

## 2025-04-30 RX ADMIN — Medication 180 MG: at 07:30

## 2025-04-30 RX ADMIN — MIDAZOLAM HYDROCHLORIDE 2 MG: 1 INJECTION, SOLUTION INTRAMUSCULAR; INTRAVENOUS at 07:03

## 2025-04-30 RX ADMIN — SENNOSIDES, DOCUSATE SODIUM 2 TABLET: 50; 8.6 TABLET, FILM COATED ORAL at 20:15

## 2025-04-30 RX ADMIN — ROCURONIUM BROMIDE 10 MG: 10 INJECTION, SOLUTION INTRAVENOUS at 09:35

## 2025-04-30 RX ADMIN — CEFAZOLIN 2000 MG: 2 INJECTION, POWDER, FOR SOLUTION INTRAMUSCULAR; INTRAVENOUS at 22:41

## 2025-04-30 RX ADMIN — HYDROMORPHONE HYDROCHLORIDE 0.5 MG: 1 INJECTION, SOLUTION INTRAMUSCULAR; INTRAVENOUS; SUBCUTANEOUS at 15:08

## 2025-04-30 RX ADMIN — FENTANYL CITRATE 100 MCG: 50 INJECTION, SOLUTION INTRAMUSCULAR; INTRAVENOUS at 08:44

## 2025-04-30 RX ADMIN — ROCURONIUM BROMIDE 20 MG: 10 INJECTION, SOLUTION INTRAVENOUS at 08:25

## 2025-04-30 RX ADMIN — HYDROMORPHONE HYDROCHLORIDE 0.5 MG: 1 INJECTION, SOLUTION INTRAMUSCULAR; INTRAVENOUS; SUBCUTANEOUS at 11:30

## 2025-04-30 RX ADMIN — LIDOCAINE HYDROCHLORIDE 100 MG: 20 INJECTION, SOLUTION EPIDURAL; INFILTRATION; INTRACAUDAL; PERINEURAL at 07:30

## 2025-04-30 RX ADMIN — CEFAZOLIN 2000 MG: 2 INJECTION, POWDER, FOR SOLUTION INTRAMUSCULAR; INTRAVENOUS at 15:08

## 2025-04-30 RX ADMIN — FENTANYL CITRATE 50 MCG: 50 INJECTION, SOLUTION INTRAMUSCULAR; INTRAVENOUS at 11:31

## 2025-04-30 RX ADMIN — SODIUM CHLORIDE, POTASSIUM CHLORIDE, SODIUM LACTATE AND CALCIUM CHLORIDE 1000 ML: 600; 310; 30; 20 INJECTION, SOLUTION INTRAVENOUS at 06:09

## 2025-04-30 RX ADMIN — ROCURONIUM BROMIDE 5 MG: 10 INJECTION, SOLUTION INTRAVENOUS at 07:30

## 2025-04-30 RX ADMIN — OXYCODONE AND ACETAMINOPHEN 1 TABLET: 325; 10 TABLET ORAL at 12:56

## 2025-04-30 RX ADMIN — DEXAMETHASONE SODIUM PHOSPHATE 4 MG: 4 INJECTION, SOLUTION INTRA-ARTICULAR; INTRALESIONAL; INTRAMUSCULAR; INTRAVENOUS; SOFT TISSUE at 07:54

## 2025-04-30 RX ADMIN — ROCURONIUM BROMIDE 45 MG: 10 INJECTION, SOLUTION INTRAVENOUS at 07:50

## 2025-04-30 RX ADMIN — Medication 3 ML: at 15:09

## 2025-04-30 RX ADMIN — DROPERIDOL 0.62 MG: 2.5 INJECTION, SOLUTION INTRAMUSCULAR; INTRAVENOUS at 08:45

## 2025-04-30 RX ADMIN — OXYCODONE HYDROCHLORIDE 20 MG: 20 TABLET, FILM COATED, EXTENDED RELEASE ORAL at 05:57

## 2025-04-30 RX ADMIN — OXYCODONE AND ACETAMINOPHEN 1 TABLET: 325; 10 TABLET ORAL at 22:41

## 2025-04-30 RX ADMIN — OXYCODONE AND ACETAMINOPHEN 1 TABLET: 325; 10 TABLET ORAL at 18:26

## 2025-04-30 RX ADMIN — EMPAGLIFLOZIN 10 MG: 10 TABLET, FILM COATED ORAL at 15:08

## 2025-04-30 RX ADMIN — SERTRALINE HYDROCHLORIDE 25 MG: 50 TABLET ORAL at 15:08

## 2025-04-30 RX ADMIN — PROPOFOL 200 MG: 10 INJECTION, EMULSION INTRAVENOUS at 07:30

## 2025-04-30 RX ADMIN — ACETAMINOPHEN 1000 MG: 500 TABLET, FILM COATED ORAL at 06:57

## 2025-04-30 RX ADMIN — SUGAMMADEX 200 MG: 100 INJECTION, SOLUTION INTRAVENOUS at 10:38

## 2025-04-30 RX ADMIN — Medication 3 ML: at 20:16

## 2025-04-30 RX ADMIN — ONDANSETRON 4 MG: 2 INJECTION INTRAMUSCULAR; INTRAVENOUS at 23:15

## 2025-04-30 RX ADMIN — FENTANYL CITRATE 150 MCG: 50 INJECTION, SOLUTION INTRAMUSCULAR; INTRAVENOUS at 07:26

## 2025-04-30 RX ADMIN — SODIUM CHLORIDE, POTASSIUM CHLORIDE, SODIUM LACTATE AND CALCIUM CHLORIDE 100 ML/HR: 600; 310; 30; 20 INJECTION, SOLUTION INTRAVENOUS at 06:10

## 2025-04-30 RX ADMIN — DEXAMETHASONE SODIUM PHOSPHATE 4 MG: 4 INJECTION, SOLUTION INTRA-ARTICULAR; INTRALESIONAL; INTRAMUSCULAR; INTRAVENOUS; SOFT TISSUE at 06:58

## 2025-04-30 RX ADMIN — FENTANYL CITRATE 100 MCG: 50 INJECTION, SOLUTION INTRAMUSCULAR; INTRAVENOUS at 08:00

## 2025-04-30 NOTE — ANESTHESIA PREPROCEDURE EVALUATION
Anesthesia Evaluation     Patient summary reviewed   history of anesthetic complications:  PONV  NPO Solid Status: > 8 hours  NPO Liquid Status: > 8 hours           Airway   Mallampati: II  TM distance: >3 FB  Neck ROM: full  No difficulty expected  Dental          Pulmonary    (-) asthma, sleep apnea, not a smoker  Cardiovascular   Exercise tolerance: good (4-7 METS)    ECG reviewed  Patient on routine beta blocker and Beta blocker given within 24 hours of surgery    (+) pacemaker (Medtronic) pacemaker, ICD, hypertension, valvular problems/murmurs murmur, hyperlipidemia  (-) past MI, cardiac stents    ROS comment: Surgery for hypertrophic cardiomyopathy- septal myomyectomy          Neuro/Psych  (+) CVA, psychiatric history Anxiety  (-) seizures  GI/Hepatic/Renal/Endo    (+) morbid obesity  (-) liver disease, no renal disease, diabetes    Musculoskeletal     Abdominal   (+) obese   Substance History      OB/GYN          Other                      Anesthesia Plan    ASA 3     general     (Awaiting interrogation report and [facility] clearance. Discussed with Dr. Cook. Patient voices that he saw primary MD on Friday and received medical clearance. [facility] would not make cardiology appointment for clearance for him and stated he didn't meet criteria for this, despite his medical history and device. Have requested nursing assistance to find these. Place magnet on device. Interrogate in recovery. )  intravenous induction     Anesthetic plan, risks, benefits, and alternatives have been provided, discussed and informed consent has been obtained with: patient.

## 2025-04-30 NOTE — PLAN OF CARE
Goal Outcome Evaluation:  Plan of Care Reviewed With: patient, spouse           Outcome Evaluation: OT eval completed. A&O x4, in room with spouse. Pt is s/p ALIF L5-S1. At baseline, pt is independent with adls and fxl mob. Today, pt presents fowlers in bed, 5/10 pain through abdomen and lower back. Educated on spinal precautions and logrolling to get out of bed which he completes with CGA and VCs. Pt fitted with LSO, education provided on use of LSO and how to don/doff. Pt completes t/f with CGa-min A, fxl mob with CGA and is dependent to don socks. OT to cont to see for deficits, D/C rec home with assist. Pt left in recliner with food, call light, nsg notified.    Anticipated Discharge Disposition (OT): home with assist

## 2025-04-30 NOTE — PLAN OF CARE
Goal Outcome Evaluation: patient is admitted from PACU, patient is a&o x 4, THOMPSON, BLE are strong.  DTV, On O2 at 2 L VSS,  in place. C/o pain. Abdominal dressing is CDI, with PWV in place.

## 2025-04-30 NOTE — PLAN OF CARE
Goal Outcome Evaluation:  Plan of Care Reviewed With: patient        Progress: no change  Outcome Evaluation: PT eval completed. Pt alert and oriented x4 with c/o 5/10 pain in abdomen. Pt reports independence at home prior to arrival. Pt edu on spinal precautions and bracing. Pt performs bed mob with CGA and demos dec hip flex ROM due to pain. Pt performs sit to stand t/f and gait training with CGA, utilizing HHA for stability. Pt with episodes of increased pain during gait that reqd standing rest break. Pt left sitting in chair at end of session. Pt will benefit from skilled PT to improve fxl mob, bracing, pain management and independence. Rec d/c home.    Anticipated Discharge Disposition (PT): home with assist

## 2025-04-30 NOTE — THERAPY EVALUATION
Patient Name: Feliberto Knowles  : 1975    MRN: [id number]                              Today's Date: 2025       Admit Date: 2025    Visit Dx:     ICD-10-CM ICD-9-CM   1. Impaired mobility [Z74.09]  Z74.09 799.89     Patient Active Problem List   Diagnosis    Dizziness    Transient ischemic attack (TIA)    Hypertrophic obstructive cardiomyopathy    Essential hypertension, new diagnosis    History of stroke    Infection of prosthetic total shoulder joint    Postoperative infection    Lumbar radiculopathy    Degeneration of intervertebral disc of lumbar region with discogenic back pain and lower extremity pain     Past Medical History:   Diagnosis Date    AICD (automatic cardioverter/defibrillator) present     Anger     Anxiety     Arthritis     Disease of thyroid gland     Heart murmur     Hyperlipidemia     Hypertrophic obstructive cardiomyopathy (HOCM)     Stroke     tia 2017, STM LOSS    Tachycardia     TBI (traumatic brain injury)      Past Surgical History:   Procedure Laterality Date    ANKLE SURGERY Right     APPENDECTOMY      CARDIAC CATHETERIZATION      no stents    CARDIAC DEFIBRILLATOR PLACEMENT      CARDIAC PACEMAKER PLACEMENT      septalmyectomy    CARPAL TUNNEL RELEASE      OTHER SURGICAL HISTORY  2012    septalmyectomy    SHOULDER ARTHROSCOPY W/ LABRAL REPAIR Right 2021    Procedure: RIGHT SHOULDER ARTHROSCOPIC REVISION POSTERIOR  LABRAL REPAIR;  Surgeon: Sandro Monsivais MD;  Location: [facility] OR;  Service: Orthopedics;  Laterality: Right;    TOTAL SHOULDER ARTHROPLASTY W/ DISTAL CLAVICLE EXCISION Right 2021    Procedure: RIGHT REVERSE TOTAL SHOULDER ARTHROPLASTY;  Surgeon: Sandro Monsivais MD;  Location: [facility] OR;  Service: Orthopedics;  Laterality: Right;    TOTAL SHOULDER ARTHROPLASTY W/ DISTAL CLAVICLE EXCISION Right 10/14/2021    Procedure: INCISION AND DRAINAGE RIGHT SHOULDER WITH POLY EXCHANGE;  Surgeon: Sandro Monsivais MD;  Location: [facility] OR;  Service:  Orthopedics;  Laterality: Right;      General Information       Row Name 04/30/25 [facility]1          Physical Therapy Time and Intention    Document Type evaluation  ANTERIOR DECOMPRESSION L4-S1, ARTIFICAL DISC REPLACEMENT L4-5, ANTERIOR LUMBAR INTERBODY FUSION WITH INSTRUMENTATION L5-S1  -SB     Mode of Treatment physical therapy  -SB       Row Name 04/30/25 [facility]4          General Information    Patient Profile Reviewed yes  -SB     Prior Level of Function independent:;all household mobility;ADL's;home management;work  step over tub  -SB     Existing Precautions/Restrictions fall;LSO;spinal;brace worn when out of bed  -SB     Barriers to Rehab none identified  -SB       Row Name 04/30/25 [facility]0          Living Environment    Current Living Arrangements home  -SB     People in Home child(camron), adult;spouse  -SB       Row Name 04/30/25 [facility]9          Home Main Entrance    Number of Stairs, Main Entrance six  -SB     Stair Railings, Main Entrance none  -SB       Row Name 04/30/25 [facility]9          Stairs Within Home, Primary    Number of Stairs, Within Home, Primary none  -SB       Row Name 04/30/25 [facility]0          Cognition    Orientation Status (Cognition) oriented x 4  -SB       Row Name 04/30/25 [facility]0          Safety Issues/Impairments Affecting Functional Mobility    Impairments Affecting Function (Mobility) pain;strength;range of motion (ROM);endurance/activity tolerance  -SB               User Key  (r) = Recorded By, (t) = Taken By, (c) = Cosigned By      Initials Name Provider Type    Taylor Orourke PT DPT Physical Therapist                   Mobility       Row Name 04/30/25 6395          Bed Mobility    Bed Mobility rolling right;sidelying-sit  -SB     Rolling Right [place] (Bed Mobility) standby assist;verbal cues  -SB     Sidelying-Sit [place] (Bed Mobility) contact guard;verbal cues  -SB     Assistive Device (Bed Mobility) head of bed elevated;bed rails  -SB       Row Name 04/30/25 9507          Sit-Stand  Transfer    Sit-Stand [place] (Transfers) minimum assist (75% patient effort);contact guard  -SB     Assistive Device (Sit-Stand Transfers) other (see comments)  HHA  -SB       Row Name 04/30/25 4727          Gait/Stairs (Locomotion)    [place] Level (Gait) standby assist  -SB     Patient was able to Ambulate yes  -SB     Distance in Feet (Gait) 150  -SB     Deviations/Abnormal Patterns (Gait) gait speed decreased  -SB     Bilateral Gait Deviations decreased arm swing  -SB               User Key  (r) = Recorded By, (t) = Taken By, (c) = Cosigned By      Initials Name Provider Type    SB Taylor Cedeño PT DPT Physical Therapist                   Obj/Interventions       Row Name 04/30/25 1083          Range of Motion Comprehensive    General Range of Motion lower extremity range of motion deficits identified  -SB     Comment, General Range of Motion B hip flex imp 25% due to pain  -SB       Row Name 04/30/25 0686          Strength Comprehensive (MMT)    General Manual Muscle Testing (MMT) Assessment lower extremity strength deficits identified  -SB     Comment, General Manual Muscle Testing (MMT) Assessment B hip flex 3-/5, all others 5/5  -SB       Row Name 04/30/25 8297          Balance    Balance Assessment sitting static balance;sitting dynamic balance;standing static balance;standing dynamic balance  -SB     Static Sitting Balance standby assist  -SB     Dynamic Sitting Balance standby assist  -SB     Position, Sitting Balance unsupported;sitting edge of bed  -SB     Static Standing Balance contact guard  -SB     Dynamic Standing Balance contact guard  -SB     Position/Device Used, Standing Balance supported  -SB       Row Name 04/30/25 1470          Sensory Assessment (Somatosensory)    Sensory Assessment (Somatosensory) LE sensation intact  -SB               User Key  (r) = Recorded By, (t) = Taken By, (c) = Cosigned By      Initials Name Provider Type    Taylor Orourke PT DPT Physical Therapist                    Goals/Plan       Row Name 04/30/25 1565          Bed Mobility Goal 1 (PT)    Activity/Assistive Device (Bed Mobility Goal 1, PT) rolling to left;rolling to right;sidelying to sit;sit to sidelying  -SB     [place] Level/Cues Needed (Bed Mobility Goal 1, PT) independent  -SB     Time Frame (Bed Mobility Goal 1, PT) long term goal (LTG)  -SB     Progress/Outcomes (Bed Mobility Goal 1, PT) new goal  -SB       Row Name 04/30/25 7195          Transfer Goal 1 (PT)    Activity/Assistive Device (Transfer Goal 1, PT) sit-to-stand/stand-to-sit;bed-to-chair/chair-to-bed  -SB     [place] Level/Cues Needed (Transfer Goal 1, PT) independent  -SB     Time Frame (Transfer Goal 1, PT) long term goal (LTG)  -SB     Progress/Outcome (Transfer Goal 1, PT) new goal  -SB       Row Name 04/30/25 1095          Gait Training Goal 1 (PT)    Activity/Assistive Device (Gait Training Goal 1, PT) gait (walking locomotion);decrease fall risk;improve balance and speed;increase endurance/gait distance  -SB     [place] Level (Gait Training Goal 1, PT) independent  -SB     Distance (Gait Training Goal 1, PT) 300  -SB     Time Frame (Gait Training Goal 1, PT) long term goal (LTG)  -SB     Progress/Outcome (Gait Training Goal 1, PT) new goal  -SB       Row Name 04/30/25 9795          Stairs Goal 1 (PT)    Activity/Assistive Device (Stairs Goal 1, PT) stairs, all skills;ascending stairs;descending stairs  -SB     [place] Level/Cues Needed (Stairs Goal 1, PT) independent  -SB     Number of Stairs (Stairs Goal 1, PT) 5  -SB     Time Frame (Stairs Goal 1, PT) long term goal (LTG)  -SB     Progress/Outcome (Stairs Goal 1, PT) new goal  -SB       Row Name 04/30/25 4175          Problem Specific Goal 1 (PT)    Problem Specific Goal 1 (PT) Pt will perform mobility with </= 3/10 pain  -SB     Time Frame (Problem Specific Goal 1, PT) long-term goal (LTG)  -SB     Progress/Outcome (Problem Specific Goal 1, PT) new goal  -SB        Row Name 04/30/25 0007          Therapy Assessment/Plan (PT)    Planned Therapy Interventions (PT) balance training;strengthening;bed mobility training;gait training;patient/family education;transfer training;stair training;orthotic fitting/training  -SB               User Key  (r) = Recorded By, (t) = Taken By, (c) = Cosigned By      Initials Name Provider Type    SB Taylor Cedeño, PT DPT Physical Therapist                   Clinical Impression       Row Name 04/30/25 8751          Pain    Pretreatment Pain Rating 5/10  -SB     Posttreatment Pain Rating 5/10  -SB     Pain Location abdomen  -SB     Pain Management Interventions exercise or physical activity utilized;positioning techniques utilized  -SB     Response to Pain Interventions activity participation with tolerable pain  -SB       Row Name 04/30/25 3001          Plan of Care Review    Plan of Care Reviewed With patient  -SB     Progress no change  -SB     Outcome Evaluation PT eval completed. Pt alert and oriented x4 with c/o 5/10 pain in abdomen. Pt reports independence at home prior to arrival. Pt edu on spinal precautions and bracing. Pt performs bed mob with CGA and demos dec hip flex ROM due to pain. Pt performs sit to stand t/f and gait training with CGA, utilizing HHA for stability. Pt with episodes of increased pain during gait that reqd standing rest break. Pt left sitting in chair at end of session. Pt will benefit from skilled PT to improve fxl mob, bracing, pain management and independence. Rec d/c home.  -SB       Row Name 04/30/25 0586          Therapy Assessment/Plan (PT)    Patient/Family Therapy Goals Statement (PT) improve function  -SB     Rehab Potential (PT) good  -SB     Criteria for Skilled Interventions Met (PT) yes;meets criteria;skilled treatment is necessary  -SB     Therapy Frequency (PT) 2 times/day  -SB     Predicted Duration of Therapy Intervention (PT) until d/c or goals met  -SB       Row Name 04/30/25 3272           Positioning and Restraints    Pre-Treatment Position in bed  -SB     Post Treatment Position chair  -SB     In Chair notified nsg;reclined;call light within reach;encouraged to call for assist;with family/caregiver;with brace  -SB               User Key  (r) = Recorded By, (t) = Taken By, (c) = Cosigned By      Initials Name Provider Type    Taylor Orourke, PT DPT Physical Therapist                   Outcome Measures       Row Name 04/30/25 1355 04/30/25 1332       How much help from another person do you currently need...    Turning from your back to your side while in flat bed without using bedrails? 4  -SB 4  -TM    Moving from lying on back to sitting on the side of a flat bed without bedrails? 4  -SB 4  -TM    Moving to and from a bed to a chair (including a wheelchair)? 4  -SB 4  -TM    Standing up from a chair using your arms (e.g., wheelchair, bedside chair)? 4  -SB 4  -TM    Climbing 3-5 steps with a railing? 3  -SB 3  -TM    To walk in hospital room? 3  -SB 3  -TM    AM-PAC 6 Clicks Score (PT) 22  -SB 22  -TM    Highest Level of Mobility Goal 7 --> Walk 25 feet or more  -SB 7 --> Walk 25 feet or more  -TM      Row Name 04/30/25 1400 04/30/25 1355       Functional Assessment    Outcome Measure Options AM-PAC 6 Clicks Daily Activity (OT)  -KP AM-PAC 6 Clicks Basic Mobility (PT)  -SB              User Key  (r) = Recorded By, (t) = Taken By, (c) = Cosigned By      Initials Name Provider Type    TM Allyn Muhammad RN Registered Nurse    Taylor Orourke, PT DPT Physical Therapist    Michell Clark OTR/L Occupational Therapist                                 Physical Therapy Education       Title: PT OT SLP Therapies (In Progress)       Topic: Physical Therapy (In Progress)       Point: Mobility training (Done)       Learning Progress Summary            Patient Acceptance, E VU by MARY at 4/30/2025 9802    Comment: pt edu on POC, benefits of act, d/c plans, spinal precautions, bracing                       Point: Home exercise program (Not Started)       Learner Progress:  Not documented in this visit.              Point: Body mechanics (Not Started)       Learner Progress:  Not documented in this visit.              Point: Precautions (Done)       Learning Progress Summary            Patient Acceptance, E, VU by SB at 4/30/2025 1424    Comment: pt edu on POC, benefits of act, d/c plans, spinal precautions, bracing                                      User Key       Initials Effective Dates Name Provider Type Discipline     07/11/23 -  Taylor Cedeño, PT DPT Physical Therapist PT                  PT Recommendation and Plan  Planned Therapy Interventions (PT): balance training, strengthening, bed mobility training, gait training, patient/family education, transfer training, stair training, orthotic fitting/training  Progress: no change  Outcome Evaluation: PT eval completed. Pt alert and oriented x4 with c/o 5/10 pain in abdomen. Pt reports independence at home prior to arrival. Pt edu on spinal precautions and bracing. Pt performs bed mob with CGA and demos dec hip flex ROM due to pain. Pt performs sit to stand t/f and gait training with CGA, utilizing HHA for stability. Pt with episodes of increased pain during gait that reqd standing rest break. Pt left sitting in chair at end of session. Pt will benefit from skilled PT to improve fxl mob, bracing, pain management and independence. Rec d/c home.     Time Calculation:         PT Charges       Row Name 04/30/25 1533             Time Calculation    Start Time 1355  -SB      Stop Time 1459  -SB      Time Calculation (min) 64 min  -SB      PT Received On 04/30/25  -SB      PT Goal Re-Cert Due Date 05/10/25  -SB         Untimed Charges    PT Eval/Re-eval Minutes 64  -SB         Total Minutes    Untimed Charges Total Minutes 64  -SB       Total Minutes 64  -SB                User Key  (r) = Recorded By, (t) = Taken By, (c) = Cosigned By      Initials Name Provider Type     Taylor Orourke, PT DPT Physical Therapist                  Therapy Charges for Today       Code Description Service Date Service Provider Modifiers Qty    [id number] HC PT EVAL LOW COMPLEXITY 4 4/30/2025 Taylor Cedeño, PT DPT GP 1            PT G-Codes  Outcome Measure Options: AM-PAC 6 Clicks Daily Activity (OT)  AM-PAC 6 Clicks Score (PT): 22  AM-PAC 6 Clicks Score (OT): 18  PT Discharge Summary  Anticipated Discharge Disposition (PT): home with assist    Taylor Cedeño PT DPT  4/30/2025

## 2025-04-30 NOTE — ANESTHESIA POSTPROCEDURE EVALUATION
"Patient: Feliberto Knowles    Procedure Summary       Date: 04/30/25 Room / Location: [facility] OR  /  [facility] OR    Anesthesia Start: 0725 Anesthesia Stop:     Procedures:       ANTERIOR DECOMPRESSION L4-S1, ARTIFICAL DISC REPLACEMENT L4-5, ANTERIOR LUMBAR INTERBODY FUSION WITH INSTRUMENTATION L5-S1 (Spine Lumbar)      ANTERIOR LUMBAR EXPOSURE (Abdomen) Diagnosis: (m54.16)    Surgeons: MICHAEL Cook MD; Clark Guillen DO Provider: Evelio Hallman CRNA    Anesthesia Type: general ASA Status: 3            Anesthesia Type: general    Vitals  Vitals Value Taken Time   /72 04/30/25 13:03   Temp 98.5 °F (36.9 °C) 04/30/25 11:12   Pulse 82 04/30/25 13:06   Resp 12 04/30/25 13:06   SpO2 97 % 04/30/25 13:06           Post Anesthesia Care and Evaluation    Patient location during evaluation: PACU  Patient participation: complete - patient participated  Level of consciousness: awake and awake and alert  Pain score: 0  Pain management: adequate    Airway patency: patent  Anesthetic complications: No anesthetic complications  PONV Status: none  Cardiovascular status: acceptable  Respiratory status: acceptable  Hydration status: acceptable    Comments: Patient discharged according to acceptable Merly score per RN assessment. See nursing records for further information.     Blood pressure 115/65, pulse 78, temperature 98 °F (36.7 °C), temperature source Oral, resp. rate 14, height 170.2 cm (67\"), weight 117 kg (258 lb), SpO2 94%.      "

## 2025-04-30 NOTE — THERAPY EVALUATION
Patient Name: Feliberto Knowles  : 1975    MRN: [id number]                              Today's Date: 2025       Admit Date: 2025    Visit Dx: No diagnosis found.  Patient Active Problem List   Diagnosis    Dizziness    Transient ischemic attack (TIA)    Hypertrophic obstructive cardiomyopathy    Essential hypertension, new diagnosis    History of stroke    Infection of prosthetic total shoulder joint    Postoperative infection    Lumbar radiculopathy    Degeneration of intervertebral disc of lumbar region with discogenic back pain and lower extremity pain     Past Medical History:   Diagnosis Date    AICD (automatic cardioverter/defibrillator) present     Anger     Anxiety     Arthritis     Disease of thyroid gland     Heart murmur     Hyperlipidemia     Hypertrophic obstructive cardiomyopathy (HOCM)     Stroke     tia 2017, STM LOSS    Tachycardia     TBI (traumatic brain injury)      Past Surgical History:   Procedure Laterality Date    ANKLE SURGERY Right     APPENDECTOMY      CARDIAC CATHETERIZATION      no stents    CARDIAC DEFIBRILLATOR PLACEMENT      CARDIAC PACEMAKER PLACEMENT      septalmyectomy    CARPAL TUNNEL RELEASE      OTHER SURGICAL HISTORY  2012    septalmyectomy    SHOULDER ARTHROSCOPY W/ LABRAL REPAIR Right 2021    Procedure: RIGHT SHOULDER ARTHROSCOPIC REVISION POSTERIOR  LABRAL REPAIR;  Surgeon: Sandro Monsivais MD;  Location:  [facility];  Service: Orthopedics;  Laterality: Right;    TOTAL SHOULDER ARTHROPLASTY W/ DISTAL CLAVICLE EXCISION Right 2021    Procedure: RIGHT REVERSE TOTAL SHOULDER ARTHROPLASTY;  Surgeon: Sandro Monsivais MD;  Location:  [facility];  Service: Orthopedics;  Laterality: Right;    TOTAL SHOULDER ARTHROPLASTY W/ DISTAL CLAVICLE EXCISION Right 10/14/2021    Procedure: INCISION AND DRAINAGE RIGHT SHOULDER WITH POLY EXCHANGE;  Surgeon: Sandro Monsivais MD;  Location:  [facility];  Service: Orthopedics;  Laterality: Right;      General Information        Row Name 04/30/25 1400          OT Time and Intention    Document Type evaluation  -     Mode of Treatment occupational therapy  -     Patient Effort good  -KP     Symptoms Noted During/After Treatment increased pain  -KP       Row Name 04/30/25 1400          General Information    Patient Profile Reviewed yes  -KP     Prior Level of Function independent:;all household mobility;transfer;bed mobility;ADL's;home management;driving;work  -     Existing Precautions/Restrictions fall  -     Barriers to Rehab none identified  -       Row Name 04/30/25 1400          Living Environment    Current Living Arrangements home  -     People in Home child(camron), adult;spouse  -KP       Row Name 04/30/25 1400          Home Main Entrance    Number of Stairs, Main Entrance six  -KP     Stair Railings, Main Entrance none  -KP       Row Name 04/30/25 1400          Stairs Within Home, Primary    Number of Stairs, Within Home, Primary none  -KP       Row Name 04/30/25 1400          Cognition    Orientation Status (Cognition) oriented x 4  -KP       Row Name 04/30/25 1400          Safety Issues/Impairments Affecting Functional Mobility    Impairments Affecting Function (Mobility) pain  -KP               User Key  (r) = Recorded By, (t) = Taken By, (c) = Cosigned By      Initials Name Provider Type    KP Michell Urrutia, OTR/L Occupational Therapist                     Mobility/ADL's       Row Name 04/30/25 1400          Bed Mobility    Bed Mobility rolling right;sidelying-sit  -     Rolling Right [place] (Bed Mobility) standby assist;verbal cues  -     Sidelying-Sit [place] (Bed Mobility) contact guard;verbal cues  -     Assistive Device (Bed Mobility) head of bed elevated;bed rails  -       Row Name 04/30/25 1400          Transfers    Transfers sit-stand transfer;stand-sit transfer  -       Row Name 04/30/25 1400          Sit-Stand Transfer    Sit-Stand [place] (Transfers) minimum assist (75% patient  effort);contact guard  -     Assistive Device (Sit-Stand Transfers) other (see comments)  HHA  -       Row Name 04/30/25 1400          Stand-Sit Transfer    Stand-Sit [place] (Transfers) contact guard;minimum assist (75% patient effort)  -     Assistive Device (Stand-Sit Transfers) other (see comments)  HHA  -       Row Name 04/30/25 1400          Functional Mobility    Functional Mobility- Ind. Level contact guard assist;verbal cues required  -KP       Row Name 04/30/25 1400          Activities of Daily Living    BADL Assessment/Intervention lower body dressing;upper body dressing  -KP       Row Name 04/30/25 1400          Lower Body Dressing Assessment/Training    [place] Level (Lower Body Dressing) don;socks;maximum assist (25% patient effort)  -[facility] Name 04/30/25 1400          Upper Body Dressing Assessment/Training    Comment, (Upper Body Dressing) education provided on LSO including donning/doffing, when to wear  -               User Key  (r) = Recorded By, (t) = Taken By, (c) = Cosigned By      Initials Name Provider Type     iMchell Urrutia OTR/L Occupational Therapist                   Obj/Interventions       [facility] Name 04/30/25 1400          Sensory Assessment (Somatosensory)    Sensory Assessment (Somatosensory) UE sensation intact  -     Sensory Assessment numbness/tingling through R hand  -[facility] Name 04/30/25 1400          Vision Assessment/Intervention    Visual Impairment/Limitations WNL  -[facility] Name 04/30/25 1400          Range of Motion Comprehensive    General Range of Motion bilateral upper extremity ROM WFL  -KP       Row Name 04/30/25 1400          Strength Comprehensive (MMT)    General Manual Muscle Testing (MMT) Assessment upper extremity strength deficits identified  -KP       Row Name 04/30/25 1400          Balance    Balance Assessment sitting static balance;sitting dynamic balance;standing static balance;standing dynamic balance  -     Static  Sitting Balance standby assist  -KP     Dynamic Sitting Balance standby assist  -KP     Position, Sitting Balance unsupported;sitting edge of bed  -KP     Static Standing Balance contact guard  -KP     Dynamic Standing Balance contact guard  -KP     Position/Device Used, Standing Balance supported  -KP               User Key  (r) = Recorded By, (t) = Taken By, (c) = Cosigned By      Initials Name Provider Type    Michell Clark, OTR/L Occupational Therapist                   Goals/Plan       Row Name 04/30/25 1400          Transfer Goal 1 (OT)    Activity/Assistive Device (Transfer Goal 1, OT) toilet  -KP     [place] Level/Cues Needed (Transfer Goal 1, OT) supervision required  -KP     Time Frame (Transfer Goal 1, OT) long term goal (LTG);10 days  -KP     Progress/Outcome (Transfer Goal 1, OT) new goal  -       Row Name 04/30/25 1400          Dressing Goal 1 (OT)    Activity/Device (Dressing Goal 1, OT) dressing skills, all;upper body dressing  -KP     [place]/Cues Needed (Dressing Goal 1, OT) modified independence  -KP     Time Frame (Dressing Goal 1, OT) long term goal (LTG);10 days  -KP     Strategies/Barriers (Dressing Goal 1, OT) including LSO  -KP     Progress/Outcome (Dressing Goal 1, OT) new goal  -       Row Name 04/30/25 1400          Toileting Goal 1 (OT)    Activity/Device (Toileting Goal 1, OT) toileting skills, all  -KP     [place] Level/Cues Needed (Toileting Goal 1, OT) supervision required  -KP     Time Frame (Toileting Goal 1, OT) long term goal (LTG);10 days  -KP     Progress/Outcome (Toileting Goal 1, OT) new goal  -       Row Name 04/30/25 1400          Therapy Assessment/Plan (OT)    Planned Therapy Interventions (OT) activity tolerance training;BADL retraining;functional balance retraining;occupation/activity based interventions;patient/caregiver education/training;ROM/therapeutic exercise;strengthening exercise;transfer/mobility retraining  -KP               User Key   (r) = Recorded By, (t) = Taken By, (c) = Cosigned By      Initials Name Provider Type     Michell Urrutia, OTR/L Occupational Therapist                   Clinical Impression       Row Name 04/30/25 1400          Pain Assessment    Pretreatment Pain Rating 5/10  -     Posttreatment Pain Rating 7/10  -     Pain Location abdomen;back  -     Pain Side/Orientation anterior;lower  -KP     Pain Management Interventions exercise or physical activity utilized;positioning techniques utilized  -     Response to Pain Interventions activity participation with increased pain  -       Row Name 04/30/25 1400          Plan of Care Review    Plan of Care Reviewed With patient;spouse  -KP     Outcome Evaluation OT eval completed. A&O x4, in room with spouse. Pt is s/p ALIF L5-S1. At baseline, pt is independent with adls and fxl mob. Today, pt presents fowlers in bed, 5/10 pain through abdomen and lower back. Educated on spinal precautions and logrolling to get out of bed which he completes with CGA and VCs. Pt fitted with LSO, education provided on use of LSO and how to don/doff. Pt completes t/f with CGa-min A, fxl mob with CGA and is dependent to don socks. OT to cont to see for deficits, D/C rec home with assist. Pt left in recliner with food, call light, nsg notified.  -       Row Name 04/30/25 1400          Therapy Assessment/Plan (OT)    Rehab Potential (OT) good  -     Criteria for Skilled Therapeutic Interventions Met (OT) yes;meets criteria  -     Therapy Frequency (OT) 5 times/wk  -     Predicted Duration of Therapy Intervention (OT) 10 days  -       Row Name 04/30/25 1400          Therapy Plan Review/Discharge Plan (OT)    Anticipated Discharge Disposition (OT) home with assist  -       Row Name 04/30/25 1400          Positioning and Restraints    Pre-Treatment Position in bed  -     Post Treatment Position chair  -KP     In Chair notified nsg;reclined;call light within reach;encouraged to call for  assist;with family/caregiver  -               User Key  (r) = Recorded By, (t) = Taken By, (c) = Cosigned By      Initials Name Provider Type    Michell Clark OTR/L Occupational Therapist                   Outcome Measures       Row Name 04/30/25 1400          How much help from another is currently needed...    Putting on and taking off regular lower body clothing? 2  -KP     Bathing (including washing, rinsing, and drying) 2  -KP     Toileting (which includes using toilet bed pan or urinal) 3  -KP     Putting on and taking off regular upper body clothing 3  -KP     Taking care of personal grooming (such as brushing teeth) 4  -KP     Eating meals 4  -KP     AM-PAC 6 Clicks Score (OT) 18  -KP       Row Name 04/30/25 1332          How much help from another person do you currently need...    Turning from your back to your side while in flat bed without using bedrails? 4  -TM     Moving from lying on back to sitting on the side of a flat bed without bedrails? 4  -TM     Moving to and from a bed to a chair (including a wheelchair)? 4  -TM     Standing up from a chair using your arms (e.g., wheelchair, bedside chair)? 4  -TM     Climbing 3-5 steps with a railing? 3  -TM     To walk in hospital room? 3  -TM     AM-PAC 6 Clicks Score (PT) 22  -TM     Highest Level of Mobility Goal 7 --> Walk 25 feet or more  -TM       Row Name 04/30/25 1400 04/30/25 1355       Functional Assessment    Outcome Measure \[facility] [place] [facility]\"" AM-PAC 6 Clicks Daily Activity (OT)  - AM-PAC 6 Clicks Basic Mobility (PT)  -SB              User Key  (r) = Recorded By, (t) = Taken By, (c) = Cosigned By      Initials Name Provider Type    TM Allyn Muhammad RN Registered Nurse    Taylor Orourke, PT DPT Physical Therapist    Michell Clark OTR/L Occupational Therapist                    Occupational Therapy Education       Title: PT OT SLP Therapies (In Progress)       Topic: Occupational Therapy (Done)       Point: ADL training (Done)        Learning Progress Summary            Patient Acceptance, E,D, VU,DU by  at 4/30/2025 1508   Significant Other Acceptance, E,D, VU,DU by  at 4/30/2025 1508                      Point: Precautions (Done)       Learning Progress Summary            Patient Acceptance, E,D, VU,DU by  at 4/30/2025 1508   Significant Other Acceptance, E,D, VU,DU by  at 4/30/2025 1508                      Point: Body mechanics (Done)       Learning Progress Summary            Patient Acceptance, E,D, VU,DU by  at 4/30/2025 1508   Significant Other Acceptance, E,D, VU,DU by  at 4/30/2025 1508                                      User Key       Initials Effective Dates Name Provider Type Discipline     10/08/24 -  Michell Urrutia OTR/L Occupational Therapist OT                  OT Recommendation and Plan  Planned Therapy Interventions (OT): activity tolerance training, BADL retraining, functional balance retraining, occupation/activity based interventions, patient/caregiver education/training, ROM/therapeutic exercise, strengthening exercise, transfer/mobility retraining  Therapy Frequency (OT): 5 times/wk  Plan of Care Review  Plan of Care Reviewed With: patient, spouse  Outcome Evaluation: OT eval completed. A&O x4, in room with spouse. Pt is s/p ALIF L5-S1. At baseline, pt is independent with adls and fxl mob. Today, pt presents fowlers in bed, 5/10 pain through abdomen and lower back. Educated on spinal precautions and logrolling to get out of bed which he completes with CGA and VCs. Pt fitted with LSO, education provided on use of LSO and how to don/doff. Pt completes t/f with CGa-min A, fxl mob with CGA and is dependent to don socks. OT to cont to see for deficits, D/C rec home with assist. Pt left in recliner with food, call light, nsg notified.     Time Calculation:         Time Calculation- OT       Row Name 04/30/25 1400             Time Calculation- OT    OT Start Time 1400  -      OT Stop Time 1500  -      OT  Time Calculation (min) 60 min  -KP      OT Received On 04/30/25  -KP      OT Goal Re-Cert Due Date 05/10/25  -KP         Untimed Charges    OT Eval/Re-eval Minutes 60  -KP         Total Minutes    Untimed Charges Total Minutes 60  -KP       Total Minutes 60  -KP                User Key  (r) = Recorded By, (t) = Taken By, (c) = Cosigned By      Initials Name Provider Type     Michell Urrutia OTR/L Occupational Therapist                  Therapy Charges for Today       Code Description Service Date Service Provider Modifiers Qty    [id number] HC OT EVAL LOW COMPLEXITY 4 4/30/2025 Michell Urrutia OTR/L GO 1                 Michell Urrutia OTR/SUELLEN  4/30/2025

## 2025-04-30 NOTE — PAYOR COMM NOTE
"ADMIT INPT 4-30-25    Feliberto Stoner (49 y.o. Male)       Date of Birth   1975    Social Security Number       Address   [address] [place], [place] [address]    Home Phone   [phone]    MRN   [id number]       Alevism   Christianity    Marital Status                               Admission Date   4/30/2025    Admission Type   Elective    Admitting Provider   MICHAEL Cook MD    Attending Provider   MICHAEL Cook MD    Department, Room/Bed   [facility] 3A, 352/1       Discharge Date       Discharge Disposition       Discharge Destination                                 Attending Provider: MICHAEL Cook MD    Allergies: No Known Allergies    Isolation: None   Infection: None   Code Status: CPR    Ht: 170.2 cm (67\")   Wt: 117 kg (258 lb)    Admission Cmt: None   Principal Problem: Lumbar radiculopathy [M54.16]                   Active Insurance as of 4/30/2025       Primary Coverage       Payor Plan Insurance Group Employer/Plan Group    [facility] [id number]        Payor Plan Address Payor Plan Phone Number Payor Plan Fax Number Effective Dates    [address] [phone]  12/28/2021 - None Entered    [facility] [id number]         Subscriber Name Subscriber Birth Date Member ID       FELIBERTO STONER 1975                      Emergency Contacts        (Rel.) Home Phone Work Phone Mobile Phone    Janet Stoner (Spouse) -- -- [phone]                 History & Physical        MICHAEL Cook MD at 04/30/25 0706          The office history and physical exam was reviewed.  There are no changes.  Proceed with surgery as planned.    Electronically signed by MICHAEL Cook MD at 04/30/25 0706       Facility-Administered Medications as of 4/30/2025   Medication Dose Route Frequency Provider Last Rate Last Admin    acetaminophen (TYLENOL) tablet 650 mg  650 mg Oral Q4H PRN MICHAEL Cook MD        Or    " acetaminophen (TYLENOL) 160 MG/5ML oral solution 650 mg  650 mg Oral Q4H PRN MICHAEL Cook MD        Or    acetaminophen (TYLENOL) suppository 650 mg  650 mg Rectal Q4H PRN MICHAEL Cook MD        [COMPLETED] acetaminophen (TYLENOL) tablet 1,000 mg  1,000 mg Oral Once Alma Delia Cleaning MD   1,000 mg at 04/30/25 0657    sennosides-docusate (PERICOLACE) 8.6-50 MG per tablet 2 tablet  2 tablet Oral BID MICHAEL Cook MD        And    polyethylene glycol (MIRALAX) packet 17 g  17 g Oral Daily PRN MICHAEL Cook MD        And    bisacodyl (DULCOLAX) EC tablet 5 mg  5 mg Oral Daily PRN MICHAEL Cook MD        And    bisacodyl (DULCOLAX) suppository 10 mg  10 mg Rectal Daily PRN MICHAEL Cook MD        [COMPLETED] ceFAZolin 2000 mg IVPB in 100 mL NS (MBP)  2,000 mg Intravenous Once MICHAEL Cook MD   2,000 mg at 04/30/25 0740    ceFAZolin 2000 mg IVPB in 100 mL NS (MBP)  2,000 mg Intravenous Q8H MICHAEL Cook MD   2,000 mg at 04/30/25 1508    cyclobenzaprine (FLEXERIL) tablet 5 mg  5 mg Oral TID PRN MICHAEL Cook MD        [COMPLETED] dexAMETHasone (DECADRON) injection 4 mg  4 mg Intravenous Once PRN Alma Delia Cleaning MD   4 mg at 04/30/25 0658    empagliflozin (JARDIANCE) tablet 10 mg  10 mg Oral Daily MICHAEL Cook MD   10 mg at 04/30/25 1508    HYDROmorphone (DILAUDID) injection 0.5 mg  0.5 mg Intravenous Q3H PRN MICHAEL Cook MD   0.5 mg at 04/30/25 1508    And    naloxone (NARCAN) injection 0.4 mg  0.4 mg Intravenous Q5 Min PRN MICHAEL Cook MD        lactated ringers infusion 1,000 mL  1,000 mL Intravenous Continuous MICHAEL Cook MD        lactated ringers infusion 1,000 mL  1,000 mL Intravenous Continuous MICHAEL Cook MD 25 mL/hr at 04/30/25 0609 New Bag at 04/30/25 1042    lactated ringers infusion  100 mL/hr Intravenous Continuous Alma Delia Cleaning MD        [START ON 5/1/2025] levothyroxine (SYNTHROID,  "LEVOTHROID) tablet 25 mcg  25 mcg Oral Q AM MICHAEL Cook MD        lisinopril (PRINIVIL,ZESTRIL) tablet 10 mg  10 mg Oral Daily MICHAEL Cook MD   10 mg at 04/30/25 1508    metoprolol tartrate (LOPRESSOR) tablet 25 mg  25 mg Oral Daily MICHAEL Cook MD   25 mg at 04/30/25 1508    ondansetron ODT (ZOFRAN-ODT) disintegrating tablet 4 mg  4 mg Oral Q6H PRN MICHAEL Cook MD        Or    ondansetron (ZOFRAN) injection 4 mg  4 mg Intravenous Q6H PRN MICHAEL Cook MD        [COMPLETED] oxyCODONE ER (oxyCONTIN) 12 hr tablet 20 mg  20 mg Oral Once MICHAEL Cook MD   20 mg at 04/30/25 0557    oxyCODONE-acetaminophen (PERCOCET)  MG per tablet 1 tablet  1 tablet Oral Q4H PRN MICHAEL Cook MD   1 tablet at 04/30/25 1826    oxyCODONE-acetaminophen (PERCOCET) 7.5-325 MG per tablet 1 tablet  1 tablet Oral Q4H PRN MICHAEL Cook MD        sertraline (ZOLOFT) tablet 25 mg  25 mg Oral Daily MICHAEL Cook MD   25 mg at 04/30/25 1508    sodium chloride 0.9 % flush 10 mL  10 mL Intravenous PRN MICHAEL Cook MD        sodium chloride 0.9 % flush 3 mL  3 mL Intravenous Q12H MICHAEL Cook MD   3 mL at 04/30/25 1509    sodium chloride 0.9 % infusion 40 mL  40 mL Intravenous PRN MICHAEL Cook MD        sodium chloride 0.9 % infusion  100 mL/hr Intravenous Continuous MICHAEL Cook  mL/hr at 04/30/25 1509 100 mL/hr at 04/30/25 1509     Orders (all)        Start     Ordered    05/01/25 0600  levothyroxine (SYNTHROID, LEVOTHROID) tablet 25 mcg  Every Early Morning         04/30/25 1337    05/01/25 0600  CBC & Differential  Morning Draw         04/30/25 1337    05/01/25 0600  Basic Metabolic Panel  Morning Draw         04/30/25 1337    04/30/25 2100  sennosides-docusate (PERICOLACE) 8.6-50 MG per tablet 2 tablet  2 Times Daily        Placed in \"And\" Linked Group    04/30/25 1337    04/30/25 1800  Ambulate Patient 3 times daily and PRN as tolerated.  3 Times " Daily        Comments: PRN as tolerated.    25 1337    25 1545  ceFAZolin 2000 mg IVPB in 100 mL NS (MBP)  Every 8 Hours         25 1337    25 1535  PT Plan of Care Cert / Re-Cert  Once        Comments: Physical Therapy Plan of Care  Initial Certification  Certification Period: 2025 - 2025    Patient Name: Feliberto Knowles  : 1975    (Z74.09) Impaired mobility [Z74.09]  (primary encounter diagnosis)                  Assessment  PT Assessment  Rehab Potential (PT): good  Criteria for Skilled Interventions Met (PT): yes, meets criteria, skilled treatment is necessary         PT Rehab Goals     Row Name 25 1355             Bed Mobility Goal 1 (PT)    Activity/Assistive Device (Bed Mobility Goal 1, PT) rolling to   left;rolling to right;sidelying to sit;sit to sidelying  -SB      [place] Level/Cues Needed (Bed Mobility Goal 1, PT) independent  -SB        Time Frame (Bed Mobility Goal 1, PT) long term goal (LTG)  -SB      Progress/Outcomes (Bed Mobility Goal 1, PT) new goal  -SB         Transfer Goal 1 (PT)    Activity/Assistive Device (Transfer Goal 1, PT)   sit-to-stand/stand-to-sit;bed-to-chair/chair-to-bed  -SB      [place] Level/Cues Needed (Transfer Goal 1, PT) independent  -SB      Time Frame (Transfer Goal 1, PT) long term goal (LTG)  -SB      Progress/Outcome (Transfer Goal 1, PT) new goal  -SB         Gait Training Goal 1 (PT)    Activity/Assistive Device (Gait Training Goal 1, PT) gait (walking   locomotion);decrease fall risk;improve balance and speed;increase   endurance/gait distance  -SB      [place] Level (Gait Training Goal 1, PT) independent  -SB      Distance (Gait Training Goal 1, PT) 300  -SB      Time Frame (Gait Training Goal 1, PT) long term goal (LTG)  -SB      Progress/Outcome (Gait Training Goal 1, PT) new goal  -SB         Stairs Goal 1 (PT)    Activity/Assistive Device (Stairs Goal 1, PT) stairs, all   skills;ascending  stairs;descending stairs  -SB      [place] Level/Cues Needed (Stairs Goal 1, PT) independent  -SB      Number of Stairs (Stairs Goal 1, PT) 5  -SB      Time Frame (Stairs Goal 1, PT) long term goal (LTG)  -SB      Progress/Outcome (Stairs Goal 1, PT) new goal  -SB         Problem Specific Goal 1 (PT)    Problem Specific Goal 1 (PT) Pt will perform mobility with </= 3/10 pain    -SB      Time Frame (Problem Specific Goal 1, PT) long-term goal (LTG)  -SB      Progress/Outcome (Problem Specific Goal 1, PT) new goal  -SB            User Key  (r) = Recorded By, (t) = Taken By, (c) = Cosigned By    Initials Name Provider Type Discipline    Taylor Orourke PT DPT Physical Therapist PT               PT OP Goals     Row Name 04/30/25 1533          Time Calculation    PT Goal Re-Cert Due Date 05/10/25  -SB           User Key  (r) = Recorded By, (t) = Taken By, (c) = Cosigned By    Initials Name Provider Type    Taylor Orourke PT DPT Physical Therapist                  Plan  PT Plan  Therapy Frequency (PT): 2 times/day  Predicted Duration of Therapy Intervention (PT): until d/c or goals met  Planned Therapy Interventions (PT): balance training, strengthening, bed mobility training, gait training, patient/family education, transfer training, stair training, orthotic fitting/training  Outcome Evaluation: PT eval completed. Pt alert and oriented x4 with c/o 5/10 pain in abdomen. Pt reports independence at home prior to arrival. Pt edu on spinal precautions and bracing. Pt performs bed mob with CGA and demos dec hip flex ROM due to pain. Pt performs sit to stand t/f and gait training with CGA, utilizing HHA for stability. Pt with episodes of increased pain during gait that reqd standing rest break. Pt left sitting in chair at end of session. Pt will benefit from skilled PT to improve fxl mob, bracing, pain management and independence. Rec d/c home.        Taylor Cedeño PT DPT  4/30/2025            By cosigning this  order, either electronically or physically, I certify that the therapy services are furnished while this patient is under my care, the services outline above are required by this patient, and will be reviewed every 90 days.        M.D.:__________________________________________ Date: ______________    25 1534    25 1510  OT Plan of Care Cert / Re-Cert  Once        Comments: Occupational Therapy Plan of Care  Initial Certification  Certification Period: 2025 - 2025    Patient Name: Feliberto Knowles  : 1975    No diagnosis found.                Assessment  OT Assessment  Rehab Potential (OT): good  Criteria for Skilled Therapeutic Interventions Met (OT): yes, meets criteria         OT Rehab Goals     Row Name 25 1400             Transfer Goal 1 (OT)    Activity/Assistive Device (Transfer Goal 1, OT) toilet  -KP      [place] Level/Cues Needed (Transfer Goal 1, OT) supervision required    -KP      Time Frame (Transfer Goal 1, OT) long term goal (LTG);10 days  -KP      Progress/Outcome (Transfer Goal 1, OT) new goal  -KP         Dressing Goal 1 (OT)    Activity/Device (Dressing Goal 1, OT) dressing skills, all;upper body   dressing  -KP      [place]/Cues Needed (Dressing Goal 1, OT) modified independence  -KP        Time Frame (Dressing Goal 1, OT) long term goal (LTG);10 days  -KP      Strategies/Barriers (Dressing Goal 1, OT) including LSO  -KP      Progress/Outcome (Dressing Goal 1, OT) new goal  -KP         Toileting Goal 1 (OT)    Activity/Device (Toileting Goal 1, OT) toileting skills, all  -KP      [place] Level/Cues Needed (Toileting Goal 1, OT) supervision   required  -KP      Time Frame (Toileting Goal 1, OT) long term goal (LTG);10 days  -KP      Progress/Outcome (Toileting Goal 1, OT) new goal  -KP            User Key  (r) = Recorded By, (t) = Taken By, (c) = Cosigned By    Initials Name Provider Type Discipline    Michell Clark, OTR/L Occupational  Therapist OT               OT Goals     Row Name 04/30/25 1400          Time Calculation    OT Goal Re-Cert Due Date 05/10/25  -CHRISTIANA           User Key  (r) = Recorded By, (t) = Taken By, (c) = Cosigned By    Initials Name Provider Type    Michell Clark, OTR/L Occupational Therapist                Plan    OT Plan  Therapy Frequency (OT): 5 times/wk  Predicted Duration of Therapy Intervention (OT): 10 days  Outcome Evaluation: OT eval completed. A&O x4, in room with spouse. Pt is s/p ALIF L5-S1. At baseline, pt is independent with adls and fxl mob. Today, pt presents fowlers in bed, 5/10 pain through abdomen and lower back. Educated on spinal precautions and logrolling to get out of bed which he completes with CGA and VCs. Pt fitted with LSO, education provided on use of LSO and how to don/doff. Pt completes t/f with CGa-min A, fxl mob with CGA and is dependent to don socks. OT to cont to see for deficits, D/C rec home with assist. Pt left in recliner with food, call light, nsg notified.      Michell Urrutia OTR/L  4/30/2025        By cosigning this order, either electronically or physically, I certify that the therapy services are furnished while this patient is under my care, the services outline above are required by this patient, and will be reviewed every 90 days.        M.D.:__________________________________________ Date: ______________    04/30/25 1509    04/30/25 1430  empagliflozin (JARDIANCE) tablet 10 mg  Daily         04/30/25 1337    04/30/25 1430  lisinopril (PRINIVIL,ZESTRIL) tablet 10 mg  Daily         04/30/25 1337    04/30/25 1430  metoprolol tartrate (LOPRESSOR) tablet 25 mg  Daily         04/30/25 1337    04/30/25 1430  sertraline (ZOLOFT) tablet 25 mg  Daily         04/30/25 1337    04/30/25 1430  sodium chloride 0.9 % flush 3 mL  Every 12 Hours Scheduled         04/30/25 1337    04/30/25 1430  sodium chloride 0.9 % infusion  Continuous         04/30/25 1337    04/30/25 1400  Incentive  Spirometry  Every 2 Hours While Awake       04/30/25 1337    04/30/25 1338  Vital Signs  Per Hospital Policy/Protocol         04/30/25 1337    04/30/25 1338  Notify Provider (Standard Adult Parameters)  Until Discontinued         04/30/25 1337    04/30/25 1338  Saline Lock IV With Adequate PO Intake  Continuous         04/30/25 1337    04/30/25 1338  Advance Diet As Tolerated -  Until Discontinued         04/30/25 1337    04/30/25 1338  Oxygen Therapy- Nasal Cannula; Titrate 1-6 LPM Per SpO2; 90 - 95%  Continuous         04/30/25 1337    04/30/25 1338  Continuous Pulse Oximetry  Continuous         04/30/25 1337    04/30/25 1338  PT Consult: Eval & Treat  Once         04/30/25 1337    04/30/25 1338  OT Consult: Eval & Treat  Once         04/30/25 1337    04/30/25 1338  Insert Peripheral IV  Once         04/30/25 1337    04/30/25 1338  Saline Lock & Maintain IV Access  Continuous         04/30/25 1337    04/30/25 1338  Code Status and Medical Interventions: CPR (Attempt to Resuscitate); Full Support  Continuous         04/30/25 1337    04/30/25 1338  Place Sequential Compression Device  Once         04/30/25 1337    04/30/25 1338  Maintain Sequential Compression Device  Continuous         04/30/25 1337    04/30/25 1338  Bracing Equipment Communication Sitting at Edge of Bed; Spinal; Lumbo-Sacral (LSO); Not Applicable; When out of Bed, Patient is Allowed to be up to the Bathroom and Participate with Therapy before Brace Delivery  Once         04/30/25 1337    04/30/25 1338  Diet: Regular/House; Fluid Consistency: Thin (IDDSI 0)  Diet Effective Now         04/30/25 1337    04/30/25 1337  cyclobenzaprine (FLEXERIL) tablet 5 mg  3 Times Daily PRN         04/30/25 1337    04/30/25 1337  sodium chloride 0.9 % flush 10 mL  As Needed         04/30/25 1337    04/30/25 1337  sodium chloride 0.9 % infusion 40 mL  As Needed         04/30/25 1337    04/30/25 1337  ondansetron ODT (ZOFRAN-ODT) disintegrating tablet 4 mg  Every 6  "Hours PRN        Placed in \"Or\" Linked Group    04/30/25 1337    04/30/25 1337  ondansetron (ZOFRAN) injection 4 mg  Every 6 Hours PRN        Placed in \"Or\" Linked Group    04/30/25 1337    04/30/25 1337  acetaminophen (TYLENOL) tablet 650 mg  Every 4 Hours PRN        Placed in \"Or\" Linked Group    04/30/25 1337    04/30/25 1337  acetaminophen (TYLENOL) 160 MG/5ML oral solution 650 mg  Every 4 Hours PRN        Placed in \"Or\" Linked Group    04/30/25 1337    04/30/25 1337  acetaminophen (TYLENOL) suppository 650 mg  Every 4 Hours PRN        Placed in \"Or\" Linked Group    04/30/25 1337    04/30/25 1337  oxyCODONE-acetaminophen (PERCOCET) 7.5-325 MG per tablet 1 tablet  Every 4 Hours PRN         04/30/25 1337    04/30/25 1337  oxyCODONE-acetaminophen (PERCOCET)  MG per tablet 1 tablet  Every 4 Hours PRN         04/30/25 1337    04/30/25 1337  HYDROmorphone (DILAUDID) injection 0.5 mg  Every 3 Hours PRN        Placed in \"And\" Linked Group    04/30/25 1337    04/30/25 1337  naloxone (NARCAN) injection 0.4 mg  Every 5 Minutes PRN        Placed in \"And\" Linked Group    04/30/25 1337    04/30/25 1337  polyethylene glycol (MIRALAX) packet 17 g  Daily PRN        Placed in \"And\" Linked Group    04/30/25 1337    04/30/25 1337  bisacodyl (DULCOLAX) EC tablet 5 mg  Daily PRN        Placed in \"And\" Linked Group    04/30/25 1337    04/30/25 1337  bisacodyl (DULCOLAX) suppository 10 mg  Daily PRN        Placed in \"And\" Linked Group    04/30/25 1337    04/30/25 1114  POC Glucose STAT  STAT        Comments: Post op Glucose Check on All Diabetic Patients, Notify Anesthesia if Blood Sugar is Less Than 80 mg/dL or Greater Than 250mg/dL      04/30/25 1113    04/30/25 1114  Vital signs every 5 minutes for 15 minutes, every 15 minutes thereafter.  Once         04/30/25 1113    04/30/25 1114  Call Anesthesiologist for additional IV Fluid bolus for Hypotension/Tachycardia  Continuous         04/30/25 1113    04/30/25 1114  Notify " Anesthesia of Any Acute Changes in Patient Condition  Until Discontinued         04/30/25 1113 04/30/25 1114  Notify Anesthesia for Unrelieved Pain  Until Discontinued         04/30/25 1113 04/30/25 1114  Once DC criteria to floor met, follow surgeon's orders.  Until Discontinued         04/30/25 1113 04/30/25 1114  Discharge patient from PACU when discharge criteria is met.  Until Discontinued         04/30/25 1113 04/30/25 1113  HYDROmorphone (DILAUDID) injection 0.5 mg  Every 10 Minutes PRN,   Status:  Discontinued         04/30/25 1113 04/30/25 1113  fentaNYL citrate (PF) (SUBLIMAZE) injection 50 mcg  Every 10 Minutes PRN,   Status:  Discontinued         04/30/25 1113 04/30/25 1113  naloxone (NARCAN) injection 0.04 mg  As Needed,   Status:  Discontinued         04/30/25 1113 04/30/25 1113  flumazenil (ROMAZICON) injection 0.2 mg  As Needed,   Status:  Discontinued         04/30/25 1113 04/30/25 1113  ondansetron (ZOFRAN) injection 4 mg  Every 15 Minutes PRN,   Status:  Discontinued         04/30/25 1113 04/30/25 1113  labetalol (NORMODYNE,TRANDATE) injection 5 mg  Every 5 Minutes PRN,   Status:  Discontinued         04/30/25 1113 04/30/25 1113  atropine sulfate injection 0.5 mg  Once As Needed,   Status:  Discontinued         04/30/25 1113 04/30/25 1113  Apply warming blanket  As Needed,   Status:  Canceled      Comments: For a recorded temp of <36.9 C    04/30/25 1113 04/30/25 1113  oxyCODONE-acetaminophen (PERCOCET)  MG per tablet 1 tablet  Every 4 Hours PRN,   Status:  Discontinued         04/30/25 1113    04/30/25 1042  Inpatient Admission  Once         04/30/25 1041    04/30/25 0900  sodium chloride 0.9 % flush 10 mL  Every 12 Hours Scheduled,   Status:  Discontinued         04/30/25 0532    04/30/25 0900  sodium chloride 0.9 % flush 3 mL  Every 12 Hours Scheduled,   Status:  Discontinued         04/30/25 0647    04/30/25 0808  thrombin topical  As Needed,   Status:   Discontinued         04/30/25 0808    04/30/25 0808  sodium chloride 0.9 % solution  As Needed,   Status:  Discontinued         04/30/25 0808    04/30/25 0807  sodium chloride (NS) irrigation solution  As Needed,   Status:  Discontinued         04/30/25 0808    04/30/25 0704  Device Interrogation. Company to contact? [employer] [phone]  Once         04/30/25 0703    04/30/25 0653  XR Spine Lumbar AP & Lateral  1 Time Imaging         04/30/25 0653    04/30/25 0653  FL C Arm During Surgery  1 Time Imaging         04/30/25 0653    04/30/25 0653  XR Abdomen KUB  1 Time Imaging         04/30/25 0653    04/30/25 0649  lactated ringers infusion  Continuous         04/30/25 0647    04/30/25 0649  acetaminophen (TYLENOL) tablet 1,000 mg  Once         04/30/25 0647    04/30/25 0647  dexAMETHasone (DECADRON) injection 4 mg  Once As Needed         04/30/25 0647    04/30/25 0646  Oxygen Therapy- Nasal Cannula; Titrate 1-6 LPM Per SpO2; 90 - 95%  Continuous,   Status:  Canceled         04/30/25 0647    04/30/25 0646  Continuous Pulse Oximetry  Continuous,   Status:  Canceled         04/30/25 0647    04/30/25 0646  Insert Peripheral IV  Once         04/30/25 0647    04/30/25 0646  Saline Lock & Maintain IV Access  Continuous,   Status:  Canceled         04/30/25 0647    04/30/25 0646  Oxygen Therapy- Nasal Cannula; Titrate 1-6 LPM Per SpO2; 90 - 95%  Continuous,   Status:  Canceled         04/30/25 0647    04/30/25 0645  Vital Signs - Per Anesthesia Protocol  As Needed,   Status:  Canceled       04/30/25 0647    04/30/25 0645  POC Glucose PRN  As Needed,   Status:  Canceled      Comments: Notify Anesthesiologist if Blood Sugar Greater Than 200      04/30/25 0647    04/30/25 0645  sodium chloride 0.9 % flush 3-10 mL  As Needed,   Status:  Discontinued         04/30/25 0647    04/30/25 0645  sodium chloride 0.9 % infusion 40 mL  As Needed,   Status:  Discontinued         04/30/25 0647    04/30/25 0645  Midazolam HCl (PF)  (VERSED) injection 2 mg  Every 10 Minutes PRN,   Status:  Discontinued         04/30/25 0647    04/30/25 0535  lactated ringers infusion 1,000 mL  Continuous         04/30/25 0533    04/30/25 0535  lactated ringers infusion 1,000 mL  Continuous         04/30/25 0533    04/30/25 0534  ceFAZolin 2000 mg IVPB in 100 mL NS (MBP)  Once         04/30/25 0532    04/30/25 0534  oxyCODONE ER (oxyCONTIN) 12 hr tablet 20 mg  Once         04/30/25 0532    04/30/25 0534  Type & Screen  STAT         04/30/25 0533    04/30/25 0533  Insert Peripheral IV  Once         04/30/25 0532    04/30/25 0533  Saline Lock & Maintain IV Access  Continuous,   Status:  Canceled         04/30/25 0532    04/30/25 0533  Insert Peripheral IV  Once         04/30/25 0533    04/30/25 0533  Maintain IV Access  Continuous,   Status:  Canceled         04/30/25 0533 04/30/25 0533  Please Insert a Second Peripheral IV If Indicated For Procedure (All DaVinci Cases, Gastric Bypass, Sleeve Surgery, AAA, Any Vascular Bypass, Carotid, Sigmoidectomy, Colectomy (Lap Assisted), Colon Resection, Nissen, Exploratory Laparotomy, Spinal...  Once        Comments: Please Insert a Second Peripheral IV If Indicated For Procedure (All DaVinci Cases, Gastric Bypass, Sleeve Surgery, AAA, Any Vascular Bypass, Carotid, Sigmoidectomy, Colectomy (Lap Assisted), Colon Resection, Nissen, Exploratory Laparotomy, Spinal Fusion, Craniotomy, Thoracotomy, CABG, TAVR, Valve Surgery or Mediastinoscopy, Femoral Endarterectomy, Carotid Endarterectomy, Anterior Lumbar Exposure, or all Aneurysm Repairs    04/30/25 0533    04/30/25 0533  Insert Peripheral IV  Once         04/30/25 0533    04/30/25 0533  Maintain IV Access  Continuous,   Status:  Canceled         04/30/25 0533    04/30/25 0532  sodium chloride 0.9 % flush 10 mL  As Needed,   Status:  Discontinued         04/30/25 0533 04/30/25 0532  lidocaine PF 1% (XYLOCAINE) injection 0.5 mL  Once As Needed,   Status:  Discontinued          04/30/25 0533    04/30/25 0532  sodium chloride 0.9 % flush 3 mL  As Needed,   Status:  Discontinued         04/30/25 0533    04/30/25 0532  sodium chloride 0.9 % flush 10 mL  As Needed,   Status:  Discontinued         04/30/25 0532    04/30/25 0532  sodium chloride 0.9 % infusion 40 mL  As Needed,   Status:  Discontinued         04/30/25 0532    04/30/25 0532  lactated ringers infusion  Continuous PRN,   Status:  Discontinued         04/30/25 0532    04/30/25 0000  Cardiology Scan  Once         04/30/25 0000    Unscheduled  Apply Ice to Affected Area / Incision As Needed For Pain or Swelling  As Needed       04/30/25 1337    Unscheduled  Up in Chair  As Needed       04/30/25 1337    Signed and Held  Insert Indwelling Urinary Catheter  Once        Comments: Follow Protocol As Outlined in Process Instructions (Open Order Report to View Full Instructions)    Signed and Held    Signed and Held  Assess Need for Indwelling Urinary Catheter - Follow Removal Protocol  Continuous        Comments: Follow Protocol As Outlined in Process Instructions (Open Order Report to View Full Instructions)    Signed and Held    Signed and Held  Urinary Catheter Care  Every Shift       Signed and Held    Signed and Held  Oxygen Therapy- Nasal Cannula; Titrate 1-6 LPM Per SpO2; 90 - 95%  Continuous         Signed and Held    Signed and Held  Continuous Pulse Oximetry  Continuous         Signed and Held    --  metoprolol succinate XL (TOPROL-XL) 25 MG 24 hr tablet  Daily         04/30/25 1359    --  tadalafil (CIALIS) 20 MG tablet  Every 7 Days         04/30/25 1401    --  oxybutynin (DITROPAN) 5 MG tablet  Daily         04/30/25 1403    --  naloxone (NARCAN) 4 MG/0.1ML nasal spray  As Needed         04/30/25 1403    --  tiZANidine (ZANAFLEX) 4 MG tablet  Nightly PRN         04/30/25 1403    --  gabapentin (NEURONTIN) 300 MG capsule  3 Times Daily         04/30/25 1403                     Operative/Procedure Notes (all)        MICHAEL Cook  MD Bret at 04/30/25 0650  Version 1 of 1         LUMBAR ANTERIOR INTERBODY FUSION  Procedure Note    Feliberto Knowles  4/30/2025    Pre-op Diagnosis:    1. Increasing chronic low back pain    2. Occasional right buttock, thigh, and leg radiculopathy    3. Degenerative disc disease L4-S1   4. Mild facet arthropathy L4-S1    5. Retrolisthesis L5-S1    6. 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 Obesity, BMI 38.74     Post-op Diagnosis:    same    Procedure/CPT® Codes:     1. Anterior discectomy decompression with bilateral neural foraminotomy L4-5, L5-S1  2. Artificial disc replacement L4-5 (Centinel Prodisc L)  3. Anterior lumbar interbody fusion L5-S1  4. Use of titanium interbody biomechanical device for fusion L5-S1 (ATEC titanium spacer)  5. Use of allograft bone matrix for fusion L5-S1 (Induce NMP Fibers)  6. Use of fluoroscopy for confirmation of surgical level, placement implants and instrumentation  7. Intraoperative neural monitoring    Spinal Surgery Levels Completed:2 Levels      Anesthesia: General     Surgeon: ROMELIA Cook MD     Co Surgeon: Dr. Clark Guillen D.O.     Assistant: Brendon Bateman PA-C     Estimated Blood Loss: 100 mL     Complications: None     Condition: Stable to PACU.     Indications:     The patient is a 49-year-old who sees Dr. Nakul Hdz at the [facility] for medical issues.  He presented to the office with increasing chronic low back pain along with occasional right buttock, thigh, and leg radiculopathy.  Imaging studies revealed degenerative disc disease from L4-S1 associated with mild facet arthropathy.  He was noted to have retrolisthesis at L5-S1.  There were chronic disc herniations at both levels with posterior annular tearing.    After failing all conservative measures, it was mutually decided that surgery would be the best option.  Risks, benefits, and complications of surgery were discussed in detail. The patient appeared  well informed and wished to proceed. We specifically discussed the risk of infection, blood loss, nerve root injury, CSF leak, and the possibility of incomplete resolution of symptoms. We also discussed the possible risk of a nonunion and the potential need for additional surgery in the event of a pseudoarthrosis or hardware failure.    Operative Procedure:     After obtaining informed consent and verifying the correct operative levels, the patient was brought to the operating room and placed supine on an operating table. A general anesthetic was provided by the anesthesia service with the assistance of an endotracheal tube. Once this was appropriately positioned and secured, the anterior abdominal region was prepped and draped in the usual sterile fashion. A surgical timeout was taken to confirm this was the correct patient, we were working at the correct levels, and that preoperative antibiotics were given in a timely fashion.     At this point, Dr. Clark Guillen D.O. provided vascular access to the L4-5 and L5-S1 levels through a left sided anterior retroperitoneal approach. Please see his separate dictated operative report regarding the details on the approach itself.  When I entered the procedure, self retaining retractors were already in position with excellent exposure of the L4-5 and L5-S1 disc spaces.    After confirming we were at the correct levels using fluoroscopy, I used a long handle 10 blade scalpel to first cut into the L5-S1 disc space. A Ramon elevator was used to remove disc material off of the endplates. Disc material was retrieved using pituitaries and Kerrisons. A disc space distractor was then placed into the L5-S1 disc space and I used curettes to remove posterior disc material. Kerrisons were used to remove posterior osteophytes across the endplates of L5 and S1. There was high-grade stenosis centrally but also foraminally. I then performed a bilateral neural foraminotomy with Mitulrislakeisha  and curettes. The decompression was much more involved than what is usually required for an anterior lumbar interbody fusion by itself and required significantly more time to perform.  This was due to the severe disc space collapse and high-grade foraminal stenosis.    After the decompression was completed bilaterally and centrally, I used a series of endplate scrapers to prepare the endplates for interbody fusion. Trial spacers were then malleted into position and it was felt that a medium 18 mm titanium spacer with 20 degrees lordosis from the ATEC instrumentation set would be the best fit to restore disc height also restoring foraminal height and providing some indirect decompression. The disc space was then thoroughly irrigated with saline solution.  Gelfoam powder with thrombin was used to control epidural bleeding.    A medium 18 mm titanium spacer from the ATEC instrumentation set was then packed as tightly as possible with allograft bone matrix called Induce NMP Fibers. This spacer was then malleted into the L5-S1 disc space under fluoroscopic guidance. It was placed as an interbody biomechanical device to assist with fusion.  I then placed an 8.5 x 25 mm graft bolt through the spacer into S1 as well as two 5.0 x 30 mm screws through the spacer into L5. The screws were placed to help maintain position of the spacer and prevent migration. After confirming the implants were properly positioned and L5-S1, attention was then turned to L4-5.    At this point, Dr. Guillen adjusted the self-retaining retractors for better exposure of the L4-5 segment.  The L4-5 disc space was then decompressed and prepared in an identical fashion as L5-S1.  Again there was stenosis centrally and foraminally which required decompression much more involved than an ALIF procedure by itself.    After the decompression was completed bilaterally and centrally, I used a series of endplate scrapers to prepare the endplates for artificial  disc replacement implants.  Trial spacers were then malleted into position from the Prodisc L instrumentation set.  It was felt that a medium 10 mm implant with 11 degrees of lordosis was the best fit.  The keel cutting blade was then used over the trial implant and malleted into the vertebral bodies under fluoroscopic guidance.  The trial was then removed without difficulty.  Bleeding from the keel cuts was controlled using thrombin and Gelfoam powder.    An implant was then sterilely delivered to the field matching the correct trial size.  This implant was then malleted into the L4-5 disc space under fluoroscopic guidance.  The polyethylene spacer was then passed through the insertion device into its correct position between the implant endplates as directed by the Prodisc system.  After ensuring the polyethylene spacer was properly positioned, the insertion device was disengaged from the implant. Fluoroscopy was used to confirm adequate position of the implants in both the AP and lateral projections.     We then inspected the entire operative field for any signs of bleeding.  Bleeding was once again controlled using thrombin with Gelfoam powder and bipolar cautery.  Once we ensured that adequate hemostasis was accomplished, final fluoroscopy imaging was taken to confirm adequate position of our implants. There was excellent restoration of disc height and creation of lordosis with implants adequately positioned at both L4-5 and L5-S1.     A VersaWrap sheet was placed over the anterior instrumentation at L5-S1 to decrease the risk of postoperative adhesions and scarring.     Please see Dr. Clark Guillen's separate dictated operative report regarding the closure of the wound. Once this was accomplished, the patient was extubated and sent to the recovery room in good stable condition. We estimated blood loss to be approximately 300 mL and the patient remained hemodynamically stable during the procedure.       Clark Guillen D.O. provided vascular access to the L4-5 and L5-S1 levels and acted as a co-surgeon in this fashion.  Brendon Bateman PA-C provided critical assistance during the decompression at both L4-5 and L5-S1 as well as during the placement of the titanium spacer, bone graft and instrumentation to obtain a fusion across L5-S1 as well as the decompression and placement of the artificial disc replacement implants at L4-5.    ROMELIA Cook MD     Date: 4/30/2025  Time: 10:43 CDT    Electronically signed by MICHAEL Cook MD at 04/30/25 1043       Clark Guillen DO at 04/30/25 0801  Version 1 of 1         Feliberto Knowles  4/30/2025     PREOPERATIVE DIAGNOSIS:   1. Increasing chronic low back pain    2. Occasional right buttock, thigh, and leg radiculopathy    3. Degenerative disc disease L4-S1   4. Mild facet arthropathy L4-S1    5. Retrolisthesis L5-S1    6. 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 Obesity, BMI 38.74      POSTOPERATIVE DIAGNOSIS: same     PROCEDURE PERFORMED:   1.  Anterior lumbar retroperitoneal exposure of L4-L5 and L5-S1  2.  Artificial disc replacement of L4-L5 and anterior lumbar interbody fusion of L5-S1 with instrumentation  3.  Placement of a 13 cm Prevena wound VAC     SURGEON: Clark Guillen DO   COSURGEON: Bret Cook MD     ANESTHESIA: General.    PREPARATION: Routine.    STAFF: Circulator: Ollie Delacruz RN  Scrub Person: Chantal Powell; Debbi Escudero; Lissette Brown CST; Radha Chaves  Vendor Representative: Talat Coyle Matt  Assistant: Jesus Bateman PA-C    ESTIMATED BLOOD LOSS: 100 mL    SPECIMENS: None    COMPLICATIONS: None    INDICATIONS: Feliberto Knowles is a 49 y.o. male who you are currently following for chronic back pain.  Feliberto Knowlesis scheduled for artificial disc replacement of L4-5 and anterior lumbar interbody fusion of L5-S1 with Dr. Cook on 4/30/25.  The patient denies any  history of DVT.  The indications, risks, and possible complications of the procedure were explained to the patient, who voiced understanding and wished to proceed with surgery.     PROCEDURE IN DETAIL:   The patient was taken to the operating room and placed on the operating table in a supine position. After general anesthesia was obtained, the abdomen was prepped and draped in a sterile manner.  A paramedian incision was then made in the left lower quadrant.  Careful dissection was made down through the subcutaneous tissues using the Bovie cautery to ensure hemostasis.  Any crossing veins were ligated with 3-0 silk suture and hemoclips.  The rectus fascia was identified.  It was incised with the Bovie cautery.  Kocher clamps are placed on each side of the rectus fascia and subfascial planes were established in a medial and lateral  direction.  Once the subfascial planes were established the attention was then turned to the left rectus muscle.  Blunt mobilization was made of the left rectus muscle including its blood supply medially and to the right.  Once it was fully mobilized the attention was then turned laterally to the peritoneal reflection.  Entrance into the retroperitoneal space was established with the use of a sponge stick and the Bovie cautery.  Continued blunt mobilization was made with my hand using a finger sweeping motion moving the peritoneal contents and sacral fat pad medially and to the right.  Once it was fully mobilized the Brau-Delacruz retractor system was set up.  The retractor blades were set in place.  The left iliac vein was then carefully dissected free and placed safely behind the retractor blade.  The sacral vessels were carefully taken down with 0 silk suture and hemoclips.  Next, the left iliac artery was retracted laterally and the iliolumbar vein was identified.  It was ligated with hemoclips.  Continued dissection was made back toward the iliac bifurcation freeing up the distal   IVC and bifurcation fully.  The retractor blades were then carefully placed to expose the L4-L5 disc space.  The next part of the case will be dictated by Dr. Cook.  The retractors were then rearranged to now expose of the L5-S1 disc space.  The next part of the case will be dictated by Dr. Cook.  Upon completion of Dr. Cook's part of the case the wound bed was irrigated with antibiotic saline and hemostasis was observed. VersaWrap was carefully placed over the L5-S1 disc space repair.  The retractor blades were carefully taken out one at a time.  The structures were then placed back in their normal anatomic positions.  The rectus fascia was then closed with a #1 PDS in a running fashion to meet in the midline.  The deep layers were closed with (2) 2-0 Vicryls in a running fashion.  The subcutaneous layers were closed with a 3-0 Vicryl in a running fashion.  The skin was then reapproximated using a 4-0 Monocryl in a subcuticular fashion.  The wound was then cleaned.  A 13 cm Prevena wound VAC was applied. The patient tolerated the procedure well. Sponge and needle counts were correct. The patient was then awakened and extubated in the operating room and taken to the recovery room in good condition.    Clark Guillen DO    Date: 4/30/2025 Time: 11:06 CDT    Electronically signed by Clark Guillen DO at 04/30/25 1111

## 2025-04-30 NOTE — OP NOTE
Feliberto Knowles  4/30/2025     PREOPERATIVE DIAGNOSIS:   1. Increasing chronic low back pain    2. Occasional right buttock, thigh, and leg radiculopathy    3. Degenerative disc disease L4-S1   4. Mild facet arthropathy L4-S1    5. Retrolisthesis L5-S1    6. 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 Obesity, BMI 38.74      POSTOPERATIVE DIAGNOSIS: same     PROCEDURE PERFORMED:   1.  Anterior lumbar retroperitoneal exposure of L4-L5 and L5-S1  2.  Artificial disc replacement of L4-L5 and anterior lumbar interbody fusion of L5-S1 with instrumentation  3.  Placement of a 13 cm Prevena wound VAC     SURGEON: Clark Guillen DO   COSURGEON: Bret Cook MD     ANESTHESIA: General.    PREPARATION: Routine.    STAFF: Circulator: Ollie Delacruz RN  Scrub Person: Chantal Powell; Debbi Escudero; Lissette Brown CST; Radha Chaves  Vendor Representative: Gregorio Coyle; Scotty Uribe  Assistant: Jesus Bateman PA-C    ESTIMATED BLOOD LOSS: 100 mL    SPECIMENS: None    COMPLICATIONS: None    INDICATIONS: Feliberto Knowles is a 49 y.o. male who you are currently following for chronic back pain.  Feliberto Knowlesis scheduled for artificial disc replacement of L4-5 and anterior lumbar interbody fusion of L5-S1 with Dr. Cook on 4/30/25.  The patient denies any history of DVT.  The indications, risks, and possible complications of the procedure were explained to the patient, who voiced understanding and wished to proceed with surgery.     PROCEDURE IN DETAIL:   The patient was taken to the operating room and placed on the operating table in a supine position. After general anesthesia was obtained, the abdomen was prepped and draped in a sterile manner.  A paramedian incision was then made in the left lower quadrant.  Careful dissection was made down through the subcutaneous tissues using the Bovie cautery to ensure hemostasis.  Any crossing veins were ligated with 3-0 silk suture  and hemoclips.  The rectus fascia was identified.  It was incised with the Bovie cautery.  Kocher clamps are placed on each side of the rectus fascia and subfascial planes were established in a medial and lateral  direction.  Once the subfascial planes were established the attention was then turned to the left rectus muscle.  Blunt mobilization was made of the left rectus muscle including its blood supply medially and to the right.  Once it was fully mobilized the attention was then turned laterally to the peritoneal reflection.  Entrance into the retroperitoneal space was established with the use of a sponge stick and the Bovie cautery.  Continued blunt mobilization was made with my hand using a finger sweeping motion moving the peritoneal contents and sacral fat pad medially and to the right.  Once it was fully mobilized the Enoc-Delacruz retractor system was set up.  The retractor blades were set in place.  The left iliac vein was then carefully dissected free and placed safely behind the retractor blade.  The sacral vessels were carefully taken down with 0 silk suture and hemoclips.  Next, the left iliac artery was retracted laterally and the iliolumbar vein was identified.  It was ligated with hemoclips.  Continued dissection was made back toward the iliac bifurcation freeing up the distal  IVC and bifurcation fully.  The retractor blades were then carefully placed to expose the L4-L5 disc space.  The next part of the case will be dictated by Dr. Cook.  The retractors were then rearranged to now expose of the L5-S1 disc space.  The next part of the case will be dictated by Dr. Cook.  Upon completion of Dr. Cook's part of the case the wound bed was irrigated with antibiotic saline and hemostasis was observed. VersaWrap was carefully placed over the L5-S1 disc space repair.  The retractor blades were carefully taken out one at a time.  The structures were then placed back in their normal anatomic  positions.  The rectus fascia was then closed with a #1 PDS in a running fashion to meet in the midline.  The deep layers were closed with (2) 2-0 Vicryls in a running fashion.  The subcutaneous layers were closed with a 3-0 Vicryl in a running fashion.  The skin was then reapproximated using a 4-0 Monocryl in a subcuticular fashion.  The wound was then cleaned.  A 13 cm Prevena wound VAC was applied. The patient tolerated the procedure well. Sponge and needle counts were correct. The patient was then awakened and extubated in the operating room and taken to the recovery room in good condition.    Clark Guillen,     Date: 4/30/2025 Time: 11:06 CDT

## 2025-04-30 NOTE — ANESTHESIA PROCEDURE NOTES
Airway  Reason: elective    Date/Time: 4/30/2025 7:30 AM  Airway not difficult    General Information and Staff    Patient location during procedure: OR    Indications and Patient Condition  Indications for airway management: airway protection    Preoxygenated: yes  MILS maintained throughout    Mask difficulty assessment: 0 - not attempted    Final Airway Details    Final airway type: endotracheal airway      Successful airway: ETT  Cuffed: yes   Successful intubation technique: video laryngoscopy  Adjuncts used in placement: intubating stylet  Endotracheal tube insertion site: oral  Blade: Pinzon  Blade size: 4  ETT size (mm): 7.5  Cormack-Lehane Classification: grade I - full view of glottis  Placement verified by: chest auscultation and capnometry   Cuff volume (mL): 8  Measured from: teeth  ETT/EBT  to teeth (cm): 21  Number of attempts at approach: 1  Assessment: lips, teeth, and gum same as pre-op and atraumatic intubation

## 2025-04-30 NOTE — OP NOTE
LUMBAR ANTERIOR INTERBODY FUSION  Procedure Note    Feliberto Knowles  4/30/2025    Pre-op Diagnosis:    1. Increasing chronic low back pain    2. Occasional right buttock, thigh, and leg radiculopathy    3. Degenerative disc disease L4-S1   4. Mild facet arthropathy L4-S1    5. Retrolisthesis L5-S1    6. 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 Obesity, BMI 38.74     Post-op Diagnosis:    same    Procedure/CPT® Codes:     1. Anterior discectomy decompression with bilateral neural foraminotomy L4-5, L5-S1  2. Artificial disc replacement L4-5 ([employer] Prodisc L)  3. Anterior lumbar interbody fusion L5-S1  4. Use of titanium interbody biomechanical device for fusion L5-S1 (ATEC titanium spacer)  5. Use of allograft bone matrix for fusion L5-S1 (Induce NMP Fibers)  6. Use of fluoroscopy for confirmation of surgical level, placement implants and instrumentation  7. Intraoperative neural monitoring    Spinal Surgery Levels Completed:2 Levels      Anesthesia: General     Surgeon: ROMELIA Cook MD     Co Surgeon: Dr. Clark Guillen D.O.     Assistant: Brendon Bateman PA-C     Estimated Blood Loss: 100 mL     Complications: None     Condition: Stable to PACU.     Indications:     The patient is a 49-year-old who sees Dr. Nakul Hdz at the [facility] for medical issues.  He presented to the office with increasing chronic low back pain along with occasional right buttock, thigh, and leg radiculopathy.  Imaging studies revealed degenerative disc disease from L4-S1 associated with mild facet arthropathy.  He was noted to have retrolisthesis at L5-S1.  There were chronic disc herniations at both levels with posterior annular tearing.    After failing all conservative measures, it was mutually decided that surgery would be the best option.  Risks, benefits, and complications of surgery were discussed in detail. The patient appeared well informed and wished to proceed. We specifically  discussed the risk of infection, blood loss, nerve root injury, CSF leak, and the possibility of incomplete resolution of symptoms. We also discussed the possible risk of a nonunion and the potential need for additional surgery in the event of a pseudoarthrosis or hardware failure.    Operative Procedure:     After obtaining informed consent and verifying the correct operative levels, the patient was brought to the operating room and placed supine on an operating table. A general anesthetic was provided by the anesthesia service with the assistance of an endotracheal tube. Once this was appropriately positioned and secured, the anterior abdominal region was prepped and draped in the usual sterile fashion. A surgical timeout was taken to confirm this was the correct patient, we were working at the correct levels, and that preoperative antibiotics were given in a timely fashion.     At this point, Dr. Clark Guillen D.O. provided vascular access to the L4-5 and L5-S1 levels through a left sided anterior retroperitoneal approach. Please see his separate dictated operative report regarding the details on the approach itself.  When I entered the procedure, self retaining retractors were already in position with excellent exposure of the L4-5 and L5-S1 disc spaces.    After confirming we were at the correct levels using fluoroscopy, I used a long handle 10 blade scalpel to first cut into the L5-S1 disc space. A Ramon elevator was used to remove disc material off of the endplates. Disc material was retrieved using pituitaries and Kerrisons. A disc space distractor was then placed into the L5-S1 disc space and I used curettes to remove posterior disc material. Kerrisons were used to remove posterior osteophytes across the endplates of L5 and S1. There was high-grade stenosis centrally but also foraminally. I then performed a bilateral neural foraminotomy with Kerrisons and curettes. The decompression was much more involved  than what is usually required for an anterior lumbar interbody fusion by itself and required significantly more time to perform.  This was due to the severe disc space collapse and high-grade foraminal stenosis.    After the decompression was completed bilaterally and centrally, I used a series of endplate scrapers to prepare the endplates for interbody fusion. Trial spacers were then malleted into position and it was felt that a medium 18 mm titanium spacer with 20 degrees lordosis from the ATEC instrumentation set would be the best fit to restore disc height also restoring foraminal height and providing some indirect decompression. The disc space was then thoroughly irrigated with saline solution.  Gelfoam powder with thrombin was used to control epidural bleeding.    A medium 18 mm titanium spacer from the ATEC instrumentation set was then packed as tightly as possible with allograft bone matrix called Induce NMP Fibers. This spacer was then malleted into the L5-S1 disc space under fluoroscopic guidance. It was placed as an interbody biomechanical device to assist with fusion.  I then placed an 8.5 x 25 mm graft bolt through the spacer into S1 as well as two 5.0 x 30 mm screws through the spacer into L5. The screws were placed to help maintain position of the spacer and prevent migration. After confirming the implants were properly positioned and L5-S1, attention was then turned to L4-5.    At this point, Dr. Guillen adjusted the self-retaining retractors for better exposure of the L4-5 segment.  The L4-5 disc space was then decompressed and prepared in an identical fashion as L5-S1.  Again there was stenosis centrally and foraminally which required decompression much more involved than an ALIF procedure by itself.    After the decompression was completed bilaterally and centrally, I used a series of endplate scrapers to prepare the endplates for artificial disc replacement implants.  Trial spacers were then  malleted into position from the Prodisc L instrumentation set.  It was felt that a medium 10 mm implant with 11 degrees of lordosis was the best fit.  The keel cutting blade was then used over the trial implant and malleted into the vertebral bodies under fluoroscopic guidance.  The trial was then removed without difficulty.  Bleeding from the keel cuts was controlled using thrombin and Gelfoam powder.    An implant was then sterilely delivered to the field matching the correct trial size.  This implant was then malleted into the L4-5 disc space under fluoroscopic guidance.  The polyethylene spacer was then passed through the insertion device into its correct position between the implant endplates as directed by the Prodisc system.  After ensuring the polyethylene spacer was properly positioned, the insertion device was disengaged from the implant. Fluoroscopy was used to confirm adequate position of the implants in both the AP and lateral projections.     We then inspected the entire operative field for any signs of bleeding.  Bleeding was once again controlled using thrombin with Gelfoam powder and bipolar cautery.  Once we ensured that adequate hemostasis was accomplished, final fluoroscopy imaging was taken to confirm adequate position of our implants. There was excellent restoration of disc height and creation of lordosis with implants adequately positioned at both L4-5 and L5-S1.     A VersaWrap sheet was placed over the anterior instrumentation at L5-S1 to decrease the risk of postoperative adhesions and scarring.     Please see Dr. Clark Guillen's separate dictated operative report regarding the closure of the wound. Once this was accomplished, the patient was extubated and sent to the recovery room in good stable condition. We estimated blood loss to be approximately 300 mL and the patient remained hemodynamically stable during the procedure.     Dr. Clark Guillen D.O. provided vascular access to the  L4-5 and L5-S1 levels and acted as a co-surgeon in this fashion.  Brendon Bateman PA-C provided critical assistance during the decompression at both L4-5 and L5-S1 as well as during the placement of the titanium spacer, bone graft and instrumentation to obtain a fusion across L5-S1 as well as the decompression and placement of the artificial disc replacement implants at L4-5.    ROMELIA Cook MD     Date: 4/30/2025  Time: 10:43 CDT

## 2025-05-01 LAB
ANION GAP SERPL CALCULATED.3IONS-SCNC: 8 MMOL/L (ref 5–15)
BASOPHILS # BLD AUTO: 0.06 10*3/MM3 (ref 0–0.2)
BASOPHILS NFR BLD AUTO: 0.3 % (ref 0–1.5)
BUN SERPL-MCNC: 16 MG/DL (ref 6–20)
BUN/CREAT SERPL: 14.8 (ref 7–25)
CALCIUM SPEC-SCNC: 8.2 MG/DL (ref 8.6–10.5)
CHLORIDE SERPL-SCNC: 102 MMOL/L (ref 98–107)
CO2 SERPL-SCNC: 23 MMOL/L (ref 22–29)
CREAT SERPL-MCNC: 1.08 MG/DL (ref 0.76–1.27)
DEPRECATED RDW RBC AUTO: 38.5 FL (ref 37–54)
EGFRCR SERPLBLD CKD-EPI 2021: 84.6 ML/MIN/1.73
EOSINOPHIL # BLD AUTO: 0.02 10*3/MM3 (ref 0–0.4)
EOSINOPHIL NFR BLD AUTO: 0.1 % (ref 0.3–6.2)
ERYTHROCYTE [DISTWIDTH] IN BLOOD BY AUTOMATED COUNT: 12.7 % (ref 12.3–15.4)
GLUCOSE SERPL-MCNC: 113 MG/DL (ref 65–99)
HCT VFR BLD AUTO: 44.4 % (ref 37.5–51)
HGB BLD-MCNC: 14.9 G/DL (ref 13–17.7)
IMM GRANULOCYTES # BLD AUTO: 0.16 10*3/MM3 (ref 0–0.05)
IMM GRANULOCYTES NFR BLD AUTO: 0.8 % (ref 0–0.5)
LYMPHOCYTES # BLD AUTO: 1.31 10*3/MM3 (ref 0.7–3.1)
LYMPHOCYTES NFR BLD AUTO: 6.7 % (ref 19.6–45.3)
MCH RBC QN AUTO: 28.4 PG (ref 26.6–33)
MCHC RBC AUTO-ENTMCNC: 33.6 G/DL (ref 31.5–35.7)
MCV RBC AUTO: 84.6 FL (ref 79–97)
MONOCYTES # BLD AUTO: 1.47 10*3/MM3 (ref 0.1–0.9)
MONOCYTES NFR BLD AUTO: 7.5 % (ref 5–12)
NEUTROPHILS NFR BLD AUTO: 16.5 10*3/MM3 (ref 1.7–7)
NEUTROPHILS NFR BLD AUTO: 84.6 % (ref 42.7–76)
NRBC BLD AUTO-RTO: 0 /100 WBC (ref 0–0.2)
PLATELET # BLD AUTO: 248 10*3/MM3 (ref 140–450)
PMV BLD AUTO: 9.4 FL (ref 6–12)
POTASSIUM SERPL-SCNC: 4 MMOL/L (ref 3.5–5.2)
RBC # BLD AUTO: 5.25 10*6/MM3 (ref 4.14–5.8)
SODIUM SERPL-SCNC: 133 MMOL/L (ref 136–145)
WBC NRBC COR # BLD AUTO: 19.52 10*3/MM3 (ref 3.4–10.8)

## 2025-05-01 PROCEDURE — 97116 GAIT TRAINING THERAPY: CPT

## 2025-05-01 PROCEDURE — 25010000002 ONDANSETRON PER 1 MG: Performed by: ORTHOPAEDIC SURGERY

## 2025-05-01 PROCEDURE — 85025 COMPLETE CBC W/AUTO DIFF WBC: CPT | Performed by: ORTHOPAEDIC SURGERY

## 2025-05-01 PROCEDURE — 97535 SELF CARE MNGMENT TRAINING: CPT | Performed by: OCCUPATIONAL THERAPIST

## 2025-05-01 PROCEDURE — 25010000002 HYDROMORPHONE PER 4 MG: Performed by: ORTHOPAEDIC SURGERY

## 2025-05-01 PROCEDURE — 80048 BASIC METABOLIC PNL TOTAL CA: CPT | Performed by: ORTHOPAEDIC SURGERY

## 2025-05-01 RX ADMIN — OXYCODONE AND ACETAMINOPHEN 1 TABLET: 325; 10 TABLET ORAL at 05:02

## 2025-05-01 RX ADMIN — POLYETHYLENE GLYCOL 3350 17 G: 17 POWDER, FOR SOLUTION ORAL at 20:02

## 2025-05-01 RX ADMIN — OXYCODONE AND ACETAMINOPHEN 1 TABLET: 325; 10 TABLET ORAL at 19:19

## 2025-05-01 RX ADMIN — LISINOPRIL 10 MG: 10 TABLET ORAL at 08:48

## 2025-05-01 RX ADMIN — ONDANSETRON 4 MG: 2 INJECTION INTRAMUSCULAR; INTRAVENOUS at 05:02

## 2025-05-01 RX ADMIN — CYCLOBENZAPRINE HYDROCHLORIDE 5 MG: 10 TABLET, FILM COATED ORAL at 01:44

## 2025-05-01 RX ADMIN — EMPAGLIFLOZIN 10 MG: 10 TABLET, FILM COATED ORAL at 08:48

## 2025-05-01 RX ADMIN — HYDROMORPHONE HYDROCHLORIDE 0.5 MG: 1 INJECTION, SOLUTION INTRAMUSCULAR; INTRAVENOUS; SUBCUTANEOUS at 06:07

## 2025-05-01 RX ADMIN — HYDROMORPHONE HYDROCHLORIDE 0.5 MG: 1 INJECTION, SOLUTION INTRAMUSCULAR; INTRAVENOUS; SUBCUTANEOUS at 10:18

## 2025-05-01 RX ADMIN — OXYCODONE AND ACETAMINOPHEN 1 TABLET: 325; 10 TABLET ORAL at 14:02

## 2025-05-01 RX ADMIN — SENNOSIDES, DOCUSATE SODIUM 2 TABLET: 50; 8.6 TABLET, FILM COATED ORAL at 08:48

## 2025-05-01 RX ADMIN — SENNOSIDES, DOCUSATE SODIUM 2 TABLET: 50; 8.6 TABLET, FILM COATED ORAL at 20:02

## 2025-05-01 RX ADMIN — CYCLOBENZAPRINE HYDROCHLORIDE 5 MG: 10 TABLET, FILM COATED ORAL at 16:45

## 2025-05-01 RX ADMIN — SERTRALINE HYDROCHLORIDE 25 MG: 50 TABLET ORAL at 08:48

## 2025-05-01 RX ADMIN — HYDROMORPHONE HYDROCHLORIDE 0.5 MG: 1 INJECTION, SOLUTION INTRAMUSCULAR; INTRAVENOUS; SUBCUTANEOUS at 01:44

## 2025-05-01 RX ADMIN — Medication 3 ML: at 08:49

## 2025-05-01 RX ADMIN — OXYCODONE AND ACETAMINOPHEN 1 TABLET: 325; 10 TABLET ORAL at 09:14

## 2025-05-01 RX ADMIN — CYCLOBENZAPRINE HYDROCHLORIDE 5 MG: 10 TABLET, FILM COATED ORAL at 09:14

## 2025-05-01 RX ADMIN — LEVOTHYROXINE SODIUM 25 MCG: 0.03 TABLET ORAL at 05:02

## 2025-05-01 RX ADMIN — METOPROLOL TARTRATE 25 MG: 25 TABLET, FILM COATED ORAL at 08:48

## 2025-05-01 NOTE — PROGRESS NOTES
LIVIER Bateman PA-C   Progress Note        Subjective/Overnight Events:  Difficulty with pain control, ambulating, voiding, tolerating PO    Vitals  Vitals:    04/30/25 1920 04/30/25 2245 05/01/25 0355 05/01/25 0855   BP: 99/57 116/61 116/60 129/63   BP Location: Left arm Right arm Right arm Right arm   Patient Position: Lying Lying Lying Lying   Pulse: 80 88 74 86   Resp: 16 16 18 18   Temp: 97.7 °F (36.5 °C) 97.7 °F (36.5 °C) 97.5 °F (36.4 °C) 97.7 °F (36.5 °C)   TempSrc: Oral Oral Oral Oral   SpO2: 94% 95% 98% 98%   Weight:       Height:           Current Facility-Administered Medications   Medication Dose Route Frequency Provider Last Rate Last Admin    acetaminophen (TYLENOL) tablet 650 mg  650 mg Oral Q4H PRN MICHAEL Cook MD        Or    acetaminophen (TYLENOL) 160 MG/5ML oral solution 650 mg  650 mg Oral Q4H PRN MICHAEL Cook MD        Or    acetaminophen (TYLENOL) suppository 650 mg  650 mg Rectal Q4H PRN MICHAEL Cook MD        sennosides-docusate (PERICOLACE) 8.6-50 MG per tablet 2 tablet  2 tablet Oral BID MICHAEL Cook MD   2 tablet at 05/01/25 0848    And    polyethylene glycol (MIRALAX) packet 17 g  17 g Oral Daily PRN MICHAEL Cook MD        And    bisacodyl (DULCOLAX) EC tablet 5 mg  5 mg Oral Daily PRN MICHAEL Cook MD        And    bisacodyl (DULCOLAX) suppository 10 mg  10 mg Rectal Daily PRN MICHAEL Cook MD        cyclobenzaprine (FLEXERIL) tablet 5 mg  5 mg Oral TID PRN MICHAEL Cook MD   5 mg at 05/01/25 0914    empagliflozin (JARDIANCE) tablet 10 mg  10 mg Oral Daily MICHAEL Cook MD   10 mg at 05/01/25 0848    levothyroxine (SYNTHROID, LEVOTHROID) tablet 25 mcg  25 mcg Oral Q AM MICHAEL Cook MD   25 mcg at 05/01/25 0502    lisinopril (PRINIVIL,ZESTRIL) tablet 10 mg  10 mg Oral Daily MICHAEL Cook MD   10 mg at 05/01/25 0848    metoprolol tartrate (LOPRESSOR) tablet 25 mg  25 mg Oral Daily MICHAEL Cook  MD Bret   25 mg at 05/01/25 0848    naloxone (NARCAN) injection 0.4 mg  0.4 mg Intravenous Q5 Min PRN MICHAEL Cook MD        ondansetron ODT (ZOFRAN-ODT) disintegrating tablet 4 mg  4 mg Oral Q6H PRN MICHAEL Cook MD        Or    ondansetron (ZOFRAN) injection 4 mg  4 mg Intravenous Q6H PRN MICHAEL Cook MD   4 mg at 05/01/25 0502    oxyCODONE-acetaminophen (PERCOCET)  MG per tablet 1 tablet  1 tablet Oral Q4H PRN MICHAEL Cook MD   1 tablet at 05/01/25 1402    oxyCODONE-acetaminophen (PERCOCET) 7.5-325 MG per tablet 1 tablet  1 tablet Oral Q4H PRN MICHAEL Cook MD        sertraline (ZOLOFT) tablet 25 mg  25 mg Oral Daily MICHAEL Cook MD   25 mg at 05/01/25 0848    sodium chloride 0.9 % flush 10 mL  10 mL Intravenous PRN MICHAEL Cook MD        sodium chloride 0.9 % flush 3 mL  3 mL Intravenous Q12H MICHAEL Cook MD   3 mL at 05/01/25 0849    sodium chloride 0.9 % infusion 40 mL  40 mL Intravenous PRN MICHAEL Cook MD           PHYSICAL EXAM:    Orientation:  alert and oriented to person, place, and time    Incision:  no significant drainage    Upper Extremity Motor :  5/5 bilaterally    Upper Motor Neuron Signs:  none     Lower Extremity Motor :  equal bilaterally    Lower Extremity Sensory:  Tibial nerve: Intact, Superficial peroneal nerve: Intact, and Deep peroneal nerve: Intact    Flatus:  flatus    ABNORMAL EXAM FINDINGS:  none    LABS:    Lab Results (last 24 hours)       Procedure Component Value Units Date/Time    Basic Metabolic Panel [[id number]]  (Abnormal) Collected: 05/01/25 0355    Specimen: Blood Updated: 05/01/25 0507     Glucose 113 mg/dL      BUN 16 mg/dL      Creatinine 1.08 mg/dL      Sodium 133 mmol/L      Potassium 4.0 mmol/L      Chloride 102 mmol/L      CO2 23.0 mmol/L      Calcium 8.2 mg/dL      BUN/Creatinine Ratio 14.8     Anion Gap 8.0 mmol/L      eGFR 84.6 mL/min/1.73     Narrative:      GFR Categories in Chronic Kidney  Disease (CKD)              GFR Category          GFR (mL/min/1.73)    Interpretation  G1                    90 or greater        Normal or high (1)  G2                    60-89                Mild decrease (1)  G3a                   45-59                Mild to moderate decrease  G3b                   30-44                Moderate to severe decrease  G4                    15-29                Severe decrease  G5                    14 or less           Kidney failure    (1)In the absence of evidence of kidney disease, neither GFR category G1 or G2 fulfill the criteria for CKD.    eGFR calculation 2021 CKD-EPI creatinine equation, which does not include race as a factor    CBC & Differential [[id number]]  (Abnormal) Collected: 05/01/25 0355    Specimen: Blood Updated: 05/01/25 0437    Narrative:      The following orders were created for panel order CBC & Differential.  Procedure                               Abnormality         Status                     ---------                               -----------         ------                     CBC Auto Differential[[id number]]        Abnormal            Final result                 Please view results for these tests on the individual orders.    CBC Auto Differential [[id number]]  (Abnormal) Collected: 05/01/25 0355    Specimen: Blood Updated: 05/01/25 0437     WBC 19.52 10*3/mm3      RBC 5.25 10*6/mm3      Hemoglobin 14.9 g/dL      Hematocrit 44.4 %      MCV 84.6 fL      MCH 28.4 pg      MCHC 33.6 g/dL      RDW 12.7 %      RDW-SD 38.5 fl      MPV 9.4 fL      Platelets 248 10*3/mm3      Neutrophil % 84.6 %      Lymphocyte % 6.7 %      Monocyte % 7.5 %      Eosinophil % 0.1 %      Basophil % 0.3 %      Immature Grans % 0.8 %      Neutrophils, Absolute 16.50 10*3/mm3      Lymphocytes, Absolute 1.31 10*3/mm3      Monocytes, Absolute 1.47 10*3/mm3      Eosinophils, Absolute 0.02 10*3/mm3      Basophils, Absolute 0.06 10*3/mm3      Immature Grans, Absolute 0.16 10*3/mm3       nRBC 0.0 /100 WBC             ASSESSMENT AND PLAN:    Post operative day 1 status post L4-5 disc replacement, L5-S1 ALIF    1:  Activity Level:  Up with assist  2:  Pain Control:  Oral analgesia   3:  Discharge Planning:  Anticipating tomorrow  4:  Other:  Encourage mobility       Electronically signed by Jesus Bateman PA-C 5/1/2025 15:44 CDT

## 2025-05-01 NOTE — PROGRESS NOTES
Mr. Knowles is progressing slowly with physical therapy.  Still with significant complaints of pain.  Anterior dressing clean dry and intact.  Grossly neurovascular intact distally.  Plan to hopefully discharge home tomorrow.

## 2025-05-01 NOTE — THERAPY TREATMENT NOTE
Patient Name: Feliberto Knowles  : 1975    MRN: [id number]                              Today's Date: 2025       Admit Date: 2025    Visit Dx:     ICD-10-CM ICD-9-CM   1. Impaired mobility [Z74.09]  Z74.09 799.89     Patient Active Problem List   Diagnosis    Dizziness    Transient ischemic attack (TIA)    Hypertrophic obstructive cardiomyopathy    Essential hypertension, new diagnosis    History of stroke    Infection of prosthetic total shoulder joint    Postoperative infection    Lumbar radiculopathy    Degeneration of intervertebral disc of lumbar region with discogenic back pain and lower extremity pain     Past Medical History:   Diagnosis Date    AICD (automatic cardioverter/defibrillator) present     Anger     Anxiety     Arthritis     Disease of thyroid gland     Heart murmur     Hyperlipidemia     Hypertrophic obstructive cardiomyopathy (HOCM)     Stroke     tia 2017, STM LOSS    Tachycardia     TBI (traumatic brain injury)      Past Surgical History:   Procedure Laterality Date    ANKLE SURGERY Right     ANTERIOR LUMBAR EXPOSURE N/A 2025    Procedure: ANTERIOR LUMBAR EXPOSURE;  Surgeon: Clark Guillen DO;  Location:  [facility] OR;  Service: Vascular;  Laterality: N/A;    APPENDECTOMY      CARDIAC CATHETERIZATION      no stents    CARDIAC DEFIBRILLATOR PLACEMENT      CARDIAC PACEMAKER PLACEMENT      septalmyectomy    CARPAL TUNNEL RELEASE      LUMBAR FUSION N/A 2025    Procedure: ANTERIOR DECOMPRESSION L4-S1, ARTIFICAL DISC REPLACEMENT L4-5, ANTERIOR LUMBAR INTERBODY FUSION WITH INSTRUMENTATION L5-S1;  Surgeon: MICHAEL Cook MD;  Location:  [facility] OR;  Service: Orthopedic Spine;  Laterality: N/A;    OTHER SURGICAL HISTORY  2012    septalmyectomy    SHOULDER ARTHROSCOPY W/ LABRAL REPAIR Right 2021    Procedure: RIGHT SHOULDER ARTHROSCOPIC REVISION POSTERIOR  LABRAL REPAIR;  Surgeon: Sandro Monsivais MD;  Location:  [facility] OR;  Service: Orthopedics;  Laterality:  Right;    TOTAL SHOULDER ARTHROPLASTY W/ DISTAL CLAVICLE EXCISION Right 09/30/2021    Procedure: RIGHT REVERSE TOTAL SHOULDER ARTHROPLASTY;  Surgeon: Sandro Monsivais MD;  Location:  [facility] OR;  Service: Orthopedics;  Laterality: Right;    TOTAL SHOULDER ARTHROPLASTY W/ DISTAL CLAVICLE EXCISION Right 10/14/2021    Procedure: INCISION AND DRAINAGE RIGHT SHOULDER WITH POLY EXCHANGE;  Surgeon: Sandro Monsivais MD;  Location:  [facility] OR;  Service: Orthopedics;  Laterality: Right;      General Information       Row Name 05/01/25 1119          OT Time and Intention    Subjective Information complains of;pain  -     Document Type therapy note (daily note)  -     Mode of Treatment occupational therapy  -       Row Name 05/01/25 1119          General Information    Patient Profile Reviewed yes  -     Existing Precautions/Restrictions fall;LSO;brace worn when out of bed;spinal  -       Row Name 05/01/25 1119          Living Environment    Current Living Arrangements home  -       Row Name 05/01/25 1119          Cognition    Orientation Status (Cognition) oriented x 4  -       Row Name 05/01/25 1119          Safety Issues/Impairments Affecting Functional Mobility    Impairments Affecting Function (Mobility) pain  -               User Key  (r) = Recorded By, (t) = Taken By, (c) = Cosigned By      Initials Name Provider Type     La Gong, OTR/L Occupational Therapist                     Mobility/ADL's       Row Name 05/01/25 1119          Transfers    Transfers sit-stand transfer;stand-sit transfer  -       Row Name 05/01/25 1119          Sit-Stand Transfer    Sit-Stand [place] (Transfers) standby assist  -       Row Name 05/01/25 1119          Stand-Sit Transfer    Stand-Sit [place] (Transfers) standby assist  -       Row Name 05/01/25 1119          Activities of Daily Living    BADL Assessment/Intervention upper body dressing  -       Row Name 05/01/25 1119          Upper Body Dressing  Assessment/Training    [place] Level (Upper Body Dressing) set up;don;doff  -     Position (Upper Body Dressing) unsupported sitting  -     Comment, (Upper Body Dressing) LSO  -               User Key  (r) = Recorded By, (t) = Taken By, (c) = Cosigned By      Initials Name Provider Type    La Duckworth OTR/L Occupational Therapist                   Obj/Interventions       Row Name 05/01/25 1119          Balance    Balance Interventions sitting;standing;sit to stand;supported;static  -               User Key  (r) = Recorded By, (t) = Taken By, (c) = Cosigned By      Initials Name Provider Type    La Duckworth, OTR/L Occupational Therapist                   Goals/Plan    No documentation.                  Clinical Impression       Row Name 05/01/25 1119          Pain Assessment    Pretreatment Pain Rating 4/10  -CH     Posttreatment Pain Rating 6/10  -       Row Name 05/01/25 1119          Plan of Care Review    Plan of Care Reviewed With patient  -CH     Progress improving  -     Outcome Evaluation OT tx complete. Pt. is AxO x 4, pleasant, & accompanied by his spouse.  OTR provided handout to pt regarding spinal precautions and LSO mgmt. Pt. and spouse asked questions regarding after care, cont'd rehab, & LSO wearing schedule.  Mr. Knowles is able to manage his brace with set up & mobilze in room SBA.  Cont OT tx.  -       Row Name 05/01/25 1119          Therapy Plan Review/Discharge Plan (OT)    Anticipated Discharge Disposition (OT) home with assist  -       Row Name 05/01/25 1119          Positioning and Restraints    Pre-Treatment Position sitting in chair/recliner  -     Post Treatment Position chair  -CH     In Chair reclined;call light within reach;encouraged to call for assist;with brace;notified nsg;with family/caregiver  -               User Key  (r) = Recorded By, (t) = Taken By, (c) = Cosigned By      Initials Name Provider Type    La [place], OTR/L Occupational  Therapist                   Outcome Measures       Row Name 05/01/25 1143          How much help from another is currently needed...    Putting on and taking off regular lower body clothing? 2  -CH     Bathing (including washing, rinsing, and drying) 2  -CH     Toileting (which includes using toilet bed pan or urinal) 3  -CH     Putting on and taking off regular upper body clothing 4  -CH     Taking care of personal grooming (such as brushing teeth) 4  -CH     Eating meals 4  -     AM-PAC 6 Clicks Score (OT) 19  -       Row Name 05/01/25 0850          How much help from another person do you currently need...    Turning from your back to your side while in flat bed without using bedrails? 4  -MF     Moving from lying on back to sitting on the side of a flat bed without bedrails? 4  -MF     Moving to and from a bed to a chair (including a wheelchair)? 3  -MF     Standing up from a chair using your arms (e.g., wheelchair, bedside chair)? 3  -MF     Climbing 3-5 steps with a railing? 3  -MF     To walk in hospital room? 3  -MF     AM-PAC 6 Clicks Score (PT) 20  -     Highest Level of Mobility Goal 6 --> Walk 10 steps or more  -       Row Name 05/01/25 1143          Functional Assessment    Outcome Measure Options AM-PAC 6 Clicks Daily Activity (OT)  -               User Key  (r) = Recorded By, (t) = Taken By, (c) = Cosigned By      Initials Name Provider Type     La Gong, OTR/L Occupational Therapist    Anay Moe, RN Registered Nurse                    Occupational Therapy Education       Title: PT OT SLP Therapies (In Progress)       Topic: Occupational Therapy (Done)       Point: ADL training (Done)       Learning Progress Summary            Patient Acceptance, E, VU by  at 5/1/2025 1203    Acceptance, E,D, VU,DU by  at 4/30/2025 1508   Significant Other Acceptance, E,D, VU,DU by  at 4/30/2025 1508                      Point: Precautions (Done)       Learning Progress Summary             Patient Acceptance, E, VU by  at 5/1/2025 1203    Acceptance, E,D, VU,DU by  at 4/30/2025 1508   Significant Other Acceptance, E,D, VU,DU by  at 4/30/2025 1508                      Point: Body mechanics (Done)       Learning Progress Summary            Patient Acceptance, E, VU by  at 5/1/2025 1203    Acceptance, E,D, VU,DU by  at 4/30/2025 1508   Significant Other Acceptance, E,D, VU,DU by  at 4/30/2025 1508                                      User Key       Initials Effective Dates Name Provider Type [facility] 07/11/23 -  La Gong, OTR/L Occupational Therapist OT     10/08/24 -  Michell Urrutia OTR/L Occupational Therapist OT                  OT Recommendation and Plan     Plan of Care Review  Plan of Care Reviewed With: patient  Progress: improving  Outcome Evaluation: OT tx complete. Pt. is AxO x 4, pleasant, & accompanied by his spouse.  OTR provided handout to pt regarding spinal precautions and LSO mgmt. Pt. and spouse asked questions regarding after care, cont'd rehab, & LSO wearing schedule.  Mr. Knowles is able to manage his brace with set up & mobilze in room SBA.  Cont OT tx.     Time Calculation:         Time Calculation- OT       Row Name 05/01/25 1119 05/01/25 1001          Time Calculation- OT    OT Start Time 1119  -CH --     OT Stop Time 1143  -CH --     OT Time Calculation (min) 24 min  -CH --     Total Timed Code Minutes- OT 24 minute(s)  - --     OT Received On 05/01/25  - --        Timed Charges    11456 - Gait Training Minutes  -- 23  -NW     81573 - OT Self Care/Mgmt Minutes 24  -CH --        Total Minutes    Timed Charges Total Minutes 24  -CH 23  -NW      Total Minutes 24  -CH 23  -NW               User Key  (r) = Recorded By, (t) = Taken By, (c) = Cosigned By      Initials Name Provider Type     La Gong, OTR/L Occupational Therapist    NW Any Duke, PTA Physical Therapist Assistant                  Therapy Charges for Today       Code  Description Service Date Service Provider Modifiers Qty    88854934031 HC OT SELF CARE/MGMT/TRAIN EA 15 MIN 5/1/2025 La Gong OTR/L GO 2                 La Gong OTR/L  5/1/2025

## 2025-05-01 NOTE — PLAN OF CARE
Goal Outcome Evaluation:  Plan of Care Reviewed With: patient        Progress: improving  Outcome Evaluation: Pt up in chair. difficult transition form sit-stand slow gaurded sba. Pt able to don brace independently. Pt able to amb 100'x4 slow gaurded antalgic gait, holding to handrails at times. discussed home safety and POC. pt eager to dc home today, feel pt will be safe from PT standpoint.

## 2025-05-01 NOTE — PLAN OF CARE
Goal Outcome Evaluation:  Plan of Care Reviewed With: patient            Outcome Evaluation: Pt A&Ox4, able to make needs known. Medications given per orders, PRN Zofran, Percocet, Flexeril, and Dilaudid given. On RA. L wrist IV dc'd d/t leaking. R hand running NS and ABX. Up x1 with LSO to bathroom voiding, ambulating in hallways. Provena intact to lower ABD. Safety maintained, call light within reach.Spouse at bedside.

## 2025-05-01 NOTE — THERAPY TREATMENT NOTE
Acute Care - Physical Therapy Treatment Note   [facility]     Patient Name: Feliberto Knowles  : 1975  MRN: [id number]  Today's Date: 2025      Visit Dx:     ICD-10-CM ICD-9-CM   1. Impaired mobility [Z74.09]  Z74.09 799.89     Patient Active Problem List   Diagnosis    Dizziness    Transient ischemic attack (TIA)    Hypertrophic obstructive cardiomyopathy    Essential hypertension, new diagnosis    History of stroke    Infection of prosthetic total shoulder joint    Postoperative infection    Lumbar radiculopathy    Degeneration of intervertebral disc of lumbar region with discogenic back pain and lower extremity pain     Past Medical History:   Diagnosis Date    AICD (automatic cardioverter/defibrillator) present     Anger     Anxiety     Arthritis     Disease of thyroid gland     Heart murmur     Hyperlipidemia     Hypertrophic obstructive cardiomyopathy (HOCM)     Stroke     tia 2017, STM LOSS    Tachycardia     TBI (traumatic brain injury)      Past Surgical History:   Procedure Laterality Date    ANKLE SURGERY Right     ANTERIOR LUMBAR EXPOSURE N/A 2025    Procedure: ANTERIOR LUMBAR EXPOSURE;  Surgeon: Clark Guillen DO;  Location: [facility] OR;  Service: Vascular;  Laterality: N/A;    APPENDECTOMY      CARDIAC CATHETERIZATION      no stents    CARDIAC DEFIBRILLATOR PLACEMENT      CARDIAC PACEMAKER PLACEMENT      septalmyectomy    CARPAL TUNNEL RELEASE      LUMBAR FUSION N/A 2025    Procedure: ANTERIOR DECOMPRESSION L4-S1, ARTIFICAL DISC REPLACEMENT L4-5, ANTERIOR LUMBAR INTERBODY FUSION WITH INSTRUMENTATION L5-S1;  Surgeon: MICHAEL Cook MD;  Location: [facility] OR;  Service: Orthopedic Spine;  Laterality: N/A;    OTHER SURGICAL HISTORY  2012    septalmyectomy    SHOULDER ARTHROSCOPY W/ LABRAL REPAIR Right 2021    Procedure: RIGHT SHOULDER ARTHROSCOPIC REVISION POSTERIOR  LABRAL REPAIR;  Surgeon: Sandro Monsivais MD;  Location: [facility] OR;  Service: Orthopedics;   Laterality: Right;    TOTAL SHOULDER ARTHROPLASTY W/ DISTAL CLAVICLE EXCISION Right 09/30/2021    Procedure: RIGHT REVERSE TOTAL SHOULDER ARTHROPLASTY;  Surgeon: Sandro Monsivais MD;  Location:  [facility] OR;  Service: Orthopedics;  Laterality: Right;    TOTAL SHOULDER ARTHROPLASTY W/ DISTAL CLAVICLE EXCISION Right 10/14/2021    Procedure: INCISION AND DRAINAGE RIGHT SHOULDER WITH POLY EXCHANGE;  Surgeon: Sandro Monsivais MD;  Location:  [facility] OR;  Service: Orthopedics;  Laterality: Right;     PT Assessment (Last 12 Hours)       PT Evaluation and Treatment       Row Name 05/01/25 0925 05/01/25 0915       Physical Therapy Time and Intention    Subjective Information complains of;pain  -NW --  -NW    Document Type therapy note (daily note)  -NW --    Mode of Treatment physical therapy  -NW --      Row Name 05/01/25 0925          General Information    Existing Precautions/Restrictions fall;LSO;brace worn when out of bed;spinal  prevena  -NW       Row Name 05/01/25 0925          Pain    Posttreatment Pain Rating 7/10  -NW     Pain Location abdomen;back  -NW       Row Name 05/01/25 0925          Bed Mobility    Comment, (Bed Mobility) chair  -NW       Row Name 05/01/25 0925          Sit-Stand Transfer    Sit-Stand [place] (Transfers) verbal cues;contact guard  -NW     Assistive Device (Sit-Stand Transfers) --  slow gaurded  -NW       Row Name 05/01/25 0925          Stand-Sit Transfer    Stand-Sit [place] (Transfers) verbal cues;standby assist  -NW       Row Name 05/01/25 0925          Gait/Stairs (Locomotion)    [place] Level (Gait) verbal cues;standby assist  -NW     Distance in Feet (Gait) 100  100x4  -NW     Deviations/Abnormal Patterns (Gait) veronique decreased;stride length decreased  -NW     Bilateral Gait Deviations heel strike decreased  -NW     Comment, (Gait/Stairs) slow guarded gait, using handrails in hallway. reviewed home safety and POC  -NW       Row Name 05/01/25 0925          Safety Issues/Impairments  Affecting Functional Mobility    Impairments Affecting Function (Mobility) pain  -NW       Row Name             Wound 04/30/25 0801 Left lower abdomen Surgical    Wound - Properties Group Placement Date: 04/30/25  -DT Placement Time: 0801  -DT Side: Left  -DT Orientation: lower  -DT Location: abdomen  -DT Primary Wound Type: Surgical  -DT    Retired Wound - Properties Group Placement Date: 04/30/25  -DT Placement Time: 0801  -DT Side: Left  -DT Orientation: lower  -DT Location: abdomen  -DT    Retired Wound - Properties Group Placement Date: 04/30/25  -DT Placement Time: 0801  -DT Side: Left  -DT Orientation: lower  -DT Location: abdomen  -DT    Retired Wound - Properties Group Date first assessed: 04/30/25  -DT Time first assessed: 0801  -DT Side: Left  -DT Location: abdomen  -DT      Row Name             [REMOVED] Wound 09/30/21 1025 Right shoulder Incision    Wound - Properties Group Placement Date: 09/30/21  -JR Placement Time: 1025  -JR Present on Original Admission: N  -JR Side: Right  -JR Location: shoulder  -JR Primary Wound Type: Incision  -JR Wound Outcome: Healed  -AR Removal Date: 05/01/25  -AR Removal Time: 0041  -AR    Retired Wound - Properties Group Placement Date: 09/30/21  -JR Placement Time: 1025  -JR Present on Original Admission: N  -JR Side: Right  -JR Location: shoulder  -JR Primary Wound Type: Incision  -JR Wound Outcome: Healed  -AR Removal Date: 05/01/25  -AR Removal Time: 0041  -AR    Retired Wound - Properties Group Placement Date: 09/30/21  -JR Placement Time: 1025  -JR Present on Original Admission: N  -JR Side: Right  -JR Location: shoulder  -JR Primary Wound Type: Incision  -JR Removal Date: 05/01/25  -AR Removal Time: 0041  -AR Wound Outcome: Healed  -AR    Retired Wound - Properties Group Date first assessed: 09/30/21  -JR Time first assessed: 1025  -JR Present on Original Admission: N  -JR Side: Right  -JR Location: shoulder  -JR Primary Wound Type: Incision  -JR Resolution Date:  05/01/25  -AR Resolution Time: 0041  -AR Wound Outcome: Healed  -AR      Row Name             [REMOVED] Wound 10/14/21 1605 Right shoulder Incision    Wound - Properties Group Placement Date: 10/14/21  -ELIZABETH Placement Time: 1605  -ELIZABETH Side: Right  -ELIZABETH Location: shoulder  -ELIZABETH Primary Wound Type: Incision  -ELIZABETH Wound Outcome: Healed  -AR Removal Date: 05/01/25  -AR Removal Time: 0041  -AR    Retired Wound - Properties Group Placement Date: 10/14/21  -ELIZABETH Placement Time: 1605  -ELIZABETH Side: Right  -ELIZABETH Location: shoulder  -ELIZABETH Primary Wound Type: Incision  -ELIZABETH Wound Outcome: Healed  -AR Removal Date: 05/01/25  -AR Removal Time: 0041  -AR    Retired Wound - Properties Group Placement Date: 10/14/21  -ELIZABETH Placement Time: 1605  -ELIZABETH Side: Right  -ELIZABETH Location: shoulder  -ELIZABETH Primary Wound Type: Incision  -ELIZABETH Removal Date: 05/01/25  -AR Removal Time: 0041  -AR Wound Outcome: Healed  -AR    Retired Wound - Properties Group Date first assessed: 10/14/21  -ELIZABETH Time first assessed: 1605  -ELIZABETH Side: Right  -ELIZABETH Location: shoulder  -ELIZABETH Primary Wound Type: Incision  -ELIZABETH Resolution Date: 05/01/25  -AR Resolution Time: 0041  -AR Wound Outcome: Healed  -AR      Row Name             NPWT (Negative Pressure Wound Therapy) 04/30/25 1044 LLQ    NPWT (Negative Pressure Wound Therapy) - Properties Group Placement Date: 04/30/25  -DT Placement Time: 1044  -DT Location: LLQ  -DT    Retired NPWT (Negative Pressure Wound Therapy) - Properties Group Placement Date: 04/30/25  -DT Placement Time: 1044  -DT Location: LLQ  -DT    Retired NPWT (Negative Pressure Wound Therapy) - Properties Group Placement Date: 04/30/25  -DT Placement Time: 1044  -DT Location: LLQ  -DT    Retired NPWT (Negative Pressure Wound Therapy) - Properties Group Placement Date: 04/30/25  -DT Placement Time: 1044  -DT Location: LLQ  -DT      Row Name 05/01/25 0925          Positioning and Restraints    Pre-Treatment Position sitting in chair/recliner  -NW     Post Treatment Position chair   -NW     In Chair sitting;call light within reach;encouraged to call for assist;with family/caregiver  -NW               User Key  (r) = Recorded By, (t) = Taken By, (c) = Cosigned By      Initials Name Provider Type    NW Any Duke, PTA Physical Therapist Assistant    Saida Mchugh, RN Registered Nurse    Lolly Kat, RN Registered Nurse    Ollie Peñaloza RN Registered Nurse    Ebony Hess RN Registered Nurse                    Physical Therapy Education       Title: PT OT SLP Therapies (In Progress)       Topic: Physical Therapy (In Progress)       Point: Mobility training (Done)       Learning Progress Summary            Patient Acceptance, E, VU by SB at 4/30/2025 1424    Comment: pt edu on POC, benefits of act, d/c plans, spinal precautions, bracing                      Point: Home exercise program (Not Started)       Learner Progress:  Not documented in this visit.              Point: Body mechanics (Not Started)       Learner Progress:  Not documented in this visit.              Point: Precautions (Done)       Learning Progress Summary            Patient Acceptance, E, VU by SB at 4/30/2025 1424    Comment: pt edu on POC, benefits of act, d/c plans, spinal precautions, bracing                                      User Key       Initials Effective Dates Name Provider Type Discipline    SB 07/11/23 -  Taylor Cedeño, PT DPT Physical Therapist PT                  PT Recommendation and Plan     Progress: improving  Outcome Evaluation: Pt up in chair. difficult transition form sit-stand slow gaurded sba. Pt able to don brace independently. Pt able to amb 100'x4 slow gaurded antalgic gait, holding to handrails at times. discussed home safety and POC. pt eager to dc home today, feel pt will be safe from PT standpoint.       Time Calculation:    PT Charges       Row Name 05/01/25 1001             Time Calculation    Start Time 0925  -NW      Stop Time 0948  -NW      Time Calculation  (min) 23 min  -NW      PT Received On 05/01/25  -NW      PT Goal Re-Cert Due Date 05/10/25  -NW         Time Calculation- PT    Total Timed Code Minutes- PT 23 minute(s)  -NW         Timed Charges    34055 - Gait Training Minutes  23  -NW         Total Minutes    Timed Charges Total Minutes 23  -NW       Total Minutes 23  -NW                User Key  (r) = Recorded By, (t) = Taken By, (c) = Cosigned By      Initials Name Provider Type    NW Any Duke PTA Physical Therapist Assistant                  Therapy Charges for Today       Code Description Service Date Service Provider Modifiers Qty    18129755939 HC GAIT TRAINING EA 15 MIN 5/1/2025 Any Duke PTA GP 2            PT G-Codes  Outcome Measure Options: AM-PAC 6 Clicks Daily Activity (OT)  AM-PAC 6 Clicks Score (PT): 20  AM-PAC 6 Clicks Score (OT): 18    Any Duke PTA  5/1/2025

## 2025-05-01 NOTE — PLAN OF CARE
Goal Outcome Evaluation:   Pt lying in bed. Aox4. VSS on RA. Voiding via urinal. Up standby with walker in hallway. No c/o nausea this shift. C/o severe pain multiple times this shift; medicated per PRN orders. Dilaudid dc this shift. Call light within reach. Safety maintained. Care continues.

## 2025-05-01 NOTE — THERAPY TREATMENT NOTE
Acute Care - Physical Therapy Treatment Note   R[facility]     Patient Name: Feliberto Knowles  : 1975  MRN: [id number]  Today's Date: 2025      Visit Dx:     ICD-10-CM ICD-9-CM   1. Impaired mobility [Z74.09]  Z74.09 799.89     Patient Active Problem List   Diagnosis    Dizziness    Transient ischemic attack (TIA)    Hypertrophic obstructive cardiomyopathy    Essential hypertension, new diagnosis    History of stroke    Infection of prosthetic total shoulder joint    Postoperative infection    Lumbar radiculopathy    Degeneration of intervertebral disc of lumbar region with discogenic back pain and lower extremity pain     Past Medical History:   Diagnosis Date    AICD (automatic cardioverter/defibrillator) present     Anger     Anxiety     Arthritis     Disease of thyroid gland     Heart murmur     Hyperlipidemia     Hypertrophic obstructive cardiomyopathy (HOCM)     Stroke     tia 2017, STM LOSS    Tachycardia     TBI (traumatic brain injury)      Past Surgical History:   Procedure Laterality Date    ANKLE SURGERY Right     ANTERIOR LUMBAR EXPOSURE N/A 2025    Procedure: ANTERIOR LUMBAR EXPOSURE;  Surgeon: Clark Guillen DO;  Location: [facility] OR;  Service: Vascular;  Laterality: N/A;    APPENDECTOMY      CARDIAC CATHETERIZATION      no stents    CARDIAC DEFIBRILLATOR PLACEMENT      CARDIAC PACEMAKER PLACEMENT      septalmyectomy    CARPAL TUNNEL RELEASE      LUMBAR FUSION N/A 2025    Procedure: ANTERIOR DECOMPRESSION L4-S1, ARTIFICAL DISC REPLACEMENT L4-5, ANTERIOR LUMBAR INTERBODY FUSION WITH INSTRUMENTATION L5-S1;  Surgeon: MICHAEL Cook MD;  Location: [facility] [place] OR;  Service: Orthopedic Spine;  Laterality: N/A;    OTHER SURGICAL HISTORY  2012    septalmyectomy    SHOULDER ARTHROSCOPY W/ LABRAL REPAIR Right 2021    Procedure: RIGHT SHOULDER ARTHROSCOPIC REVISION POSTERIOR  LABRAL REPAIR;  Surgeon: Sandro Monsivais MD;  Location: [facility] [place] OR;  Service: Orthopedics;   Laterality: Right;    TOTAL SHOULDER ARTHROPLASTY W/ DISTAL CLAVICLE EXCISION Right 09/30/2021    Procedure: RIGHT REVERSE TOTAL SHOULDER ARTHROPLASTY;  Surgeon: Sandro Monsivais MD;  Location:  [facility] OR;  Service: Orthopedics;  Laterality: Right;    TOTAL SHOULDER ARTHROPLASTY W/ DISTAL CLAVICLE EXCISION Right 10/14/2021    Procedure: INCISION AND DRAINAGE RIGHT SHOULDER WITH POLY EXCHANGE;  Surgeon: Sandro Monsivais MD;  Location:  [facility] OR;  Service: Orthopedics;  Laterality: Right;     PT Assessment (Last 12 Hours)       PT Evaluation and Treatment       Row Name 05/01/25 0925 05/01/25 0915       Physical Therapy Time and Intention    Subjective Information complains of;pain  -NW --  -NW    Document Type therapy note (daily note)  -NW --    Mode of Treatment physical therapy  -NW --      Row Name 05/01/25 0925          General Information    Existing Precautions/Restrictions fall;LSO;brace worn when out of bed;spinal  prevena  -NW       Row Name 05/01/25 0925          Pain    Posttreatment Pain Rating 7/10  -NW     Pain Location abdomen;back  -NW       Row Name 05/01/25 0925          Bed Mobility    Comment, (Bed Mobility) chair  -NW       Row Name 05/01/25 0925          Sit-Stand Transfer    Sit-Stand [place] (Transfers) verbal cues;contact guard  -NW     Assistive Device (Sit-Stand Transfers) --  slow gaurded  -NW       Row Name 05/01/25 0925          Stand-Sit Transfer    Stand-Sit [place] (Transfers) verbal cues;standby assist  -NW       Row Name 05/01/25 0925          Gait/Stairs (Locomotion)    [place] Level (Gait) verbal cues;standby assist  -NW     Distance in Feet (Gait) 100  100x4  -NW     Deviations/Abnormal Patterns (Gait) veronique decreased;stride length decreased  -NW     Bilateral Gait Deviations heel strike decreased  -NW     Comment, (Gait/Stairs) slow guarded gait, using handrails in hallway. reviewed home safety and POC  -NW       Row Name 05/01/25 0925          Safety Issues/Impairments  Affecting Functional Mobility    Impairments Affecting Function (Mobility) pain  -NW       Row Name             Wound 04/30/25 0801 Left lower abdomen Surgical    Wound - Properties Group Placement Date: 04/30/25  -DT Placement Time: 0801  -DT Side: Left  -DT Orientation: lower  -DT Location: abdomen  -DT Primary Wound Type: Surgical  -DT    Retired Wound - Properties Group Placement Date: 04/30/25  -DT Placement Time: 0801  -DT Side: Left  -DT Orientation: lower  -DT Location: abdomen  -DT    Retired Wound - Properties Group Placement Date: 04/30/25  -DT Placement Time: 0801  -DT Side: Left  -DT Orientation: lower  -DT Location: abdomen  -DT    Retired Wound - Properties Group Date first assessed: 04/30/25  -DT Time first assessed: 0801  -DT Side: Left  -DT Location: abdomen  -DT      Row Name             [REMOVED] Wound 09/30/21 1025 Right shoulder Incision    Wound - Properties Group Placement Date: 09/30/21  -JR Placement Time: 1025  -JR Present on Original Admission: N  -JR Side: Right  -JR Location: shoulder  -JR Primary Wound Type: Incision  -JR Wound Outcome: Healed  -AR Removal Date: 05/01/25  -AR Removal Time: 0041  -AR    Retired Wound - Properties Group Placement Date: 09/30/21  -JR Placement Time: 1025  -JR Present on Original Admission: N  -JR Side: Right  -JR Location: shoulder  -JR Primary Wound Type: Incision  -JR Wound Outcome: Healed  -AR Removal Date: 05/01/25  -AR Removal Time: 0041  -AR    Retired Wound - Properties Group Placement Date: 09/30/21  -JR Placement Time: 1025  -JR Present on Original Admission: N  -JR Side: Right  -JR Location: shoulder  -JR Primary Wound Type: Incision  -JR Removal Date: 05/01/25  -AR Removal Time: 0041  -AR Wound Outcome: Healed  -AR    Retired Wound - Properties Group Date first assessed: 09/30/21  -JR Time first assessed: 1025  -JR Present on Original Admission: N  -JR Side: Right  -JR Location: shoulder  -JR Primary Wound Type: Incision  -JR Resolution Date:  05/01/25  -AR Resolution Time: 0041  -AR Wound Outcome: Healed  -AR      Row Name             [REMOVED] Wound 10/14/21 1605 Right shoulder Incision    Wound - Properties Group Placement Date: 10/14/21  -ELIZABETH Placement Time: 1605  -ELIZABETH Side: Right  -ELIZABETH Location: shoulder  -ELIZABETH Primary Wound Type: Incision  -ELIZABETH Wound Outcome: Healed  -AR Removal Date: 05/01/25  -AR Removal Time: 0041  -AR    Retired Wound - Properties Group Placement Date: 10/14/21  -ELIZABETH Placement Time: 1605  -ELIZABETH Side: Right  -ELIZABETH Location: shoulder  -ELIZABETH Primary Wound Type: Incision  -ELIZABETH Wound Outcome: Healed  -AR Removal Date: 05/01/25  -AR Removal Time: 0041  -AR    Retired Wound - Properties Group Placement Date: 10/14/21  -ELIZABETH Placement Time: 1605  -ELIZABETH Side: Right  -ELIZABETH Location: shoulder  -ELIZABETH Primary Wound Type: Incision  -ELIZABETH Removal Date: 05/01/25  -AR Removal Time: 0041  -AR Wound Outcome: Healed  -AR    Retired Wound - Properties Group Date first assessed: 10/14/21  -ELIZABETH Time first assessed: 1605  -ELIZABETH Side: Right  -ELIZABETH Location: shoulder  -ELIZABETH Primary Wound Type: Incision  -ELIZABETH Resolution Date: 05/01/25  -AR Resolution Time: 0041  -AR Wound Outcome: Healed  -AR      Row Name             NPWT (Negative Pressure Wound Therapy) 04/30/25 1044 LLQ    NPWT (Negative Pressure Wound Therapy) - Properties Group Placement Date: 04/30/25  -DT Placement Time: 1044  -DT Location: LLQ  -DT    Retired NPWT (Negative Pressure Wound Therapy) - Properties Group Placement Date: 04/30/25  -DT Placement Time: 1044  -DT Location: LLQ  -DT    Retired NPWT (Negative Pressure Wound Therapy) - Properties Group Placement Date: 04/30/25  -DT Placement Time: 1044  -DT Location: LLQ  -DT    Retired NPWT (Negative Pressure Wound Therapy) - Properties Group Placement Date: 04/30/25  -DT Placement Time: 1044  -DT Location: LLQ  -DT      Row Name 05/01/25 0925          Positioning and Restraints    Pre-Treatment Position sitting in chair/recliner  -NW     Post Treatment Position chair   -NW     In Chair sitting;call light within reach;encouraged to call for assist;with family/caregiver  -NW               User Key  (r) = Recorded By, (t) = Taken By, (c) = Cosigned By      Initials Name Provider Type    NW Any [facility] PTA Physical Therapist Assistant    Saida Mchugh, RN Registered Nurse    Lolly Kat, RN Registered Nurse    Ollie Peñaloza RN Registered Nurse    Ebony Hess RN Registered Nurse                    Physical Therapy Education       Title: PT OT SLP Therapies (In Progress)       Topic: Physical Therapy (In Progress)       Point: Mobility training (Done)       Learning Progress Summary            Patient Acceptance, E, VU by SB at 4/30/2025 1424    Comment: pt edu on POC, benefits of act, d/c plans, spinal precautions, bracing                      Point: Home exercise program (Not Started)       Learner Progress:  Not documented in this visit.              Point: Body mechanics (Not Started)       Learner Progress:  Not documented in this visit.              Point: Precautions (Done)       Learning Progress Summary            Patient Acceptance, E, VU by SB at 4/30/2025 1424    Comment: pt edu on POC, benefits of act, d/c plans, spinal precautions, bracing                                      User Key       Initials Effective Dates Name Provider Type Discipline    SB 07/11/23 -  Taylor Cedeño, PT DPT Physical Therapist PT                  PT Recommendation and Plan             Time Calculation:         PT G-Codes  Outcome Measure Options: AM-PAC 6 Clicks Daily Activity (OT)  AM-PAC 6 Clicks Score (PT): 20  AM-PAC 6 Clicks Score (OT): 18    Any [facility] PTA  5/1/2025

## 2025-05-01 NOTE — PLAN OF CARE
Goal Outcome Evaluation:  Plan of Care Reviewed With: patient, spouse        Progress: improving  Outcome Evaluation: AOx4. VSS on RA while awake, VSS on 2L O2 NC when sleeping. Up x1 assist with LSO brace. PRN pain medication given upon request with good affect. Neuro checks q4h. No changes noted. However, pt reported having numbness in his right hand at shift change. IV fluids switched from R hand IV site to Left hand IV site. Will be monitored. Safety maintained. Call light within reach.

## 2025-05-02 ENCOUNTER — READMISSION MANAGEMENT (OUTPATIENT)
Dept: CALL CENTER | Facility: HOSPITAL | Age: 50
End: 2025-05-02
Payer: OTHER GOVERNMENT

## 2025-05-02 VITALS
BODY MASS INDEX: 40.49 KG/M2 | OXYGEN SATURATION: 99 % | HEIGHT: 67 IN | RESPIRATION RATE: 18 BRPM | TEMPERATURE: 98.5 F | SYSTOLIC BLOOD PRESSURE: 102 MMHG | WEIGHT: 258 LBS | DIASTOLIC BLOOD PRESSURE: 84 MMHG | HEART RATE: 98 BPM

## 2025-05-02 PROCEDURE — 97116 GAIT TRAINING THERAPY: CPT

## 2025-05-02 RX ORDER — OXYCODONE AND ACETAMINOPHEN 10; 325 MG/1; MG/1
1 TABLET ORAL EVERY 4 HOURS PRN
Qty: 60 TABLET | Refills: 0 | Status: SHIPPED | OUTPATIENT
Start: 2025-05-02 | End: 2025-05-12

## 2025-05-02 RX ADMIN — CYCLOBENZAPRINE HYDROCHLORIDE 5 MG: 10 TABLET, FILM COATED ORAL at 11:58

## 2025-05-02 RX ADMIN — EMPAGLIFLOZIN 10 MG: 10 TABLET, FILM COATED ORAL at 08:34

## 2025-05-02 RX ADMIN — METOPROLOL TARTRATE 25 MG: 25 TABLET, FILM COATED ORAL at 08:34

## 2025-05-02 RX ADMIN — LISINOPRIL 10 MG: 10 TABLET ORAL at 08:34

## 2025-05-02 RX ADMIN — SERTRALINE HYDROCHLORIDE 25 MG: 50 TABLET ORAL at 08:34

## 2025-05-02 RX ADMIN — LEVOTHYROXINE SODIUM 25 MCG: 0.03 TABLET ORAL at 06:06

## 2025-05-02 RX ADMIN — OXYCODONE AND ACETAMINOPHEN 1 TABLET: 325; 10 TABLET ORAL at 00:02

## 2025-05-02 RX ADMIN — CYCLOBENZAPRINE HYDROCHLORIDE 5 MG: 10 TABLET, FILM COATED ORAL at 02:05

## 2025-05-02 RX ADMIN — OXYCODONE AND ACETAMINOPHEN 1 TABLET: 325; 10 TABLET ORAL at 11:58

## 2025-05-02 RX ADMIN — OXYCODONE AND ACETAMINOPHEN 1 TABLET: 325; 10 TABLET ORAL at 06:06

## 2025-05-02 RX ADMIN — SENNOSIDES, DOCUSATE SODIUM 2 TABLET: 50; 8.6 TABLET, FILM COATED ORAL at 08:34

## 2025-05-02 NOTE — THERAPY TREATMENT NOTE
Acute Care - Physical Therapy Treatment Note   [facility]     Patient Name: Feliberto Knowles  : 1975  MRN: [id number]  Today's Date: 2025      Visit Dx:     ICD-10-CM ICD-9-CM   1. Impaired mobility [Z74.09]  Z74.09 799.89     Patient Active Problem List   Diagnosis    Dizziness    Transient ischemic attack (TIA)    Hypertrophic obstructive cardiomyopathy    Essential hypertension, new diagnosis    History of stroke    Infection of prosthetic total shoulder joint    Postoperative infection    Lumbar radiculopathy    Degeneration of intervertebral disc of lumbar region with discogenic back pain and lower extremity pain     Past Medical History:   Diagnosis Date    AICD (automatic cardioverter/defibrillator) present     Anger     Anxiety     Arthritis     Disease of thyroid gland     Heart murmur     Hyperlipidemia     Hypertrophic obstructive cardiomyopathy (HOCM)     Stroke     tia 2017, STM LOSS    Tachycardia     TBI (traumatic brain injury)      Past Surgical History:   Procedure Laterality Date    ANKLE SURGERY Right     ANTERIOR LUMBAR EXPOSURE N/A 2025    Procedure: ANTERIOR LUMBAR EXPOSURE;  Surgeon: Clark Guillen DO;  Location: [facility] OR;  Service: Vascular;  Laterality: N/A;    APPENDECTOMY      CARDIAC CATHETERIZATION      no stents    CARDIAC DEFIBRILLATOR PLACEMENT      CARDIAC PACEMAKER PLACEMENT      septalmyectomy    CARPAL TUNNEL RELEASE      LUMBAR FUSION N/A 2025    Procedure: ANTERIOR DECOMPRESSION L4-S1, ARTIFICAL DISC REPLACEMENT L4-5, ANTERIOR LUMBAR INTERBODY FUSION WITH INSTRUMENTATION L5-S1;  Surgeon: MICHAEL Cook MD;  Location: [facility] OR;  Service: Orthopedic Spine;  Laterality: N/A;    OTHER SURGICAL HISTORY  2012    septalmyectomy    SHOULDER ARTHROSCOPY W/ LABRAL REPAIR Right 2021    Procedure: RIGHT SHOULDER ARTHROSCOPIC REVISION POSTERIOR  LABRAL REPAIR;  Surgeon: Sandro Monsivais MD;  Location: [facility] OR;  Service: Orthopedics;   Laterality: Right;    TOTAL SHOULDER ARTHROPLASTY W/ DISTAL CLAVICLE EXCISION Right 09/30/2021    Procedure: RIGHT REVERSE TOTAL SHOULDER ARTHROPLASTY;  Surgeon: Sandro Monsivais MD;  Location:  [facility] OR;  Service: Orthopedics;  Laterality: Right;    TOTAL SHOULDER ARTHROPLASTY W/ DISTAL CLAVICLE EXCISION Right 10/14/2021    Procedure: INCISION AND DRAINAGE RIGHT SHOULDER WITH POLY EXCHANGE;  Surgeon: Sandro Monsivais MD;  Location:  [facility] OR;  Service: Orthopedics;  Laterality: Right;     PT Assessment (Last 12 Hours)       PT Evaluation and Treatment       Row Name 05/02/25 0849 05/02/25 0840       Physical Therapy Time and Intention    Subjective Information --  -NW complains of;pain  -NW    Document Type --  -NW therapy note (daily note)  -NW    Mode of Treatment -- physical therapy  -NW      Row Name 05/02/25 0840          General Information    Existing Precautions/Restrictions fall;LSO;brace worn when out of bed;spinal  prevena  -NW       Row Name 05/02/25 0840          Pain    Posttreatment Pain Rating 7/10  -NW     Pain Location abdomen;back  -NW     Pain Side/Orientation lower  -NW       Row Name 05/02/25 0840          Bed Mobility    Bed Mobility sidelying-sit;sit-sidelying  -NW     Sit-Sidelying [place] (Bed Mobility) verbal cues;supervision  -NW     Comment, (Bed Mobility) cues and extra time due to pain  -NW       Row Name 05/02/25 0840          Sit-Stand Transfer    Sit-Stand [place] (Transfers) verbal cues;standby assist  -NW     Assistive Device (Sit-Stand Transfers) --  slow gaurded, extra time for transition  -NW       Row Name 05/02/25 0840          Stand-Sit Transfer    Stand-Sit [place] (Transfers) verbal cues;standby assist  -NW       Row Name 05/02/25 0840          Gait/Stairs (Locomotion)    [place] Level (Gait) verbal cues;standby assist  -NW     Distance in Feet (Gait) 200  200x2  -NW     Deviations/Abnormal Patterns (Gait) veronique decreased;stride length decreased  -NW      Bilateral Gait Deviations heel strike decreased  -NW     [place] Level (Stairs) verbal cues;stand by assist  -NW     Handrail Location (Stairs) both sides  -NW     Number of Steps (Stairs) 5  -NW     Ascending Technique (Stairs) step-to-step  -NW     Descending Technique (Stairs) step-to-step  -NW     Comment, (Gait/Stairs) review home safety and POC  -NW       Row Name 05/02/25 0840          Safety Issues/Impairments Affecting Functional Mobility    Impairments Affecting Function (Mobility) pain  -NW       Row Name             Wound 04/30/25 0801 Left lower abdomen Surgical    Wound - Properties Group Placement Date: 04/30/25  -DT Placement Time: 0801  -DT Side: Left  -DT Orientation: lower  -DT Location: abdomen  -DT Primary Wound Type: Surgical  -DT    Retired Wound - Properties Group Placement Date: 04/30/25  -DT Placement Time: 0801  -DT Side: Left  -DT Orientation: lower  -DT Location: abdomen  -DT    Retired Wound - Properties Group Placement Date: 04/30/25  -DT Placement Time: 0801  -DT Side: Left  -DT Orientation: lower  -DT Location: abdomen  -DT    Retired Wound - Properties Group Date first assessed: 04/30/25  -DT Time first assessed: 0801  -DT Side: Left  -DT Location: abdomen  -DT      Row Name             NPWT (Negative Pressure Wound Therapy) 04/30/25 1044 LLQ    NPWT (Negative Pressure Wound Therapy) - Properties Group Placement Date: 04/30/25  -DT Placement Time: 1044  -DT Location: LLQ  -DT    Retired NPWT (Negative Pressure Wound Therapy) - Properties Group Placement Date: 04/30/25  -DT Placement Time: 1044  -DT Location: LLQ  -DT    Retired NPWT (Negative Pressure Wound Therapy) - Properties Group Placement Date: 04/30/25  -DT Placement Time: 1044  -DT Location: LLQ  -DT    Retired NPWT (Negative Pressure Wound Therapy) - Properties Group Placement Date: 04/30/25  -DT Placement Time: 1044  -DT Location: LLQ  -DT      Row Name 05/02/25 0840          Positioning and Restraints     Pre-Treatment Position sitting in chair/recliner  -NW     Post Treatment Position bed  -NW     In Bed fowlers;call light within reach;encouraged to call for assist  -NW               User Key  (r) = Recorded By, (t) = Taken By, (c) = Cosigned By      Initials Name Provider Type    NW Any [facility], PTA Physical Therapist Assistant    Ollie Peñaloza, RN Registered Nurse                    Physical Therapy Education       Title: PT OT SLP Therapies (In Progress)       Topic: Physical Therapy (In Progress)       Point: Mobility training (Done)       Learning Progress Summary            Patient Acceptance, E, VU by SB at 4/30/2025 1424    Comment: pt edu on POC, benefits of act, d/c plans, spinal precautions, bracing                      Point: Home exercise program (Not Started)       Learner Progress:  Not documented in this visit.              Point: Body mechanics (Not Started)       Learner Progress:  Not documented in this visit.              Point: Precautions (Done)       Learning Progress Summary            Patient Acceptance, E, VU by SB at 4/30/2025 1424    Comment: pt edu on POC, benefits of act, d/c plans, spinal precautions, bracing                                      User Key       Initials Effective Dates Name Provider Type Discipline     07/11/23 -  Taylor Cedeño, PT DPT Physical Therapist PT                  PT Recommendation and Plan     Progress: improving  Outcome Evaluation: Pt up in chair. difficult transition form sit-stand slow gaurded sba. Pt able to don brace independently. Pt able to amb 100'x4 slow gaurded antalgic gait, holding to handrails at times. discussed home safety and POC. pt eager to dc home today, feel pt will be safe from PT standpoint.       Time Calculation:    PT Charges       Row Name 05/02/25 0912             Time Calculation    Start Time 0840  -NW      Stop Time 0905  -NW      Time Calculation (min) 25 min  -NW      PT Received On 05/02/25  -NW      PT Goal  Re-Cert Due Date 05/10/25  -NW         Time Calculation- PT    Total Timed Code Minutes- PT 25 minute(s)  -NW         Timed Charges    99423 - Gait Training Minutes  25  -NW         Total Minutes    Timed Charges Total Minutes 25  -NW       Total Minutes 25  -NW                User Key  (r) = Recorded By, (t) = Taken By, (c) = Cosigned By      Initials Name Provider Type    NW Any [facility], YENNIFER Physical Therapist Assistant                  Therapy Charges for Today       Code Description Service Date Service Provider Modifiers Qty    51914784408 HC GAIT TRAINING EA 15 MIN 5/1/2025 Any [facility] PTA GP 2    82778783193 HC GAIT TRAINING EA 15 MIN 5/2/2025 Any [facility], YENNIFER GP 2            PT G-Codes  Outcome Measure Options: AM-PAC 6 Clicks Daily Activity (OT)  AM-PAC 6 Clicks Score (PT): 20  AM-PAC 6 Clicks Score (OT): 19    Any [facility] PTA  5/2/2025

## 2025-05-02 NOTE — PLAN OF CARE
Problem: Adult Inpatient Plan of Care  Goal: Plan of Care Review  Outcome: Progressing  Flowsheets (Taken 5/2/2025 6933)  Progress: no change  Outcome Evaluation: Pt A&Ox4, VSS. Pt c/o severe pain with a little relief from PRN PO meds. Pt did not sleep much this shift. Up with LSO brace. Dressing CDI. Room air. Pt reports numbness to R hand is improved. Safety maintained, will continue to monitor.  Plan of Care Reviewed With: patient

## 2025-05-02 NOTE — PAYOR COMM NOTE
"REF:  [id number]     [facility]  FAX  [phone]       Feliberto Stoner (49 y.o. Male)       Date of Birth   1975    Social Security Number       Address   [address] [place] [address]    Home Phone   [phone]    MRN   [id number]       Jainism   Islam    Marital Status                               Admission Date   4/30/2025    Admission Type   Elective    Admitting Provider   MICHAEL Cook MD    Attending Provider       Department, Room/Bed   [facility] 3A, 352/1       Discharge Date   5/2/2025    Discharge Disposition   Home or Self Care    Discharge Destination                                 Attending Provider: (none)   Allergies: No Known Allergies    Isolation: None   Infection: None   Code Status: Prior    Ht: 170.2 cm (67\")   Wt: 117 kg (258 lb)    Admission Cmt: None   Principal Problem: Lumbar radiculopathy [M54.16]                   Active Insurance as of 4/30/2025       Primary Coverage       Payor Plan Insurance Group Employer/Plan Group    [facility] CCN OPTUM        Payor Plan Address Payor Plan Phone Number Payor Plan Fax Number Effective Dates    [address] [phone]  12/28/2021 - None Entered    [facility] [id number]         Subscriber Name Subscriber Birth Date Member ID       FELIBERTO STONER 1975                      Emergency Contacts        (Rel.) Home Phone Work Phone Mobile Phone    Janet Stoner (Spouse) -- -- [phone]                 Discharge Summary        Jesus Bateman PA-C at 05/02/25 1210          [facility]  Jesus Bateman PA-C  DISCHARGE SUMMARY  Patient ID:  Feliberto Stoner  [id number]  49 y.o.  1975    Admit date: 4/30/2025    Discharge date and time: 5/2/2025    Admitting Physician: MICHAEL Cook MD     Indication for Admission: Planned surgical admission     Admission Diagnoses: Lumbar radiculopathy " [M54.16]    Discharge Diagnoses: Lumbar radiculopathy [M54.16]    Procedures: Lumbar disc replacement L4-5, anterior lumbar fusion L5-S1    Problem List:   Lumbar radiculopathy    Degeneration of intervertebral disc of lumbar region with discogenic back pain and lower extremity pain      Consults: none    Admission Condition: stable    Discharged Condition: stable    Hospital Course: no immediate post operative complication     Disposition: home    Patient Instructions:      Discharge Medications        ASK your doctor about these medications        Instructions Start Date   empagliflozin 25 MG tablet tablet  Commonly known as: JARDIANCE   12.5 mg, Oral, Daily      gabapentin 300 MG capsule  Commonly known as: NEURONTIN   300 mg, 3 Times Daily      levothyroxine 75 MCG tablet  Commonly known as: SYNTHROID, LEVOTHROID   75 mcg, Oral, Every Early Morning      lisinopril 5 MG tablet  Commonly known as: PRINIVIL,ZESTRIL   Take 0.5 tablets by mouth Daily.      metoprolol succinate XL 25 MG 24 hr tablet  Commonly known as: TOPROL-XL   12.5 mg, Oral, Daily      naloxone 4 MG/0.1ML nasal spray  Commonly known as: NARCAN   1 spray, Nasal, As Needed, Call 911. Don't prime. [place] in 1 nostril for overdose. Repeat in 2-3 minutes in other nostril if no or minimal breathing/responsiveness.      oxybutynin 5 MG tablet  Commonly known as: DITROPAN   5 mg, Oral, Daily      Semaglutide (1 MG/DOSE) 4 MG/3ML solution pen-injector  Commonly known as: OZEMPIC   0.5 mL, Subcutaneous, Weekly      sertraline 100 MG tablet  Commonly known as: ZOLOFT   100 mg, Oral, Daily      tadalafil 20 MG tablet  Commonly known as: CIALIS  Ask about: Which instructions should I use?   20 mg, Oral, Every 7 Days, prn      testosterone cypionate 100 MG/ML solution injection  Commonly known as: DEPO-TESTOSTERONE   100 mg, 2 Times Weekly      tiZANidine 4 MG tablet  Commonly known as: ZANAFLEX   4 mg, Oral, Nightly PRN             Activity: Avoid bending  lifting or twisting, brace when up and out of bed, no driving while taking narcotic pain medication, walk 15 minutes 4 times daily  Diet: regular diet  Wound Care: keep wound clean and dry  Other: Contact [facility] office with questions or concerns    Follow-up with Dr Cook or PA's in 2 weeks.    Electronically signed by Jesus Bateman PA-C 12:10 CDT 5/2/2025      Electronically signed by Jesus Bateman PA-C at 05/02/25 1211       Discharge Order (From admission, onward)       Start     Ordered    05/02/25 1220  Discharge patient  Once        Expected Discharge Date: 05/02/25   Discharge Disposition: Home or Self Care   Physician of Record for Attribution - Please select from Treatment Team: MICHAEL COOK [[id number]]   Review needed by CMO to determine Physician of Record: No      Question Answer Comment   Physician of Record for Attribution - Please select from Treatment Team MICHAEL COOK    Review needed by CMO to determine Physician of Record No        05/02/25 5462                   No deformities present No deformities present No deformities present

## 2025-05-02 NOTE — DISCHARGE SUMMARY
[facility]  Jesus Bateman PA-C  DISCHARGE SUMMARY  Patient ID:  Feliberto Knowles  [id number]  49 y.o.  1975    Admit date: 4/30/2025    Discharge date and time: 5/2/2025    Admitting Physician: MICHAEL Cook MD     Indication for Admission: Planned surgical admission     Admission Diagnoses: Lumbar radiculopathy [M54.16]    Discharge Diagnoses: Lumbar radiculopathy [M54.16]    Procedures: Lumbar disc replacement L4-5, anterior lumbar fusion L5-S1    Problem List:   Lumbar radiculopathy    Degeneration of intervertebral disc of lumbar region with discogenic back pain and lower extremity pain      Consults: none    Admission Condition: stable    Discharged Condition: stable    Hospital Course: no immediate post operative complication     Disposition: home    Patient Instructions:      Discharge Medications        ASK your doctor about these medications        Instructions Start Date   empagliflozin 25 MG tablet tablet  Commonly known as: JARDIANCE   12.5 mg, Oral, Daily      gabapentin 300 MG capsule  Commonly known as: NEURONTIN   300 mg, 3 Times Daily      levothyroxine 75 MCG tablet  Commonly known as: SYNTHROID, LEVOTHROID   75 mcg, Oral, Every Early Morning      lisinopril 5 MG tablet  Commonly known as: PRINIVIL,ZESTRIL   Take 0.5 tablets by mouth Daily.      metoprolol succinate XL 25 MG 24 hr tablet  Commonly known as: TOPROL-XL   12.5 mg, Oral, Daily      naloxone 4 MG/0.1ML nasal spray  Commonly known as: NARCAN   1 spray, Nasal, As Needed, Call 911. Don't prime. [place] in 1 nostril for overdose. Repeat in 2-3 minutes in other nostril if no or minimal breathing/responsiveness.      oxybutynin 5 MG tablet  Commonly known as: DITROPAN   5 mg, Oral, Daily      Semaglutide (1 MG/DOSE) 4 MG/3ML solution pen-injector  Commonly known as: OZEMPIC   0.5 mL, Subcutaneous, Weekly      sertraline 100 MG tablet  Commonly known as: ZOLOFT   100 mg, Oral, Daily      tadalafil 20 MG tablet  Commonly  known as: CIALIS  Ask about: Which instructions should I use?   20 mg, Oral, Every 7 Days, prn      testosterone cypionate 100 MG/ML solution injection  Commonly known as: DEPO-TESTOSTERONE   100 mg, 2 Times Weekly      tiZANidine 4 MG tablet  Commonly known as: ZANAFLEX   4 mg, Oral, Nightly PRN             Activity: Avoid bending lifting or twisting, brace when up and out of bed, no driving while taking narcotic pain medication, walk 15 minutes 4 times daily  Diet: regular diet  Wound Care: keep wound clean and dry  Other: Contact [facility] office with questions or concerns    Follow-up with Dr Cook or PA's in 2 weeks.    Electronically signed by Jesus Bateman PA-C 12:10 CDT 5/2/2025

## 2025-05-02 NOTE — THERAPY DISCHARGE NOTE
Acute Care - Physical Therapy Discharge Summary  [facility]       Patient Name: Feliberto Knowles  : 1975  MRN: [id number]    Today's Date: 2025                 Admit Date: 2025      PT Recommendation and Plan    Visit Dx:    ICD-10-CM ICD-9-CM   1. Impaired mobility [Z74.09]  Z74.09 799.89   2. Lumbar radiculopathy  M54.16 724.4   3. Degeneration of intervertebral disc of lumbar region with discogenic back pain and lower extremity pain  M51.362 722.52            PT Charges       Row Name 25 0912             Time Calculation    Start Time 0840  -NW      Stop Time 0905  -NW      Time Calculation (min) 25 min  -NW      PT Received On 25  -NW      PT Goal Re-Cert Due Date 05/10/25  -NW         Time Calculation- PT    Total Timed Code Minutes- PT 25 minute(s)  -NW         Timed Charges    14881 - Gait Training Minutes  25  -NW         Total Minutes    Timed Charges Total Minutes 25  -NW       Total Minutes 25  -NW                User Key  (r) = Recorded By, (t) = Taken By, (c) = Cosigned By      Initials Name Provider Type    Any Diallo, PTA Physical Therapist Assistant                     PT Rehab Goals       Row Name 25 1300             Bed Mobility Goal 1 (PT)    Activity/Assistive Device (Bed Mobility Goal 1, PT) rolling to left;rolling to right;sidelying to sit;sit to sidelying  -AB      [place] Level/Cues Needed (Bed Mobility Goal 1, PT) independent  -AB      Time Frame (Bed Mobility Goal 1, PT) long term goal (LTG)  -AB      Progress/Outcomes (Bed Mobility Goal 1, PT) goal not met  -AB         Transfer Goal 1 (PT)    Activity/Assistive Device (Transfer Goal 1, PT) sit-to-stand/stand-to-sit;bed-to-chair/chair-to-bed  -AB      [place] Level/Cues Needed (Transfer Goal 1, PT) independent  -AB      Time Frame (Transfer Goal 1, PT) long term goal (LTG)  -AB      Progress/Outcome (Transfer Goal 1, PT) goal not met  -AB         Gait Training Goal 1 (PT)     Activity/Assistive Device (Gait Training Goal 1, PT) gait (walking locomotion);decrease fall risk;improve balance and speed;increase endurance/gait distance  -AB      [place] Level (Gait Training Goal 1, PT) independent  -AB      Distance (Gait Training Goal 1, PT) 300  -AB      Time Frame (Gait Training Goal 1, PT) long term goal (LTG)  -AB      Progress/Outcome (Gait Training Goal 1, PT) goal not met  -AB         Stairs Goal 1 (PT)    Activity/Assistive Device (Stairs Goal 1, PT) stairs, all skills;ascending stairs;descending stairs  -AB      [place] Level/Cues Needed (Stairs Goal 1, PT) independent  -AB      Number of Stairs (Stairs Goal 1, PT) 5  -AB      Time Frame (Stairs Goal 1, PT) long term goal (LTG)  -AB      Progress/Outcome (Stairs Goal 1, PT) goal not met  -AB         Problem Specific Goal 1 (PT)    Problem Specific Goal 1 (PT) Pt will perform mobility with </= 3/10 pain  -AB      Time Frame (Problem Specific Goal 1, PT) long-term goal (LTG)  -AB      Progress/Outcome (Problem Specific Goal 1, PT) goal not met  -AB                User Key  (r) = Recorded By, (t) = Taken By, (c) = Cosigned By      Initials Name Provider Type Discipline    Ebony Goldstein, PTA Physical Therapist Assistant PT                        PT Discharge Summary  Anticipated Discharge Disposition (PT): home with assist  Reason for Discharge: Discharge from facility  Outcomes Achieved: Refer to plan of care for updates on goals achieved  Discharge Destination: Home with assist      Ebony Maya PTA   5/2/2025

## 2025-05-02 NOTE — PLAN OF CARE
Goal Outcome Evaluation:      Pt sitting upright on side of bed . Aox4. VSS on RA. Pt up standby to restroom and hallway. Pt c/o minimal pain this shift. Pt did have fall to bottom off side of bed. No injuries obtained. Pt still feels safe to discharge home. DC education completed with pt and spouse. Verbalized understanding. No further questions. Call light within reach. Safety maintained. Care continues.     Pt wheeled to main entrance at 1324.

## 2025-05-02 NOTE — DISCHARGE INSTR - ACTIVITY
Activity: Avoid bending lifting or twisting, brace when up and out of bed, no driving while taking narcotic pain medication, walk 15 minutes 4 times daily  Diet: regular diet  Wound Care: keep wound clean and dry  Other: Contact [facility] office with questions or concerns

## 2025-05-03 NOTE — OUTREACH NOTE
Prep Survey      Flowsheet Row Responses   Religious facility patient discharged from? [facility]   Is LACE score < 7 ? No   Eligibility Readm Mgmt   Discharge diagnosis Lumbar radiculopathy, Lumbar disc replacement L4-5, anterior lumbar fusion L5-S1   Does the patient have one of the following disease processes/diagnoses(primary or secondary)? General Surgery   Does the patient have Home health ordered? No   Is there a DME ordered? No   Prep survey completed? Yes            Lindsay Herrera Registered Nurse

## 2025-05-05 NOTE — THERAPY DISCHARGE NOTE
Acute Care - Occupational Therapy Discharge Summary  [facility]     Patient Name: Feliberto Knowles  : 1975  MRN: [id number]    Today's Date: 2025                 Admit Date: 2025        OT Recommendation and Plan    Visit Dx:    ICD-10-CM ICD-9-CM   1. Impaired mobility [Z74.09]  Z74.09 799.89   2. Lumbar radiculopathy  M54.16 724.4   3. Degeneration of intervertebral disc of lumbar region with discogenic back pain and lower extremity pain  M51.362 722.52                OT Rehab Goals       Row Name 25 1000             Transfer Goal 1 (OT)    Activity/Assistive Device (Transfer Goal 1, OT) toilet  -LS      [place] Level/Cues Needed (Transfer Goal 1, OT) supervision required  -LS      Time Frame (Transfer Goal 1, OT) long term goal (LTG);10 days  -LS      Progress/Outcome (Transfer Goal 1, OT) goal not met  -LS         Dressing Goal 1 (OT)    Activity/Device (Dressing Goal 1, OT) dressing skills, all;upper body dressing  -LS      [place]/Cues Needed (Dressing Goal 1, OT) modified independence  -LS      Time Frame (Dressing Goal 1, OT) long term goal (LTG);10 days  -LS      Strategies/Barriers (Dressing Goal 1, OT) including LSO  -LS      Progress/Outcome (Dressing Goal 1, OT) goal not met  -LS         Toileting Goal 1 (OT)    Activity/Device (Toileting Goal 1, OT) toileting skills, all  -LS      [place] Level/Cues Needed (Toileting Goal 1, OT) supervision required  -LS      Time Frame (Toileting Goal 1, OT) long term goal (LTG);10 days  -LS      Progress/Outcome (Toileting Goal 1, OT) goal not met  -LS                User Key  (r) = Recorded By, (t) = Taken By, (c) = Cosigned By      Initials Name Provider Type Discipline    LS Yecenia Nuñez COTA Occupational Therapist Assistant THERAPIES                                OT Discharge Summary  Anticipated Discharge Disposition (OT): home with assist  Reason for Discharge: Discharge from facility  Outcomes Achieved: Refer to plan of  care for updates on goals achieved  Discharge Destination: Home with assist      CLYDE Lynne  5/5/2025

## 2025-05-06 ENCOUNTER — NURSE TRIAGE (OUTPATIENT)
Dept: CALL CENTER | Facility: HOSPITAL | Age: 50
End: 2025-05-06
Payer: OTHER GOVERNMENT

## 2025-05-07 ENCOUNTER — READMISSION MANAGEMENT (OUTPATIENT)
Dept: CALL CENTER | Facility: HOSPITAL | Age: 50
End: 2025-05-07
Payer: OTHER GOVERNMENT

## 2025-05-07 NOTE — TELEPHONE ENCOUNTER
Reason for Disposition   Severe pain in the incision    Additional Information   Negative: [1] Major abdominal surgical incision AND [2] wound gaping open AND [3] visible internal organs   Negative: Sounds like a life-threatening emergency to the triager   Negative: Patient has a Negative Pressure Wound Therapy device   Negative: Patient is followed by a wound clinic or wound specialist for this wound   Negative: [1] Bleeding from incision AND [2] won't stop after 10 minutes of direct pressure   Negative: [1] Bleeding (more than a few drops) from incision AND [2] tracheostomy or blood vessel surgery (e.g., carotid endarterectomy, femoral bypass graft, kidney dialysis fistula)   Negative: [1] Widespread rash AND [2] bright red, sunburn-like   Negative: [1] Incision gaping open AND [2] < 48 hours since wound re-opened   Negative: [1] Incision gaping open AND [2] length of opening > 2 inches (5 cm)   Negative: Patient sounds very sick or weak to the triager   Negative: Sounds like a serious complication to the triager   Negative: Fever > 100.4 F (38.0 C)   Negative: [1] Incision looks infected (spreading redness, pain) AND [2] fever > 99.5 F (37.5 C)   Negative: [1] Incision looks infected (spreading redness, pain) AND [2] large red area (> 2 in. or 5 cm)   Negative: [1] Incision looks infected (spreading redness, pain) AND [2] face wound   Negative: [1] Red streak runs from the incision AND [2] longer than 1 inch (2.5 cm)   Negative: [1] Pus or bad-smelling fluid draining from incision AND [2] no fever   Negative: [1] Post-op pain AND [2] not controlled with pain medications   Negative: Dressing soaked with blood or body fluid (e.g., drainage)   Negative: [1] Scant bleeding (e.g., few drops) from incision AND AND [2] tracheostomy or blood vessel surgery (e.g., carotid endarterectomy, femoral bypass graft, kidney dialysis fistula)   Negative: [1] Raised bruise and [2] size > 2 inches (5 cm) and expanding   Negative:  "[1] Caller has URGENT question AND [2] triager unable to answer question   Negative: [1] INCREASING pain in incision AND [2] > 2 days (48 hours) since surgery   Negative: [1] Small red area or streak AND [2] no fever   Negative: [1] Clear or blood-tinged fluid draining from wound AND [2] no fever   Negative: [1] Incision gaping open AND [2] > 48 hours since wound re-opened AND [3] length of opening > 1/2 inch (12 mm)   Negative: [1] Incision on face gaping open AND [2] > 48 hours since wound re-opened AND [3] length of opening > 1/4 inch (6 mm)   Negative: Suture or staple removal is overdue   Negative: [1] Suture or staple came out early AND [2] caller wants wound checked   Negative: [1] Caller has NON-URGENT question AND [2] triager unable to answer question   Negative: Pimple where a stitch comes through the skin   Negative: Suture (or staple) came out early   Negative: Mild bruising near incision site   Negative: Suture removal date, questions about   Negative: Skin tape (e.g., Steri-Strips) removal, questions about   Negative: Sutured or stapled surgical incision, questions about   Negative: Surgical incision after sutures or staples removed, questions about   Negative: Numbness at incision site, questions about   Negative: Saline dressing changes, questions about    Answer Assessment - Initial Assessment Questions  1. SYMPTOM: \"What's the main symptom you're concerned about?\" (e.g., drainage, incision opening up, pain, redness)  Had Back Surgery at [facility] and was discharged Friday, had wound vac on but was instructed to take dressing off today and leave it off, the area is more swollen, painful, red, hot to touch, concerned it is infected, would is on lower abdomen           2. ONSET: \"When did   start?\"      today    3. SURGERY: \"What surgery did you have?\"      Back surgery     4. DATE of SURGERY: \"When was the surgery?\"           5. INCISION SITE: \"Where is the incision located?\"         Lower abdomen " "    6. REDNESS: \"Is there any redness at the incision site?\" If Yes, ask: \"How wide across is the redness?\" (Inches, centimeters)       Yes all round     7. PAIN: \"Is there any pain?\" If Yes, ask: \"How bad is it?\"  (Scale 1-10; or mild, moderate, severe)    - NONE (0): no pain    - MILD (1-3): doesn't interfere with normal activities     - MODERATE (4-7): interferes with normal activities or awakens from sleep     - SEVERE (8-10): excruciating pain, unable to do any normal activities      severe  8. BLEEDING: \"Is there any bleeding?\" If Yes, ask: \"How much?\" and \"Where?\"      No    9. DRAINAGE: \"Is there any drainage from the incision site?\" If Yes, ask: \"What color and how much?\" (e.g., red, cloudy, pus; drops, teaspoon)        No    10. FEVER: \"Do you have a fever?\" If Yes, ask: \"What is your temperature, how was it measured, and when did it start?\"          No     11. OTHER SYMPTOMS: \"Do you have any other symptoms?\" (e.g., dizziness, rash elsewhere on body, shaking chills, weakness)        Swollen, hot to touch    Protocols used: Post-Op Incision Symptoms and Questions-ADULT-    "

## 2025-05-07 NOTE — TELEPHONE ENCOUNTER
Had Back Surgery at [facility] and was discharged Friday, had wound vac on but was instructed to take dressing off today and leave it off, the area is more swollen, painful, red, hot to touch, concerned it is infected, would is on lower abdomen   Called Surgeon office number for after hours, he left message to return call  Triaged per protocol,   He states if they do not call back in short time will go to the ER as thinks his wound is infected and having severe pain

## 2025-05-07 NOTE — OUTREACH NOTE
General Surgery Week 1 Survey      Flowsheet Row Responses   [facility] patient discharged from? [facility]   Does the patient have one of the following disease processes/diagnoses(primary or secondary)? General Surgery   Week 1 attempt successful? Yes   Call start time 1203   Call end time 1207   List who call center can speak with pt   Meds reviewed with patient/caregiver? Yes   Is the patient having any side effects they believe may be caused by any medication additions or changes? No   Does the patient have all medications related to this admission filled (includes all antibiotics, pain medications, etc.) Yes   Is the patient taking all medications as directed (includes completed medication regime)? Yes   Does the patient have a follow up appointment scheduled with their surgeon? Yes   Has the patient kept scheduled appointments due by today? Yes   Has home health visited the patient within 72 hours of discharge? N/A   Psychosocial issues? No   Did the patient receive a copy of their discharge instructions? Yes   Nursing interventions Reviewed instructions with patient   What is the patient's perception of their health status since discharge? Improving   Is the patient /caregiver able to teach back basic post-op care? Drive as instructed by MD in discharge instructions, Take showers only when approved by MD-sponge bathe until then, No tub bath, swimming, or hot tub until instructed by MD, Keep incision areas clean,dry and protected, Do not remove steri-strips, Lifting as instructed by MD in discharge instructions   Is the patient/caregiver able to teach back signs and symptoms of incisional infection? Increased redness, swelling or pain at the incisonal site, Increased drainage or bleeding, Incisional warmth, Pus or odor from incision, Fever   Is the patient/caregiver able to teach back steps to recovery at home? Set small, achievable goals for return to baseline health, Rest and rebuild strength, gradually  increase activity   Is the patient/caregiver able to teach back the hierarchy of who to call/visit for symptoms/problems? PCP, Specialist, Home health nurse, Urgent Care, ED, 911 Yes   Week 1 call completed? Yes   Is the patient interested in additional calls from an ambulatory ? No   Wrap up additional comments Pt feeling better today. Pt just left surgeon office with concerns of incisional infection. Surgeon reports no infection noted, incision healing. Back brace present at all times.   Call end time 1207            Taylor COE - Registered Nurse

## 2025-05-19 ENCOUNTER — OFFICE VISIT (OUTPATIENT)
Dept: VASCULAR SURGERY | Facility: CLINIC | Age: 50
End: 2025-05-19
Payer: OTHER GOVERNMENT

## 2025-05-19 ENCOUNTER — TELEPHONE (OUTPATIENT)
Dept: VASCULAR SURGERY | Facility: CLINIC | Age: 50
End: 2025-05-19
Payer: OTHER GOVERNMENT

## 2025-05-19 VITALS
DIASTOLIC BLOOD PRESSURE: 62 MMHG | HEART RATE: 95 BPM | HEIGHT: 67 IN | SYSTOLIC BLOOD PRESSURE: 118 MMHG | WEIGHT: 248 LBS | OXYGEN SATURATION: 95 % | BODY MASS INDEX: 38.92 KG/M2

## 2025-05-19 DIAGNOSIS — L03.311 CELLULITIS OF ABDOMINAL WALL: Primary | ICD-10-CM

## 2025-05-19 PROCEDURE — 99024 POSTOP FOLLOW-UP VISIT: CPT | Performed by: NURSE PRACTITIONER

## 2025-05-19 RX ORDER — CEPHALEXIN 500 MG/1
500 CAPSULE ORAL 3 TIMES DAILY
Qty: 21 CAPSULE | Refills: 0 | Status: ON HOLD | OUTPATIENT
Start: 2025-05-19 | End: 2025-05-26

## 2025-05-19 NOTE — PROGRESS NOTES
"5/19/2025       MICHAEL Elliott MD  [address] [address]  [facility] [[email or web address]] [address]  [place] [address]    Feliberto Knowles  1975    Chief Complaint   Patient presents with    Lumbar radiculopathy     Says he got out of the shower last night and his bandage was completely saturated and had a greenish color to it. Also has incisions open as well. Talked to dr elliott office last night of who was on call. They said to get in touch with us. Says his wife re-bandaged it and it is already completely saturated. Still really hard and sore.        Dear MICHAEL Elliott MD:      HPI  I had the pleasure of seeing your patient Feliberto Knowles in the office today.   As you recall, Feliberto Knowles is a 49 y.o.  male who you are currently following for chronic back pain.  Feliberto Knowles did undergo  artificial disc replacement of L4-5 and anterior lumbar interbody fusion of L5-S1 with Dr. Elliott on 4/30/25.  She has overall done well but a couple days ago had small opening to his lower incision with drainage noted.  He denies any fever or increased discomfort.           Review of Systems   Constitutional: Negative.    HENT: Negative.     Eyes: Negative.    Respiratory: Negative.     Cardiovascular: Negative.    Gastrointestinal: Negative.    Endocrine: Negative.    Genitourinary: Negative.    Musculoskeletal:  Positive for arthralgias and back pain.   Skin: Negative.    Allergic/Immunologic: Negative.    Neurological: Negative.    Hematological: Negative.    Psychiatric/Behavioral: Negative.     All other systems reviewed and are negative.      /62   Pulse 95   Ht 170.2 cm (67\")   Wt 112 kg (248 lb)   SpO2 95%   BMI 38.84 kg/m²   Physical Exam  Vitals and nursing note reviewed.   Constitutional:       General: He is not in acute distress.     Appearance: Normal appearance. He is well-developed. He is obese. He is not diaphoretic.   HENT:      Head: Normocephalic and atraumatic.   Eyes:      General: No scleral icterus.     Pupils: " Pupils are equal, round, and reactive to light.   Neck:      Thyroid: No thyromegaly.      Vascular: No carotid bruit or JVD.   Cardiovascular:      Rate and Rhythm: Normal rate and regular rhythm.      Pulses: Normal pulses.           Carotid pulses are 2+ on the right side and 2+ on the left side.       Femoral pulses are 2+ on the right side and 2+ on the left side.       Popliteal pulses are 2+ on the right side and 2+ on the left side.        Dorsalis pedis pulses are 2+ on the right side and 2+ on the left side.        Posterior tibial pulses are 2+ on the right side and 2+ on the left side.      Heart sounds: Normal heart sounds, S1 normal and S2 normal. No murmur heard.     No friction rub. No gallop.   Pulmonary:      Effort: Pulmonary effort is normal.      Breath sounds: Normal breath sounds.   Abdominal:      General: Bowel sounds are normal. There is no distension or abdominal bruit.      Palpations: Abdomen is soft. There is no mass.      Tenderness: There is no abdominal tenderness.   Musculoskeletal:      Cervical back: Neck supple.      Comments: Brace in place   Lymphadenopathy:      Cervical: No cervical adenopathy.   Skin:     General: Skin is warm and dry.          Neurological:      Mental Status: He is alert and oriented to person, place, and time.      Cranial Nerves: No cranial nerve deficit.      Sensory: No sensory deficit.   Psychiatric:         Mood and Affect: Mood normal.         Behavior: Behavior normal.         Thought Content: Thought content normal.         Judgment: Judgment normal.         XR Abdomen KUB  Result Date: 4/30/2025  Narrative: EXAM: XR ABDOMEN KUB-  DATE: 4/30/2025 9:50 AM  HISTORY: ALIF -- no count in OR   COMPARISON: No existing relevant imaging studies available.  TECHNIQUE:  Supine view of the abdomen. 1 images.  FINDINGS:  Limited evaluation for free air on supine imaging. Nonobstructive supine bowel gas pattern. Nonobstructive supine bowel gas pattern.  No  dystrophic calcifications visualized at the abdomen.  Postoperative changes at L4-5 and L5-S1. Numerous surgical clips at the RIGHT lower quadrant and LEFT pelvis. No acute bony finding by radiograph.       Impression: 1. Postoperative changes at L4-5 and L5-S1, not optimally evaluated on this exam.  This report was signed and finalized on 4/30/2025 11:10 AM by Dr Lili Saab MD.      XR Spine Lumbar AP & Lateral  Result Date: 4/30/2025  Narrative: EXAM: XR SPINE LUMBAR AP AND LATERAL-  DATE: 4/30/2025 6:50 AM  HISTORY: ALIF   COMPARISON: No existing relevant imaging studies available.  TECHNIQUE:  Intraoperative fluoroscopy images submitted.  Number of images: 10 Fluoroscopy time: 29.9 seconds Air kerma: 24.869 mGy  FINDINGS:  See impression.       Impression: 1. Anterior discectomy and fixation L4-5 and L5-S1. 2. A radiologist was not present for the acquisition or intraoperative the interpretation of these images. Please see the operative report for details pertaining to this exam.  This report was signed and finalized on 4/30/2025 10:48 AM by Dr Lili Saab MD.      FL C Arm During Surgery  Result Date: 4/30/2025  Narrative: This procedure was auto-finalized with no dictation required.      Patient Active Problem List   Diagnosis    Dizziness    Transient ischemic attack (TIA)    Hypertrophic obstructive cardiomyopathy    Essential hypertension, new diagnosis    History of stroke    Infection of prosthetic total shoulder joint    Postoperative infection    Lumbar radiculopathy    Degeneration of intervertebral disc of lumbar region with discogenic back pain and lower extremity pain         ICD-10-CM ICD-9-CM   1. Cellulitis of abdominal wall  L03.311 682.2         Plan: After thoroughly evaluating Feliberto Knowles, I believe the best course of action is to remain conservative from a vascular surgery standpoint.  He does have a firm area without significant discomfort or fluctuation.  There is mild  induration.  I did have Dr. Garzon evaluate as well and did not feel this required any surgical intervention.  He denies any fevers.  He did recommend cleansing daily with soap and water and painting with Betadine and applying dressing.  I encouraged him to call if he developed any fever or purulent drainage.  At this point he appears to be serosanguineous.  I will prophylactically place him on Keflex.  He will notify us should he have any significant changes.  This was all discussed in full with complete understanding.    Thank you for allowing me to participate in the care of your patient.  Please do not hesitate with any questions or concerns.  I will keep you aware of any further encounters with Feliberto Knowles.        Sincerely yours,         FALGUNI Scott

## 2025-05-19 NOTE — LETTER
May 19, 2025     Nakul Hdz DO  [address] [facility] [address]    Patient: Feliberto Knowles   YOB: 1975   Date of Visit: 5/19/2025     Dear Nakul Hdz DO:       Thank you for referring Feliberto Knowles to me for evaluation. Below are the relevant portions of my assessment and plan of care.    If you have questions, please do not hesitate to call me. I look forward to following Feliberto along with you.         Sincerely,        FALGUNI Scott        CC: LISA Fonseca Brandy, APRN  05/19/25 1624  Sign when Signing Visit  5/19/2025       MICHAEL Elliott MD  [address] [facility]  [address]  [place] [address]    Feliberto Knowles  1975    Chief Complaint   Patient presents with   • Lumbar radiculopathy     Says he got out of the shower last night and his bandage was completely saturated and had a greenish color to it. Also has incisions open as well. Talked to dr elliott office last night of who was on call. They said to get in touch with us. Says his wife re-bandaged it and it is already completely saturated. Still really hard and sore.        Dear MICHAEL Elliott MD:      HPI  I had the pleasure of seeing your patient Feliberto Knowles in the office today.   As you recall, Feliberto Knowles is a 49 y.o.  male who you are currently following for chronic back pain.  Feliberto Knowles did undergo  artificial disc replacement of L4-5 and anterior lumbar interbody fusion of L5-S1 with Dr. Elliott on 4/30/25.  She has overall done well but a couple days ago had small opening to his lower incision with drainage noted.  He denies any fever or increased discomfort.           Review of Systems   Constitutional: Negative.    HENT: Negative.     Eyes: Negative.    Respiratory: Negative.     Cardiovascular: Negative.    Gastrointestinal: Negative.    Endocrine: Negative.    Genitourinary: Negative.    Musculoskeletal:  Positive for arthralgias and back pain.   Skin:  "Negative.    Allergic/Immunologic: Negative.    Neurological: Negative.    Hematological: Negative.    Psychiatric/Behavioral: Negative.     All other systems reviewed and are negative.      /62   Pulse 95   Ht 170.2 cm (67\")   Wt 112 kg (248 lb)   SpO2 95%   BMI 38.84 kg/m²   Physical Exam  Vitals and nursing note reviewed.   Constitutional:       General: He is not in acute distress.     Appearance: Normal appearance. He is well-developed. He is obese. He is not diaphoretic.   HENT:      Head: Normocephalic and atraumatic.   Eyes:      General: No scleral icterus.     Pupils: Pupils are equal, round, and reactive to light.   Neck:      Thyroid: No thyromegaly.      Vascular: No carotid bruit or JVD.   Cardiovascular:      Rate and Rhythm: Normal rate and regular rhythm.      Pulses: Normal pulses.           Carotid pulses are 2+ on the right side and 2+ on the left side.       Femoral pulses are 2+ on the right side and 2+ on the left side.       Popliteal pulses are 2+ on the right side and 2+ on the left side.        Dorsalis pedis pulses are 2+ on the right side and 2+ on the left side.        Posterior tibial pulses are 2+ on the right side and 2+ on the left side.      Heart sounds: Normal heart sounds, S1 normal and S2 normal. No murmur heard.     No friction rub. No gallop.   Pulmonary:      Effort: Pulmonary effort is normal.      Breath sounds: Normal breath sounds.   Abdominal:      General: Bowel sounds are normal. There is no distension or abdominal bruit.      Palpations: Abdomen is soft. There is no mass.      Tenderness: There is no abdominal tenderness.   Musculoskeletal:      Cervical back: Neck supple.      Comments: Brace in place   Lymphadenopathy:      Cervical: No cervical adenopathy.   Skin:     General: Skin is warm and dry.          Neurological:      Mental Status: He is alert and oriented to person, place, and time.      Cranial Nerves: No cranial nerve deficit.      Sensory: " No sensory deficit.   Psychiatric:         Mood and Affect: Mood normal.         Behavior: Behavior normal.         Thought Content: Thought content normal.         Judgment: Judgment normal.         XR Abdomen KUB  Result Date: 4/30/2025  Narrative: EXAM: XR ABDOMEN KUB-  DATE: 4/30/2025 9:50 AM  HISTORY: ALIF -- no count in OR   COMPARISON: No existing relevant imaging studies available.  TECHNIQUE:  Supine view of the abdomen. 1 images.  FINDINGS:  Limited evaluation for free air on supine imaging. Nonobstructive supine bowel gas pattern. Nonobstructive supine bowel gas pattern.  No dystrophic calcifications visualized at the abdomen.  Postoperative changes at L4-5 and L5-S1. Numerous surgical clips at the RIGHT lower quadrant and LEFT pelvis. No acute bony finding by radiograph.       Impression: 1. Postoperative changes at L4-5 and L5-S1, not optimally evaluated on this exam.  This report was signed and finalized on 4/30/2025 11:10 AM by Dr Lili Saab MD.      XR Spine Lumbar AP & Lateral  Result Date: 4/30/2025  Narrative: EXAM: XR SPINE LUMBAR AP AND LATERAL-  DATE: 4/30/2025 6:50 AM  HISTORY: ALIF   COMPARISON: No existing relevant imaging studies available.  TECHNIQUE:  Intraoperative fluoroscopy images submitted.  Number of images: 10 Fluoroscopy time: 29.9 seconds Air kerma: 24.869 mGy  FINDINGS:  See impression.       Impression: 1. Anterior discectomy and fixation L4-5 and L5-S1. 2. A radiologist was not present for the acquisition or intraoperative the interpretation of these images. Please see the operative report for details pertaining to this exam.  This report was signed and finalized on 4/30/2025 10:48 AM by Dr Lili Saab MD.      FL C Arm During Surgery  Result Date: 4/30/2025  Narrative: This procedure was auto-finalized with no dictation required.      Patient Active Problem List   Diagnosis   • Dizziness   • Transient ischemic attack (TIA)   • Hypertrophic obstructive cardiomyopathy    • Essential hypertension, new diagnosis   • History of stroke   • Infection of prosthetic total shoulder joint   • Postoperative infection   • Lumbar radiculopathy   • Degeneration of intervertebral disc of lumbar region with discogenic back pain and lower extremity pain         ICD-10-CM ICD-9-CM   1. Cellulitis of abdominal wall  L03.311 682.2         Plan: After thoroughly evaluating Feliberto Knowles, I believe the best course of action is to remain conservative from a vascular surgery standpoint.  He does have a firm area without significant discomfort or fluctuation.  There is mild induration.  I did have Dr. Garzon evaluate as well and did not feel this required any surgical intervention.  He denies any fevers.  He did recommend cleansing daily with soap and water and painting with Betadine and applying dressing.  I encouraged him to call if he developed any fever or purulent drainage.  At this point he appears to be serosanguineous.  I will prophylactically place him on Keflex.  He will notify us should he have any significant changes.  This was all discussed in full with complete understanding.    Thank you for allowing me to participate in the care of your patient.  Please do not hesitate with any questions or concerns.  I will keep you aware of any further encounters with Feliberto Knowles.        Sincerely yours,         FALGUNI Scott

## 2025-05-20 ENCOUNTER — TELEPHONE (OUTPATIENT)
Dept: VASCULAR SURGERY | Facility: CLINIC | Age: 50
End: 2025-05-20
Payer: OTHER GOVERNMENT

## 2025-05-20 ENCOUNTER — READMISSION MANAGEMENT (OUTPATIENT)
Dept: CALL CENTER | Facility: HOSPITAL | Age: 50
End: 2025-05-20
Payer: OTHER GOVERNMENT

## 2025-05-20 DIAGNOSIS — L03.311 CELLULITIS OF ABDOMINAL WALL: Primary | ICD-10-CM

## 2025-05-20 NOTE — TELEPHONE ENCOUNTER
PATIENT MESSAGED IN ON [place]. CHAR SENT A MESSAGE STATING PATIENT NEEDED TO HAVE A CT ABD PELVIS SCHEDULED AT [facility] AND COME BACK TO SEE HER ON THURSDAY. CALLED [facility] TO GET SCHEDULED. THE STATED THAT THEY ARE FULL ON THE SCHEDULE BUT WOULD WORK HIM IN SHE STATED THAT THEIR TECHS GET THERE  SO ANYTIME AFTER THAT PATIENT COULD COME IN. I CALLED PATIENT TO MAKE HIM AWARE AND SCHEDULED HIM WITH CHAR FOR THAT AFTERNOON TO ENSURE HE HAD PLENTY OF TIME TO COMPLETE TESTING. I DID LET HIM KNOW WE HAVE MORNING OPENINGS SO IF HE FINISHES ANYTIME BEFORE NOON HE IS WELCOME TO COME ON OVER FOR APPOINTMENT PATIENT VOICED UNDERSTANDING TO ALL ABOVE AND IS AWARE OF APPOINTMENTS DATE AND TIME.

## 2025-05-20 NOTE — OUTREACH NOTE
General Surgery Week 3 Survey      Flowsheet Row Responses   [facility] patient discharged from? [facility]   Does the patient have one of the following disease processes/diagnoses(primary or secondary)? General Surgery   Week 3 attempt successful? Yes   Call start time 1619   Call end time 1620   Discharge diagnosis Lumbar radiculopathy, Lumbar disc replacement L4-5, anterior lumbar fusion L5-S1   Meds reviewed with patient/caregiver? Yes   Is the patient taking all medications as directed (includes completed medication regime)? Yes   Does the patient have a follow up appointment scheduled with their surgeon? Yes   Has the patient kept scheduled appointments due by today? Yes   What is the patient's perception of their health status since discharge? Improving  [some drainage at an incisional site ,  CT ordered on 5/22/25]   Is the patient/caregiver able to teach back the hierarchy of who to call/visit for symptoms/problems? PCP, Specialist, Home health nurse, Urgent Care, ED, 911 Yes   Week 3 call completed? Yes   Graduated Yes   Graduated/Revoked comments Pt has a CT scan scheduled for 5/22/25 to check incisional site   Call end time 1620            Sabrina BAEZA - Registered Nurse

## 2025-05-20 NOTE — TELEPHONE ENCOUNTER
Spoke with wife and patient , was seen yesterday and had 3 small open areas and now has seven and an odor. Will inform Jada, explained I would speak with APRN and return call

## 2025-05-22 ENCOUNTER — OFFICE VISIT (OUTPATIENT)
Dept: VASCULAR SURGERY | Facility: CLINIC | Age: 50
End: 2025-05-22
Payer: OTHER GOVERNMENT

## 2025-05-22 ENCOUNTER — HOSPITAL ENCOUNTER (OUTPATIENT)
Dept: CT IMAGING | Facility: HOSPITAL | Age: 50
Discharge: HOME | End: 2025-05-22
Admitting: NURSE PRACTITIONER
Payer: OTHER GOVERNMENT

## 2025-05-22 ENCOUNTER — TELEPHONE (OUTPATIENT)
Dept: VASCULAR SURGERY | Facility: CLINIC | Age: 50
End: 2025-05-22
Payer: OTHER GOVERNMENT

## 2025-05-22 VITALS
HEART RATE: 84 BPM | DIASTOLIC BLOOD PRESSURE: 62 MMHG | WEIGHT: 259.8 LBS | BODY MASS INDEX: 40.78 KG/M2 | HEIGHT: 67 IN | OXYGEN SATURATION: 98 % | SYSTOLIC BLOOD PRESSURE: 116 MMHG

## 2025-05-22 DIAGNOSIS — L03.311 CELLULITIS OF ABDOMINAL WALL: ICD-10-CM

## 2025-05-22 DIAGNOSIS — L03.311 CELLULITIS OF ABDOMINAL WALL: Primary | ICD-10-CM

## 2025-05-22 DIAGNOSIS — Z01.818 PREOP TESTING: ICD-10-CM

## 2025-05-22 DIAGNOSIS — S30.1XXA ABDOMINAL WALL SEROMA, INITIAL ENCOUNTER: ICD-10-CM

## 2025-05-22 PROCEDURE — 25510000001 IOPAMIDOL 61 % SOLUTION: Performed by: NURSE PRACTITIONER

## 2025-05-22 PROCEDURE — 74178 CT ABD&PLV WO CNTR FLWD CNTR: CPT

## 2025-05-22 RX ORDER — IOPAMIDOL 612 MG/ML
100 INJECTION, SOLUTION INTRAVASCULAR
Status: COMPLETED | OUTPATIENT
Start: 2025-05-22 | End: 2025-05-22

## 2025-05-22 RX ADMIN — IOPAMIDOL 100 ML: 612 INJECTION, SOLUTION INTRAVENOUS at 10:05

## 2025-05-22 NOTE — SIGNIFICANT NOTE
Home Rx;  Ozempic; has not taken in last 4 weeks.  Lisinopril; instructed Not to take x 24 hours before DOS-5/23/25.  Toprol-XL (metoprolol); instructed to take, w/ sip of water the DOS-5/23/25.  Cialis; instructed Not to take x 24 hours before DOS-5/23/25.

## 2025-05-22 NOTE — LETTER
May 22, 2025     Nakul Hdz DO  [address] [facility] [address]    Patient: Feliberto Knowles   YOB: 1975   Date of Visit: 5/22/2025     Dear Nakul Hdz DO:       Thank you for referring Feliberto Knowles to me for evaluation. Below are the relevant portions of my assessment and plan of care.    If you have questions, please do not hesitate to call me. I look forward to following Feliberto along with you.         Sincerely,        FALGUNI Scott        CC: MD Hermann Khan Brandy, APRN  05/22/25 1428  Sign when Signing Visit  5/22/2025       MICHAEL Cook MD  [address] [facility]  [address]  [place] [address]    Feliberto Knowles  1975    Chief Complaint   Patient presents with   • cellulitis abdominal wall     Has dressing in place removed top part. Area is very firm and reddened       Dear MICHAEL Cook MD:      HPI  I had the pleasure of seeing your patient Feliberto Knowles in the office today.   As you recall, Feliberto Knowles is a 50 y.o.  male who you are currently following for chronic back pain.  Feliberto Knowles did undergo  artificial disc replacement of L4-5 and anterior lumbar interbody fusion of L5-S1 with Dr. Cook on 4/30/25.  He has overall done well but a couple days ago had small opening to his lower incision with drainage noted.  For the past few days he developed increased drainage and tenderness.  He has been on antibiotics since his last visit.  He is accompanied by his wife.  He did have noninvasive testing performed today, which I did review in office.           Review of Systems   Constitutional: Negative.    HENT: Negative.     Eyes: Negative.    Respiratory: Negative.     Cardiovascular: Negative.    Gastrointestinal: Negative.    Endocrine: Negative.    Genitourinary: Negative.    Musculoskeletal:  Positive for arthralgias and back pain.   Skin:  Positive for color change and wound (Drainage).   Allergic/Immunologic: Negative.   "  Neurological: Negative.    Hematological: Negative.    Psychiatric/Behavioral: Negative.     All other systems reviewed and are negative.      /62   Pulse 84   Ht 170.2 cm (67\")   Wt 118 kg (259 lb 12.8 oz)   SpO2 98%   BMI 40.69 kg/m²   Physical Exam  Vitals and nursing note reviewed.   Constitutional:       General: He is not in acute distress.     Appearance: Normal appearance. He is well-developed. He is obese. He is not diaphoretic.   HENT:      Head: Normocephalic and atraumatic.   Eyes:      General: No scleral icterus.     Pupils: Pupils are equal, round, and reactive to light.   Neck:      Thyroid: No thyromegaly.      Vascular: No carotid bruit or JVD.   Cardiovascular:      Rate and Rhythm: Normal rate and regular rhythm.      Pulses: Normal pulses.           Carotid pulses are 2+ on the right side and 2+ on the left side.       Femoral pulses are 2+ on the right side and 2+ on the left side.       Popliteal pulses are 2+ on the right side and 2+ on the left side.        Dorsalis pedis pulses are 2+ on the right side and 2+ on the left side.        Posterior tibial pulses are 2+ on the right side and 2+ on the left side.      Heart sounds: Normal heart sounds, S1 normal and S2 normal. No murmur heard.     No friction rub. No gallop.   Pulmonary:      Effort: Pulmonary effort is normal.      Breath sounds: Normal breath sounds.   Abdominal:      General: Bowel sounds are normal. There is no distension or abdominal bruit.      Palpations: Abdomen is soft. There is no mass.      Tenderness: There is no abdominal tenderness.   Musculoskeletal:      Cervical back: Neck supple.      Comments: Brace in place   Lymphadenopathy:      Cervical: No cervical adenopathy.   Skin:     General: Skin is warm and dry.          Neurological:      Mental Status: He is alert and oriented to person, place, and time.      Cranial Nerves: No cranial nerve deficit.      Sensory: No sensory deficit.   Psychiatric:    "      Mood and Affect: Mood normal.         Behavior: Behavior normal.         Thought Content: Thought content normal.         Judgment: Judgment normal.         CT Abd With Contrast Pelvis With & Without Contrast  Result Date: 5/22/2025  Narrative: EXAM/TECHNIQUE: CT abdomen and pelvis without and with IV contrast  INDICATION: recent surgery, draining abdominal incision; L03.311-Cellulitis of abdominal wall  COMPARISON: 9/21/2022  DLP: 885.76 mGy.cm. Automated exposure control was utilized to decrease patient radiation dose.  FINDINGS:  LOWER CHEST: No acute findings.  LIVER AND BILIARY: No focal liver lesion. No gallstones or biliary ductal dilatation.  PANCREAS: Unremarkable.  SPLEEN: Unremarkable.  ADRENAL: Unremarkable.  KIDNEYS/BLADDER: No solid renal mass. No urolithiasis or hydronephrosis. No focal urinary bladder wall thickening.  BOWEL: No colonic wall thickening or pericolonic fat stranding. Prior appendectomy. No small bowel distention or inflammatory change.  PERITONEUM: No ascites.  PELVIC ORGANS: Prostate seminal vesicles are unremarkable.  VASCULATURE: Abdominal aorta is nonaneurysmal.  LYMPH NODES: No enlarged lymph nodes.  SOFT TISSUES: Small bilateral fat-containing inguinal hernias. A periumbilical fat-containing hernia is present. 7.6 x 5.6 x 7.6 cm fluid collection in the left lower abdominal wall subcutaneous fat abutting the left rectus abdominous muscle, with fluid tract extending out to the skin surface. Surrounding subcutaneous stranding and overlying skin thickening is noted.  BONES: Postoperative change of L4-L5 disc arthroplasty and L5-S1 anterior fusion. No hardware complication. No acute osseous finding.      Impression:  7.6 x 5.6 x 7.6 cm fluid collection in the left lower abdominal wall subcutaneous fat overlying the left rectus abdominis muscle, with fluid tract extending out to the skin surface. Differential includes postoperative seroma/hematoma as well as abscess. Correlate  with clinical exam.    This report was signed and finalized on 5/22/2025 10:22 AM by Dr. Gregorio Daniel MD.      XR Abdomen KUB  Result Date: 4/30/2025  Narrative: EXAM: XR ABDOMEN KUB-  DATE: 4/30/2025 9:50 AM  HISTORY: ALIF -- no count in OR   COMPARISON: No existing relevant imaging studies available.  TECHNIQUE:  Supine view of the abdomen. 1 images.  FINDINGS:  Limited evaluation for free air on supine imaging. Nonobstructive supine bowel gas pattern. Nonobstructive supine bowel gas pattern.  No dystrophic calcifications visualized at the abdomen.  Postoperative changes at L4-5 and L5-S1. Numerous surgical clips at the RIGHT lower quadrant and LEFT pelvis. No acute bony finding by radiograph.       Impression: 1. Postoperative changes at L4-5 and L5-S1, not optimally evaluated on this exam.  This report was signed and finalized on 4/30/2025 11:10 AM by Dr Lili Saab MD.      XR Spine Lumbar AP & Lateral  Result Date: 4/30/2025  Narrative: EXAM: XR SPINE LUMBAR AP AND LATERAL-  DATE: 4/30/2025 6:50 AM  HISTORY: ALIF   COMPARISON: No existing relevant imaging studies available.  TECHNIQUE:  Intraoperative fluoroscopy images submitted.  Number of images: 10 Fluoroscopy time: 29.9 seconds Air kerma: 24.869 mGy  FINDINGS:  See impression.       Impression: 1. Anterior discectomy and fixation L4-5 and L5-S1. 2. A radiologist was not present for the acquisition or intraoperative the interpretation of these images. Please see the operative report for details pertaining to this exam.  This report was signed and finalized on 4/30/2025 10:48 AM by Dr Lili Saab MD.      FL C Arm During Surgery  Result Date: 4/30/2025  Narrative: This procedure was auto-finalized with no dictation required.      Patient Active Problem List   Diagnosis   • Dizziness   • Transient ischemic attack (TIA)   • Hypertrophic obstructive cardiomyopathy   • Essential hypertension, new diagnosis   • History of stroke   • Infection of  prosthetic total shoulder joint   • Postoperative infection   • Lumbar radiculopathy   • Degeneration of intervertebral disc of lumbar region with discogenic back pain and lower extremity pain   • Cellulitis of abdominal wall   • Preop testing         ICD-10-CM ICD-9-CM   1. Cellulitis of abdominal wall  L03.311 682.2   2. Preop testing  Z01.818 V72.84   3. Abdominal wall seroma, initial encounter  S30.1XXA 998.13         Plan: After thoroughly evaluating Feliberto Knowles, I believe the best course of action is to proceed with incision and drainage I have abdominal wall seroma/possible abscess.  Open areas have further prove progressed.  He is complaining of some tenderness as well as an increase in drainage.  He is nauseated in the office today however believes this was due to contrast.  Risks/benefits were expanded great length to the patient which include but are not limited to bleeding, infection, vessel damage,  MI, stroke, and death.  He can continue antibiotics as ordered previously.   He will notify us should he have any significant changes.  This was all discussed in full with complete understanding.    Thank you for allowing me to participate in the care of your patient.  Please do not hesitate with any questions or concerns.  I will keep you aware of any further encounters with Feliberto Knowles.        Sincerely yours,         FALGUNI Scott

## 2025-05-22 NOTE — TELEPHONE ENCOUNTER
PT WAS INFORMED TO ARRIVE ON 05/23/25 AT 0500 MAIN REG FOR SURGERY. NOTHING TO EAT OR DRINK AFTER MIDNIGHT. PT STATED UNDERSTANDING.

## 2025-05-22 NOTE — H&P (VIEW-ONLY)
"5/22/2025       MICHAEL Cook MD  [address] [facility] [address]  [place] [address]    Feliberto Knowles  1975    Chief Complaint   Patient presents with    cellulitis abdominal wall     Has dressing in place removed top part. Area is very firm and reddened       Dear MICHAEL Cook MD:      HPI  I had the pleasure of seeing your patient Feliberto Knowles in the office today.   As you recall, Feliberto Knowles is a 50 y.o.  male who you are currently following for chronic back pain.  Feliberto Knowles did undergo  artificial disc replacement of L4-5 and anterior lumbar interbody fusion of L5-S1 with Dr. Cook on 4/30/25.  He has overall done well but a couple days ago had small opening to his lower incision with drainage noted.  For the past few days he developed increased drainage and tenderness.  He has been on antibiotics since his last visit.  He is accompanied by his wife.  He did have noninvasive testing performed today, which I did review in office.           Review of Systems   Constitutional: Negative.    HENT: Negative.     Eyes: Negative.    Respiratory: Negative.     Cardiovascular: Negative.    Gastrointestinal: Negative.    Endocrine: Negative.    Genitourinary: Negative.    Musculoskeletal:  Positive for arthralgias and back pain.   Skin:  Positive for color change and wound (Drainage).   Allergic/Immunologic: Negative.    Neurological: Negative.    Hematological: Negative.    Psychiatric/Behavioral: Negative.     All other systems reviewed and are negative.      /62   Pulse 84   Ht 170.2 cm (67\")   Wt 118 kg (259 lb 12.8 oz)   SpO2 98%   BMI 40.69 kg/m²   Physical Exam  Vitals and nursing note reviewed.   Constitutional:       General: He is not in acute distress.     Appearance: Normal appearance. He is well-developed. He is obese. He is not diaphoretic.   HENT:      Head: Normocephalic and atraumatic.   Eyes:      General: No scleral icterus.     Pupils: Pupils are equal, round, and " reactive to light.   Neck:      Thyroid: No thyromegaly.      Vascular: No carotid bruit or JVD.   Cardiovascular:      Rate and Rhythm: Normal rate and regular rhythm.      Pulses: Normal pulses.           Carotid pulses are 2+ on the right side and 2+ on the left side.       Femoral pulses are 2+ on the right side and 2+ on the left side.       Popliteal pulses are 2+ on the right side and 2+ on the left side.        Dorsalis pedis pulses are 2+ on the right side and 2+ on the left side.        Posterior tibial pulses are 2+ on the right side and 2+ on the left side.      Heart sounds: Normal heart sounds, S1 normal and S2 normal. No murmur heard.     No friction rub. No gallop.   Pulmonary:      Effort: Pulmonary effort is normal.      Breath sounds: Normal breath sounds.   Abdominal:      General: Bowel sounds are normal. There is no distension or abdominal bruit.      Palpations: Abdomen is soft. There is no mass.      Tenderness: There is no abdominal tenderness.   Musculoskeletal:      Cervical back: Neck supple.      Comments: Brace in place   Lymphadenopathy:      Cervical: No cervical adenopathy.   Skin:     General: Skin is warm and dry.          Neurological:      Mental Status: He is alert and oriented to person, place, and time.      Cranial Nerves: No cranial nerve deficit.      Sensory: No sensory deficit.   Psychiatric:         Mood and Affect: Mood normal.         Behavior: Behavior normal.         Thought Content: Thought content normal.         Judgment: Judgment normal.         CT Abd With Contrast Pelvis With & Without Contrast  Result Date: 5/22/2025  Narrative: EXAM/TECHNIQUE: CT abdomen and pelvis without and with IV contrast  INDICATION: recent surgery, draining abdominal incision; L03.311-Cellulitis of abdominal wall  COMPARISON: 9/21/2022  DLP: 885.76 mGy.cm. Automated exposure control was utilized to decrease patient radiation dose.  FINDINGS:  LOWER CHEST: No acute findings.  LIVER AND  BILIARY: No focal liver lesion. No gallstones or biliary ductal dilatation.  PANCREAS: Unremarkable.  SPLEEN: Unremarkable.  ADRENAL: Unremarkable.  KIDNEYS/BLADDER: No solid renal mass. No urolithiasis or hydronephrosis. No focal urinary bladder wall thickening.  BOWEL: No colonic wall thickening or pericolonic fat stranding. Prior appendectomy. No small bowel distention or inflammatory change.  PERITONEUM: No ascites.  PELVIC ORGANS: Prostate seminal vesicles are unremarkable.  VASCULATURE: Abdominal aorta is nonaneurysmal.  LYMPH NODES: No enlarged lymph nodes.  SOFT TISSUES: Small bilateral fat-containing inguinal hernias. A periumbilical fat-containing hernia is present. 7.6 x 5.6 x 7.6 cm fluid collection in the left lower abdominal wall subcutaneous fat abutting the left rectus abdominous muscle, with fluid tract extending out to the skin surface. Surrounding subcutaneous stranding and overlying skin thickening is noted.  BONES: Postoperative change of L4-L5 disc arthroplasty and L5-S1 anterior fusion. No hardware complication. No acute osseous finding.      Impression:  7.6 x 5.6 x 7.6 cm fluid collection in the left lower abdominal wall subcutaneous fat overlying the left rectus abdominis muscle, with fluid tract extending out to the skin surface. Differential includes postoperative seroma/hematoma as well as abscess. Correlate with clinical exam.    This report was signed and finalized on 5/22/2025 10:22 AM by Dr. Gregorio Daniel MD.      XR Abdomen KUB  Result Date: 4/30/2025  Narrative: EXAM: XR ABDOMEN KUB-  DATE: 4/30/2025 9:50 AM  HISTORY: ALIF -- no count in OR   COMPARISON: No existing relevant imaging studies available.  TECHNIQUE:  Supine view of the abdomen. 1 images.  FINDINGS:  Limited evaluation for free air on supine imaging. Nonobstructive supine bowel gas pattern. Nonobstructive supine bowel gas pattern.  No dystrophic calcifications visualized at the abdomen.  Postoperative changes at  L4-5 and L5-S1. Numerous surgical clips at the RIGHT lower quadrant and LEFT pelvis. No acute bony finding by radiograph.       Impression: 1. Postoperative changes at L4-5 and L5-S1, not optimally evaluated on this exam.  This report was signed and finalized on 4/30/2025 11:10 AM by Dr Lili Saab MD.      XR Spine Lumbar AP & Lateral  Result Date: 4/30/2025  Narrative: EXAM: XR SPINE LUMBAR AP AND LATERAL-  DATE: 4/30/2025 6:50 AM  HISTORY: ALIF   COMPARISON: No existing relevant imaging studies available.  TECHNIQUE:  Intraoperative fluoroscopy images submitted.  Number of images: 10 Fluoroscopy time: 29.9 seconds Air kerma: 24.869 mGy  FINDINGS:  See impression.       Impression: 1. Anterior discectomy and fixation L4-5 and L5-S1. 2. A radiologist was not present for the acquisition or intraoperative the interpretation of these images. Please see the operative report for details pertaining to this exam.  This report was signed and finalized on 4/30/2025 10:48 AM by Dr Lili Saab MD.      FL C Arm During Surgery  Result Date: 4/30/2025  Narrative: This procedure was auto-finalized with no dictation required.      Patient Active Problem List   Diagnosis    Dizziness    Transient ischemic attack (TIA)    Hypertrophic obstructive cardiomyopathy    Essential hypertension, new diagnosis    History of stroke    Infection of prosthetic total shoulder joint    Postoperative infection    Lumbar radiculopathy    Degeneration of intervertebral disc of lumbar region with discogenic back pain and lower extremity pain    Cellulitis of abdominal wall    Preop testing         ICD-10-CM ICD-9-CM   1. Cellulitis of abdominal wall  L03.311 682.2   2. Preop testing  Z01.818 V72.84   3. Abdominal wall seroma, initial encounter  S30.1XXA 998.13         Plan: After thoroughly evaluating Feliberto Knowles, I believe the best course of action is to proceed with incision and drainage I have abdominal wall seroma/possible abscess.   Open areas have further prove progressed.  He is complaining of some tenderness as well as an increase in drainage.  He is nauseated in the office today however believes this was due to contrast.  Risks/benefits were expanded great length to the patient which include but are not limited to bleeding, infection, vessel damage,  MI, stroke, and death.  He can continue antibiotics as ordered previously.   He will notify us should he have any significant changes.  This was all discussed in full with complete understanding.    Thank you for allowing me to participate in the care of your patient.  Please do not hesitate with any questions or concerns.  I will keep you aware of any further encounters with Feliberto Knowles.        Sincerely yours,         FALGUNI Scott

## 2025-05-22 NOTE — PROGRESS NOTES
"5/22/2025       MICHAEL Cook MD  [address] [facility] [address]  [place] [address]    Feliberto Knowles  1975    Chief Complaint   Patient presents with    cellulitis abdominal wall     Has dressing in place removed top part. Area is very firm and reddened       Dear MICHAEL Cook MD:      HPI  I had the pleasure of seeing your patient Feliberto Knowles in the office today.   As you recall, Feliberto Knowles is a 50 y.o.  male who you are currently following for chronic back pain.  Feliberto Knowles did undergo  artificial disc replacement of L4-5 and anterior lumbar interbody fusion of L5-S1 with Dr. Cook on 4/30/25.  He has overall done well but a couple days ago had small opening to his lower incision with drainage noted.  For the past few days he developed increased drainage and tenderness.  He has been on antibiotics since his last visit.  He is accompanied by his wife.  He did have noninvasive testing performed today, which I did review in office.           Review of Systems   Constitutional: Negative.    HENT: Negative.     Eyes: Negative.    Respiratory: Negative.     Cardiovascular: Negative.    Gastrointestinal: Negative.    Endocrine: Negative.    Genitourinary: Negative.    Musculoskeletal:  Positive for arthralgias and back pain.   Skin:  Positive for color change and wound (Drainage).   Allergic/Immunologic: Negative.    Neurological: Negative.    Hematological: Negative.    Psychiatric/Behavioral: Negative.     All other systems reviewed and are negative.      /62   Pulse 84   Ht 170.2 cm (67\")   Wt 118 kg (259 lb 12.8 oz)   SpO2 98%   BMI 40.69 kg/m²   Physical Exam  Vitals and nursing note reviewed.   Constitutional:       General: He is not in acute distress.     Appearance: Normal appearance. He is well-developed. He is obese. He is not diaphoretic.   HENT:      Head: Normocephalic and atraumatic.   Eyes:      General: No scleral icterus.     Pupils: Pupils are equal, round, and " reactive to light.   Neck:      Thyroid: No thyromegaly.      Vascular: No carotid bruit or JVD.   Cardiovascular:      Rate and Rhythm: Normal rate and regular rhythm.      Pulses: Normal pulses.           Carotid pulses are 2+ on the right side and 2+ on the left side.       Femoral pulses are 2+ on the right side and 2+ on the left side.       Popliteal pulses are 2+ on the right side and 2+ on the left side.        Dorsalis pedis pulses are 2+ on the right side and 2+ on the left side.        Posterior tibial pulses are 2+ on the right side and 2+ on the left side.      Heart sounds: Normal heart sounds, S1 normal and S2 normal. No murmur heard.     No friction rub. No gallop.   Pulmonary:      Effort: Pulmonary effort is normal.      Breath sounds: Normal breath sounds.   Abdominal:      General: Bowel sounds are normal. There is no distension or abdominal bruit.      Palpations: Abdomen is soft. There is no mass.      Tenderness: There is no abdominal tenderness.   Musculoskeletal:      Cervical back: Neck supple.      Comments: Brace in place   Lymphadenopathy:      Cervical: No cervical adenopathy.   Skin:     General: Skin is warm and dry.          Neurological:      Mental Status: He is alert and oriented to person, place, and time.      Cranial Nerves: No cranial nerve deficit.      Sensory: No sensory deficit.   Psychiatric:         Mood and Affect: Mood normal.         Behavior: Behavior normal.         Thought Content: Thought content normal.         Judgment: Judgment normal.         CT Abd With Contrast Pelvis With & Without Contrast  Result Date: 5/22/2025  Narrative: EXAM/TECHNIQUE: CT abdomen and pelvis without and with IV contrast  INDICATION: recent surgery, draining abdominal incision; L03.311-Cellulitis of abdominal wall  COMPARISON: 9/21/2022  DLP: 885.76 mGy.cm. Automated exposure control was utilized to decrease patient radiation dose.  FINDINGS:  LOWER CHEST: No acute findings.  LIVER AND  BILIARY: No focal liver lesion. No gallstones or biliary ductal dilatation.  PANCREAS: Unremarkable.  SPLEEN: Unremarkable.  ADRENAL: Unremarkable.  KIDNEYS/BLADDER: No solid renal mass. No urolithiasis or hydronephrosis. No focal urinary bladder wall thickening.  BOWEL: No colonic wall thickening or pericolonic fat stranding. Prior appendectomy. No small bowel distention or inflammatory change.  PERITONEUM: No ascites.  PELVIC ORGANS: Prostate seminal vesicles are unremarkable.  VASCULATURE: Abdominal aorta is nonaneurysmal.  LYMPH NODES: No enlarged lymph nodes.  SOFT TISSUES: Small bilateral fat-containing inguinal hernias. A periumbilical fat-containing hernia is present. 7.6 x 5.6 x 7.6 cm fluid collection in the left lower abdominal wall subcutaneous fat abutting the left rectus abdominous muscle, with fluid tract extending out to the skin surface. Surrounding subcutaneous stranding and overlying skin thickening is noted.  BONES: Postoperative change of L4-L5 disc arthroplasty and L5-S1 anterior fusion. No hardware complication. No acute osseous finding.      Impression:  7.6 x 5.6 x 7.6 cm fluid collection in the left lower abdominal wall subcutaneous fat overlying the left rectus abdominis muscle, with fluid tract extending out to the skin surface. Differential includes postoperative seroma/hematoma as well as abscess. Correlate with clinical exam.    This report was signed and finalized on 5/22/2025 10:22 AM by Dr. Gregorio Daniel MD.      XR Abdomen KUB  Result Date: 4/30/2025  Narrative: EXAM: XR ABDOMEN KUB-  DATE: 4/30/2025 9:50 AM  HISTORY: ALIF -- no count in OR   COMPARISON: No existing relevant imaging studies available.  TECHNIQUE:  Supine view of the abdomen. 1 images.  FINDINGS:  Limited evaluation for free air on supine imaging. Nonobstructive supine bowel gas pattern. Nonobstructive supine bowel gas pattern.  No dystrophic calcifications visualized at the abdomen.  Postoperative changes at  L4-5 and L5-S1. Numerous surgical clips at the RIGHT lower quadrant and LEFT pelvis. No acute bony finding by radiograph.       Impression: 1. Postoperative changes at L4-5 and L5-S1, not optimally evaluated on this exam.  This report was signed and finalized on 4/30/2025 11:10 AM by Dr Lili Saab MD.      XR Spine Lumbar AP & Lateral  Result Date: 4/30/2025  Narrative: EXAM: XR SPINE LUMBAR AP AND LATERAL-  DATE: 4/30/2025 6:50 AM  HISTORY: ALIF   COMPARISON: No existing relevant imaging studies available.  TECHNIQUE:  Intraoperative fluoroscopy images submitted.  Number of images: 10 Fluoroscopy time: 29.9 seconds Air kerma: 24.869 mGy  FINDINGS:  See impression.       Impression: 1. Anterior discectomy and fixation L4-5 and L5-S1. 2. A radiologist was not present for the acquisition or intraoperative the interpretation of these images. Please see the operative report for details pertaining to this exam.  This report was signed and finalized on 4/30/2025 10:48 AM by Dr Lili Saab MD.      FL C Arm During Surgery  Result Date: 4/30/2025  Narrative: This procedure was auto-finalized with no dictation required.      Patient Active Problem List   Diagnosis    Dizziness    Transient ischemic attack (TIA)    Hypertrophic obstructive cardiomyopathy    Essential hypertension, new diagnosis    History of stroke    Infection of prosthetic total shoulder joint    Postoperative infection    Lumbar radiculopathy    Degeneration of intervertebral disc of lumbar region with discogenic back pain and lower extremity pain    Cellulitis of abdominal wall    Preop testing         ICD-10-CM ICD-9-CM   1. Cellulitis of abdominal wall  L03.311 682.2   2. Preop testing  Z01.818 V72.84   3. Abdominal wall seroma, initial encounter  S30.1XXA 998.13         Plan: After thoroughly evaluating Feliberto Knowles, I believe the best course of action is to proceed with incision and drainage I have abdominal wall seroma/possible abscess.   Open areas have further prove progressed.  He is complaining of some tenderness as well as an increase in drainage.  He is nauseated in the office today however believes this was due to contrast.  Risks/benefits were expanded great length to the patient which include but are not limited to bleeding, infection, vessel damage,  MI, stroke, and death.  He can continue antibiotics as ordered previously.   He will notify us should he have any significant changes.  This was all discussed in full with complete understanding.    Thank you for allowing me to participate in the care of your patient.  Please do not hesitate with any questions or concerns.  I will keep you aware of any further encounters with Feliberto Knowles.        Sincerely yours,         FALGUNI Scott

## 2025-05-23 ENCOUNTER — ANESTHESIA (OUTPATIENT)
Dept: PERIOP | Facility: HOSPITAL | Age: 50
End: 2025-05-23
Payer: OTHER GOVERNMENT

## 2025-05-23 ENCOUNTER — ANESTHESIA EVENT (OUTPATIENT)
Dept: PERIOP | Facility: HOSPITAL | Age: 50
End: 2025-05-23
Payer: OTHER GOVERNMENT

## 2025-05-23 ENCOUNTER — HOSPITAL ENCOUNTER (INPATIENT)
Facility: HOSPITAL | Age: 50
LOS: 5 days | Discharge: HOME | End: 2025-05-28
Attending: STUDENT IN AN ORGANIZED HEALTH CARE EDUCATION/TRAINING PROGRAM | Admitting: STUDENT IN AN ORGANIZED HEALTH CARE EDUCATION/TRAINING PROGRAM
Payer: OTHER GOVERNMENT

## 2025-05-23 DIAGNOSIS — S31.109A OPEN WOUND OF ABDOMEN, INITIAL ENCOUNTER: Primary | ICD-10-CM

## 2025-05-23 DIAGNOSIS — L03.311 CELLULITIS OF ABDOMINAL WALL: ICD-10-CM

## 2025-05-23 DIAGNOSIS — Z01.818 PREOP TESTING: ICD-10-CM

## 2025-05-23 PROBLEM — S30.1XXA HEMATOMA OF ABDOMINAL WALL, INITIAL ENCOUNTER: Status: ACTIVE | Noted: 2025-05-23

## 2025-05-23 LAB
ABO GROUP BLD: NORMAL
ANION GAP SERPL CALCULATED.3IONS-SCNC: 11 MMOL/L (ref 5–15)
APTT PPP: 31.8 SECONDS (ref 24.5–36)
BASOPHILS # BLD AUTO: 0.06 10*3/MM3 (ref 0–0.2)
BASOPHILS NFR BLD AUTO: 0.7 % (ref 0–1.5)
BLD GP AB SCN SERPL QL: NEGATIVE
BUN SERPL-MCNC: 16 MG/DL (ref 6–20)
BUN/CREAT SERPL: 19.3 (ref 7–25)
CALCIUM SPEC-SCNC: 8.9 MG/DL (ref 8.6–10.5)
CHLORIDE SERPL-SCNC: 101 MMOL/L (ref 98–107)
CO2 SERPL-SCNC: 25 MMOL/L (ref 22–29)
CREAT SERPL-MCNC: 0.83 MG/DL (ref 0.76–1.27)
DEPRECATED RDW RBC AUTO: 36.8 FL (ref 37–54)
EGFRCR SERPLBLD CKD-EPI 2021: 107.3 ML/MIN/1.73
EOSINOPHIL # BLD AUTO: 0.01 10*3/MM3 (ref 0–0.4)
EOSINOPHIL NFR BLD AUTO: 0.1 % (ref 0.3–6.2)
ERYTHROCYTE [DISTWIDTH] IN BLOOD BY AUTOMATED COUNT: 12.2 % (ref 12.3–15.4)
GLUCOSE BLDC GLUCOMTR-MCNC: 135 MG/DL (ref 70–130)
GLUCOSE BLDC GLUCOMTR-MCNC: 146 MG/DL (ref 70–130)
GLUCOSE BLDC GLUCOMTR-MCNC: 194 MG/DL (ref 70–130)
GLUCOSE SERPL-MCNC: 101 MG/DL (ref 65–99)
HCT VFR BLD AUTO: 46.5 % (ref 37.5–51)
HGB BLD-MCNC: 15.7 G/DL (ref 13–17.7)
IMM GRANULOCYTES # BLD AUTO: 0.11 10*3/MM3 (ref 0–0.05)
IMM GRANULOCYTES NFR BLD AUTO: 1.3 % (ref 0–0.5)
INR PPP: 1 (ref 0.91–1.09)
LYMPHOCYTES # BLD AUTO: 1.37 10*3/MM3 (ref 0.7–3.1)
LYMPHOCYTES NFR BLD AUTO: 16.5 % (ref 19.6–45.3)
MCH RBC QN AUTO: 27.9 PG (ref 26.6–33)
MCHC RBC AUTO-ENTMCNC: 33.8 G/DL (ref 31.5–35.7)
MCV RBC AUTO: 82.6 FL (ref 79–97)
MONOCYTES # BLD AUTO: 0.85 10*3/MM3 (ref 0.1–0.9)
MONOCYTES NFR BLD AUTO: 10.2 % (ref 5–12)
NEUTROPHILS NFR BLD AUTO: 5.92 10*3/MM3 (ref 1.7–7)
NEUTROPHILS NFR BLD AUTO: 71.2 % (ref 42.7–76)
NRBC BLD AUTO-RTO: 0 /100 WBC (ref 0–0.2)
PLATELET # BLD AUTO: 325 10*3/MM3 (ref 140–450)
PMV BLD AUTO: 8.6 FL (ref 6–12)
POTASSIUM SERPL-SCNC: 4.3 MMOL/L (ref 3.5–5.2)
PROTHROMBIN TIME: 13.7 SECONDS (ref 11.8–14.8)
RBC # BLD AUTO: 5.63 10*6/MM3 (ref 4.14–5.8)
RH BLD: POSITIVE
SODIUM SERPL-SCNC: 137 MMOL/L (ref 136–145)
T&S EXPIRATION DATE: NORMAL
WBC NRBC COR # BLD AUTO: 8.32 10*3/MM3 (ref 3.4–10.8)

## 2025-05-23 PROCEDURE — 25010000002 MIDAZOLAM PER 1MG: Performed by: ANESTHESIOLOGY

## 2025-05-23 PROCEDURE — 85025 COMPLETE CBC W/AUTO DIFF WBC: CPT | Performed by: NURSE PRACTITIONER

## 2025-05-23 PROCEDURE — 86900 BLOOD TYPING SEROLOGIC ABO: CPT | Performed by: NURSE PRACTITIONER

## 2025-05-23 PROCEDURE — 10160 PNXR ASPIR ABSC HMTMA BULLA: CPT | Performed by: STUDENT IN AN ORGANIZED HEALTH CARE EDUCATION/TRAINING PROGRAM

## 2025-05-23 PROCEDURE — 25010000002 CEFAZOLIN PER 500 MG: Performed by: NURSE PRACTITIONER

## 2025-05-23 PROCEDURE — 0J980ZZ DRAINAGE OF ABDOMEN SUBCUTANEOUS TISSUE AND FASCIA, OPEN APPROACH: ICD-10-PCS | Performed by: STUDENT IN AN ORGANIZED HEALTH CARE EDUCATION/TRAINING PROGRAM

## 2025-05-23 PROCEDURE — 80048 BASIC METABOLIC PNL TOTAL CA: CPT | Performed by: NURSE PRACTITIONER

## 2025-05-23 PROCEDURE — 25810000003 LACTATED RINGERS PER 1000 ML: Performed by: STUDENT IN AN ORGANIZED HEALTH CARE EDUCATION/TRAINING PROGRAM

## 2025-05-23 PROCEDURE — 86850 RBC ANTIBODY SCREEN: CPT | Performed by: NURSE PRACTITIONER

## 2025-05-23 PROCEDURE — 86901 BLOOD TYPING SEROLOGIC RH(D): CPT | Performed by: NURSE PRACTITIONER

## 2025-05-23 PROCEDURE — 25010000002 FENTANYL CITRATE (PF) 50 MCG/ML SOLUTION

## 2025-05-23 PROCEDURE — 25010000002 PROPOFOL 10 MG/ML EMULSION

## 2025-05-23 PROCEDURE — 82948 REAGENT STRIP/BLOOD GLUCOSE: CPT

## 2025-05-23 PROCEDURE — 97605 NEG PRS WND THER DME<=50SQCM: CPT | Performed by: STUDENT IN AN ORGANIZED HEALTH CARE EDUCATION/TRAINING PROGRAM

## 2025-05-23 PROCEDURE — 25010000002 HYDROMORPHONE PER 4 MG: Performed by: ANESTHESIOLOGY

## 2025-05-23 PROCEDURE — 25010000002 DEXAMETHASONE PER 1 MG: Performed by: ANESTHESIOLOGY

## 2025-05-23 PROCEDURE — 85610 PROTHROMBIN TIME: CPT | Performed by: NURSE PRACTITIONER

## 2025-05-23 PROCEDURE — 25010000002 LIDOCAINE PF 2% 2 % SOLUTION

## 2025-05-23 PROCEDURE — 25010000002 CEFAZOLIN PER 500 MG: Performed by: STUDENT IN AN ORGANIZED HEALTH CARE EDUCATION/TRAINING PROGRAM

## 2025-05-23 PROCEDURE — 25010000002 DROPERIDOL PER 5 MG

## 2025-05-23 PROCEDURE — 25010000002 FENTANYL CITRATE (PF) 50 MCG/ML SOLUTION: Performed by: ANESTHESIOLOGY

## 2025-05-23 PROCEDURE — 85730 THROMBOPLASTIN TIME PARTIAL: CPT | Performed by: NURSE PRACTITIONER

## 2025-05-23 RX ORDER — HYDROMORPHONE HYDROCHLORIDE 1 MG/ML
0.5 INJECTION, SOLUTION INTRAMUSCULAR; INTRAVENOUS; SUBCUTANEOUS
Status: DISCONTINUED | OUTPATIENT
Start: 2025-05-23 | End: 2025-05-23 | Stop reason: HOSPADM

## 2025-05-23 RX ORDER — FLUMAZENIL 0.1 MG/ML
0.2 INJECTION INTRAVENOUS AS NEEDED
Status: DISCONTINUED | OUTPATIENT
Start: 2025-05-23 | End: 2025-05-23 | Stop reason: HOSPADM

## 2025-05-23 RX ORDER — ONDANSETRON 4 MG/1
4 TABLET, ORALLY DISINTEGRATING ORAL EVERY 6 HOURS PRN
Status: DISCONTINUED | OUTPATIENT
Start: 2025-05-23 | End: 2025-05-28 | Stop reason: HOSPADM

## 2025-05-23 RX ORDER — GABAPENTIN 300 MG/1
300 CAPSULE ORAL 3 TIMES DAILY PRN
Status: DISCONTINUED | OUTPATIENT
Start: 2025-05-23 | End: 2025-05-28 | Stop reason: HOSPADM

## 2025-05-23 RX ORDER — IBUPROFEN 600 MG/1
1 TABLET ORAL
Status: DISCONTINUED | OUTPATIENT
Start: 2025-05-23 | End: 2025-05-25

## 2025-05-23 RX ORDER — MIDAZOLAM HYDROCHLORIDE 2 MG/2ML
2 INJECTION, SOLUTION INTRAMUSCULAR; INTRAVENOUS
Status: DISCONTINUED | OUTPATIENT
Start: 2025-05-23 | End: 2025-05-23 | Stop reason: HOSPADM

## 2025-05-23 RX ORDER — SODIUM CHLORIDE 0.9 % (FLUSH) 0.9 %
10 SYRINGE (ML) INJECTION EVERY 12 HOURS SCHEDULED
Status: DISCONTINUED | OUTPATIENT
Start: 2025-05-23 | End: 2025-05-28 | Stop reason: HOSPADM

## 2025-05-23 RX ORDER — NITROGLYCERIN 0.4 MG/1
0.4 TABLET SUBLINGUAL
Status: DISCONTINUED | OUTPATIENT
Start: 2025-05-23 | End: 2025-05-28 | Stop reason: HOSPADM

## 2025-05-23 RX ORDER — NAPROXEN SODIUM 220 MG/1
220 TABLET, FILM COATED ORAL 2 TIMES DAILY PRN
COMMUNITY

## 2025-05-23 RX ORDER — SUCCINYLCHOLINE/SOD CL,ISO/PF 200MG/10ML
SYRINGE (ML) INTRAVENOUS AS NEEDED
Status: DISCONTINUED | OUTPATIENT
Start: 2025-05-23 | End: 2025-05-23 | Stop reason: SURG

## 2025-05-23 RX ORDER — IBUPROFEN 600 MG/1
600 TABLET, FILM COATED ORAL EVERY 6 HOURS PRN
Status: DISCONTINUED | OUTPATIENT
Start: 2025-05-23 | End: 2025-05-23 | Stop reason: HOSPADM

## 2025-05-23 RX ORDER — ACETAMINOPHEN 650 MG/1
650 SUPPOSITORY RECTAL EVERY 8 HOURS
Status: ACTIVE | OUTPATIENT
Start: 2025-05-23 | End: 2025-05-25

## 2025-05-23 RX ORDER — SODIUM CHLORIDE, SODIUM LACTATE, POTASSIUM CHLORIDE, CALCIUM CHLORIDE 600; 310; 30; 20 MG/100ML; MG/100ML; MG/100ML; MG/100ML
100 INJECTION, SOLUTION INTRAVENOUS CONTINUOUS
Status: DISPENSED | OUTPATIENT
Start: 2025-05-23 | End: 2025-05-24

## 2025-05-23 RX ORDER — SODIUM CHLORIDE 0.9 % (FLUSH) 0.9 %
10 SYRINGE (ML) INJECTION AS NEEDED
Status: DISCONTINUED | OUTPATIENT
Start: 2025-05-23 | End: 2025-05-28 | Stop reason: HOSPADM

## 2025-05-23 RX ORDER — DEXTROSE MONOHYDRATE 25 G/50ML
25 INJECTION, SOLUTION INTRAVENOUS
Status: DISCONTINUED | OUTPATIENT
Start: 2025-05-23 | End: 2025-05-25

## 2025-05-23 RX ORDER — OXYCODONE AND ACETAMINOPHEN 10; 325 MG/1; MG/1
1 TABLET ORAL EVERY 4 HOURS PRN
Status: DISCONTINUED | OUTPATIENT
Start: 2025-05-23 | End: 2025-05-28 | Stop reason: HOSPADM

## 2025-05-23 RX ORDER — NICOTINE POLACRILEX 4 MG
15 LOZENGE BUCCAL
Status: DISCONTINUED | OUTPATIENT
Start: 2025-05-23 | End: 2025-05-25

## 2025-05-23 RX ORDER — LABETALOL HYDROCHLORIDE 5 MG/ML
5 INJECTION, SOLUTION INTRAVENOUS
Status: DISCONTINUED | OUTPATIENT
Start: 2025-05-23 | End: 2025-05-23 | Stop reason: HOSPADM

## 2025-05-23 RX ORDER — INSULIN LISPRO 100 [IU]/ML
2-9 INJECTION, SOLUTION INTRAVENOUS; SUBCUTANEOUS
Status: DISCONTINUED | OUTPATIENT
Start: 2025-05-23 | End: 2025-05-25

## 2025-05-23 RX ORDER — DROPERIDOL 2.5 MG/ML
INJECTION, SOLUTION INTRAMUSCULAR; INTRAVENOUS AS NEEDED
Status: DISCONTINUED | OUTPATIENT
Start: 2025-05-23 | End: 2025-05-23 | Stop reason: SURG

## 2025-05-23 RX ORDER — ROCURONIUM BROMIDE 10 MG/ML
INJECTION, SOLUTION INTRAVENOUS AS NEEDED
Status: DISCONTINUED | OUTPATIENT
Start: 2025-05-23 | End: 2025-05-23 | Stop reason: SURG

## 2025-05-23 RX ORDER — SERTRALINE HYDROCHLORIDE 100 MG/1
100 TABLET, FILM COATED ORAL DAILY
Status: DISCONTINUED | OUTPATIENT
Start: 2025-05-23 | End: 2025-05-28 | Stop reason: HOSPADM

## 2025-05-23 RX ORDER — LIDOCAINE HYDROCHLORIDE 10 MG/ML
0.5 INJECTION, SOLUTION EPIDURAL; INFILTRATION; INTRACAUDAL; PERINEURAL ONCE AS NEEDED
Status: DISCONTINUED | OUTPATIENT
Start: 2025-05-23 | End: 2025-05-23 | Stop reason: HOSPADM

## 2025-05-23 RX ORDER — SODIUM CHLORIDE, SODIUM LACTATE, POTASSIUM CHLORIDE, CALCIUM CHLORIDE 600; 310; 30; 20 MG/100ML; MG/100ML; MG/100ML; MG/100ML
1000 INJECTION, SOLUTION INTRAVENOUS CONTINUOUS
Status: DISPENSED | OUTPATIENT
Start: 2025-05-23 | End: 2025-05-24

## 2025-05-23 RX ORDER — FENTANYL CITRATE 50 UG/ML
INJECTION, SOLUTION INTRAMUSCULAR; INTRAVENOUS AS NEEDED
Status: DISCONTINUED | OUTPATIENT
Start: 2025-05-23 | End: 2025-05-23 | Stop reason: SURG

## 2025-05-23 RX ORDER — METOPROLOL SUCCINATE 25 MG/1
12.5 TABLET, EXTENDED RELEASE ORAL EVERY MORNING
Status: DISCONTINUED | OUTPATIENT
Start: 2025-05-24 | End: 2025-05-28 | Stop reason: HOSPADM

## 2025-05-23 RX ORDER — LISINOPRIL 2.5 MG/1
2.5 TABLET ORAL EVERY MORNING
Status: DISCONTINUED | OUTPATIENT
Start: 2025-05-23 | End: 2025-05-28 | Stop reason: HOSPADM

## 2025-05-23 RX ORDER — OXYCODONE AND ACETAMINOPHEN 10; 325 MG/1; MG/1
1 TABLET ORAL EVERY 4 HOURS PRN
Status: DISCONTINUED | OUTPATIENT
Start: 2025-05-23 | End: 2025-05-23 | Stop reason: HOSPADM

## 2025-05-23 RX ORDER — FENTANYL CITRATE 50 UG/ML
50 INJECTION, SOLUTION INTRAMUSCULAR; INTRAVENOUS
Status: DISCONTINUED | OUTPATIENT
Start: 2025-05-23 | End: 2025-05-23 | Stop reason: HOSPADM

## 2025-05-23 RX ORDER — SODIUM CHLORIDE 0.9 % (FLUSH) 0.9 %
3 SYRINGE (ML) INJECTION EVERY 12 HOURS SCHEDULED
Status: DISCONTINUED | OUTPATIENT
Start: 2025-05-23 | End: 2025-05-23 | Stop reason: HOSPADM

## 2025-05-23 RX ORDER — ACETAMINOPHEN 160 MG/5ML
650 SOLUTION ORAL EVERY 8 HOURS
Status: ACTIVE | OUTPATIENT
Start: 2025-05-23 | End: 2025-05-25

## 2025-05-23 RX ORDER — SODIUM CHLORIDE 9 MG/ML
40 INJECTION, SOLUTION INTRAVENOUS AS NEEDED
Status: DISCONTINUED | OUTPATIENT
Start: 2025-05-23 | End: 2025-05-28 | Stop reason: HOSPADM

## 2025-05-23 RX ORDER — OXYBUTYNIN CHLORIDE 5 MG/1
5 TABLET ORAL DAILY
Status: DISCONTINUED | OUTPATIENT
Start: 2025-05-23 | End: 2025-05-28 | Stop reason: HOSPADM

## 2025-05-23 RX ORDER — OXYCODONE AND ACETAMINOPHEN 10; 325 MG/1; MG/1
1 TABLET ORAL EVERY 4 HOURS PRN
COMMUNITY

## 2025-05-23 RX ORDER — ACETAMINOPHEN 325 MG/1
650 TABLET ORAL EVERY 8 HOURS
Status: DISPENSED | OUTPATIENT
Start: 2025-05-23 | End: 2025-05-25

## 2025-05-23 RX ORDER — CEPHALEXIN 500 MG/1
500 CAPSULE ORAL 3 TIMES DAILY
Status: DISCONTINUED | OUTPATIENT
Start: 2025-05-23 | End: 2025-05-26

## 2025-05-23 RX ORDER — PROPOFOL 10 MG/ML
VIAL (ML) INTRAVENOUS AS NEEDED
Status: DISCONTINUED | OUTPATIENT
Start: 2025-05-23 | End: 2025-05-23 | Stop reason: SURG

## 2025-05-23 RX ORDER — NALOXONE HCL 0.4 MG/ML
0.04 VIAL (ML) INJECTION AS NEEDED
Status: DISCONTINUED | OUTPATIENT
Start: 2025-05-23 | End: 2025-05-23 | Stop reason: HOSPADM

## 2025-05-23 RX ORDER — SODIUM CHLORIDE 0.9 % (FLUSH) 0.9 %
3-10 SYRINGE (ML) INJECTION AS NEEDED
Status: DISCONTINUED | OUTPATIENT
Start: 2025-05-23 | End: 2025-05-23 | Stop reason: HOSPADM

## 2025-05-23 RX ORDER — SODIUM CHLORIDE 0.9 % (FLUSH) 0.9 %
3 SYRINGE (ML) INJECTION AS NEEDED
Status: DISCONTINUED | OUTPATIENT
Start: 2025-05-23 | End: 2025-05-23 | Stop reason: HOSPADM

## 2025-05-23 RX ORDER — LEVOTHYROXINE SODIUM 75 UG/1
75 TABLET ORAL
Status: DISCONTINUED | OUTPATIENT
Start: 2025-05-23 | End: 2025-05-28 | Stop reason: HOSPADM

## 2025-05-23 RX ORDER — SODIUM CHLORIDE 9 MG/ML
40 INJECTION, SOLUTION INTRAVENOUS AS NEEDED
Status: DISCONTINUED | OUTPATIENT
Start: 2025-05-23 | End: 2025-05-23 | Stop reason: HOSPADM

## 2025-05-23 RX ORDER — ONDANSETRON 2 MG/ML
4 INJECTION INTRAMUSCULAR; INTRAVENOUS
Status: DISCONTINUED | OUTPATIENT
Start: 2025-05-23 | End: 2025-05-23 | Stop reason: HOSPADM

## 2025-05-23 RX ORDER — ONDANSETRON 2 MG/ML
4 INJECTION INTRAMUSCULAR; INTRAVENOUS EVERY 6 HOURS PRN
Status: DISCONTINUED | OUTPATIENT
Start: 2025-05-23 | End: 2025-05-28 | Stop reason: HOSPADM

## 2025-05-23 RX ORDER — LIDOCAINE HYDROCHLORIDE 20 MG/ML
INJECTION, SOLUTION EPIDURAL; INFILTRATION; INTRACAUDAL; PERINEURAL AS NEEDED
Status: DISCONTINUED | OUTPATIENT
Start: 2025-05-23 | End: 2025-05-23 | Stop reason: SURG

## 2025-05-23 RX ORDER — DEXAMETHASONE SODIUM PHOSPHATE 4 MG/ML
4 INJECTION, SOLUTION INTRA-ARTICULAR; INTRALESIONAL; INTRAMUSCULAR; INTRAVENOUS; SOFT TISSUE ONCE AS NEEDED
Status: COMPLETED | OUTPATIENT
Start: 2025-05-23 | End: 2025-05-23

## 2025-05-23 RX ADMIN — ROCURONIUM 5 MG: 50 INJECTION, SOLUTION INTRAVENOUS at 08:15

## 2025-05-23 RX ADMIN — Medication 200 MG: at 08:15

## 2025-05-23 RX ADMIN — OXYCODONE AND ACETAMINOPHEN 1 TABLET: 325; 10 TABLET ORAL at 16:43

## 2025-05-23 RX ADMIN — MIDAZOLAM HYDROCHLORIDE 2 MG: 1 INJECTION, SOLUTION INTRAMUSCULAR; INTRAVENOUS at 07:45

## 2025-05-23 RX ADMIN — DROPERIDOL 0.62 MG: 2.5 INJECTION, SOLUTION INTRAMUSCULAR; INTRAVENOUS at 08:49

## 2025-05-23 RX ADMIN — SODIUM CHLORIDE, POTASSIUM CHLORIDE, SODIUM LACTATE AND CALCIUM CHLORIDE 1000 ML: 600; 310; 30; 20 INJECTION, SOLUTION INTRAVENOUS at 06:23

## 2025-05-23 RX ADMIN — OXYCODONE AND ACETAMINOPHEN 1 TABLET: 325; 10 TABLET ORAL at 21:35

## 2025-05-23 RX ADMIN — ACETAMINOPHEN 650 MG: 325 TABLET, FILM COATED ORAL at 18:58

## 2025-05-23 RX ADMIN — HYDROMORPHONE HYDROCHLORIDE 0.5 MG: 1 INJECTION, SOLUTION INTRAMUSCULAR; INTRAVENOUS; SUBCUTANEOUS at 07:37

## 2025-05-23 RX ADMIN — LISINOPRIL 2.5 MG: 2.5 TABLET ORAL at 16:39

## 2025-05-23 RX ADMIN — OXYBUTYNIN CHLORIDE 5 MG: 5 TABLET ORAL at 16:39

## 2025-05-23 RX ADMIN — FENTANYL CITRATE 50 MCG: 50 INJECTION, SOLUTION INTRAMUSCULAR; INTRAVENOUS at 08:56

## 2025-05-23 RX ADMIN — GABAPENTIN 300 MG: 300 CAPSULE ORAL at 21:35

## 2025-05-23 RX ADMIN — DEXAMETHASONE SODIUM PHOSPHATE 4 MG: 4 INJECTION, SOLUTION INTRA-ARTICULAR; INTRALESIONAL; INTRAMUSCULAR; INTRAVENOUS; SOFT TISSUE at 07:37

## 2025-05-23 RX ADMIN — HYDROMORPHONE HYDROCHLORIDE 0.5 MG: 1 INJECTION, SOLUTION INTRAMUSCULAR; INTRAVENOUS; SUBCUTANEOUS at 09:25

## 2025-05-23 RX ADMIN — FENTANYL CITRATE 150 MCG: 50 INJECTION, SOLUTION INTRAMUSCULAR; INTRAVENOUS at 08:11

## 2025-05-23 RX ADMIN — Medication 10 ML: at 16:41

## 2025-05-23 RX ADMIN — Medication 10 ML: at 21:37

## 2025-05-23 RX ADMIN — PROPOFOL 200 MG: 10 INJECTION, EMULSION INTRAVENOUS at 08:15

## 2025-05-23 RX ADMIN — FENTANYL CITRATE 50 MCG: 50 INJECTION, SOLUTION INTRAMUSCULAR; INTRAVENOUS at 08:47

## 2025-05-23 RX ADMIN — CEFAZOLIN 2000 MG: 2 INJECTION, POWDER, FOR SOLUTION INTRAMUSCULAR; INTRAVENOUS at 08:21

## 2025-05-23 RX ADMIN — LIDOCAINE HYDROCHLORIDE 100 MG: 20 INJECTION, SOLUTION EPIDURAL; INFILTRATION; INTRACAUDAL; PERINEURAL at 08:15

## 2025-05-23 RX ADMIN — SERTRALINE HYDROCHLORIDE 100 MG: 100 TABLET ORAL at 16:39

## 2025-05-23 RX ADMIN — FENTANYL CITRATE 50 MCG: 50 INJECTION, SOLUTION INTRAMUSCULAR; INTRAVENOUS at 09:28

## 2025-05-23 RX ADMIN — CEPHALEXIN 500 MG: 500 CAPSULE ORAL at 16:39

## 2025-05-23 RX ADMIN — CEPHALEXIN 500 MG: 500 CAPSULE ORAL at 21:35

## 2025-05-23 NOTE — OP NOTE
Patient Name: Gale  MRN: [id number]  : 1975    PREOPERATIVE DIAGNOSIS: Cellulitis of abdominal wall [L03.311]  Preop testing [Z01.818]     POSTOPERATIVE DIAGNOSIS: Post-Op Diagnosis Codes:     * Cellulitis of abdominal wall [L03.311]     * Preop testing [Z01.818]     PROCEDURE PERFORMED:   Incision and drainage of abdominal wall hematoma   application of negative pressure dressing greater than 50 cm²      SURGEON: Zhang Garzon MD     ANESTHESIA: General    PREPARATION: Routine.    STAFF: Circulator: Asya Tristan RN  Scrub Person: Chantal Powell  Assistant: Debbi Escudero    Estimated Blood Loss: minimal    SPECIMENS: None    COMPLICATIONS: None     INDICATIONS: Feliberto Knowles is a 50 y.o. male who underwent an ALIF a few weeks prior.  The patient had increased erythema at the surgical site.  He also had increased drainage.  Patient on a CT scan which showed a fluid collection in the subcutaneous space.  The indications, risks, and possible complications of the procedure were explained to the patient, who voiced understanding and wished to proceed with surgery.     PROCEDURE IN DETAIL: The patient was taken to the operating room and placed on the operating table in supine position. After general anesthesia was obtained, the abdomen was prepped and draped in a sterile manner.  Incision was then made at the previous incision.  This was carried down through the skin and subcutaneous tissues.  There was a hematoma/seroma.  This was then evacuated.  Fascia appeared intact.  Wound was then extensively irrigated.  The wound was noted to measure 9 x 6 x 7.5 Centimeters.  The wound VAC was then applied. The patient tolerated the procedure well. Sponge and needle counts were correct. The patient was then awakened and extubated in the operating room and taken to the recovery room in good condition.    Steven Garzon MD  Date: 2025 Time: 09:13 CDT

## 2025-05-23 NOTE — CASE MANAGEMENT/SOCIAL WORK
Discharge Planning Assessment   Roly     Patient Name: Feliberto Knowles  MRN: [id number]  Today's Date: 5/23/2025    Admit Date: 5/23/2025        Discharge Needs Assessment       Row Name 05/23/25 1451       Living Environment    People in Home spouse    Current Living Arrangements home    Potentially Unsafe Housing Conditions none    Primary Care Provided by self    Provides Primary Care For no one    Family Caregiver if Needed spouse    Quality of Family Relationships helpful;involved;supportive    Able to Return to Prior Arrangements yes       Resource/Environmental Concerns    Resource/Environmental Concerns none       Transition Planning    Patient/Family Anticipates Transition to home with family    Patient/Family Anticipated Services at Transition durable medical equipment    Transportation Anticipated family or friend will provide       Discharge Needs Assessment    Readmission Within the Last 30 Days no previous admission in last 30 days    Equipment Currently Used at Home none    Concerns to be Addressed care coordination/care conferences;discharge planning    Anticipated Changes Related to Illness none    Equipment Needed After Discharge negative pressure wound therapy device    Discharge Coordination/Progress Spoke with pt's spouse in the room while pt was sleeping. He has had a wound vac placed today. Pt only has [employer] insurance. Vac ordered on [employer] and faxed supporting information and signed MD order to them at [phone]. [facility] RFS form signed and sent to [facility] for approval. Since it is Friday and Monday is a holiday, [facility] will not be approving the wound vac until Tuesday at the [facility]. Spoke with Pooja [phone] with [facility] about options since [employer] will not be approving for several days. She recommended a prevena vac be placed until the VA has approval. Informed Dr. Garzon and at this time the plan is for pt to stay at [facility] until the vac is approved.                   Discharge Plan     No documentation.                      Demographic Summary    No documentation.                  Functional Status    No documentation.                  Psychosocial    No documentation.                  Abuse/Neglect    No documentation.                  Legal    No documentation.                  Substance Abuse    No documentation.                  Patient Forms    No documentation.                     CAMERON Redding

## 2025-05-23 NOTE — DISCHARGE PLACEMENT REQUEST
"Felibetro Stoner (50 y.o. Male)       Date of Birth   1975    Social Security Number       Address   [address] [place] [address]    Home Phone   [phone]    MRN   [id number]       Adventist   Taoism    Marital Status                               Admission Date   2025    Admission Type   Elective    Admitting Provider   Steven Garzon MD    Attending Provider   Steven Garzon MD    Department, Room/Bed   [facility] 3C, 363/1       Discharge Date       Discharge Disposition       Discharge Destination                                 Attending Provider: Steven Garzon MD    Allergies: No Known Allergies    Isolation: None   Infection: None   Code Status: CPR    Ht: 171.5 cm (67.52\")   Wt: 116 kg (255 lb 3.2 oz)    Admission Cmt: None   Principal Problem: Hematoma of abdominal wall, initial encounter [S30.1XXA]                   Active Insurance as of 2025       Primary Coverage       Payor Plan Insurance Group Employer/Plan Group    [facility] [employer]        Payor Plan Address Payor Plan Phone Number Payor Plan Fax Number Effective Dates    [address] [phone]  2021 - None Entered    [facility] [id number]         Subscriber Name Subscriber Birth Date Member ID       FELIBERTO STONER 1975                      Emergency Contacts        (Rel.) Home Phone Work Phone Mobile Phone    Janet Stoner (Spouse) [phone] -- [phone]            Steven Garzon MD   Physician  Vascular Surgery  Op Note     Signed  Date of Service:  25  Creation Time:  25  Case Time:  Procedures:  Surgeons:    25 INCISION AND DRAINAGE, wound washout with wound vac placement    Steven Garzon MD               Signed        Patient Name: Gale  MRN: [id number]  : 1975     PREOPERATIVE DIAGNOSIS: Cellulitis of abdominal wall [L03.311]  Preop testing [Z01.818]   "   POSTOPERATIVE DIAGNOSIS: Post-Op Diagnosis Codes:     * Cellulitis of abdominal wall [L03.311]     * Preop testing [Z01.818]     PROCEDURE PERFORMED:   Incision and drainage of abdominal wall hematoma   application of negative pressure dressing greater than 50 cm²      SURGEON: Zhang Garzon MD     ANESTHESIA: General     PREPARATION: Routine.     STAFF: Circulator: Asya Tristan RN  Scrub Person: Chantal Powell  Assistant: Debbi Escudero     Estimated Blood Loss: minimal     SPECIMENS: None     COMPLICATIONS: None      INDICATIONS: Feliberto Knowles is a 50 y.o. male who underwent an ALIF a few weeks prior.  The patient had increased erythema at the surgical site.  He also had increased drainage.  Patient on a CT scan which showed a fluid collection in the subcutaneous space.  The indications, risks, and possible complications of the procedure were explained to the patient, who voiced understanding and wished to proceed with surgery.     PROCEDURE IN DETAIL: The patient was taken to the operating room and placed on the operating table in supine position. After general anesthesia was obtained, the abdomen was prepped and draped in a sterile manner.  Incision was then made at the previous incision.  This was carried down through the skin and subcutaneous tissues.  There was a hematoma/seroma.  This was then evacuated.  Fascia appeared intact.  Wound was then extensively irrigated.  The wound was noted to measure 9 x 6 x 7.5 Centimeters.  The wound VAC was then applied. The patient tolerated the procedure well. Sponge and needle counts were correct. The patient was then awakened and extubated in the operating room and taken to the recovery room in good condition.     Steven Garzon MD  Date: 5/23/2025          Time: 09:13 CDT                     Jada Mccrary APRN   Nurse Practitioner  Specialty:  Vascular Surgery  Progress Notes     Signed  Encounter Date:  5/22/2025   Related encounter: Office Visit from  "5/22/2025 in [facility] VASCULAR SURGERY with Jada Mccrary APRN     Signed        Expand All Collapse All  5/22/2025         MICHAEL Cook MD  [address]  [place] [address]     Feliberto Knowles  1975          Chief Complaint   Patient presents with    cellulitis abdominal wall       Has dressing in place removed top part. Area is very firm and reddened         Dear MICHAEL Cook MD:        HPI  I had the pleasure of seeing your patient Feliberto Knowles in the office today.   As you recall, Feliberto Knowles is a 50 y.o.  male who you are currently following for chronic back pain.  Feliberto Knowles did undergo  artificial disc replacement of L4-5 and anterior lumbar interbody fusion of L5-S1 with Dr. Cook on 4/30/25.  He has overall done well but a couple days ago had small opening to his lower incision with drainage noted.  For the past few days he developed increased drainage and tenderness.  He has been on antibiotics since his last visit.  He is accompanied by his wife.  He did have noninvasive testing performed today, which I did review in office.              Review of Systems   Constitutional: Negative.    HENT: Negative.     Eyes: Negative.    Respiratory: Negative.     Cardiovascular: Negative.    Gastrointestinal: Negative.    Endocrine: Negative.    Genitourinary: Negative.    Musculoskeletal:  Positive for arthralgias and back pain.   Skin:  Positive for color change and wound (Drainage).   Allergic/Immunologic: Negative.    Neurological: Negative.    Hematological: Negative.    Psychiatric/Behavioral: Negative.     All other systems reviewed and are negative.        /62   Pulse 84   Ht 170.2 cm (67\")   Wt 118 kg (259 lb 12.8 oz)   SpO2 98%   BMI 40.69 kg/m²   Physical Exam  Vitals and nursing note reviewed.   Constitutional:       General: He is not in acute distress.     Appearance: Normal appearance. He is well-developed. He is obese. He is " not diaphoretic.   HENT:      Head: Normocephalic and atraumatic.   Eyes:      General: No scleral icterus.     Pupils: Pupils are equal, round, and reactive to light.   Neck:      Thyroid: No thyromegaly.      Vascular: No carotid bruit or JVD.   Cardiovascular:      Rate and Rhythm: Normal rate and regular rhythm.      Pulses: Normal pulses.           Carotid pulses are 2+ on the right side and 2+ on the left side.       Femoral pulses are 2+ on the right side and 2+ on the left side.       Popliteal pulses are 2+ on the right side and 2+ on the left side.        Dorsalis pedis pulses are 2+ on the right side and 2+ on the left side.        Posterior tibial pulses are 2+ on the right side and 2+ on the left side.      Heart sounds: Normal heart sounds, S1 normal and S2 normal. No murmur heard.     No friction rub. No gallop.   Pulmonary:      Effort: Pulmonary effort is normal.      Breath sounds: Normal breath sounds.   Abdominal:      General: Bowel sounds are normal. There is no distension or abdominal bruit.      Palpations: Abdomen is soft. There is no mass.      Tenderness: There is no abdominal tenderness.   Musculoskeletal:      Cervical back: Neck supple.      Comments: Brace in place   Lymphadenopathy:      Cervical: No cervical adenopathy.   Skin:     General: Skin is warm and dry.           Neurological:      Mental Status: He is alert and oriented to person, place, and time.      Cranial Nerves: No cranial nerve deficit.      Sensory: No sensory deficit.   Psychiatric:         Mood and Affect: Mood normal.         Behavior: Behavior normal.         Thought Content: Thought content normal.         Judgment: Judgment normal.           CT Abd With Contrast Pelvis With & Without Contrast  Result Date: 5/22/2025  Narrative: EXAM/TECHNIQUE: CT abdomen and pelvis without and with IV contrast  INDICATION: recent surgery, draining abdominal incision; L03.311-Cellulitis of abdominal wall  COMPARISON: 9/21/2022   DLP: 885.76 mGy.cm. Automated exposure control was utilized to decrease patient radiation dose.  FINDINGS:  LOWER CHEST: No acute findings.  LIVER AND BILIARY: No focal liver lesion. No gallstones or biliary ductal dilatation.  PANCREAS: Unremarkable.  SPLEEN: Unremarkable.  ADRENAL: Unremarkable.  KIDNEYS/BLADDER: No solid renal mass. No urolithiasis or hydronephrosis. No focal urinary bladder wall thickening.  BOWEL: No colonic wall thickening or pericolonic fat stranding. Prior appendectomy. No small bowel distention or inflammatory change.  PERITONEUM: No ascites.  PELVIC ORGANS: Prostate seminal vesicles are unremarkable.  VASCULATURE: Abdominal aorta is nonaneurysmal.  LYMPH NODES: No enlarged lymph nodes.  SOFT TISSUES: Small bilateral fat-containing inguinal hernias. A periumbilical fat-containing hernia is present. 7.6 x 5.6 x 7.6 cm fluid collection in the left lower abdominal wall subcutaneous fat abutting the left rectus abdominous muscle, with fluid tract extending out to the skin surface. Surrounding subcutaneous stranding and overlying skin thickening is noted.  BONES: Postoperative change of L4-L5 disc arthroplasty and L5-S1 anterior fusion. No hardware complication. No acute osseous finding.       Impression:  7.6 x 5.6 x 7.6 cm fluid collection in the left lower abdominal wall subcutaneous fat overlying the left rectus abdominis muscle, with fluid tract extending out to the skin surface. Differential includes postoperative seroma/hematoma as well as abscess. Correlate with clinical exam.    This report was signed and finalized on 5/22/2025 10:22 AM by Dr. Gregorio Daniel MD.       XR Abdomen KUB  Result Date: 4/30/2025  Narrative: EXAM: XR ABDOMEN KUB-  DATE: 4/30/2025 9:50 AM  HISTORY: ALIF -- no count in OR   COMPARISON: No existing relevant imaging studies available.  TECHNIQUE:  Supine view of the abdomen. 1 images.  FINDINGS:  Limited evaluation for free air on supine imaging. Nonobstructive  supine bowel gas pattern. Nonobstructive supine bowel gas pattern.  No dystrophic calcifications visualized at the abdomen.  Postoperative changes at L4-5 and L5-S1. Numerous surgical clips at the RIGHT lower quadrant and LEFT pelvis. No acute bony finding by radiograph.        Impression: 1. Postoperative changes at L4-5 and L5-S1, not optimally evaluated on this exam.  This report was signed and finalized on 4/30/2025 11:10 AM by Dr Lili Saab MD.       XR Spine Lumbar AP & Lateral  Result Date: 4/30/2025  Narrative: EXAM: XR SPINE LUMBAR AP AND LATERAL-  DATE: 4/30/2025 6:50 AM  HISTORY: ALIF   COMPARISON: No existing relevant imaging studies available.  TECHNIQUE:  Intraoperative fluoroscopy images submitted.  Number of images: 10 Fluoroscopy time: 29.9 seconds Air kerma: 24.869 mGy  FINDINGS:  See impression.        Impression: 1. Anterior discectomy and fixation L4-5 and L5-S1. 2. A radiologist was not present for the acquisition or intraoperative the interpretation of these images. Please see the operative report for details pertaining to this exam.  This report was signed and finalized on 4/30/2025 10:48 AM by Dr Lili Saab MD.       FL C Arm During Surgery  Result Date: 4/30/2025  Narrative: This procedure was auto-finalized with no dictation required.        Problem List       Patient Active Problem List   Diagnosis    Dizziness    Transient ischemic attack (TIA)    Hypertrophic obstructive cardiomyopathy    Essential hypertension, new diagnosis    History of stroke    Infection of prosthetic total shoulder joint    Postoperative infection    Lumbar radiculopathy    Degeneration of intervertebral disc of lumbar region with discogenic back pain and lower extremity pain    Cellulitis of abdominal wall    Preop testing            Visit Diagnosis       ICD-10-CM ICD-9-CM   1. Cellulitis of abdominal wall  L03.311 682.2   2. Preop testing  Z01.818 V72.84   3. Abdominal wall seroma, initial encounter   S30.1XXA 998.13               Plan: After thoroughly evaluating Feliberto Knowles, I believe the best course of action is to proceed with incision and drainage I have abdominal wall seroma/possible abscess.  Open areas have further prove progressed.  He is complaining of some tenderness as well as an increase in drainage.  He is nauseated in the office today however believes this was due to contrast.  Risks/benefits were expanded great length to the patient which include but are not limited to bleeding, infection, vessel damage,  MI, stroke, and death.  He can continue antibiotics as ordered previously.   He will notify us should he have any significant changes.  This was all discussed in full with complete understanding.     Thank you for allowing me to participate in the care of your patient.  Please do not hesitate with any questions or concerns.  I will keep you aware of any further encounters with Feliberto Knowles.           Sincerely yours,           FALGUNI Scott

## 2025-05-23 NOTE — ANESTHESIA POSTPROCEDURE EVALUATION
"Patient: Feliberto Knowles    Procedure Summary       Date: 05/23/25 Room / Location: [facility] OR  / [facility] HYBRID OR    Anesthesia Start: 0811 Anesthesia Stop: 0858    Procedure: INCISION AND DRAINAGE, wound washout with wound vac placement (Abdomen) Diagnosis:       Cellulitis of abdominal wall      Preop testing      (Cellulitis of abdominal wall [L03.311])      (Preop testing [Z01.818])    Surgeons: Steven Garzon MD Provider: Evelio Hallman CRNA    Anesthesia Type: general ASA Status: 3            Anesthesia Type: general    Vitals  Vitals Value Taken Time   /63 05/23/25 10:00   Temp 97 °F (36.1 °C) 05/23/25 10:00   Pulse 71 05/23/25 10:10   Resp 14 05/23/25 10:10   SpO2 97 % 05/23/25 10:10           Post Anesthesia Care and Evaluation    Patient location during evaluation: PACU  Patient participation: complete - patient participated  Level of consciousness: awake and alert  Pain management: adequate    Airway patency: patent  Anesthetic complications: No anesthetic complications  PONV Status: none  Cardiovascular status: acceptable and hemodynamically stable  Respiratory status: acceptable  Hydration status: acceptable    Comments: Blood pressure 133/63, pulse 80, temperature 97.6 °F (36.4 °C), temperature source Oral, resp. rate 16, height 171.5 cm (67.52\"), weight 116 kg (255 lb 3.2 oz), SpO2 94%.    Patient discharged from PACU based upon Merly score. Please see RN notes for further details    "

## 2025-05-23 NOTE — ANESTHESIA PREPROCEDURE EVALUATION
Anesthesia Evaluation     Patient summary reviewed   history of anesthetic complications:  PONV  NPO Solid Status: > 8 hours             Airway   Mallampati: II  TM distance: >3 FB  Neck ROM: full  No difficulty expected  Dental          Pulmonary    (-) asthma, sleep apnea, not a smoker  Cardiovascular   Exercise tolerance: good (4-7 METS)    ECG reviewed  Patient on routine beta blocker and Beta blocker given within 24 hours of surgery    (+) pacemaker ([employer]tr[employer]) pacemaker, ICD, hypertension, valvular problems/murmurs murmur, hyperlipidemia  (-) past MI, cardiac stents    ROS comment: Surgery for hypertrophic cardiomyopathy- septal myomyectomy          Neuro/Psych  (+) CVA, psychiatric history Anxiety  (-) seizures  GI/Hepatic/Renal/Endo    (+) morbid obesity  (-) liver disease, no renal disease, diabetes    Musculoskeletal     Abdominal   (+) obese   Substance History      OB/GYN          Other                      Anesthesia Plan    ASA 3     general     (Place magnet on device- interrogate in recovery  )  intravenous induction     Anesthetic plan, risks, benefits, and alternatives have been provided, discussed and informed consent has been obtained with: patient.

## 2025-05-23 NOTE — ANESTHESIA PROCEDURE NOTES
Airway  Reason: elective    Date/Time: 5/23/2025 8:15 AM  Airway not difficult    General Information and Staff    Patient location during procedure: OR    Indications and Patient Condition  Indications for airway management: airway protection    Preoxygenated: yes  MILS maintained throughout    Mask difficulty assessment: 0 - not attempted    Final Airway Details    Final airway type: endotracheal airway      Successful airway: ETT  Cuffed: yes   Successful intubation technique: video laryngoscopy  Adjuncts used in placement: intubating stylet  Endotracheal tube insertion site: oral  Blade: Pinzon  Blade size: 4  ETT size (mm): 7.5  Cormack-Lehane Classification: grade I - full view of glottis  Placement verified by: chest auscultation and capnometry   Cuff volume (mL): 8  Measured from: teeth  ETT/EBT  to teeth (cm): 23  Number of attempts at approach: 1  Assessment: lips, teeth, and gum same as pre-op and atraumatic intubation

## 2025-05-23 NOTE — PLAN OF CARE
Problem: Adult Inpatient Plan of Care  Goal: Plan of Care Review  Outcome: Progressing  Flowsheets (Taken 5/23/2025 1511)  Outcome Evaluation: Patient RA. IV CDI, IID. No C/O pain. Accucheck, refusing insulin. Up adlib. Voiding per urinal. Alert and Oriented x4. Wound vac to left lower stomach, CDI. VSS, safety maintained.

## 2025-05-24 LAB
BASOPHILS # BLD AUTO: 0.05 10*3/MM3 (ref 0–0.2)
BASOPHILS NFR BLD AUTO: 0.3 % (ref 0–1.5)
DEPRECATED RDW RBC AUTO: 36.5 FL (ref 37–54)
EOSINOPHIL # BLD AUTO: 0.01 10*3/MM3 (ref 0–0.4)
EOSINOPHIL NFR BLD AUTO: 0.1 % (ref 0.3–6.2)
ERYTHROCYTE [DISTWIDTH] IN BLOOD BY AUTOMATED COUNT: 12.2 % (ref 12.3–15.4)
GLUCOSE BLDC GLUCOMTR-MCNC: 119 MG/DL (ref 70–130)
GLUCOSE BLDC GLUCOMTR-MCNC: 159 MG/DL (ref 70–130)
GLUCOSE BLDC GLUCOMTR-MCNC: 95 MG/DL (ref 70–130)
HCT VFR BLD AUTO: 44.8 % (ref 37.5–51)
HGB BLD-MCNC: 15.1 G/DL (ref 13–17.7)
IMM GRANULOCYTES # BLD AUTO: 0.12 10*3/MM3 (ref 0–0.05)
IMM GRANULOCYTES NFR BLD AUTO: 0.8 % (ref 0–0.5)
LYMPHOCYTES # BLD AUTO: 1.74 10*3/MM3 (ref 0.7–3.1)
LYMPHOCYTES NFR BLD AUTO: 11.8 % (ref 19.6–45.3)
MCH RBC QN AUTO: 27.8 PG (ref 26.6–33)
MCHC RBC AUTO-ENTMCNC: 33.7 G/DL (ref 31.5–35.7)
MCV RBC AUTO: 82.4 FL (ref 79–97)
MONOCYTES # BLD AUTO: 0.91 10*3/MM3 (ref 0.1–0.9)
MONOCYTES NFR BLD AUTO: 6.2 % (ref 5–12)
NEUTROPHILS NFR BLD AUTO: 11.92 10*3/MM3 (ref 1.7–7)
NEUTROPHILS NFR BLD AUTO: 80.8 % (ref 42.7–76)
NRBC BLD AUTO-RTO: 0 /100 WBC (ref 0–0.2)
PLATELET # BLD AUTO: 363 10*3/MM3 (ref 140–450)
PMV BLD AUTO: 8.7 FL (ref 6–12)
RBC # BLD AUTO: 5.44 10*6/MM3 (ref 4.14–5.8)
WBC NRBC COR # BLD AUTO: 14.75 10*3/MM3 (ref 3.4–10.8)

## 2025-05-24 PROCEDURE — 82948 REAGENT STRIP/BLOOD GLUCOSE: CPT

## 2025-05-24 PROCEDURE — 25010000002 MORPHINE PER 10 MG: Performed by: STUDENT IN AN ORGANIZED HEALTH CARE EDUCATION/TRAINING PROGRAM

## 2025-05-24 PROCEDURE — 85025 COMPLETE CBC W/AUTO DIFF WBC: CPT | Performed by: STUDENT IN AN ORGANIZED HEALTH CARE EDUCATION/TRAINING PROGRAM

## 2025-05-24 RX ORDER — DOCUSATE SODIUM 100 MG/1
100 CAPSULE, LIQUID FILLED ORAL 2 TIMES DAILY
Status: DISCONTINUED | OUTPATIENT
Start: 2025-05-24 | End: 2025-05-28 | Stop reason: HOSPADM

## 2025-05-24 RX ORDER — MORPHINE SULFATE 2 MG/ML
2 INJECTION, SOLUTION INTRAMUSCULAR; INTRAVENOUS
Status: DISCONTINUED | OUTPATIENT
Start: 2025-05-24 | End: 2025-05-28 | Stop reason: HOSPADM

## 2025-05-24 RX ADMIN — Medication 10 ML: at 21:15

## 2025-05-24 RX ADMIN — CEPHALEXIN 500 MG: 500 CAPSULE ORAL at 15:28

## 2025-05-24 RX ADMIN — OXYCODONE AND ACETAMINOPHEN 1 TABLET: 325; 10 TABLET ORAL at 05:20

## 2025-05-24 RX ADMIN — METOPROLOL SUCCINATE 12.5 MG: 25 TABLET, EXTENDED RELEASE ORAL at 08:14

## 2025-05-24 RX ADMIN — ACETAMINOPHEN 650 MG: 325 TABLET, FILM COATED ORAL at 19:33

## 2025-05-24 RX ADMIN — OXYCODONE AND ACETAMINOPHEN 1 TABLET: 325; 10 TABLET ORAL at 11:21

## 2025-05-24 RX ADMIN — CEPHALEXIN 500 MG: 500 CAPSULE ORAL at 21:15

## 2025-05-24 RX ADMIN — MORPHINE SULFATE 2 MG: 2 INJECTION, SOLUTION INTRAMUSCULAR; INTRAVENOUS at 16:34

## 2025-05-24 RX ADMIN — OXYBUTYNIN CHLORIDE 5 MG: 5 TABLET ORAL at 08:14

## 2025-05-24 RX ADMIN — TIZANIDINE 4 MG: 4 TABLET ORAL at 05:20

## 2025-05-24 RX ADMIN — DOCUSATE SODIUM 100 MG: 100 CAPSULE, LIQUID FILLED ORAL at 21:15

## 2025-05-24 RX ADMIN — TIZANIDINE 4 MG: 4 TABLET ORAL at 15:37

## 2025-05-24 RX ADMIN — GABAPENTIN 300 MG: 300 CAPSULE ORAL at 08:14

## 2025-05-24 RX ADMIN — LEVOTHYROXINE SODIUM 75 MCG: 75 TABLET ORAL at 05:20

## 2025-05-24 RX ADMIN — SERTRALINE HYDROCHLORIDE 100 MG: 100 TABLET ORAL at 08:14

## 2025-05-24 RX ADMIN — Medication 10 ML: at 08:15

## 2025-05-24 RX ADMIN — ACETAMINOPHEN 650 MG: 325 TABLET, FILM COATED ORAL at 05:20

## 2025-05-24 RX ADMIN — DOCUSATE SODIUM 100 MG: 100 CAPSULE, LIQUID FILLED ORAL at 11:21

## 2025-05-24 RX ADMIN — MORPHINE SULFATE 2 MG: 2 INJECTION, SOLUTION INTRAMUSCULAR; INTRAVENOUS at 19:33

## 2025-05-24 RX ADMIN — GABAPENTIN 300 MG: 300 CAPSULE ORAL at 15:28

## 2025-05-24 RX ADMIN — MORPHINE SULFATE 2 MG: 2 INJECTION, SOLUTION INTRAMUSCULAR; INTRAVENOUS at 23:52

## 2025-05-24 RX ADMIN — CEPHALEXIN 500 MG: 500 CAPSULE ORAL at 08:14

## 2025-05-24 NOTE — PROGRESS NOTES
LOS: 1 day   Patient Care Team:  Nakul Hdz DO as PCP - General (Family Medicine)    Chief Complaint:  drainage and fluid collection s/p ALIF    Subjective     Patient Complaints: No complaints this a.m.  Pain controlled.    Objective     Vital Signs  Temp:  [97 °F (36.1 °C)-98.2 °F (36.8 °C)] 97.2 °F (36.2 °C)  Heart Rate:  [65-85] 65  Resp:  [12-16] 14  BP: ()/(51-81) 101/51    Physical Exam  Constitutional:       Appearance: Normal appearance.   HENT:      Head: Normocephalic and atraumatic.   Cardiovascular:      Rate and Rhythm: Normal rate and regular rhythm.   Pulmonary:      Effort: Pulmonary effort is normal. No respiratory distress.   Skin:     Comments: Left paramedian abdominal incision with wound VAC in place.   Neurological:      General: No focal deficit present.      Mental Status: He is alert. Mental status is at baseline.         Laboratory Data:   Results from last 7 days   Lab Units 05/24/25  0300 05/23/25  0615   WBC 10*3/mm3 14.75* 8.32   HEMOGLOBIN g/dL 15.1 15.7   HEMATOCRIT % 44.8 46.5   PLATELETS 10*3/mm3 363 325       Results from last 7 days   Lab Units 05/23/25  0615   SODIUM mmol/L 137   POTASSIUM mmol/L 4.3   CHLORIDE mmol/L 101   CO2 mmol/L 25.0   BUN mg/dL 16   CREATININE mg/dL 0.83   CALCIUM mg/dL 8.9   GLUCOSE mg/dL 101*     Results from last 7 days   Lab Units 05/23/25  0615   PROTIME Seconds 13.7   INR  1.00   APTT seconds 31.8            Medication Review:     Assessment & Plan       Hematoma of abdominal wall, initial encounter    Cellulitis of abdominal wall    Preop testing      50-year-old male who presented with hematoma/seroma after ALIF procedure status post drainage, washout, wound VAC placement on 5/23/2025.    -Afebrile vital signs stable continue to monitor.  - Pain control continue current regiment  - Awaiting home wound VAC.  [facility]  - Will plan on DC once home wound VAC approved.    Plan for disposition:    Steven Garzon  MD  Vascular Surgery  [phone]  05/24/25  09:14 CDT

## 2025-05-24 NOTE — PLAN OF CARE
Goal Outcome Evaluation:           Progress: no change                            Wound vac in place. Patient medicated for pain x1 using PRN pain medication. Accucheck 146 at bedtime. No distress noted.

## 2025-05-25 LAB
GLUCOSE BLDC GLUCOMTR-MCNC: 116 MG/DL (ref 70–130)
GLUCOSE BLDC GLUCOMTR-MCNC: 99 MG/DL (ref 70–130)

## 2025-05-25 PROCEDURE — 82948 REAGENT STRIP/BLOOD GLUCOSE: CPT

## 2025-05-25 PROCEDURE — 25010000002 MORPHINE PER 10 MG: Performed by: STUDENT IN AN ORGANIZED HEALTH CARE EDUCATION/TRAINING PROGRAM

## 2025-05-25 RX ADMIN — OXYCODONE AND ACETAMINOPHEN 1 TABLET: 325; 10 TABLET ORAL at 12:42

## 2025-05-25 RX ADMIN — MORPHINE SULFATE 2 MG: 2 INJECTION, SOLUTION INTRAMUSCULAR; INTRAVENOUS at 18:59

## 2025-05-25 RX ADMIN — OXYCODONE AND ACETAMINOPHEN 1 TABLET: 325; 10 TABLET ORAL at 17:03

## 2025-05-25 RX ADMIN — CEPHALEXIN 500 MG: 500 CAPSULE ORAL at 21:58

## 2025-05-25 RX ADMIN — SERTRALINE HYDROCHLORIDE 100 MG: 100 TABLET ORAL at 08:38

## 2025-05-25 RX ADMIN — ACETAMINOPHEN 650 MG: 325 TABLET, FILM COATED ORAL at 04:58

## 2025-05-25 RX ADMIN — MORPHINE SULFATE 2 MG: 2 INJECTION, SOLUTION INTRAMUSCULAR; INTRAVENOUS at 23:00

## 2025-05-25 RX ADMIN — Medication 10 ML: at 21:59

## 2025-05-25 RX ADMIN — MORPHINE SULFATE 2 MG: 2 INJECTION, SOLUTION INTRAMUSCULAR; INTRAVENOUS at 14:43

## 2025-05-25 RX ADMIN — MORPHINE SULFATE 2 MG: 2 INJECTION, SOLUTION INTRAMUSCULAR; INTRAVENOUS at 08:38

## 2025-05-25 RX ADMIN — TIZANIDINE 4 MG: 4 TABLET ORAL at 12:42

## 2025-05-25 RX ADMIN — OXYBUTYNIN CHLORIDE 5 MG: 5 TABLET ORAL at 08:38

## 2025-05-25 RX ADMIN — LEVOTHYROXINE SODIUM 75 MCG: 75 TABLET ORAL at 05:01

## 2025-05-25 RX ADMIN — DOCUSATE SODIUM 100 MG: 100 CAPSULE, LIQUID FILLED ORAL at 21:58

## 2025-05-25 RX ADMIN — DOCUSATE SODIUM 100 MG: 100 CAPSULE, LIQUID FILLED ORAL at 08:38

## 2025-05-25 RX ADMIN — MORPHINE SULFATE 2 MG: 2 INJECTION, SOLUTION INTRAMUSCULAR; INTRAVENOUS at 04:58

## 2025-05-25 RX ADMIN — Medication 10 ML: at 08:38

## 2025-05-25 RX ADMIN — CEPHALEXIN 500 MG: 500 CAPSULE ORAL at 08:38

## 2025-05-25 RX ADMIN — OXYCODONE AND ACETAMINOPHEN 1 TABLET: 325; 10 TABLET ORAL at 21:58

## 2025-05-25 RX ADMIN — CEPHALEXIN 500 MG: 500 CAPSULE ORAL at 17:03

## 2025-05-25 NOTE — PLAN OF CARE
Goal Outcome Evaluation:  Plan of Care Reviewed With: patient        Progress: no change  Outcome Evaluation: Patient complains of pain, see MAR. IV IID. Accucheck. Room air. Up ad quirino, voiding. A&OX4. VSS. Safety maintained.

## 2025-05-25 NOTE — PROGRESS NOTES
LOS: 2 days   Patient Care Team:  Nakul Hdz DO as PCP - General (Family Medicine)    Chief Complaint:  drainage and fluid collection s/p ALIF    Subjective     Patient Complaints: No complaints this a.m.  Pain controlled.    Objective     Vital Signs  Temp:  [97.3 °F (36.3 °C)-98.5 °F (36.9 °C)] 97.6 °F (36.4 °C)  Heart Rate:  [67-78] 67  Resp:  [14-16] 16  BP: (106-134)/(50-80) 109/56    Physical Exam  Constitutional:       Appearance: Normal appearance.   HENT:      Head: Normocephalic and atraumatic.   Cardiovascular:      Rate and Rhythm: Normal rate and regular rhythm.   Pulmonary:      Effort: Pulmonary effort is normal. No respiratory distress.   Skin:     Comments: Left paramedian abdominal incision with wound VAC in place.   Neurological:      General: No focal deficit present.      Mental Status: He is alert. Mental status is at baseline.         Laboratory Data:   Results from last 7 days   Lab Units 05/24/25  0300 05/23/25  0615   WBC 10*3/mm3 14.75* 8.32   HEMOGLOBIN g/dL 15.1 15.7   HEMATOCRIT % 44.8 46.5   PLATELETS 10*3/mm3 363 325       Results from last 7 days   Lab Units 05/23/25  0615   SODIUM mmol/L 137   POTASSIUM mmol/L 4.3   CHLORIDE mmol/L 101   CO2 mmol/L 25.0   BUN mg/dL 16   CREATININE mg/dL 0.83   CALCIUM mg/dL 8.9   GLUCOSE mg/dL 101*     Results from last 7 days   Lab Units 05/23/25  0615   PROTIME Seconds 13.7   INR  1.00   APTT seconds 31.8            Medication Review:     Assessment & Plan       Hematoma of abdominal wall, initial encounter    Cellulitis of abdominal wall    Preop testing      50-year-old male who presented with hematoma/seroma after ALIF procedure status post drainage, washout, wound VAC placement on 5/23/2025.    -Afebrile vital signs stable continue to monitor.  - Pain control continue current regiment  - Awaiting home wound VAC.  [facility]  - Will plan on DC once home wound VAC approved.    Plan for disposition:    Steven Garzon,  MD  Vascular Surgery  [phone]  05/25/25  11:49 CDT

## 2025-05-25 NOTE — PLAN OF CARE
Goal Outcome Evaluation:  Plan of Care Reviewed With: patient  Progress: improving       Medicated for c/o pain x2 this shift. IV in place saline locked. Wound vac maintained in place to abd. Up ad quirino. Voiding per urinal. Resting between care. VSS. Safety maintained.

## 2025-05-26 PROCEDURE — 25010000002 MORPHINE PER 10 MG: Performed by: STUDENT IN AN ORGANIZED HEALTH CARE EDUCATION/TRAINING PROGRAM

## 2025-05-26 PROCEDURE — 97161 PT EVAL LOW COMPLEX 20 MIN: CPT | Performed by: PHYSICAL THERAPIST

## 2025-05-26 PROCEDURE — 97605 NEG PRS WND THER DME<=50SQCM: CPT | Performed by: PHYSICAL THERAPIST

## 2025-05-26 RX ADMIN — SERTRALINE HYDROCHLORIDE 100 MG: 100 TABLET ORAL at 08:18

## 2025-05-26 RX ADMIN — OXYCODONE AND ACETAMINOPHEN 1 TABLET: 325; 10 TABLET ORAL at 12:41

## 2025-05-26 RX ADMIN — DOCUSATE SODIUM 100 MG: 100 CAPSULE, LIQUID FILLED ORAL at 08:18

## 2025-05-26 RX ADMIN — Medication 10 ML: at 08:20

## 2025-05-26 RX ADMIN — OXYBUTYNIN CHLORIDE 5 MG: 5 TABLET ORAL at 08:19

## 2025-05-26 RX ADMIN — LISINOPRIL 2.5 MG: 2.5 TABLET ORAL at 08:18

## 2025-05-26 RX ADMIN — LEVOTHYROXINE SODIUM 75 MCG: 75 TABLET ORAL at 04:47

## 2025-05-26 RX ADMIN — OXYCODONE AND ACETAMINOPHEN 1 TABLET: 325; 10 TABLET ORAL at 20:59

## 2025-05-26 RX ADMIN — METOPROLOL SUCCINATE 12.5 MG: 25 TABLET, EXTENDED RELEASE ORAL at 08:18

## 2025-05-26 RX ADMIN — MORPHINE SULFATE 2 MG: 2 INJECTION, SOLUTION INTRAMUSCULAR; INTRAVENOUS at 08:18

## 2025-05-26 RX ADMIN — DOCUSATE SODIUM 100 MG: 100 CAPSULE, LIQUID FILLED ORAL at 20:59

## 2025-05-26 RX ADMIN — MORPHINE SULFATE 2 MG: 2 INJECTION, SOLUTION INTRAMUSCULAR; INTRAVENOUS at 04:47

## 2025-05-26 RX ADMIN — TIZANIDINE 4 MG: 4 TABLET ORAL at 12:41

## 2025-05-26 NOTE — CASE MANAGEMENT/SOCIAL WORK
Continued Stay Note  DARIO Dunham     Patient Name: Feliberto Knowles  MRN: [id number]  Today's Date: 5/26/2025    Admit Date: 5/23/2025    Plan: Home   Discharge Plan       Row Name 05/26/25 1156       Plan    Plan Home    Patient/Family in Agreement with Plan yes    Plan Comments [facility] due [facility] holiday so unable to check on wound vac status. Will check back tomorrow.                   Discharge Codes    No documentation.                       CAMERON Redding

## 2025-05-26 NOTE — PROGRESS NOTES
LOS: 3 days   Patient Care Team:  Nakul Hdz DO as PCP - General (Family Medicine)    Chief Complaint:  drainage and fluid collection s/p ALIF    Subjective     Patient Complaints: No complaints this a.m.  Pain controlled.    Objective     Vital Signs  Temp:  [97.6 °F (36.4 °C)-98.4 °F (36.9 °C)] 97.8 °F (36.6 °C)  Heart Rate:  [70-79] 70  Resp:  [16-18] 18  BP: (114-136)/(57-85) 117/63    Physical Exam  Constitutional:       Appearance: Normal appearance.   HENT:      Head: Normocephalic and atraumatic.   Cardiovascular:      Rate and Rhythm: Normal rate and regular rhythm.   Pulmonary:      Effort: Pulmonary effort is normal. No respiratory distress.   Skin:     Comments: Left paramedian abdominal incision with wound VAC in place.   Neurological:      General: No focal deficit present.      Mental Status: He is alert. Mental status is at baseline.         Laboratory Data:   Results from last 7 days   Lab Units 05/24/25  0300 05/23/25  0615   WBC 10*3/mm3 14.75* 8.32   HEMOGLOBIN g/dL 15.1 15.7   HEMATOCRIT % 44.8 46.5   PLATELETS 10*3/mm3 363 325       Results from last 7 days   Lab Units 05/23/25  0615   SODIUM mmol/L 137   POTASSIUM mmol/L 4.3   CHLORIDE mmol/L 101   CO2 mmol/L 25.0   BUN mg/dL 16   CREATININE mg/dL 0.83   CALCIUM mg/dL 8.9   GLUCOSE mg/dL 101*     Results from last 7 days   Lab Units 05/23/25  0615   PROTIME Seconds 13.7   INR  1.00   APTT seconds 31.8            Medication Review:     Assessment & Plan       Hematoma of abdominal wall, initial encounter    Cellulitis of abdominal wall    Preop testing      50-year-old male who presented with hematoma/seroma after ALIF procedure status post drainage, washout, wound VAC placement on 5/23/2025.    -Afebrile vital signs stable continue to monitor.  - Pain control continue current regiment  - Awaiting home wound VAC.  [facility]  - Will plan on DC once home wound VAC approved.    Plan for disposition:    Steven Garzon,  MD  Vascular Surgery  [phone]  05/26/25  10:26 CDT

## 2025-05-26 NOTE — THERAPY WOUND CARE TREATMENT
Acute Care - Wound/Debridement Initial Evaluation  [facility]     Patient Name: Feliberto Knowles  : 1975  MRN: [id number]  Today's Date: 2025                Admit Date: 2025    Visit Dx:    ICD-10-CM ICD-9-CM   1. Open wound of abdomen, initial encounter [S31.109A]  S31.109A 879.2   2. Cellulitis of abdominal wall  L03.311 682.2   3. Preop testing  Z01.818 V72.84       Patient Active Problem List   Diagnosis    Dizziness    Transient ischemic attack (TIA)    Hypertrophic obstructive cardiomyopathy    Essential hypertension, new diagnosis    History of stroke    Infection of prosthetic total shoulder joint    Postoperative infection    Lumbar radiculopathy    Degeneration of intervertebral disc of lumbar region with discogenic back pain and lower extremity pain    Cellulitis of abdominal wall    Preop testing    Hematoma of abdominal wall, initial encounter        Past Medical History:   Diagnosis Date    AICD (automatic cardioverter/defibrillator) present 2020    Medtronic AICD.    Anger     Anxiety     Arthritis     Heart murmur     Hyperlipidemia     Hypertrophic obstructive cardiomyopathy (HOCM)     Hypothyroidism     Postoperative wound infection 2025    Stroke     tia 2017, STM LOSS    Tachycardia     TBI (traumatic brain injury)         Past Surgical History:   Procedure Laterality Date    ANKLE SURGERY Right     ANTERIOR LUMBAR EXPOSURE N/A 2025    Procedure: ANTERIOR LUMBAR EXPOSURE;  Surgeon: Clark Guillen DO;  Location: [facility];  Service: Vascular;  Laterality: N/A;    APPENDECTOMY      CARDIAC CATHETERIZATION      no stents    CARDIAC DEFIBRILLATOR PLACEMENT  2020    Medtronic / AICD    CARDIAC PACEMAKER PLACEMENT      septalmyectomy    CARPAL TUNNEL RELEASE      LUMBAR FUSION N/A 2025    Procedure: ANTERIOR DECOMPRESSION L4-S1, ARTIFICAL DISC REPLACEMENT L4-5, ANTERIOR LUMBAR INTERBODY FUSION WITH INSTRUMENTATION L5-S1;  Surgeon: MICHAEL Cook  MD Bret;  Location:  [facility];  Service: Orthopedic Spine;  Laterality: N/A;    OTHER SURGICAL HISTORY  05/2012    septalmyectomy    SHOULDER ARTHROSCOPY W/ LABRAL REPAIR Right 03/25/2021    Procedure: RIGHT SHOULDER ARTHROSCOPIC REVISION POSTERIOR  LABRAL REPAIR;  Surgeon: Sandro Monsivais MD;  Location:  [facility];  Service: Orthopedics;  Laterality: Right;    TOTAL SHOULDER ARTHROPLASTY W/ DISTAL CLAVICLE EXCISION Right 09/30/2021    Procedure: RIGHT REVERSE TOTAL SHOULDER ARTHROPLASTY;  Surgeon: Sandro Monsivais MD;  Location:  [facility];  Service: Orthopedics;  Laterality: Right;    TOTAL SHOULDER ARTHROPLASTY W/ DISTAL CLAVICLE EXCISION Right 10/14/2021    Procedure: INCISION AND DRAINAGE RIGHT SHOULDER WITH POLY EXCHANGE;  Surgeon: Sandro Monsivais MD;  Location:  [facility];  Service: Orthopedics;  Laterality: Right;           Wound 04/30/25 0801 Left lower abdomen Surgical (Active)   Wound Image   05/26/25 0800   Dressing Appearance intact 05/26/25 0800   Base red;subcutaneous 05/26/25 0800   Red (%), Wound Tissue Color 95 05/26/25 0800   Yellow (%), Wound Tissue Color 5 05/26/25 0800   Periwound intact 05/26/25 0800   Periwound Temperature warm 05/26/25 0800   Periwound Skin Turgor soft;firm 05/26/25 0800   Edges open 05/26/25 0800   Wound Length (cm) 5.2 cm 05/26/25 0800   Wound Width (cm) 4.2 cm 05/26/25 0800   Wound Depth (cm) 6 cm 05/26/25 0800   Wound Surface Area (cm^2) 17.15 cm^2 05/26/25 0800   Wound Volume (cm^3) 68.612 cm^3 05/26/25 0800   Drainage Characteristics/Odor sanguineous 05/26/25 0800   Drainage Amount small 05/26/25 0800   Care, Wound negative pressure wound therapy 05/26/25 0800   Dressing Care dressing changed 05/26/25 0800   Periwound Care barrier film applied 05/26/25 0800   Wound Output (mL) 150 05/26/25 0800       NPWT (Negative Pressure Wound Therapy) 05/23/25 0850 abdomen (Active)   Therapy Setting continuous therapy 05/26/25 0800   Dressing foam, black 05/26/25 0800   Pressure Setting 125  mmHg 05/26/25 0800   Sponges Inserted 1 05/26/25 0800   Sponges Removed 1 05/26/25 0800   Finger sweep complete Yes 05/26/25 0800         WOUND DEBRIDEMENT                     PT Assessment (Last 12 Hours)       PT Evaluation and Treatment       Row Name 05/26/25 0800          Physical Therapy Time and Intention    Subjective Information no complaints  -MS     Document Type evaluation;wound care  -MS       Row Name 05/26/25 0800          General Information    Patient Profile Reviewed other (see comments)  -MS     Pertinent History of Current Functional Problem s/p I and D of anterior spinal incision due to infection  -MS     Existing Precautions/Restrictions --  wound vac  -MS     Risks Reviewed patient:;increased discomfort;increased drainage;lines disloged  -MS     Benefits Reviewed patient:;decrease pain;improve skin integrity;increase knowledge  -MS     Barriers to Rehab none identified  -MS       Row Name 05/26/25 0800          Pain    Pretreatment Pain Rating 4/10  -MS     Posttreatment Pain Rating 4/10  -MS     Pain Side/Orientation --  incisional  -MS     Pain Management Interventions premedicated for activity  -MS     Response to Pain Interventions no change per patient report  -MS       Row Name 05/26/25 0800          Cognition    Orientation Status (Cognition) oriented x 4  -MS       Row Name 05/26/25 0800          Wound 04/30/25 0801 Left lower abdomen Surgical    Wound - Properties Group Placement Date: 04/30/25  -DT Placement Time: 0801  -DT Present on Original Admission: Y  -MS Side: Left  -DT Orientation: lower  -DT Location: abdomen  -DT Primary Wound Type: Surgical  -DT Additional Comments: I &D 5/23/25  -MS Wound Outcome: --  -MS Removal Date: --  -MS Removal Time: --  -MS, not present upon assessment     Wound Image Images linked: 1  -MS     Dressing Appearance intact  -MS     Closure --  NPWT dressing  -MS     Base red;subcutaneous  -MS     Red (%), Wound Tissue Color 95  -MS     Yellow (%),  Wound Tissue Color 5  -MS     Periwound intact  -MS     Periwound Temperature warm  -MS     Periwound Skin Turgor soft;firm  -MS     Edges open  -MS     Wound Length (cm) 5.2 cm  -MS     Wound Width (cm) 4.2 cm  -MS     Wound Depth (cm) 6 cm  -MS     Wound Surface Area (cm^2) 17.15 cm^2  -MS     Wound Volume (cm^3) 68.612 cm^3  -MS     Drainage Characteristics/Odor sanguineous  -MS     Drainage Amount small  -MS     Care, Wound negative pressure wound therapy  -MS     Dressing Care dressing changed  -MS     Periwound Care barrier film applied  -MS     Wound Output (mL) 150  -MS     Retired Wound - Properties Group Placement Date: 04/30/25  -DT Placement Time: 0801  -DT Present on Original Admission: Y  -MS Side: Left  -DT Orientation: lower  -DT Location: abdomen  -DT Additional Comments: I &D 5/23/25  -MS Wound Outcome: --  -MS Removal Date: --  -MS Removal Time: --  -MS, not present upon assessment     Retired Wound - Properties Group Placement Date: 04/30/25  -DT Placement Time: 0801  -DT Present on Original Admission: Y  -MS Side: Left  -DT Orientation: lower  -DT Location: abdomen  -DT Additional Comments: I &D 5/23/25  -MS Removal Date: --  -MS Removal Time: --  -MS, not present upon assessment  Wound Outcome: --  -MS    Retired Wound - Properties Group Date first assessed: 04/30/25  -DT Time first assessed: 0801  -DT Present on Original Admission: Y  -MS Side: Left  -DT Location: abdomen  -DT Additional Comments: I &D 5/23/25  -MS Resolution Date: --  -MS Resolution Time: --  -MS, not present upon assessment  Wound Outcome: --  -MS      Row Name 05/26/25 0800          NPWT (Negative Pressure Wound Therapy) 05/23/25 0850 abdomen    NPWT (Negative Pressure Wound Therapy) - Properties Group Placement Date: 05/23/25  -AC Placement Time: 0850  -AC Location: abdomen  -AC    Therapy Setting continuous therapy  -MS     Dressing foam, black  -MS     Pressure Setting 125 mmHg  -MS     Sponges Inserted 1  -MS      Sponges Removed 1  -MS     Finger sweep complete Yes  -MS     Retired NPWT (Negative Pressure Wound Therapy) - Properties Group Placement Date: 05/23/25  -AC Placement Time: 0850  -AC Location: abdomen  -AC    Retired NPWT (Negative Pressure Wound Therapy) - Properties Group Placement Date: 05/23/25  -AC Placement Time: 0850  -AC Location: abdomen  -AC    Retired NPWT (Negative Pressure Wound Therapy) - Properties Group Placement Date: 05/23/25  -AC Placement Time: 0850  -AC Location: abdomen  -AC      Row Name 05/26/25 0800          Plan of Care Review    Plan of Care Reviewed With patient;spouse  -MS     Progress improving  -MS     Outcome Evaluation The patient presents alert and oriented x4 lying in bed with NPWT dressing alarming blockage. I assessed dressing and could not find why the blockage alarm was occuring. It was time for the dressing change anyway so I changed the dressing to find a healthy wound bed. I was able to replace the dressing and acheive a good seal. PT will continue ot monitor the dressing daily and plan to change on Wednesday.  -MS       Row Name 05/26/25 0800          Positioning and Restraints    Post Treatment Position bed  -MS     In Bed supine;call light within reach;encouraged to call for assist;with family/caregiver;side rails up x2  -MS       Row Name 05/26/25 0800          Therapy Assessment/Plan (PT)    Patient/Family Therapy Goals Statement (PT) wound healing  -MS     Rehab Potential (PT) good  -MS     Criteria for Skilled Interventions Met (PT) yes;meets criteria;skilled treatment is necessary  -MS     Therapy Frequency (PT) 3 times/wk  check daily and change MWF  -MS     Predicted Duration of Therapy Intervention (PT) until discharge  -MS       Row Name 05/26/25 0800          Physical Therapy Goals    Wound Management Goal Selection (PT) wound management, PT goal 1;wound management, PT goal 2  -MS       Row Name 05/26/25 0800          Wound Management Goal 1 (PT)    Wound  Management Goal (Wound Goal 1, PT) The abdominal wound will decrease in depth by 2cm from 6cm to 4cm  -MS     Time Frame (Wound Goal 1, PT) long-term goal (LTG);by discharge  -MS     Progress/Outcomes (Wound Goal 1, PT) goal ongoing  -MS       Row Name 05/26/25 0800          Wound Management Goal 2 (PT)    Wound Management Goal (Wound Goal 2, PT) The wound will decrease by length by 2cm from 5.2cm to 3.2cm  -MS     Time Frame (Wound Goal 2, PT) long-term goal (LTG);by discharge  -MS     Progress/Outcomes (Wound Goal 2, PT) goal ongoing  -MS               User Key  (r) = Recorded By, (t) = Taken By, (c) = Cosigned By      Initials Name Provider Type    MS Rosalba Perkins, PT, DPT, NCS Physical Therapist    AC Asya Tristan, RN Registered Nurse    Ollie Peñaloza RN Registered Nurse                  Physical Therapy Education       Title: PT OT SLP Therapies (In Progress)       Topic: Physical Therapy (In Progress)       Point: Mobility training (Not Started)       Learner Progress:  Not documented in this visit.              Point: Home exercise program (Not Started)       Learner Progress:  Not documented in this visit.              Point: Body mechanics (Not Started)       Learner Progress:  Not documented in this visit.              Point: Precautions (Done)       Learning Progress Summary            Patient Acceptance, E, VU by MS at 5/26/2025 0918    Comment: role of wound vac in his care                                      User Key       Initials Effective Dates Name Provider Type Discipline    MS 07/11/23 -  Rosalba Perkins, PT, DPT, NCS Physical Therapist PT                    Recommendation and Plan  Anticipated Discharge Disposition (PT): home with assist (wound care)  Planned Therapy Interventions (PT): wound care, patient/family education  Therapy Frequency (PT): 3 times/wk (check daily and change MWF)  Plan of Care Reviewed With: patient, spouse   Progress: improving       Progress:  improving  Outcome Evaluation: The patient presents alert and oriented x4 lying in bed with NPWT dressing alarming blockage. I assessed dressing and could not find why the blockage alarm was occuring. It was time for the dressing change anyway so I changed the dressing to find a healthy wound bed. I was able to replace the dressing and acheive a good seal. PT will continue ot monitor the dressing daily and plan to change on Wednesday.  Plan of Care Reviewed With: patient, spouse            Time Calculation   PT Charges       Row Name 05/26/25 0916             Time Calculation    Start Time 0800  -MS      Stop Time 0915  -MS      Time Calculation (min) 75 min  -MS      PT Received On 05/26/25  -MS      PT Goal Re-Cert Due Date 06/06/25  -MS         Untimed Charges    PT Eval/Re-eval Minutes 45  -MS      Wound Care 24649 Neg Press (DME) wound TO 50 sqcm  -MS      [id number] Pressure wound to 50 sqcm 45  -MS         Total Minutes    Untimed Charges Total Minutes 90  -MS       Total Minutes 90  -MS                User Key  (r) = Recorded By, (t) = Taken By, (c) = Cosigned By      Initials Name Provider Type    Rosalba Gutierrez, PT, DPT, NCS Physical Therapist                            PT G-Codes  AM-PAC 6 Clicks Score (PT): 24       Rosalba Perkins, PT, DPT, NCS  5/26/2025

## 2025-05-26 NOTE — PLAN OF CARE
Goal Outcome Evaluation:  Plan of Care Reviewed With: patient, spouse        Progress: improving  Outcome Evaluation: The patient presents alert and oriented x4 lying in bed with NPWT dressing alarming blockage. I assessed dressing and could not find why the blockage alarm was occuring. It was time for the dressing change anyway so I changed the dressing to find a healthy wound bed. I was able to replace the dressing and acheive a good seal. PT will continue ot monitor the dressing daily and plan to change on Wednesday.    Anticipated Discharge Disposition (PT): home with assist (wound care)

## 2025-05-26 NOTE — PLAN OF CARE
Goal Outcome Evaluation:  Plan of Care Reviewed With: patient  Progress: improving       Pt medicated with prn pain meds x2 thus far this shift. IV in place saline locked. Wound vac maintained in place. Up ad quirino. Voiding per urinal. VSS. Safety maintained.

## 2025-05-26 NOTE — PLAN OF CARE
Goal Outcome Evaluation:  Plan of Care Reviewed With: patient        Progress: improving  Outcome Evaluation: Patient complains of pain, see MAR. IV IID. Up ad quirino, voiding. VSS. Safety maintained

## 2025-05-26 NOTE — PLAN OF CARE
Goal Outcome Evaluation:  Plan of Care Reviewed With: patient        Progress: improving  Outcome Evaluation: Patient complains of pain, see MAR. No IV access, MD aware. Voiding. Ambulating in room. VSS. Safety maintained.

## 2025-05-27 RX ADMIN — DOCUSATE SODIUM 100 MG: 100 CAPSULE, LIQUID FILLED ORAL at 09:28

## 2025-05-27 RX ADMIN — LISINOPRIL 2.5 MG: 2.5 TABLET ORAL at 09:27

## 2025-05-27 RX ADMIN — DOCUSATE SODIUM 100 MG: 100 CAPSULE, LIQUID FILLED ORAL at 21:11

## 2025-05-27 RX ADMIN — OXYCODONE AND ACETAMINOPHEN 1 TABLET: 325; 10 TABLET ORAL at 21:11

## 2025-05-27 RX ADMIN — METOPROLOL SUCCINATE 12.5 MG: 25 TABLET, EXTENDED RELEASE ORAL at 09:27

## 2025-05-27 RX ADMIN — TIZANIDINE 4 MG: 4 TABLET ORAL at 01:30

## 2025-05-27 RX ADMIN — LEVOTHYROXINE SODIUM 75 MCG: 75 TABLET ORAL at 05:39

## 2025-05-27 RX ADMIN — TIZANIDINE 4 MG: 4 TABLET ORAL at 17:27

## 2025-05-27 RX ADMIN — SERTRALINE HYDROCHLORIDE 100 MG: 100 TABLET ORAL at 09:27

## 2025-05-27 RX ADMIN — OXYCODONE AND ACETAMINOPHEN 1 TABLET: 325; 10 TABLET ORAL at 17:27

## 2025-05-27 RX ADMIN — GABAPENTIN 300 MG: 300 CAPSULE ORAL at 09:28

## 2025-05-27 RX ADMIN — OXYCODONE AND ACETAMINOPHEN 1 TABLET: 325; 10 TABLET ORAL at 01:28

## 2025-05-27 RX ADMIN — OXYBUTYNIN CHLORIDE 5 MG: 5 TABLET ORAL at 09:28

## 2025-05-27 RX ADMIN — OXYCODONE AND ACETAMINOPHEN 1 TABLET: 325; 10 TABLET ORAL at 05:39

## 2025-05-27 NOTE — PAYOR COMM NOTE
"REF:  [id number]     [facility]  [place],   [phone]  OR  FAX  [phone]       Feliberto Stoner (50 y.o. Male)       Date of Birth   1975    Social Security Number       Address   [address] [address]    Home Phone   [phone]    MRN   [id number]       Mandaen   Roman [employer]    Marital Status                               Admission Date   5/23/2025    Admission Type   Elective    Admitting Provider   Steven Garzon MD    Attending Provider   Steven Garzon MD    Department, Room/Bed   [facility] 3C, 363/1       Discharge Date       Discharge Disposition       Discharge Destination                                 Attending Provider: Steven Garzon MD    Allergies: No Known Allergies    Isolation: None   Infection: None   Code Status: CPR    Ht: 171.5 cm (67.52\")   Wt: 116 kg (255 lb 3.2 oz)    Admission Cmt: None   Principal Problem: Hematoma of abdominal wall, initial encounter [S30.1XXA]                   Active Insurance as of 5/23/2025       Primary Coverage       Payor Plan Insurance Group Employer/Plan Group    [facility] [employer]        Payor Plan Address Payor Plan Phone Number Payor Plan Fax Number Effective Dates    [address] [phone]  12/28/2021 - None Entered    [facility] [id number]         Subscriber Name Subscriber Birth Date Member ID       FELIBERTO STONER 1975                      Emergency Contacts        (Rel.) Home Phone Work Phone Mobile Phone    Janet Stoner (Spouse) [phone] -- [phone]                 History & Physical        Steven Garzon MD at 05/23/25 0767          H&P reviewed.  The patient was examined and there are no changes to the H&P  Electronically signed by Steven Garzon MD at 05/23/25 0736   Source Note: H&P (View-Only)          5/22/2025       MICHAEL Cook MD  [address] [address]    Feliberto " "Eleni  1975    Chief Complaint   Patient presents with    cellulitis abdominal wall     Has dressing in place removed top part. Area is very firm and reddened       Dear MICHAEL Cook MD:      HPI  I had the pleasure of seeing your patient Feliberto Knowles in the office today.   As you recall, Feliberto Knowles is a 50 y.o.  male who you are currently following for chronic back pain.  Feliberto Knowles did undergo  artificial disc replacement of L4-5 and anterior lumbar interbody fusion of L5-S1 with Dr. Cook on 4/30/25.  He has overall done well but a couple days ago had small opening to his lower incision with drainage noted.  For the past few days he developed increased drainage and tenderness.  He has been on antibiotics since his last visit.  He is accompanied by his wife.  He did have noninvasive testing performed today, which I did review in office.           Review of Systems   Constitutional: Negative.    HENT: Negative.     Eyes: Negative.    Respiratory: Negative.     Cardiovascular: Negative.    Gastrointestinal: Negative.    Endocrine: Negative.    Genitourinary: Negative.    Musculoskeletal:  Positive for arthralgias and back pain.   Skin:  Positive for color change and wound (Drainage).   Allergic/Immunologic: Negative.    Neurological: Negative.    Hematological: Negative.    Psychiatric/Behavioral: Negative.     All other systems reviewed and are negative.      /62   Pulse 84   Ht 170.2 cm (67\")   Wt 118 kg (259 lb 12.8 oz)   SpO2 98%   BMI 40.69 kg/m²   Physical Exam  Vitals and nursing note reviewed.   Constitutional:       General: He is not in acute distress.     Appearance: Normal appearance. He is well-developed. He is obese. He is not diaphoretic.   HENT:      Head: Normocephalic and atraumatic.   Eyes:      General: No scleral icterus.     Pupils: Pupils are equal, round, and reactive to light.   Neck:      Thyroid: No thyromegaly.      Vascular: No carotid bruit or JVD. "   Cardiovascular:      Rate and Rhythm: Normal rate and regular rhythm.      Pulses: Normal pulses.           Carotid pulses are 2+ on the right side and 2+ on the left side.       Femoral pulses are 2+ on the right side and 2+ on the left side.       Popliteal pulses are 2+ on the right side and 2+ on the left side.        Dorsalis pedis pulses are 2+ on the right side and 2+ on the left side.        Posterior tibial pulses are 2+ on the right side and 2+ on the left side.      Heart sounds: Normal heart sounds, S1 normal and S2 normal. No murmur heard.     No friction rub. No gallop.   Pulmonary:      Effort: Pulmonary effort is normal.      Breath sounds: Normal breath sounds.   Abdominal:      General: Bowel sounds are normal. There is no distension or abdominal bruit.      Palpations: Abdomen is soft. There is no mass.      Tenderness: There is no abdominal tenderness.   Musculoskeletal:      Cervical back: Neck supple.      Comments: Brace in place   Lymphadenopathy:      Cervical: No cervical adenopathy.   Skin:     General: Skin is warm and dry.          Neurological:      Mental Status: He is alert and oriented to person, place, and time.      Cranial Nerves: No cranial nerve deficit.      Sensory: No sensory deficit.   Psychiatric:         Mood and Affect: Mood normal.         Behavior: Behavior normal.         Thought Content: Thought content normal.         Judgment: Judgment normal.         CT Abd With Contrast Pelvis With & Without Contrast  Result Date: 5/22/2025  Narrative: EXAM/TECHNIQUE: CT abdomen and pelvis without and with IV contrast  INDICATION: recent surgery, draining abdominal incision; L03.311-Cellulitis of abdominal wall  COMPARISON: 9/21/2022  DLP: 885.76 mGy.cm. Automated exposure control was utilized to decrease patient radiation dose.  FINDINGS:  LOWER CHEST: No acute findings.  LIVER AND BILIARY: No focal liver lesion. No gallstones or biliary ductal dilatation.  PANCREAS:  Unremarkable.  SPLEEN: Unremarkable.  ADRENAL: Unremarkable.  KIDNEYS/BLADDER: No solid renal mass. No urolithiasis or hydronephrosis. No focal urinary bladder wall thickening.  BOWEL: No colonic wall thickening or pericolonic fat stranding. Prior appendectomy. No small bowel distention or inflammatory change.  PERITONEUM: No ascites.  PELVIC ORGANS: Prostate seminal vesicles are unremarkable.  VASCULATURE: Abdominal aorta is nonaneurysmal.  LYMPH NODES: No enlarged lymph nodes.  SOFT TISSUES: Small bilateral fat-containing inguinal hernias. A periumbilical fat-containing hernia is present. 7.6 x 5.6 x 7.6 cm fluid collection in the left lower abdominal wall subcutaneous fat abutting the left rectus abdominous muscle, with fluid tract extending out to the skin surface. Surrounding subcutaneous stranding and overlying skin thickening is noted.  BONES: Postoperative change of L4-L5 disc arthroplasty and L5-S1 anterior fusion. No hardware complication. No acute osseous finding.      Impression:  7.6 x 5.6 x 7.6 cm fluid collection in the left lower abdominal wall subcutaneous fat overlying the left rectus abdominis muscle, with fluid tract extending out to the skin surface. Differential includes postoperative seroma/hematoma as well as abscess. Correlate with clinical exam.    This report was signed and finalized on 5/22/2025 10:22 AM by Dr. Gregorio Daniel MD.      XR Abdomen KUB  Result Date: 4/30/2025  Narrative: EXAM: XR ABDOMEN KUB-  DATE: 4/30/2025 9:50 AM  HISTORY: ALIF -- no count in OR   COMPARISON: No existing relevant imaging studies available.  TECHNIQUE:  Supine view of the abdomen. 1 images.  FINDINGS:  Limited evaluation for free air on supine imaging. Nonobstructive supine bowel gas pattern. Nonobstructive supine bowel gas pattern.  No dystrophic calcifications visualized at the abdomen.  Postoperative changes at L4-5 and L5-S1. Numerous surgical clips at the RIGHT lower quadrant and LEFT pelvis. No  acute bony finding by radiograph.       Impression: 1. Postoperative changes at L4-5 and L5-S1, not optimally evaluated on this exam.  This report was signed and finalized on 4/30/2025 11:10 AM by Dr Lili Saab MD.      XR Spine Lumbar AP & Lateral  Result Date: 4/30/2025  Narrative: EXAM: XR SPINE LUMBAR AP AND LATERAL-  DATE: 4/30/2025 6:50 AM  HISTORY: ALIF   COMPARISON: No existing relevant imaging studies available.  TECHNIQUE:  Intraoperative fluoroscopy images submitted.  Number of images: 10 Fluoroscopy time: 29.9 seconds Air kerma: 24.869 mGy  FINDINGS:  See impression.       Impression: 1. Anterior discectomy and fixation L4-5 and L5-S1. 2. A radiologist was not present for the acquisition or intraoperative the interpretation of these images. Please see the operative report for details pertaining to this exam.  This report was signed and finalized on 4/30/2025 10:48 AM by Dr Lili Saab MD.      FL C Arm During Surgery  Result Date: 4/30/2025  Narrative: This procedure was auto-finalized with no dictation required.      Patient Active Problem List   Diagnosis    Dizziness    Transient ischemic attack (TIA)    Hypertrophic obstructive cardiomyopathy    Essential hypertension, new diagnosis    History of stroke    Infection of prosthetic total shoulder joint    Postoperative infection    Lumbar radiculopathy    Degeneration of intervertebral disc of lumbar region with discogenic back pain and lower extremity pain    Cellulitis of abdominal wall    Preop testing         ICD-10-CM ICD-9-CM   1. Cellulitis of abdominal wall  L03.311 682.2   2. Preop testing  Z01.818 V72.84   3. Abdominal wall seroma, initial encounter  S30.1XXA 998.13         Plan: After thoroughly evaluating Feliberto Knowles, I believe the best course of action is to proceed with incision and drainage I have abdominal wall seroma/possible abscess.  Open areas have further prove progressed.  He is complaining of some tenderness as well  as an increase in drainage.  He is nauseated in the office today however believes this was due to contrast.  Risks/benefits were expanded great length to the patient which include but are not limited to bleeding, infection, vessel damage,  MI, stroke, and death.  He can continue antibiotics as ordered previously.   He will notify us should he have any significant changes.  This was all discussed in full with complete understanding.    Thank you for allowing me to participate in the care of your patient.  Please do not hesitate with any questions or concerns.  I will keep you aware of any further encounters with Feliberto Knowles.        Sincerely yours,         FALGUNI Scott             Electronically signed by Jada Mccrary APRN at 05/22/25 1429                 Jada Mccrary APRN at 05/22/25 1015          5/22/2025       MICHAEL Cook MD  [address]  [place] [address]    Feliberto Knowles  1975    Chief Complaint   Patient presents with    cellulitis abdominal wall     Has dressing in place removed top part. Area is very firm and reddened       Dear MICHAEL Cook MD:      HPI  I had the pleasure of seeing your patient Feliberto Knowles in the office today.   As you recall, Feliberto Knowles is a 50 y.o.  male who you are currently following for chronic back pain.  Feliberto Knowles did undergo  artificial disc replacement of L4-5 and anterior lumbar interbody fusion of L5-S1 with Dr. Cook on 4/30/25.  He has overall done well but a couple days ago had small opening to his lower incision with drainage noted.  For the past few days he developed increased drainage and tenderness.  He has been on antibiotics since his last visit.  He is accompanied by his wife.  He did have noninvasive testing performed today, which I did review in office.           Review of Systems   Constitutional: Negative.    HENT: Negative.     Eyes: Negative.    Respiratory: Negative.    "  Cardiovascular: Negative.    Gastrointestinal: Negative.    Endocrine: Negative.    Genitourinary: Negative.    Musculoskeletal:  Positive for arthralgias and back pain.   Skin:  Positive for color change and wound (Drainage).   Allergic/Immunologic: Negative.    Neurological: Negative.    Hematological: Negative.    Psychiatric/Behavioral: Negative.     All other systems reviewed and are negative.      /62   Pulse 84   Ht 170.2 cm (67\")   Wt 118 kg (259 lb 12.8 oz)   SpO2 98%   BMI 40.69 kg/m²   Physical Exam  Vitals and nursing note reviewed.   Constitutional:       General: He is not in acute distress.     Appearance: Normal appearance. He is well-developed. He is obese. He is not diaphoretic.   HENT:      Head: Normocephalic and atraumatic.   Eyes:      General: No scleral icterus.     Pupils: Pupils are equal, round, and reactive to light.   Neck:      Thyroid: No thyromegaly.      Vascular: No carotid bruit or JVD.   Cardiovascular:      Rate and Rhythm: Normal rate and regular rhythm.      Pulses: Normal pulses.           Carotid pulses are 2+ on the right side and 2+ on the left side.       Femoral pulses are 2+ on the right side and 2+ on the left side.       Popliteal pulses are 2+ on the right side and 2+ on the left side.        Dorsalis pedis pulses are 2+ on the right side and 2+ on the left side.        Posterior tibial pulses are 2+ on the right side and 2+ on the left side.      Heart sounds: Normal heart sounds, S1 normal and S2 normal. No murmur heard.     No friction rub. No gallop.   Pulmonary:      Effort: Pulmonary effort is normal.      Breath sounds: Normal breath sounds.   Abdominal:      General: Bowel sounds are normal. There is no distension or abdominal bruit.      Palpations: Abdomen is soft. There is no mass.      Tenderness: There is no abdominal tenderness.   Musculoskeletal:      Cervical back: Neck supple.      Comments: Brace in place   Lymphadenopathy:      " Cervical: No cervical adenopathy.   Skin:     General: Skin is warm and dry.          Neurological:      Mental Status: He is alert and oriented to person, place, and time.      Cranial Nerves: No cranial nerve deficit.      Sensory: No sensory deficit.   Psychiatric:         Mood and Affect: Mood normal.         Behavior: Behavior normal.         Thought Content: Thought content normal.         Judgment: Judgment normal.         CT Abd With Contrast Pelvis With & Without Contrast  Result Date: 5/22/2025  Narrative: EXAM/TECHNIQUE: CT abdomen and pelvis without and with IV contrast  INDICATION: recent surgery, draining abdominal incision; L03.311-Cellulitis of abdominal wall  COMPARISON: 9/21/2022  DLP: 885.76 mGy.cm. Automated exposure control was utilized to decrease patient radiation dose.  FINDINGS:  LOWER CHEST: No acute findings.  LIVER AND BILIARY: No focal liver lesion. No gallstones or biliary ductal dilatation.  PANCREAS: Unremarkable.  SPLEEN: Unremarkable.  ADRENAL: Unremarkable.  KIDNEYS/BLADDER: No solid renal mass. No urolithiasis or hydronephrosis. No focal urinary bladder wall thickening.  BOWEL: No colonic wall thickening or pericolonic fat stranding. Prior appendectomy. No small bowel distention or inflammatory change.  PERITONEUM: No ascites.  PELVIC ORGANS: Prostate seminal vesicles are unremarkable.  VASCULATURE: Abdominal aorta is nonaneurysmal.  LYMPH NODES: No enlarged lymph nodes.  SOFT TISSUES: Small bilateral fat-containing inguinal hernias. A periumbilical fat-containing hernia is present. 7.6 x 5.6 x 7.6 cm fluid collection in the left lower abdominal wall subcutaneous fat abutting the left rectus abdominous muscle, with fluid tract extending out to the skin surface. Surrounding subcutaneous stranding and overlying skin thickening is noted.  BONES: Postoperative change of L4-L5 disc arthroplasty and L5-S1 anterior fusion. No hardware complication. No acute osseous finding.       Impression:  7.6 x 5.6 x 7.6 cm fluid collection in the left lower abdominal wall subcutaneous fat overlying the left rectus abdominis muscle, with fluid tract extending out to the skin surface. Differential includes postoperative seroma/hematoma as well as abscess. Correlate with clinical exam.    This report was signed and finalized on 5/22/2025 10:22 AM by Dr. Gregorio Daniel MD.      XR Abdomen KUB  Result Date: 4/30/2025  Narrative: EXAM: XR ABDOMEN KUB-  DATE: 4/30/2025 9:50 AM  HISTORY: ALIF -- no count in OR   COMPARISON: No existing relevant imaging studies available.  TECHNIQUE:  Supine view of the abdomen. 1 images.  FINDINGS:  Limited evaluation for free air on supine imaging. Nonobstructive supine bowel gas pattern. Nonobstructive supine bowel gas pattern.  No dystrophic calcifications visualized at the abdomen.  Postoperative changes at L4-5 and L5-S1. Numerous surgical clips at the RIGHT lower quadrant and LEFT pelvis. No acute bony finding by radiograph.       Impression: 1. Postoperative changes at L4-5 and L5-S1, not optimally evaluated on this exam.  This report was signed and finalized on 4/30/2025 11:10 AM by Dr Lili Saab MD.      XR Spine Lumbar AP & Lateral  Result Date: 4/30/2025  Narrative: EXAM: XR SPINE LUMBAR AP AND LATERAL-  DATE: 4/30/2025 6:50 AM  HISTORY: ALIF   COMPARISON: No existing relevant imaging studies available.  TECHNIQUE:  Intraoperative fluoroscopy images submitted.  Number of images: 10 Fluoroscopy time: 29.9 seconds Air kerma: 24.869 mGy  FINDINGS:  See impression.       Impression: 1. Anterior discectomy and fixation L4-5 and L5-S1. 2. A radiologist was not present for the acquisition or intraoperative the interpretation of these images. Please see the operative report for details pertaining to this exam.  This report was signed and finalized on 4/30/2025 10:48 AM by Dr Lili Saab MD.      FL C Arm During Surgery  Result Date: 4/30/2025  Narrative: This  procedure was auto-finalized with no dictation required.      Patient Active Problem List   Diagnosis    Dizziness    Transient ischemic attack (TIA)    Hypertrophic obstructive cardiomyopathy    Essential hypertension, new diagnosis    History of stroke    Infection of prosthetic total shoulder joint    Postoperative infection    Lumbar radiculopathy    Degeneration of intervertebral disc of lumbar region with discogenic back pain and lower extremity pain    Cellulitis of abdominal wall    Preop testing         ICD-10-CM ICD-9-CM   1. Cellulitis of abdominal wall  L03.311 682.2   2. Preop testing  Z01.818 V72.84   3. Abdominal wall seroma, initial encounter  S30.1XXA 998.13         Plan: After thoroughly evaluating Feliberto Knowles, I believe the best course of action is to proceed with incision and drainage I have abdominal wall seroma/possible abscess.  Open areas have further prove progressed.  He is complaining of some tenderness as well as an increase in drainage.  He is nauseated in the office today however believes this was due to contrast.  Risks/benefits were expanded great length to the patient which include but are not limited to bleeding, infection, vessel damage,  MI, stroke, and death.  He can continue antibiotics as ordered previously.   He will notify us should he have any significant changes.  This was all discussed in full with complete understanding.    Thank you for allowing me to participate in the care of your patient.  Please do not hesitate with any questions or concerns.  I will keep you aware of any further encounters with Feliberto Knowles.        Sincerely yours,         FALGUNI Scott             Electronically signed by Jada Mccrary APRN at 25 1429          Operative/Procedure Notes (most recent note)        Steven Garzon MD at 25 [id number]          Patient Name: Gale  MRN: [id number]  : 1975    PREOPERATIVE DIAGNOSIS: Cellulitis of abdominal wall  [L03.311]  Preop testing [Z01.818]     POSTOPERATIVE DIAGNOSIS: Post-Op Diagnosis Codes:     * Cellulitis of abdominal wall [L03.311]     * Preop testing [Z01.818]     PROCEDURE PERFORMED:   Incision and drainage of abdominal wall hematoma   application of negative pressure dressing greater than 50 cm²      SURGEON: Zhang Garzon MD     ANESTHESIA: General    PREPARATION: Routine.    STAFF: Circulator: Asya Tristan RN  Scrub Person: Chantal Powell  Assistant: Debbi Escudero    Estimated Blood Loss: minimal    SPECIMENS: None    COMPLICATIONS: None     INDICATIONS: Feliberto Knowles is a 50 y.o. male who underwent an ALIF a few weeks prior.  The patient had increased erythema at the surgical site.  He also had increased drainage.  Patient on a CT scan which showed a fluid collection in the subcutaneous space.  The indications, risks, and possible complications of the procedure were explained to the patient, who voiced understanding and wished to proceed with surgery.     PROCEDURE IN DETAIL: The patient was taken to the operating room and placed on the operating table in supine position. After general anesthesia was obtained, the abdomen was prepped and draped in a sterile manner.  Incision was then made at the previous incision.  This was carried down through the skin and subcutaneous tissues.  There was a hematoma/seroma.  This was then evacuated.  Fascia appeared intact.  Wound was then extensively irrigated.  The wound was noted to measure 9 x 6 x 7.5 Centimeters.  The wound VAC was then applied. The patient tolerated the procedure well. Sponge and needle counts were correct. The patient was then awakened and extubated in the operating room and taken to the recovery room in good condition.    Steven Garzon MD  Date: 5/23/2025 Time: 09:13 CDT      Electronically signed by Steven Garzon MD at 05/23/25 0958          Physician Progress Notes (all)        Steven Garzon MD at 05/26/25 1026             LOS: 3  days   Patient Care Team:  Nakul Hdz DO as PCP - General (Family Medicine)    Chief Complaint:  drainage and fluid collection s/p ALIF    Subjective     Patient Complaints: No complaints this a.m.  Pain controlled.    Objective     Vital Signs  Temp:  [97.6 °F (36.4 °C)-98.4 °F (36.9 °C)] 97.8 °F (36.6 °C)  Heart Rate:  [70-79] 70  Resp:  [16-18] 18  BP: (114-136)/(57-85) 117/63    Physical Exam  Constitutional:       Appearance: Normal appearance.   HENT:      Head: Normocephalic and atraumatic.   Cardiovascular:      Rate and Rhythm: Normal rate and regular rhythm.   Pulmonary:      Effort: Pulmonary effort is normal. No respiratory distress.   Skin:     Comments: Left paramedian abdominal incision with wound VAC in place.   Neurological:      General: No focal deficit present.      Mental Status: He is alert. Mental status is at baseline.         Laboratory Data:   Results from last 7 days   Lab Units 05/24/25  0300 05/23/25  0615   WBC 10*3/mm3 14.75* 8.32   HEMOGLOBIN g/dL 15.1 15.7   HEMATOCRIT % 44.8 46.5   PLATELETS 10*3/mm3 363 325       Results from last 7 days   Lab Units 05/23/25  0615   SODIUM mmol/L 137   POTASSIUM mmol/L 4.3   CHLORIDE mmol/L 101   CO2 mmol/L 25.0   BUN mg/dL 16   CREATININE mg/dL 0.83   CALCIUM mg/dL 8.9   GLUCOSE mg/dL 101*     Results from last 7 days   Lab Units 05/23/25  0615   PROTIME Seconds 13.7   INR  1.00   APTT seconds 31.8            Medication Review:     Assessment & Plan       Hematoma of abdominal wall, initial encounter    Cellulitis of abdominal wall    Preop testing      50-year-old male who presented with hematoma/seroma after ALIF procedure status post drainage, washout, wound VAC placement on 5/23/2025.    -Afebrile vital signs stable continue to monitor.  - Pain control continue current regiment  - Awaiting home wound VAC.  [facility].  - Will plan on DC once home wound VAC approved.    Plan for disposition:    Steven Garzon MD  Vascular  Surgery  [phone]  05/26/25  10:26 CDT      Electronically signed by Steven Garzon MD at 05/26/25 1026       Steven Garzon MD at 05/25/25 1149             LOS: 2 days   Patient Care Team:  Nakul Hdz DO as PCP - General (Family Medicine)    Chief Complaint:  drainage and fluid collection s/p ALIF    Subjective     Patient Complaints: No complaints this a.m.  Pain controlled.    Objective     Vital Signs  Temp:  [97.3 °F (36.3 °C)-98.5 °F (36.9 °C)] 97.6 °F (36.4 °C)  Heart Rate:  [67-78] 67  Resp:  [14-16] 16  BP: (106-134)/(50-80) 109/56    Physical Exam  Constitutional:       Appearance: Normal appearance.   HENT:      Head: Normocephalic and atraumatic.   Cardiovascular:      Rate and Rhythm: Normal rate and regular rhythm.   Pulmonary:      Effort: Pulmonary effort is normal. No respiratory distress.   Skin:     Comments: Left paramedian abdominal incision with wound VAC in place.   Neurological:      General: No focal deficit present.      Mental Status: He is alert. Mental status is at baseline.         Laboratory Data:   Results from last 7 days   Lab Units 05/24/25  0300 05/23/25  0615   WBC 10*3/mm3 14.75* 8.32   HEMOGLOBIN g/dL 15.1 15.7   HEMATOCRIT % 44.8 46.5   PLATELETS 10*3/mm3 363 325       Results from last 7 days   Lab Units 05/23/25  0615   SODIUM mmol/L 137   POTASSIUM mmol/L 4.3   CHLORIDE mmol/L 101   CO2 mmol/L 25.0   BUN mg/dL 16   CREATININE mg/dL 0.83   CALCIUM mg/dL 8.9   GLUCOSE mg/dL 101*     Results from last 7 days   Lab Units 05/23/25  0615   PROTIME Seconds 13.7   INR  1.00   APTT seconds 31.8            Medication Review:     Assessment & Plan       Hematoma of abdominal wall, initial encounter    Cellulitis of abdominal wall    Preop testing      50-year-old male who presented with hematoma/seroma after ALIF procedure status post drainage, washout, wound VAC placement on 5/23/2025.    -Afebrile vital signs stable continue to monitor.  - Pain control continue  current regiment  - Awaiting home wound VAC.  [facility]  - Will plan on DC once home wound VAC approved.    Plan for disposition:    Steven Garzon MD  Vascular Surgery  [phone]  05/25/25  11:49 CDT      Electronically signed by Steven Garzon MD at 05/25/25 1150       Steven Garzon MD at 05/24/25 0977             LOS: 1 day   Patient Care Team:  Nakul Hdz DO as PCP - General (Family Medicine)    Chief Complaint:  drainage and fluid collection s/p ALIF    Subjective     Patient Complaints: No complaints this a.m.  Pain controlled.    Objective     Vital Signs  Temp:  [97 °F (36.1 °C)-98.2 °F (36.8 °C)] 97.2 °F (36.2 °C)  Heart Rate:  [65-85] 65  Resp:  [12-16] 14  BP: ()/(51-81) 101/51    Physical Exam  Constitutional:       Appearance: Normal appearance.   HENT:      Head: Normocephalic and atraumatic.   Cardiovascular:      Rate and Rhythm: Normal rate and regular rhythm.   Pulmonary:      Effort: Pulmonary effort is normal. No respiratory distress.   Skin:     Comments: Left paramedian abdominal incision with wound VAC in place.   Neurological:      General: No focal deficit present.      Mental Status: He is alert. Mental status is at baseline.         Laboratory Data:   Results from last 7 days   Lab Units 05/24/25  0300 05/23/25  0615   WBC 10*3/mm3 14.75* 8.32   HEMOGLOBIN g/dL 15.1 15.7   HEMATOCRIT % 44.8 46.5   PLATELETS 10*3/mm3 363 325       Results from last 7 days   Lab Units 05/23/25  0615   SODIUM mmol/L 137   POTASSIUM mmol/L 4.3   CHLORIDE mmol/L 101   CO2 mmol/L 25.0   BUN mg/dL 16   CREATININE mg/dL 0.83   CALCIUM mg/dL 8.9   GLUCOSE mg/dL 101*     Results from last 7 days   Lab Units 05/23/25  0615   PROTIME Seconds 13.7   INR  1.00   APTT seconds 31.8            Medication Review:     Assessment & Plan       Hematoma of abdominal wall, initial encounter    Cellulitis of abdominal wall    Preop testing      50-year-old male who presented with  hematoma/seroma after ALIF procedure status post drainage, washout, wound VAC placement on 5/23/2025.    -Afebrile vital signs stable continue to monitor.  - Pain control continue current regiment  - Awaiting home wound VAC.  [facility]  - Will plan on DC once home wound VAC approved.    Plan for disposition:    Steven Garzon MD  Vascular Surgery  [phone]  05/24/25  09:14 CDT      Electronically signed by Steven Garzon MD at 05/24/25 5481

## 2025-05-27 NOTE — DISCHARGE PLACEMENT REQUEST
Rosalba Perkins, PT, DPT, NCS   Physical Therapist  Physical Therapy  Therapy Wound Care Treatment     Signed  Date of Service:  25  Creation Time:  25     Signed        Expand All Collapse All[]Expand All by Default  Acute Care - Wound/Debridement Initial Evaluation  DARIO Dunham     Patient Name: Feliberto Knowles                  : 1975                      MRN: [id number]  Today's Date: 2025                                Admit Date: 2025     Visit Dx:  Visit Diagnosis       ICD-10-CM ICD-9-CM   1. Open wound of abdomen, initial encounter [S31.109A]  S31.109A 879.2   2. Cellulitis of abdominal wall  L03.311 682.2   3. Preop testing  Z01.818 V72.84            Problem List       Patient Active Problem List   Diagnosis    Dizziness    Transient ischemic attack (TIA)    Hypertrophic obstructive cardiomyopathy    Essential hypertension, new diagnosis    History of stroke    Infection of prosthetic total shoulder joint    Postoperative infection    Lumbar radiculopathy    Degeneration of intervertebral disc of lumbar region with discogenic back pain and lower extremity pain    Cellulitis of abdominal wall    Preop testing    Hematoma of abdominal wall, initial encounter            Medical History        Past Medical History:   Diagnosis Date    AICD (automatic cardioverter/defibrillator) present 2020     Medtronic AICD.    Anger      Anxiety      Arthritis      Heart murmur      Hyperlipidemia      Hypertrophic obstructive cardiomyopathy (HOCM)      Hypothyroidism      Postoperative wound infection 2025    Stroke       tia 2017, STM LOSS    Tachycardia      TBI (traumatic brain injury)              Surgical History         Past Surgical History:   Procedure Laterality Date    ANKLE SURGERY Right      ANTERIOR LUMBAR EXPOSURE N/A 2025     Procedure: ANTERIOR LUMBAR EXPOSURE;  Surgeon: Clark Guillen DO;  Location: [facility] OR;  Service: Vascular;   Laterality: N/A;    APPENDECTOMY        CARDIAC CATHETERIZATION         no stents    CARDIAC DEFIBRILLATOR PLACEMENT   08/11/2020     Medtronic / AICD    CARDIAC PACEMAKER PLACEMENT         septalmyectomy    CARPAL TUNNEL RELEASE        LUMBAR FUSION N/A 04/30/2025     Procedure: ANTERIOR DECOMPRESSION L4-S1, ARTIFICAL DISC REPLACEMENT L4-5, ANTERIOR LUMBAR INTERBODY FUSION WITH INSTRUMENTATION L5-S1;  Surgeon: MICHAEL Cook MD;  Location:  [facility] OR;  Service: Orthopedic Spine;  Laterality: N/A;    OTHER SURGICAL HISTORY   05/2012     septalmyectomy    SHOULDER ARTHROSCOPY W/ LABRAL REPAIR Right 03/25/2021     Procedure: RIGHT SHOULDER ARTHROSCOPIC REVISION POSTERIOR  LABRAL REPAIR;  Surgeon: Sandro Monsivais MD;  Location:  [facility] OR;  Service: Orthopedics;  Laterality: Right;    TOTAL SHOULDER ARTHROPLASTY W/ DISTAL CLAVICLE EXCISION Right 09/30/2021     Procedure: RIGHT REVERSE TOTAL SHOULDER ARTHROPLASTY;  Surgeon: Sandro Monsivais MD;  Location:  [facility] OR;  Service: Orthopedics;  Laterality: Right;    TOTAL SHOULDER ARTHROPLASTY W/ DISTAL CLAVICLE EXCISION Right 10/14/2021     Procedure: INCISION AND DRAINAGE RIGHT SHOULDER WITH POLY EXCHANGE;  Surgeon: Sandro Monsivais MD;  Location:  [facility] OR;  Service: Orthopedics;  Laterality: Right;                       Wound 04/30/25 0801 Left lower abdomen Surgical (Active)   Wound Image   05/26/25 0800   Dressing Appearance intact 05/26/25 0800   Base red;subcutaneous 05/26/25 0800   Red (%), Wound Tissue Color 95 05/26/25 0800   Yellow (%), Wound Tissue Color 5 05/26/25 0800   Periwound intact 05/26/25 0800   Periwound Temperature warm 05/26/25 0800   Periwound Skin Turgor soft;firm 05/26/25 0800   Edges open 05/26/25 0800   Wound Length (cm) 5.2 cm 05/26/25 0800   Wound Width (cm) 4.2 cm 05/26/25 0800   Wound Depth (cm) 6 cm 05/26/25 0800   Wound Surface Area (cm^2) 17.15 cm^2 05/26/25 0800   Wound Volume (cm^3) 68.612 cm^3 05/26/25 0800   Drainage Characteristics/Odor  sanguineous 05/26/25 0800   Drainage Amount small 05/26/25 0800   Care, Wound negative pressure wound therapy 05/26/25 0800   Dressing Care dressing changed 05/26/25 0800   Periwound Care barrier film applied 05/26/25 0800   Wound Output (mL) 150 05/26/25 0800       NPWT (Negative Pressure Wound Therapy) 05/23/25 0850 abdomen (Active)   Therapy Setting continuous therapy 05/26/25 0800   Dressing foam, black 05/26/25 0800   Pressure Setting 125 mmHg 05/26/25 0800   Sponges Inserted 1 05/26/25 0800   Sponges Removed 1 05/26/25 0800   Finger sweep complete Yes 05/26/25 0800            WOUND DEBRIDEMENT                    PT Assessment (Last 12 Hours)            PT Evaluation and Treatment         Row Name 05/26/25 0800                 Physical Therapy Time and Intention     Subjective Information no complaints  -MS       Document Type evaluation;wound care  -MS          Row Name 05/26/25 0800                 General Information     Patient Profile Reviewed other (see comments)  -MS       Pertinent History of Current Functional Problem s/p I and D of anterior spinal incision due to infection  -MS       Existing Precautions/Restrictions --  wound vac  -MS       Risks Reviewed patient:;increased discomfort;increased drainage;lines disloged  -MS       Benefits Reviewed patient:;decrease pain;improve skin integrity;increase knowledge  -MS       Barriers to Rehab none identified  -MS          Row Name 05/26/25 0800                 Pain     Pretreatment Pain Rating 4/10  -MS       Posttreatment Pain Rating 4/10  -MS       Pain Side/Orientation --  incisional  -MS       Pain Management Interventions premedicated for activity  -MS       Response to Pain Interventions no change per patient report  -MS          Row Name 05/26/25 0800                 Cognition     Orientation Status (Cognition) oriented x 4  -MS          Row Name 05/26/25 0800                 Wound 04/30/25 0801 Left lower abdomen Surgical     Wound - Properties  Group Placement Date: 04/30/25  -DT Placement Time: 0801  -DT Present on Original Admission: Y  -MS Side: Left  -DT Orientation: lower  -DT Location: abdomen  -DT Primary Wound Type: Surgical  -DT Additional Comments: I &D 5/23/25  -MS Wound Outcome: --  -MS Removal Date: --  -MS Removal Time: --  -MS, not present upon assessment      Wound Image Images linked: 1  -MS       Dressing Appearance intact  -MS       Closure --  NPWT dressing  -MS       Base red;subcutaneous  -MS       Red (%), Wound Tissue Color 95  -MS       Yellow (%), Wound Tissue Color 5  -MS       Periwound intact  -MS       Periwound Temperature warm  -MS       Periwound Skin Turgor soft;firm  -MS       Edges open  -MS       Wound Length (cm) 5.2 cm  -MS       Wound Width (cm) 4.2 cm  -MS       Wound Depth (cm) 6 cm  -MS       Wound Surface Area (cm^2) 17.15 cm^2  -MS       Wound Volume (cm^3) 68.612 cm^3  -MS       Drainage Characteristics/Odor sanguineous  -MS       Drainage Amount small  -MS       Care, Wound negative pressure wound therapy  -MS       Dressing Care dressing changed  -MS       Periwound Care barrier film applied  -MS       Wound Output (mL) 150  -MS       Retired Wound - Properties Group Placement Date: 04/30/25  -DT Placement Time: 0801  -DT Present on Original Admission: Y  -MS Side: Left  -DT Orientation: lower  -DT Location: abdomen  -DT Additional Comments: I &D 5/23/25  -MS Wound Outcome: --  -MS Removal Date: --  -MS Removal Time: --  -MS, not present upon assessment      Retired Wound - Properties Group Placement Date: 04/30/25  -DT Placement Time: 0801  -DT Present on Original Admission: Y  -MS Side: Left  -DT Orientation: lower  -DT Location: abdomen  -DT Additional Comments: I &D 5/23/25  -MS Removal Date: --  -MS Removal Time: --  -MS, not present upon assessment  Wound Outcome: --  -MS     Retired Wound - Properties Group Date first assessed: 04/30/25  -DT Time first assessed: 0801  -DT Present on Original Admission:  Y  -MS Side: Left  -DT Location: abdomen  -DT Additional Comments: I &D 5/23/25  -MS Resolution Date: --  -MS Resolution Time: --  -MS, not present upon assessment  Wound Outcome: --  -MS        Row Name 05/26/25 0800                 NPWT (Negative Pressure Wound Therapy) 05/23/25 0850 abdomen     NPWT (Negative Pressure Wound Therapy) - Properties Group Placement Date: 05/23/25  -AC Placement Time: 0850  -AC Location: abdomen  -AC     Therapy Setting continuous therapy  -MS       Dressing foam, black  -MS       Pressure Setting 125 mmHg  -MS       Sponges Inserted 1  -MS       Sponges Removed 1  -MS       Finger sweep complete Yes  -MS       Retired NPWT (Negative Pressure Wound Therapy) - Properties Group Placement Date: 05/23/25  -AC Placement Time: 0850  -AC Location: abdomen  -AC     Retired NPWT (Negative Pressure Wound Therapy) - Properties Group Placement Date: 05/23/25  -AC Placement Time: 0850  -AC Location: abdomen  -AC     Retired NPWT (Negative Pressure Wound Therapy) - Properties Group Placement Date: 05/23/25  -AC Placement Time: 0850  -AC Location: abdomen  -AC        Row Name 05/26/25 0800                 Plan of Care Review     Plan of Care Reviewed With patient;spouse  -MS       Progress improving  -MS       Outcome Evaluation The patient presents alert and oriented x4 lying in bed with NPWT dressing alarming blockage. I assessed dressing and could not find why the blockage alarm was occuring. It was time for the dressing change anyway so I changed the dressing to find a healthy wound bed. I was able to replace the dressing and acheive a good seal. PT will continue ot monitor the dressing daily and plan to change on Wednesday.  -MS          Row Name 05/26/25 0800                 Positioning and Restraints     Post Treatment Position bed  -MS       In Bed supine;call light within reach;encouraged to call for assist;with family/caregiver;side rails up x2  -MS          Row Name 05/26/25 0800                  Therapy Assessment/Plan (PT)     Patient/Family Therapy Goals Statement (PT) wound healing  -MS       Rehab Potential (PT) good  -MS       Criteria for Skilled Interventions Met (PT) yes;meets criteria;skilled treatment is necessary  -MS       Therapy Frequency (PT) 3 times/wk  check daily and change MWF  -MS       Predicted Duration of Therapy Intervention (PT) until discharge  -MS          Row Name 05/26/25 0800                 Physical Therapy Goals     Wound Management Goal Selection (PT) wound management, PT goal 1;wound management, PT goal 2  -MS          Row Name 05/26/25 0800                 Wound Management Goal 1 (PT)     Wound Management Goal (Wound Goal 1, PT) The abdominal wound will decrease in depth by 2cm from 6cm to 4cm  -MS       Time Frame (Wound Goal 1, PT) long-term goal (LTG);by discharge  -MS       Progress/Outcomes (Wound Goal 1, PT) goal ongoing  -MS          Row Name 05/26/25 0800                 Wound Management Goal 2 (PT)     Wound Management Goal (Wound Goal 2, PT) The wound will decrease by length by 2cm from 5.2cm to 3.2cm  -MS       Time Frame (Wound Goal 2, PT) long-term goal (LTG);by discharge  -MS       Progress/Outcomes (Wound Goal 2, PT) goal ongoing  -MS                    User Key  (r) = Recorded By, (t) = Taken By, (c) = Cosigned By        Initials Name Provider Type     Rosalba Gutierrez, PT, DPT, NCS Physical Therapist     AC Asya Tristan, RN Registered Nurse     Ollie Peñaloza RN Registered Nurse                          Physical Therapy Education            Title: PT OT SLP Therapies (In Progress)         Topic: Physical Therapy (In Progress)         Point: Mobility training (Not Started)         Learner Progress:  Not documented in this visit.                  Point: Home exercise program (Not Started)         Learner Progress:  Not documented in this visit.                  Point: Body mechanics (Not Started)         Learner Progress:  Not  documented in this visit.                  Point: Precautions (Done)         Learning Progress Summary             Patient Acceptance, E, VU by MS at 5/26/2025 0918     Comment: role of wound vac in his care                                                User Key         Initials Effective Dates Name Provider Type Discipline     MS 07/11/23 -  Rosalba Perkins, PT, DPT, NCS Physical Therapist PT                             Recommendation and Plan  Anticipated Discharge Disposition (PT): home with assist (wound care)  Planned Therapy Interventions (PT): wound care, patient/family education  Therapy Frequency (PT): 3 times/wk (check daily and change MWF)  Plan of Care Reviewed With: patient, spouse   Progress: improving       Progress: improving  Outcome Evaluation: The patient presents alert and oriented x4 lying in bed with NPWT dressing alarming blockage. I assessed dressing and could not find why the blockage alarm was occuring. It was time for the dressing change anyway so I changed the dressing to find a healthy wound bed. I was able to replace the dressing and acheive a good seal. PT will continue ot monitor the dressing daily and plan to change on Wednesday.  Plan of Care Reviewed With: patient, spouse                       Time Calculation    PT Charges         Row Name 05/26/25 0916                       Time Calculation     Start Time 0800  -MS         Stop Time 0915  -MS         Time Calculation (min) 75 min  -MS         PT Received On 05/26/25  -MS         PT Goal Re-Cert Due Date 06/06/25  -MS                 Untimed Charges     PT Eval/Re-eval Minutes 45  -MS         Wound Care 12306 Neg Press (DME) wound TO 50 sqcm  -MS         [id number] Pressure wound to 50 sqcm 45  -MS                 Total Minutes     Untimed Charges Total Minutes 90  -MS          Total Minutes 90  -MS                      User Key  (r) = Recorded By, (t) = Taken By, (c) = Cosigned By        Initials Name Provider Type     MS Perkins  Rosalba WATKINS PT, DPT, NCS Physical Therapist                                      PT G-Codes  AM-PAC 6 Clicks Score (PT): 24         Rosalba Perkins, PT, DPT, NCS                   2025                                    [address] [address]  Phone:  [phone]  Fax:          Patient:     Feliberto Knowles MRN:  [id number]   [address] [facility] [address] :  1975  SSN:    Phone: [phone] Sex:  M      INS[employer] # SUBSCRIBER ID   Primary:    [facility] [id number]      Admitting Diagnosis: Cellulitis of abdominal wall [L03.311]  Order Date:  May 23, 2025        Wound Vac       (Order ID: [id number])     Diagnosis:         Priority:  Routine Expected Date:   Expiration Date:        Interval:  Every Mon-Wed-Fri Count:    Comments: Review Additional Information As Outlined in Process Instructions (Open Order Report to View Full Instructions)  Pressure: 125 mmHg  Wound Bed: Black     Specimen Type: ,   Specimen Source:   Specimen Taken Date:   Specimen Taken Time:                  Authorizing Provider:Steven Garzon MD  Authorizing Provider's NPI: [id number]  Order Entered By: Steven Garzon MD 2025  2:49 PM     Electronically signed by: Steven Garzon MD 2025  2:49 PM        No

## 2025-05-27 NOTE — PLAN OF CARE
Goal Outcome Evaluation:  Plan of Care Reviewed With: patient  Progress: improving       Pt medicated with prn pain med x2 thus far this shift and prn muscle relaxer x1 for c/o back pain. Wound vac maintained in place to abd. Up ad quirino. Voiding per urinal. VSS. Safety maintained.

## 2025-05-27 NOTE — PLAN OF CARE
Goal Outcome Evaluation:  Plan of Care Reviewed With: patient        Progress: improving  Outcome Evaluation: Patient complains of pain x1, see MAR. No IV access. Voiding. Wound vac in place. VSS. Safety maintained.

## 2025-05-27 NOTE — CASE MANAGEMENT/SOCIAL WORK
Continued Stay Note  DARIO Dunham     Patient Name: Feliberto Knowles  MRN: [id number]  Today's Date: 5/27/2025    Admit Date: 5/23/2025    Plan: Home   Discharge Plan       Row Name 05/27/25 1346       Plan    Plan Home    Patient/Family in Agreement with Plan yes    Plan Comments Have talked with Reggie from [facility] [phone] and she did receive the referral and has been looking at it. Answered her questions and RFS sent for approval for outpatient wound care. Waiting for approval.                   Discharge Codes    No documentation.                       CAMERON [place]

## 2025-05-27 NOTE — PROGRESS NOTES
LOS: 4 days   Patient Care Team:  Nakul Hdz DO as PCP - General (Family Medicine)    Chief Complaint:  drainage and fluid collection s/p ALIF    Subjective     Patient Complaints: No complaints this a.m.  Pain controlled.    Objective     Vital Signs  Temp:  [97.2 °F (36.2 °C)-98.2 °F (36.8 °C)] 97.2 °F (36.2 °C)  Heart Rate:  [80-87] 85  Resp:  [14-16] 16  BP: (115-132)/(73-84) 130/81    Physical Exam  Constitutional:       Appearance: Normal appearance.   HENT:      Head: Normocephalic and atraumatic.   Cardiovascular:      Rate and Rhythm: Normal rate and regular rhythm.   Pulmonary:      Effort: Pulmonary effort is normal. No respiratory distress.   Skin:     Comments: Left paramedian abdominal incision with wound VAC in place.   Neurological:      General: No focal deficit present.      Mental Status: He is alert. Mental status is at baseline.         Laboratory Data:   Results from last 7 days   Lab Units 05/24/25  0300 05/23/25  0615   WBC 10*3/mm3 14.75* 8.32   HEMOGLOBIN g/dL 15.1 15.7   HEMATOCRIT % 44.8 46.5   PLATELETS 10*3/mm3 363 325       Results from last 7 days   Lab Units 05/23/25  0615   SODIUM mmol/L 137   POTASSIUM mmol/L 4.3   CHLORIDE mmol/L 101   CO2 mmol/L 25.0   BUN mg/dL 16   CREATININE mg/dL 0.83   CALCIUM mg/dL 8.9   GLUCOSE mg/dL 101*     Results from last 7 days   Lab Units 05/23/25  0615   PROTIME Seconds 13.7   INR  1.00   APTT seconds 31.8            Medication Review:     Assessment & Plan       Hematoma of abdominal wall, initial encounter    Cellulitis of abdominal wall    Preop testing      50-year-old male who presented with hematoma/seroma after ALIF procedure status post drainage, washout, wound VAC placement on 5/23/2025.    -Afebrile vital signs stable continue to monitor.  - Pain control continue current regiment  - Awaiting home wound VAC.  [facility]  - Will plan on DC once home wound VAC approved.    Plan for disposition:    Steven Garzon,  MD  Vascular Surgery  [phone]  05/27/25  18:27 CDT

## 2025-05-28 ENCOUNTER — READMISSION MANAGEMENT (OUTPATIENT)
Dept: CALL CENTER | Facility: HOSPITAL | Age: 50
End: 2025-05-28
Payer: OTHER GOVERNMENT

## 2025-05-28 VITALS
SYSTOLIC BLOOD PRESSURE: 100 MMHG | RESPIRATION RATE: 16 BRPM | HEIGHT: 68 IN | OXYGEN SATURATION: 96 % | BODY MASS INDEX: 38.68 KG/M2 | WEIGHT: 255.2 LBS | HEART RATE: 77 BPM | TEMPERATURE: 97.9 F | DIASTOLIC BLOOD PRESSURE: 80 MMHG

## 2025-05-28 PROCEDURE — 97605 NEG PRS WND THER DME<=50SQCM: CPT

## 2025-05-28 RX ADMIN — GABAPENTIN 300 MG: 300 CAPSULE ORAL at 03:11

## 2025-05-28 RX ADMIN — GABAPENTIN 300 MG: 300 CAPSULE ORAL at 11:04

## 2025-05-28 RX ADMIN — OXYCODONE AND ACETAMINOPHEN 1 TABLET: 325; 10 TABLET ORAL at 11:04

## 2025-05-28 RX ADMIN — SERTRALINE HYDROCHLORIDE 100 MG: 100 TABLET ORAL at 08:15

## 2025-05-28 RX ADMIN — OXYCODONE AND ACETAMINOPHEN 1 TABLET: 325; 10 TABLET ORAL at 03:11

## 2025-05-28 RX ADMIN — LISINOPRIL 2.5 MG: 2.5 TABLET ORAL at 08:15

## 2025-05-28 RX ADMIN — METOPROLOL SUCCINATE 12.5 MG: 25 TABLET, EXTENDED RELEASE ORAL at 08:15

## 2025-05-28 RX ADMIN — OXYBUTYNIN CHLORIDE 5 MG: 5 TABLET ORAL at 08:15

## 2025-05-28 RX ADMIN — LEVOTHYROXINE SODIUM 75 MCG: 75 TABLET ORAL at 06:27

## 2025-05-28 NOTE — THERAPY WOUND CARE TREATMENT
Acute Care - Wound/Debridement Treatment Note   [place]     Patient Name: Feliberto Knowles  : 1975  MRN: [id number]  Today's Date: 2025                Admit Date: 2025    Visit Dx:    ICD-10-CM ICD-9-CM   1. Open wound of abdomen, initial encounter [S31.109A]  S31.109A 879.2   2. Cellulitis of abdominal wall  L03.311 682.2   3. Preop testing  Z01.818 V72.84       Patient Active Problem List   Diagnosis    Dizziness    Transient ischemic attack (TIA)    Hypertrophic obstructive cardiomyopathy    Essential hypertension, new diagnosis    History of stroke    Infection of prosthetic total shoulder joint    Postoperative infection    Lumbar radiculopathy    Degeneration of intervertebral disc of lumbar region with discogenic back pain and lower extremity pain    Cellulitis of abdominal wall    Preop testing    Hematoma of abdominal wall, initial encounter        Past Medical History:   Diagnosis Date    AICD (automatic cardioverter/defibrillator) present 2020    Medtronic AICD.    Anger     Anxiety     Arthritis     Heart murmur     Hyperlipidemia     Hypertrophic obstructive cardiomyopathy (HOCM)     Hypothyroidism     Postoperative wound infection 2025    Stroke     tia 2017, STM LOSS    Tachycardia     TBI (traumatic brain injury)         Past Surgical History:   Procedure Laterality Date    ANKLE SURGERY Right     ANTERIOR LUMBAR EXPOSURE N/A 2025    Procedure: ANTERIOR LUMBAR EXPOSURE;  Surgeon: Clark Guillen DO;  Location:  [facility];  Service: Vascular;  Laterality: N/A;    APPENDECTOMY      CARDIAC CATHETERIZATION      no stents    CARDIAC DEFIBRILLATOR PLACEMENT  2020    Medtronic / AICD    CARDIAC PACEMAKER PLACEMENT      septalmyectomy    CARPAL TUNNEL RELEASE      INCISION AND DRAINAGE TRUNK N/A 2025    Procedure: INCISION AND DRAINAGE, wound washout with wound vac placement;  Surgeon: Steven Garzon MD;  Location:  [facility];  Service:  Vascular;  Laterality: N/A;    LUMBAR FUSION N/A 04/30/2025    Procedure: ANTERIOR DECOMPRESSION L4-S1, ARTIFICAL DISC REPLACEMENT L4-5, ANTERIOR LUMBAR INTERBODY FUSION WITH INSTRUMENTATION L5-S1;  Surgeon: MICHAEL Cook MD;  Location:  [facility];  Service: Orthopedic Spine;  Laterality: N/A;    OTHER SURGICAL HISTORY  05/2012    septalmyectomy    SHOULDER ARTHROSCOPY W/ LABRAL REPAIR Right 03/25/2021    Procedure: RIGHT SHOULDER ARTHROSCOPIC REVISION POSTERIOR  LABRAL REPAIR;  Surgeon: Sandro Monsivais MD;  Location:  [facility];  Service: Orthopedics;  Laterality: Right;    TOTAL SHOULDER ARTHROPLASTY W/ DISTAL CLAVICLE EXCISION Right 09/30/2021    Procedure: RIGHT REVERSE TOTAL SHOULDER ARTHROPLASTY;  Surgeon: Sandro Monsivais MD;  Location:  [facility];  Service: Orthopedics;  Laterality: Right;    TOTAL SHOULDER ARTHROPLASTY W/ DISTAL CLAVICLE EXCISION Right 10/14/2021    Procedure: INCISION AND DRAINAGE RIGHT SHOULDER WITH POLY EXCHANGE;  Surgeon: Sandro Monsivais MD;  Location:  [facility];  Service: Orthopedics;  Laterality: Right;           Wound 04/30/25 0801 Left lower abdomen Surgical (Active)   Dressing Appearance intact 05/28/25 1310   Base red;subcutaneous 05/28/25 1310   Red (%), Wound Tissue Color 95 05/28/25 1310   Yellow (%), Wound Tissue Color 5 05/28/25 1310   Periwound intact 05/28/25 1310   Periwound Temperature warm 05/28/25 1310   Periwound Skin Turgor soft 05/28/25 1310   Edges open 05/28/25 1310   Drainage Characteristics/Odor sanguineous 05/28/25 1310   Drainage Amount small 05/28/25 1310   Care, Wound negative pressure wound therapy 05/28/25 1310   Dressing Care dressing changed 05/28/25 1310   Periwound Care barrier film applied 05/28/25 1310   Wound Output (mL) 50 05/28/25 1310       NPWT (Negative Pressure Wound Therapy) 05/23/25 0850 abdomen (Active)   Therapy Setting continuous therapy 05/28/25 1310   Dressing foam, black 05/28/25 1310   Pressure Setting 125 mmHg 05/28/25 1310   Sponges  Inserted 1 05/28/25 1310   Sponges Removed 1 05/28/25 1310   Finger sweep complete Yes 05/28/25 1310         WOUND DEBRIDEMENT                     PT Assessment (Last 12 Hours)       PT Evaluation and Treatment       Row Name 05/28/25 1310          Physical Therapy Time and Intention    Subjective Information no complaints  -KEISHA     Document Type wound care  -KEISHA     Mode of Treatment physical therapy  -KEISHA     Comment WV dressing changed and placed on home vac for d/c home  -KEISHA       Row Name 05/28/25 1310          Pain    Pretreatment Pain Rating 2/10  -KEISHA     Posttreatment Pain Rating 2/10  -KEISHA     Pain Location abdomen  -KEISHA     Pain Side/Orientation generalized  -KEISHA     Pain Management Interventions premedicated for activity  -KEISHA     Response to Pain Interventions no change per patient report  -KEISHA       Row Name 05/28/25 1310          Wound 04/30/25 0801 Left lower abdomen Surgical    Wound - Properties Group Placement Date: 04/30/25  -DT Placement Time: 0801  -DT Present on Original Admission: Y  -MS Side: Left  -DT Orientation: lower  -DT Location: abdomen  -DT Primary Wound Type: Surgical  -DT Additional Comments: I &D 5/23/25  -MS Wound Outcome: --  -MS Removal Date: --  -MS Removal Time: --  -MS, not present upon assessment     Dressing Appearance intact  -KEISHA     Base red;subcutaneous  -KEISHA     Red (%), Wound Tissue Color 95  -KEISHA     Yellow (%), Wound Tissue Color 5  -KEISHA     Periwound intact  -KEISHA     Periwound Temperature warm  -KEISHA     Periwound Skin Turgor soft  -KEISHA     Edges open  -KEISHA     Drainage Characteristics/Odor sanguineous  -KEISHA     Drainage Amount small  -KEISHA     Care, Wound negative pressure wound therapy  -KEISHA     Dressing Care dressing changed  -KEISHA     Periwound Care barrier film applied  -KEISHA     Wound Output (mL) 50  250  -KEISHA     Retired Wound - Properties Group Placement Date: 04/30/25  -DT Placement Time: 0801  -DT Present on Original Admission: Y  -MS Side: Left  -DT Orientation: lower  -DT  Location: abdomen  -DT Additional Comments: I &D 5/23/25  -MS Wound Outcome: --  -MS Removal Date: --  -MS Removal Time: --  -MS, not present upon assessment     Retired Wound - Properties Group Placement Date: 04/30/25  -DT Placement Time: 0801  -DT Present on Original Admission: Y  -MS Side: Left  -DT Orientation: lower  -DT Location: abdomen  -DT Additional Comments: I &D 5/23/25  -MS Removal Date: --  -MS Removal Time: --  -MS, not present upon assessment  Wound Outcome: --  -MS    Retired Wound - Properties Group Date first assessed: 04/30/25  -DT Time first assessed: 0801  -DT Present on Original Admission: Y  -MS Side: Left  -DT Location: abdomen  -DT Additional Comments: I &D 5/23/25  -MS Resolution Date: --  -MS Resolution Time: --  -MS, not present upon assessment  Wound Outcome: --  -MS      Row Name 05/28/25 1310          NPWT (Negative Pressure Wound Therapy) 05/23/25 0850 abdomen    NPWT (Negative Pressure Wound Therapy) - Properties Group Placement Date: 05/23/25  -AC Placement Time: 0850  -AC Location: abdomen  -AC    Therapy Setting continuous therapy  -KEISHA     Dressing foam, black  -KEISHA     Pressure Setting 125 mmHg  -KEISHA     Sponges Inserted 1  -KEISHA     Sponges Removed 1  -KEISHA     Finger sweep complete Yes  -KEISHA     Retired NPWT (Negative Pressure Wound Therapy) - Properties Group Placement Date: 05/23/25  -AC Placement Time: 0850  -AC Location: abdomen  -AC    Retired NPWT (Negative Pressure Wound Therapy) - Properties Group Placement Date: 05/23/25  -AC Placement Time: 0850  -AC Location: abdomen  -AC    Retired NPWT (Negative Pressure Wound Therapy) - Properties Group Placement Date: 05/23/25  -AC Placement Time: 0850  -AC Location: abdomen  -AC      Row Name 05/28/25 1310          Positioning and Restraints    Pre-Treatment Position in bed  -KEISHA     Post Treatment Position bed  -KEISHA     In Bed fowlers;call light within reach;encouraged to call for assist;side rails up x2  -KEISHA               User Key   (r) = Recorded By, (t) = Taken By, (c) = Cosigned By      Initials Name Provider Type    MS Rosalba Perkins, PT, DPT, NCS Physical Therapist    Yusef Sandoval PTA Physical Therapist Assistant    Asya Schmidt, RN Registered Nurse    Ollie Peñaloza RN Registered Nurse                  Physical Therapy Education       Title: PT OT SLP Therapies (In Progress)       Topic: Physical Therapy (In Progress)       Point: Mobility training (Not Started)       Learner Progress:  Not documented in this visit.              Point: Home exercise program (Not Started)       Learner Progress:  Not documented in this visit.              Point: Body mechanics (Not Started)       Learner Progress:  Not documented in this visit.              Point: Precautions (Done)       Learning Progress Summary            Patient Acceptance, E, VU by MS at 5/26/2025 0918    Comment: role of wound vac in his care                                      User Key       Initials Effective Dates Name Provider Type Discipline    MS 07/11/23 -  Rosalba Perkins, PT, DPT, NCS Physical Therapist PT                    Recommendation and Plan  Anticipated Discharge Disposition (PT): home with assist (wound care)  Planned Therapy Interventions (PT): wound care, patient/family education  Therapy Frequency (PT): 3 times/wk (check daily and change MWF)                           Time Calculation   PT Charges       Row Name 05/28/25 1310             Time Calculation    Start Time 1310  -KEISHA      Stop Time 1350  -KEISHA      Time Calculation (min) 40 min  -KEISHA      PT Received On 05/28/25  -KEISHA         Time Calculation- PT    Total Timed Code Minutes- PT 40 minute(s)  -KEISHA                User Key  (r) = Recorded By, (t) = Taken By, (c) = Cosigned By      Initials Name Provider Type    Yusef Sandoval PTA Physical Therapist Assistant                      Therapy Charges for Today       Code Description Service Date Service Provider Modifiers Qty     93135722879 HC PT NEG PRESS WOUND TO 50SQCM DME3 5/28/2025 Yusef Garcia, PTA GP 1              PT G-Codes  AM-PAC 6 Clicks Score (PT): 24       Yusef Garcia PTA  5/28/2025

## 2025-05-28 NOTE — CASE MANAGEMENT/SOCIAL WORK
Continued Stay Note   [place]     Patient Name: Feliberto Knowles  MRN: [id number]  Today's Date: 5/28/2025    Admit Date: 5/23/2025    Plan: Home   Discharge Plan       Row Name 05/28/25 1347       Plan    Plan Home    Patient/Family in Agreement with Plan yes    Final Discharge Disposition Code 01 - home or self-care    Final Note Wound vac approved and delivered to pt's room. POD signed and being faxed back to [facility] at [phone]. Reggie [phone] says she is sending over authorization to [facility] today to approve dressing changes. Appointment made for Friday 5/30 at 1045.      Row Name 05/28/25 1153       Plan    Plan Home    Patient/Family in Agreement with Plan yes    Plan Comments Reggie with [phone] says they will have pt's wound vac approved today. Waiting on that approval.                   Discharge Codes    No documentation.                 Expected Discharge Date and Time       Expected Discharge Date Expected Discharge Time    May 28, 2025               CAMERON Redding

## 2025-05-28 NOTE — PLAN OF CARE
Goal Outcome Evaluation:  Plan of Care Reviewed With: patient  Progress: improving       Pt medicated with prn pain meds x2 thus far this shift. Wound vac maintained in place to abd. Up ad quirino. Voiding. Resting between care. Vss. Safety maintained.

## 2025-05-28 NOTE — DISCHARGE SUMMARY
Date of Discharge:  5/28/2025    Discharge Diagnosis: Hematoma/seroma status post a left    Presenting Problem/History of Present Illness  Cellulitis of abdominal wall [L03.311]  Preop testing [Z01.818]  Hematoma of abdominal wall, initial encounter [S30.1XXA]       Hospital Course  Patient is a 50 y.o. male presented with swelling of an ALIF incision.  Patient underwent a CT scan which showed a fluid collection in the subcutaneous tissues.  The patient underwent I&D with evacuation of hematoma/seroma on the day after admission.  Postoperatively his course was uneventful.  Patient is now been approved for home wound VAC and is stable for discharge..      Procedures Performed  Procedure(s):  INCISION AND DRAINAGE, wound washout with wound vac placement       Consults:   Consults       No orders found from 4/24/2025 to 5/24/2025.              Condition on Discharge: Stable    Discharge Medications     Discharge Medications        Continue These Medications        Instructions Start Date   empagliflozin 25 MG tablet tablet  Commonly known as: JARDIANCE   12.5 mg, Daily      gabapentin 300 MG capsule  Commonly known as: NEURONTIN   300 mg, 3 Times Daily      lisinopril 5 MG tablet  Commonly known as: PRINIVIL,ZESTRIL   Take 0.5 tablets by mouth Every Morning.      metoprolol succinate XL 25 MG 24 hr tablet  Commonly known as: TOPROL-XL   12.5 mg, Every Morning      naloxone 4 MG/0.1ML nasal spray  Commonly known as: NARCAN   1 spray, As Needed      naproxen sodium 220 MG tablet  Commonly known as: ALEVE   220 mg, 2 Times Daily PRN      oxyCODONE-acetaminophen  MG per tablet  Commonly known as: PERCOCET   1 tablet, Every 4 Hours PRN      Semaglutide (1 MG/DOSE) 4 MG/3ML solution pen-injector  Commonly known as: OZEMPIC   0.5 mL, Weekly      sertraline 100 MG tablet  Commonly known as: ZOLOFT   100 mg, Daily      tadalafil 20 MG tablet  Commonly known as: CIALIS   20 mg, Every 7 Days      testosterone cypionate  100 MG/ML solution injection  Commonly known as: DEPO-TESTOSTERONE   100 mg, 2 Times Weekly      tiZANidine 4 MG tablet  Commonly known as: ZANAFLEX   4 mg, Oral, Every 8 Hours PRN             ASK your doctor about these medications        Instructions Start Date   cephalexin 500 MG capsule  Commonly known as: KEFLEX  Ask about: Should I take this medication?   500 mg, Oral, 3 Times Daily               Discharge Diet: Regular    Activity at Discharge: No heavy lifting    Follow-up Appointments  No future appointments.  Follow-up with wound care for wound VAC changes.     I did spend more than 30 minutes reviewing the chart, face to face encounter, and organizing discharge.    Steven Garzon MD  05/28/25  13:02 CDT

## 2025-05-28 NOTE — CASE MANAGEMENT/SOCIAL WORK
Continued Stay Note  [facility]     Patient Name: Feliberto Knowles  MRN: [id number]  Today's Date: 5/28/2025    Admit Date: 5/23/2025    Plan: Home   Discharge Plan       Row Name 05/28/25 1153       Plan    Plan Home    Patient/Family in Agreement with Plan yes    Plan Comments Reggie with [phone] says they will have pt's wound vac approved today. Waiting on that approval.                   Discharge Codes    No documentation.                       CAMERON [place]

## 2025-05-29 NOTE — THERAPY DISCHARGE NOTE
Acute Care - Physical Therapy Discharge Summary  [facility]       Patient Name: Feliberto Knowles  : 1975  MRN: [id number]    Today's Date: 2025                 Admit Date: 2025      PT Recommendation and Plan    Visit Dx:    ICD-10-CM ICD-9-CM   1. Open wound of abdomen, initial encounter [S31.109A]  S31.109A 879.2   2. Cellulitis of abdominal wall  L03.311 682.2   3. Preop testing  Z01.818 V72.84                PT Rehab Goals       Row Name 25 1529             Wound Management Goal 1 (PT)    Wound Management Goal (Wound Goal 1, PT) The abdominal wound will decrease in depth by 2cm from 6cm to 4cm  -      Time Frame (Wound Goal 1, PT) long-term goal (LTG);by discharge  -      Progress/Outcomes (Wound Goal 1, PT) goal not met  -         Wound Management Goal 2 (PT)    Wound Management Goal (Wound Goal 2, PT) The wound will decrease by length by 2cm from 5.2cm to 3.2cm  -      Time Frame (Wound Goal 2, PT) long-term goal (LTG);by discharge  -      Progress/Outcomes (Wound Goal 2, PT) goal not met  -                User Key  (r) = Recorded By, (t) = Taken By, (c) = Cosigned By      Initials Name Provider Type Heike Huang PTA Physical Therapist Assistant PT                        PT Discharge Summary  Reason for Discharge: Discharge from facility  Outcomes Achieved: Refer to plan of care for updates on goals achieved  Discharge Destination: Home      Heike Diaz PTA   2025

## 2025-05-29 NOTE — OUTREACH NOTE
Prep Survey      Flowsheet Row Responses   [facility] facility patient discharged from? [place]   Is LACE score < 7 ? No   Eligibility Readm Mgmt   Discharge diagnosis Hematoma of abdominal wall, INCISION AND DRAINAGE, wound washout with wound vac placement   Does the patient have one of the following disease processes/diagnoses(primary or secondary)? General Surgery   Does the patient have Home health ordered? No   Is there a DME ordered? No   Prep survey completed? Yes            Lindsay Herrera Registered Nurse

## 2025-05-29 NOTE — PAYOR COMM NOTE
"REF:   [id number]     [facility]  FAX  [phone]     Feliberto Stoner (50 y.o. Male)       Date of Birth   1975    Social Security Number       Address   [address] [place] [id number]    Home Phone   [phone]    MRN   [id number]       Restorationist   Gnosticism    Marital Status                               Admission Date   5/23/2025    Admission Type   Elective    Admitting Provider   Steven Garzon MD    Attending Provider       Department, Room/Bed   [facility] 3C, 363/1       Discharge Date   5/28/2025    Discharge Disposition   Home or Self Care    Discharge Destination                                 Attending Provider: (none)   Allergies: No Known Allergies    Isolation: None   Infection: None   Code Status: Prior    Ht: 171.5 cm (67.52\")   Wt: 116 kg (255 lb 3.2 oz)    Admission Cmt: None   Principal Problem: Hematoma of abdominal wall, initial encounter [S30.1XXA]                   Active Insurance as of 5/23/2025       Primary Coverage       Payor Plan Insurance Group Employer/Plan Group    [facility] [employer]        Payor Plan Address Payor Plan Phone Number Payor Plan Fax Number Effective Dates    [address] [phone]  12/28/2021 - None Entered    [facility] [id number]         Subscriber Name Subscriber Birth Date Member ID       FELIBERTO STONER 1975                      Emergency Contacts        (Rel.) Home Phone Work Phone Mobile Phone    Janet Stoner (Spouse) [phone] -- [phone]                 Discharge Summary        Steven Garzon MD at 05/28/25 1302            Date of Discharge:  5/28/2025    Discharge Diagnosis: Hematoma/seroma status post a left    Presenting Problem/History of Present Illness  Cellulitis of abdominal wall [L03.311]  Preop testing [Z01.818]  Hematoma of abdominal wall, initial encounter [S30.1XXA]       Hospital Course  Patient is a 50 y.o. male " presented with swelling of an ALIF incision.  Patient underwent a CT scan which showed a fluid collection in the subcutaneous tissues.  The patient underwent I&D with evacuation of hematoma/seroma on the day after admission.  Postoperatively his course was uneventful.  Patient is now been approved for home wound VAC and is stable for discharge..      Procedures Performed  Procedure(s):  INCISION AND DRAINAGE, wound washout with wound vac placement       Consults:   Consults       No orders found from 4/24/2025 to 5/24/2025.              Condition on Discharge: Stable    Discharge Medications     Discharge Medications        Continue These Medications        Instructions Start Date   empagliflozin 25 MG tablet tablet  Commonly known as: JARDIANCE   12.5 mg, Daily      gabapentin 300 MG capsule  Commonly known as: NEURONTIN   300 mg, 3 Times Daily      lisinopril 5 MG tablet  Commonly known as: PRINIVIL,ZESTRIL   Take 0.5 tablets by mouth Every Morning.      metoprolol succinate XL 25 MG 24 hr tablet  Commonly known as: TOPROL-XL   12.5 mg, Every Morning      naloxone 4 MG/0.1ML nasal spray  Commonly known as: NARCAN   1 spray, As Needed      naproxen sodium 220 MG tablet  Commonly known as: ALEVE   220 mg, 2 Times Daily PRN      oxyCODONE-acetaminophen  MG per tablet  Commonly known as: PERCOCET   1 tablet, Every 4 Hours PRN      Semaglutide (1 MG/DOSE) 4 MG/3ML solution pen-injector  Commonly known as: OZEMPIC   0.5 mL, Weekly      sertraline 100 MG tablet  Commonly known as: ZOLOFT   100 mg, Daily      tadalafil 20 MG tablet  Commonly known as: CIALIS   20 mg, Every 7 Days      testosterone cypionate 100 MG/ML solution injection  Commonly known as: DEPO-TESTOSTERONE   100 mg, 2 Times Weekly      tiZANidine 4 MG tablet  Commonly known as: ZANAFLEX   4 mg, Oral, Every 8 Hours PRN             ASK your doctor about these medications        Instructions Start Date   cephalexin 500 MG capsule  Commonly known as:  KEFLEX  Ask about: Should I take this medication?   500 mg, Oral, 3 Times Daily               Discharge Diet: Regular    Activity at Discharge: No heavy lifting    Follow-up Appointments  No future appointments.  Follow-up with wound care for wound VAC changes.     I did spend more than 30 minutes reviewing the chart, face to face encounter, and organizing discharge.    Steven Thurman MD  05/28/25  13:02 CDT       Electronically signed by Steven Thurman MD at 05/28/25 1305       Discharge Order (From admission, onward)       Start     Ordered    05/28/25 1259  Discharge patient  Once        Expected Discharge Date: 05/28/25   Discharge Disposition: Home or Self Care   Physician of Record for Attribution - Please select from Treatment Team: STEVEN THURMAN [[id number]]   Review needed by CMO to determine Physician of Record: No      Question Answer Comment   Physician of Record for Attribution - Please select from Treatment Team STEVEN THURMAN    Review needed by CMO to determine Physician of Record No        05/28/25 1672

## 2025-05-30 ENCOUNTER — OFFICE VISIT (OUTPATIENT)
Dept: WOUND CARE | Facility: HOSPITAL | Age: 50
End: 2025-05-30
Payer: OTHER GOVERNMENT

## 2025-05-30 DIAGNOSIS — S31.105S UNSPECIFIED OPEN WOUND OF ABDOMINAL WALL, PERIUMBILIC REGION WITHOUT PENETRATION INTO PERITONEAL CAVITY, SEQUELA: ICD-10-CM

## 2025-05-30 DIAGNOSIS — L76.34 POSTPROCEDURAL SEROMA OF SKIN AND SUBCUTANEOUS TISSUE FOLLOWING OTHER PROCEDURE: Primary | ICD-10-CM

## 2025-05-30 DIAGNOSIS — T81.89XA OTHER COMPLICATIONS OF PROCEDURES, NOT ELSEWHERE CLASSIFIED, INITIAL ENCOUNTER: ICD-10-CM

## 2025-05-30 DIAGNOSIS — E11.9 TYPE 2 DIABETES MELLITUS WITHOUT COMPLICATION, UNSPECIFIED WHETHER LONG TERM INSULIN USE: ICD-10-CM

## 2025-05-30 PROCEDURE — 97605 NEG PRS WND THER DME<=50SQCM: CPT

## 2025-05-30 PROCEDURE — G0463 HOSPITAL OUTPT CLINIC VISIT: HCPCS

## 2025-06-02 ENCOUNTER — OFFICE VISIT (OUTPATIENT)
Dept: WOUND CARE | Facility: HOSPITAL | Age: 50
End: 2025-06-02
Payer: OTHER GOVERNMENT

## 2025-06-02 ENCOUNTER — READMISSION MANAGEMENT (OUTPATIENT)
Dept: CALL CENTER | Facility: HOSPITAL | Age: 50
End: 2025-06-02
Payer: OTHER GOVERNMENT

## 2025-06-02 PROCEDURE — 97605 NEG PRS WND THER DME<=50SQCM: CPT

## 2025-06-02 NOTE — OUTREACH NOTE
General Surgery Week 1 Survey      Flowsheet Row Responses   [facility] patient discharged from? [place]   Does the patient have one of the following disease processes/diagnoses(primary or secondary)? General Surgery   Week 1 attempt successful? Yes   Call start time 1448   Call end time 1450   Discharge diagnosis Hematoma of abdominal wall, INCISION AND DRAINAGE, wound washout with wound vac placement   Person spoke with today (if not patient) and relationship pt   Meds reviewed with patient/caregiver? Yes   Is the patient having any side effects they believe may be caused by any medication additions or changes? No   Does the patient have all medications related to this admission filled (includes all antibiotics, pain medications, etc.) Yes   Is the patient taking all medications as directed (includes completed medication regime)? Yes   Does the patient have a follow up appointment scheduled with their surgeon? Yes   Has the patient kept scheduled appointments due by today? N/A   Psychosocial issues? No   Did the patient receive a copy of their discharge instructions? Yes   Nursing interventions Reviewed instructions with patient   What is the patient's perception of their health status since discharge? Improving   Nursing interventions Nurse provided patient education   Is the patient/caregiver able to teach back signs and symptoms of incisional infection? Increased redness, swelling or pain at the incisonal site, Increased drainage or bleeding, Incisional warmth, Pus or odor from incision, Fever   Is the patient/caregiver able to teach back steps to recovery at home? Set small, achievable goals for return to baseline health, Rest and rebuild strength, gradually increase activity   If the patient is a current smoker, are they able to teach back resources for cessation? Not a smoker   Is the patient/caregiver able to teach back the hierarchy of who to call/visit for symptoms/problems? PCP, Specialist, Home  health nurse, Urgent Care, ED, 911 Yes   Week 1 call completed? Yes   Graduated Yes   Is the patient interested in additional calls from an ambulatory ? No   Would this patient benefit from a Referral to [facility] Social Work? No   Wrap up additional comments was told by doctor no need to f/u with him. Is seeing his pcp for f/u   Call end time 1450            RAYMOND CHUN - Registered Nurse

## 2025-06-04 ENCOUNTER — OFFICE VISIT (OUTPATIENT)
Dept: WOUND CARE | Facility: HOSPITAL | Age: 50
End: 2025-06-04
Payer: OTHER GOVERNMENT

## 2025-06-04 PROCEDURE — 97605 NEG PRS WND THER DME<=50SQCM: CPT

## 2025-06-06 ENCOUNTER — OFFICE VISIT (OUTPATIENT)
Dept: WOUND CARE | Facility: HOSPITAL | Age: 50
End: 2025-06-06
Payer: OTHER GOVERNMENT

## 2025-06-06 DIAGNOSIS — E11.9 TYPE 2 DIABETES MELLITUS WITHOUT COMPLICATION, UNSPECIFIED WHETHER LONG TERM INSULIN USE: Primary | ICD-10-CM

## 2025-06-06 DIAGNOSIS — L76.34 POSTPROCEDURAL SEROMA OF SKIN AND SUBCUTANEOUS TISSUE FOLLOWING OTHER PROCEDURE: ICD-10-CM

## 2025-06-06 DIAGNOSIS — S31.105S UNSPECIFIED OPEN WOUND OF ABDOMINAL WALL, PERIUMBILIC REGION WITHOUT PENETRATION INTO PERITONEAL CAVITY, SEQUELA: ICD-10-CM

## 2025-06-06 DIAGNOSIS — T81.89XA OTHER COMPLICATIONS OF PROCEDURES, NOT ELSEWHERE CLASSIFIED, INITIAL ENCOUNTER: ICD-10-CM

## 2025-06-09 ENCOUNTER — OFFICE VISIT (OUTPATIENT)
Dept: WOUND CARE | Facility: HOSPITAL | Age: 50
End: 2025-06-09
Payer: OTHER GOVERNMENT

## 2025-06-09 PROCEDURE — 97605 NEG PRS WND THER DME<=50SQCM: CPT

## 2025-06-11 ENCOUNTER — OFFICE VISIT (OUTPATIENT)
Dept: WOUND CARE | Facility: HOSPITAL | Age: 50
End: 2025-06-11
Payer: OTHER GOVERNMENT

## 2025-06-11 PROCEDURE — 97605 NEG PRS WND THER DME<=50SQCM: CPT

## 2025-06-13 ENCOUNTER — OFFICE VISIT (OUTPATIENT)
Dept: WOUND CARE | Facility: HOSPITAL | Age: 50
End: 2025-06-13
Payer: OTHER GOVERNMENT

## 2025-06-16 ENCOUNTER — OFFICE VISIT (OUTPATIENT)
Dept: WOUND CARE | Facility: HOSPITAL | Age: 50
End: 2025-06-16
Payer: OTHER GOVERNMENT

## 2025-06-16 PROCEDURE — 97605 NEG PRS WND THER DME<=50SQCM: CPT

## 2025-06-18 ENCOUNTER — OFFICE VISIT (OUTPATIENT)
Dept: WOUND CARE | Facility: HOSPITAL | Age: 50
End: 2025-06-18
Payer: OTHER GOVERNMENT

## 2025-06-18 PROCEDURE — 97605 NEG PRS WND THER DME<=50SQCM: CPT

## 2025-06-23 ENCOUNTER — OFFICE VISIT (OUTPATIENT)
Dept: WOUND CARE | Facility: HOSPITAL | Age: 50
End: 2025-06-23
Payer: OTHER GOVERNMENT

## 2025-06-23 PROCEDURE — 97605 NEG PRS WND THER DME<=50SQCM: CPT

## 2025-06-27 ENCOUNTER — OFFICE VISIT (OUTPATIENT)
Dept: WOUND CARE | Facility: HOSPITAL | Age: 50
End: 2025-06-27
Payer: OTHER GOVERNMENT

## 2025-06-27 PROCEDURE — 97605 NEG PRS WND THER DME<=50SQCM: CPT

## 2025-07-02 ENCOUNTER — OFFICE VISIT (OUTPATIENT)
Dept: WOUND CARE | Facility: HOSPITAL | Age: 50
End: 2025-07-02
Payer: OTHER GOVERNMENT

## 2025-07-02 PROCEDURE — 97605 NEG PRS WND THER DME<=50SQCM: CPT

## 2025-07-11 ENCOUNTER — OFFICE VISIT (OUTPATIENT)
Dept: WOUND CARE | Facility: HOSPITAL | Age: 50
End: 2025-07-11
Payer: OTHER GOVERNMENT

## 2025-07-18 ENCOUNTER — OFFICE VISIT (OUTPATIENT)
Dept: WOUND CARE | Facility: HOSPITAL | Age: 50
End: 2025-07-18
Payer: OTHER GOVERNMENT

## 2025-07-18 PROCEDURE — 97605 NEG PRS WND THER DME<=50SQCM: CPT

## 2025-07-25 ENCOUNTER — OFFICE VISIT (OUTPATIENT)
Dept: WOUND CARE | Facility: HOSPITAL | Age: 50
End: 2025-07-25
Payer: OTHER GOVERNMENT

## 2025-08-04 ENCOUNTER — OFFICE VISIT (OUTPATIENT)
Dept: WOUND CARE | Facility: HOSPITAL | Age: 50
End: 2025-08-04
Payer: OTHER GOVERNMENT

## 2025-08-12 ENCOUNTER — LAB REQUISITION (OUTPATIENT)
Dept: LAB | Facility: HOSPITAL | Age: 50
End: 2025-08-12
Payer: OTHER GOVERNMENT

## 2025-08-12 ENCOUNTER — OFFICE VISIT (OUTPATIENT)
Dept: WOUND CARE | Facility: HOSPITAL | Age: 50
End: 2025-08-12
Payer: OTHER GOVERNMENT

## 2025-08-12 DIAGNOSIS — S31.105S UNSPECIFIED OPEN WOUND OF ABDOMINAL WALL, PERIUMBILIC REGION WITHOUT PENETRATION INTO PERITONEAL CAVITY, SEQUELA: ICD-10-CM

## 2025-08-12 PROCEDURE — 87070 CULTURE OTHR SPECIMN AEROBIC: CPT | Performed by: FAMILY MEDICINE

## 2025-08-12 PROCEDURE — 87205 SMEAR GRAM STAIN: CPT | Performed by: FAMILY MEDICINE

## 2025-08-12 PROCEDURE — 87075 CULTR BACTERIA EXCEPT BLOOD: CPT | Performed by: FAMILY MEDICINE

## 2025-08-12 PROCEDURE — 87176 TISSUE HOMOGENIZATION CULTR: CPT | Performed by: FAMILY MEDICINE

## 2025-08-15 LAB
BACTERIA SPEC AEROBE CULT: NORMAL
GRAM STN SPEC: NORMAL
GRAM STN SPEC: NORMAL

## 2025-08-17 LAB — BACTERIA SPEC ANAEROBE CULT: NORMAL

## 2025-08-18 ENCOUNTER — OFFICE VISIT (OUTPATIENT)
Dept: WOUND CARE | Facility: HOSPITAL | Age: 50
End: 2025-08-18
Payer: OTHER GOVERNMENT

## 2025-08-19 ENCOUNTER — TRANSCRIBE ORDERS (OUTPATIENT)
Dept: ADMINISTRATIVE | Facility: HOSPITAL | Age: 50
End: 2025-08-19
Payer: OTHER GOVERNMENT

## 2025-08-19 DIAGNOSIS — S31.105S UNSPECIFIED OPEN WOUND OF ABDOMINAL WALL, PERIUMBILIC REGION WITHOUT PENETRATION INTO PERITONEAL CAVITY, SEQUELA: Primary | ICD-10-CM

## 2025-08-19 DIAGNOSIS — S31.104S: ICD-10-CM

## 2025-08-20 ENCOUNTER — HOSPITAL ENCOUNTER (OUTPATIENT)
Dept: ULTRASOUND IMAGING | Facility: HOSPITAL | Age: 50
Discharge: HOME | End: 2025-08-20
Payer: OTHER GOVERNMENT

## 2025-08-20 ENCOUNTER — HOSPITAL ENCOUNTER (OUTPATIENT)
Dept: ULTRASOUND IMAGING | Facility: HOSPITAL | Age: 50
Discharge: HOME | End: 2025-08-20
Admitting: FAMILY MEDICINE
Payer: OTHER GOVERNMENT

## 2025-08-20 DIAGNOSIS — S31.105S UNSPECIFIED OPEN WOUND OF ABDOMINAL WALL, PERIUMBILIC REGION WITHOUT PENETRATION INTO PERITONEAL CAVITY, SEQUELA: ICD-10-CM

## 2025-08-20 PROCEDURE — 76705 ECHO EXAM OF ABDOMEN: CPT

## 2025-08-25 ENCOUNTER — OFFICE VISIT (OUTPATIENT)
Dept: WOUND CARE | Facility: HOSPITAL | Age: 50
End: 2025-08-25
Payer: OTHER GOVERNMENT

## (undated) DEVICE — 3M™ IOBAN™ 2 ANTIMICROBIAL INCISE DRAPE 6650EZ: Brand: IOBAN™ 2

## (undated) DEVICE — DRP C/ARM W/BAND W/CLIPS 41X74IN

## (undated) DEVICE — PENCL ES MEGADINE EZ/CLEAN BUTN W/HOLSTR 10FT

## (undated) DEVICE — SAFEOP 3 THORACOLUMBAR ELECTRODE KIT - EMG/SSEP - NEEDLE: Brand: SAFEOP 3

## (undated) DEVICE — PAD MINOR UNIVERSAL: Brand: MEDLINE INDUSTRIES, INC.

## (undated) DEVICE — CANNULA SURG RIGID 7X75 MM DISP

## (undated) DEVICE — GLV SURG TRIUMPH MICRO PF LTX 7.5 STRL

## (undated) DEVICE — SUT SILK 3/0 SUTUPAK TIES 24IN SA74H

## (undated) DEVICE — 1010 S-DRAPE TOWEL DRAPE 10/BX: Brand: STERI-DRAPE™

## (undated) DEVICE — BIPOLAR SEALER 23-113-1 AQM 2.3 OM NEURO: Brand: AQUAMANTYS ®

## (undated) DEVICE — PACK,SHOULDER,DRAPE,POUCH: Brand: MEDLINE

## (undated) DEVICE — GLV SURG SENSICARE W/ALOE PF LF 6.5 STRL

## (undated) DEVICE — 4-PORT MANIFOLD: Brand: NEPTUNE 2

## (undated) DEVICE — GLV SURG BIOGEL LTX PF 7 1/2

## (undated) DEVICE — GLV SURG SENSICARE W/ALOE PF LF 7 STRL

## (undated) DEVICE — PK TURNOVER RM ADV

## (undated) DEVICE — HANDPIECE SET WITH HIGH FLOW TIP AND SUCTION TUBE: Brand: INTERPULSE

## (undated) DEVICE — PENCIL CAUT PUSH BTTN W HOLSTER AND CRD 15FT

## (undated) DEVICE — POOLE SUCTION INSTRUMENT WITH REMOVABLE SHEATH: Brand: POOLE

## (undated) DEVICE — SUT SILK 2/0 SH 75CM 30IN BLK C016D

## (undated) DEVICE — KT DRN EVAC WND PVC PCH WTROC RND 19F400

## (undated) DEVICE — SOL BETADINE 4OZ

## (undated) DEVICE — SUT ETHLN 3/0 FS1 30IN 669H

## (undated) DEVICE — SPNG LAP PREWSH SFTPK 18X18IN STRL PK/5

## (undated) DEVICE — DRAPE,UTILITY,TAPE,15X26,STERILE: Brand: MEDLINE

## (undated) DEVICE — DRESSING FOAM SELF ADH 20X10 CM ABSORBENT MEPILEX BORDER

## (undated) DEVICE — STRIP CLS WND CURAD MEDI/STRIP HYPOALLERG 0.25X4IN PK/10

## (undated) DEVICE — TUBE ET 7.5MM NSL ORAL BASIC CUF INTMED MURPHY EYE RADPQ

## (undated) DEVICE — T-MAX DISPOSABLE FACE MASK 8 PER BOX

## (undated) DEVICE — 4.0 CM ACROMIOBLASTER STRAIGHT                                    BURRS, POWER/EP-1, SAGE GREEN, 8000                                    MAXIMUM RPM, PACKAGED 6 PER BOX, STERILE

## (undated) DEVICE — PACK,SET UP,NO DRAPES: Brand: MEDLINE

## (undated) DEVICE — TUBING, SUCTION, 1/4" X 12', STRAIGHT: Brand: MEDLINE

## (undated) DEVICE — KT SHLDR SUSP

## (undated) DEVICE — YANKAUER,OPEN TIP,W/O VENT,STERILE: Brand: MEDLINE INDUSTRIES, INC.

## (undated) DEVICE — CVR UNIV C/ARM

## (undated) DEVICE — IMMOB SHLDR W/FOAM STRAP XLG

## (undated) DEVICE — GOWN,SIRUS,NONRNF,SETINSLV,XL,20/CS: Brand: MEDLINE

## (undated) DEVICE — SUTURE VCRL SZ 3-0 L27IN ABSRB UD L26MM SH 1/2 CIR J416H

## (undated) DEVICE — PK SHLDR 30

## (undated) DEVICE — IDENTITI ALIF SA LATERAL SCREW, Ø5 X 25 MM
Type: IMPLANTABLE DEVICE | Site: SPINE LUMBAR | Status: NON-FUNCTIONAL
Brand: IDENTITI
Removed: 2025-04-30

## (undated) DEVICE — DUAL CUT SAGITTAL BLADE

## (undated) DEVICE — SUTURE ETHLN SZ 3-0 L18IN NONABSORBABLE BLK FS-1 L24MM 3/8 663H

## (undated) DEVICE — Device

## (undated) DEVICE — [TOMCAT CUTTER, ARTHROSCOPIC SHAVER BLADE,  DO NOT RESTERILIZE,  DO NOT USE IF PACKAGE IS DAMAGED,  KEEP DRY,  KEEP AWAY FROM SUNLIGHT]: Brand: FORMULA

## (undated) DEVICE — DRP C/ARMOR

## (undated) DEVICE — STERILE POLYISOPRENE POWDER-FREE SURGICAL GLOVES: Brand: PROTEXIS

## (undated) DEVICE — GLV SURG SENSICARE W/ALOE PF LF 7.5 STRL

## (undated) DEVICE — TUBING PMP IRRIG GOFLO

## (undated) DEVICE — SPONGE,LAP,12"X12",XR,ST,5/PK,40PK/CS: Brand: MEDLINE

## (undated) DEVICE — SOL HYDROGEN PEROX 3P 32OZ

## (undated) DEVICE — ELECTRD BLD EZ CLN STD 6.5IN

## (undated) DEVICE — PAD,ABDOMINAL,8"X10",ST,LF: Brand: MEDLINE

## (undated) DEVICE — DRSNG WND VAC GRANUFOAM SENSATRAC SM

## (undated) DEVICE — SUTURE MAXBRAID SZ 2 NONABSORBABLE BLU C-7 TAPR NDL 900335

## (undated) DEVICE — 1LYRTR 16FR10ML100%SIL UMS SNP: Brand: MEDLINE INDUSTRIES, INC.

## (undated) DEVICE — DRSNG SURESITE WNDW 4X4.5

## (undated) DEVICE — STRAP TRAC ARM TRAPS FOR SHLDR SUSP

## (undated) DEVICE — TUBING, SUCTION, 1/4" X 20', STRAIGHT: Brand: MEDLINE INDUSTRIES, INC.

## (undated) DEVICE — APPL CHLORAPREP HI/LITE 26ML ORNG

## (undated) DEVICE — BLADE LARYNSCP HNDL MAC 3 DISP CURAVIEW LED

## (undated) DEVICE — GLV SURG DERMASSURE GRN LF PF 8.0

## (undated) DEVICE — CANNULA ARTHSCP L7CM ID575MM CRYS SMOOTH W OBT

## (undated) DEVICE — SHEET,DRAPE,53X77,STERILE: Brand: MEDLINE

## (undated) DEVICE — ANTIBACTERIAL UNDYED BRAIDED (POLYGLACTIN 910), SYNTHETIC ABSORBABLE SUTURE: Brand: COATED VICRYL

## (undated) DEVICE — TBG ARTHRO FLOWSTEADY/ST DISP

## (undated) DEVICE — SUT PROLN 5/0 C1 DA 24IN 8725H

## (undated) DEVICE — SYS CLS SKIN PREMIERPRO EXOFINFUSION 22CM

## (undated) DEVICE — SUT SILK 0 SUTUPAK TIES 24IN SA76G

## (undated) DEVICE — THE STERILE THE STERIS STERILE CAMERA HANDLE COVERS ARE DESIGNED FOR HARMONYAIR 4K CAMERA MODULE, AND PROVIDE STERILE CONTROL THAT ALLOW FOR INCREASING AND DECREASING ILLUMINATION THROUGH SEVEN INTENSITY LEVELS.

## (undated) DEVICE — KT CANSTR VAC WND W/ISOLYSER SENSATRAC 500CC 10CS

## (undated) DEVICE — SUTURE PDS II SZ 1 L27IN ABSRB VLT CT L40MM 1/2 CIR TAPR Z353H

## (undated) DEVICE — PK SPINE POST 30

## (undated) DEVICE — Z INACTIVE OBSOLETE PER MEDTRONIC BIT DRILL 2.4MM

## (undated) DEVICE — Z INACTIVE USE 2660664 SOLUTION IRRIG 3000ML 0.9% SOD CHL USP UROMATIC PLAS CONT

## (undated) DEVICE — SOLUTION IV IRRIG POUR BRL 0.9% SODIUM CHL 2F7124

## (undated) DEVICE — SUT MNCRYL 4/0 PS2 27IN UD MCP426H

## (undated) DEVICE — OPTIFOAM GENTLE SA, POSTOP, 4X8: Brand: MEDLINE

## (undated) DEVICE — 3M™ STERI-DRAPE™ U-DRAPE 1015: Brand: STERI-DRAPE™

## (undated) DEVICE — ADHS LIQ MASTISOL 2/3ML

## (undated) DEVICE — PROB ABLAT SERFAS ENERGY 90S 3.5MM

## (undated) DEVICE — SPONGE,DISSECTOR,K,XRAY,9/16"X1/4",STRL: Brand: MEDLINE

## (undated) DEVICE — PREVENA PEEL & PLACE SYSTEM KIT- 13 CM: Brand: PREVENA™ PEEL & PLACE™

## (undated) DEVICE — SUPER TURBOVAC 90 WITH INTEGRATED FINGER SWITCHES IFS: Brand: COBLATION

## (undated) DEVICE — SUTURE PDS II SZ 1 L54IN ABSRB VLT L65MM TP-1 1/2 CIR Z879G

## (undated) DEVICE — SUT SILK 2/0 SUTUPAK TIES 24IN SA75H

## (undated) DEVICE — INTENDED FOR TISSUE SEPARATION, AND OTHER PROCEDURES THAT REQUIRE A SHARP SURGICAL BLADE TO PUNCTURE OR CUT.: Brand: BARD-PARKER ®  SAFETY SCALPED

## (undated) DEVICE — TRAP FLD MINIVAC MEGADYNE 100ML

## (undated) DEVICE — SHOULDER CDS

## (undated) DEVICE — SUT PDS 0 CTX 36IN VIO PDP370T

## (undated) DEVICE — TOWEL,OR,DSP,ST,BLUE,STD,4/PK,20PK/CS: Brand: MEDLINE

## (undated) DEVICE — 4.5 MM INCISOR PLUS STRAIGHT                                    BLADES, POWER/EP-1, VIOLET, PACKAGED                                    6 PER BOX, STERILE

## (undated) DEVICE — FLUID CONTROL SHOULDER PACK: Brand: CONVERTORS

## (undated) DEVICE — SUCTION MAT (LOW PROFILE), 50X34: Brand: NEPTUNE

## (undated) DEVICE — CANNULA THREADED FLEX 8.0 X 72MM: Brand: CLEAR-TRAC

## (undated) DEVICE — GLOVE SURG SZ 75 CRM LTX FREE POLYISOPRENE POLYMER BEAD ANTI

## (undated) DEVICE — SUT SILK 4/0 SUTUPAK TIES 24IN SA73H

## (undated) DEVICE — GLV SURG SENSICARE PI ORTHO SZ8 LF STRL

## (undated) DEVICE — CATH IV ANGIO FEP 12G 3IN LTBLU 10PK

## (undated) DEVICE — BUR BRL FORMLA 6FLUT 4MM

## (undated) DEVICE — SUTURE PDS + SZ 0 L27IN ABSRB VLT L36MM CT 1 1 2 CIR PDP340H

## (undated) DEVICE — ARM SLING II: Brand: DEROYAL

## (undated) DEVICE — CLTH CLENS READYCLEANSE PERI CARE PK/5

## (undated) DEVICE — DRESSING,GAUZE,XEROFORM,CURAD,5"X9",ST: Brand: CURAD

## (undated) DEVICE — DRSNG GZ CURAD XEROFORM NONADHS 5X9IN STRL

## (undated) DEVICE — SYSTEM SKIN CLSR 22CM DERMBND PRINEO

## (undated) DEVICE — SPNG GZ WOVN 4X4IN 12PLY 10/BX STRL

## (undated) DEVICE — GLOVE SURG SZ 7 L12IN FNGR THK79MIL GRN LTX FREE

## (undated) DEVICE — THE STERILE LIGHT HANDLE COVER IS USED WITH STERIS SURGICAL LIGHTING AND VISUALIZATION SYSTEMS.